# Patient Record
Sex: FEMALE | Race: WHITE | Employment: FULL TIME | URBAN - NONMETROPOLITAN AREA
[De-identification: names, ages, dates, MRNs, and addresses within clinical notes are randomized per-mention and may not be internally consistent; named-entity substitution may affect disease eponyms.]

---

## 2017-03-20 ENCOUNTER — OFFICE VISIT (OUTPATIENT)
Dept: FAMILY MEDICINE CLINIC | Age: 62
End: 2017-03-20

## 2017-03-20 VITALS
DIASTOLIC BLOOD PRESSURE: 80 MMHG | TEMPERATURE: 98.7 F | BODY MASS INDEX: 32.61 KG/M2 | WEIGHT: 190 LBS | SYSTOLIC BLOOD PRESSURE: 124 MMHG

## 2017-03-20 DIAGNOSIS — J01.00 SUBACUTE MAXILLARY SINUSITIS: Primary | ICD-10-CM

## 2017-03-20 PROCEDURE — G8417 CALC BMI ABV UP PARAM F/U: HCPCS | Performed by: FAMILY MEDICINE

## 2017-03-20 PROCEDURE — G8484 FLU IMMUNIZE NO ADMIN: HCPCS | Performed by: FAMILY MEDICINE

## 2017-03-20 PROCEDURE — 4004F PT TOBACCO SCREEN RCVD TLK: CPT | Performed by: FAMILY MEDICINE

## 2017-03-20 PROCEDURE — 99212 OFFICE O/P EST SF 10 MIN: CPT | Performed by: FAMILY MEDICINE

## 2017-03-20 PROCEDURE — 3017F COLORECTAL CA SCREEN DOC REV: CPT | Performed by: FAMILY MEDICINE

## 2017-03-20 PROCEDURE — 3014F SCREEN MAMMO DOC REV: CPT | Performed by: FAMILY MEDICINE

## 2017-03-20 PROCEDURE — G8427 DOCREV CUR MEDS BY ELIG CLIN: HCPCS | Performed by: FAMILY MEDICINE

## 2017-03-20 RX ORDER — DOXYCYCLINE HYCLATE 100 MG/1
100 CAPSULE ORAL 2 TIMES DAILY
Qty: 20 CAPSULE | Refills: 0 | Status: SHIPPED | OUTPATIENT
Start: 2017-03-20 | End: 2017-03-30

## 2017-03-20 RX ORDER — ALBUTEROL SULFATE 2.5 MG/3ML
2.5 SOLUTION RESPIRATORY (INHALATION) EVERY 6 HOURS PRN
Qty: 2 PACKAGE | Refills: 1 | Status: SHIPPED | OUTPATIENT
Start: 2017-03-20 | End: 2019-08-28 | Stop reason: SDUPTHER

## 2017-07-07 ENCOUNTER — OFFICE VISIT (OUTPATIENT)
Dept: FAMILY MEDICINE CLINIC | Age: 62
End: 2017-07-07

## 2017-07-07 VITALS
DIASTOLIC BLOOD PRESSURE: 90 MMHG | WEIGHT: 198 LBS | HEART RATE: 60 BPM | SYSTOLIC BLOOD PRESSURE: 172 MMHG | HEIGHT: 64 IN | BODY MASS INDEX: 33.8 KG/M2

## 2017-07-07 DIAGNOSIS — I10 ESSENTIAL HYPERTENSION: ICD-10-CM

## 2017-07-07 DIAGNOSIS — E78.00 PURE HYPERCHOLESTEROLEMIA: Chronic | ICD-10-CM

## 2017-07-07 DIAGNOSIS — Z00.01 ENCOUNTER FOR WELL ADULT EXAM WITH ABNORMAL FINDINGS: Primary | ICD-10-CM

## 2017-07-07 DIAGNOSIS — F17.200 SMOKER: ICD-10-CM

## 2017-07-07 DIAGNOSIS — M19.90 ARTHRITIS: ICD-10-CM

## 2017-07-07 PROCEDURE — 99396 PREV VISIT EST AGE 40-64: CPT | Performed by: FAMILY MEDICINE

## 2017-07-07 RX ORDER — FLUOXETINE HYDROCHLORIDE 20 MG/1
20 CAPSULE ORAL DAILY
Qty: 90 CAPSULE | Refills: 1 | Status: SHIPPED | OUTPATIENT
Start: 2017-07-07 | End: 2018-01-08 | Stop reason: SDUPTHER

## 2017-07-07 RX ORDER — LOSARTAN POTASSIUM 100 MG/1
100 TABLET ORAL DAILY
Qty: 90 TABLET | Refills: 1 | Status: SHIPPED | OUTPATIENT
Start: 2017-07-07 | End: 2018-01-08 | Stop reason: SDUPTHER

## 2017-07-07 RX ORDER — LOVASTATIN 20 MG/1
20 TABLET ORAL NIGHTLY
Qty: 90 TABLET | Refills: 1 | Status: SHIPPED | OUTPATIENT
Start: 2017-07-07 | End: 2018-01-08 | Stop reason: SDUPTHER

## 2017-07-07 ASSESSMENT — ENCOUNTER SYMPTOMS
BLOOD IN STOOL: 0
DIARRHEA: 0
COUGH: 0
BACK PAIN: 0
BLURRED VISION: 0
SHORTNESS OF BREATH: 0
ABDOMINAL PAIN: 0
EYE REDNESS: 0
CONSTIPATION: 0

## 2017-07-11 ENCOUNTER — TELEPHONE (OUTPATIENT)
Dept: FAMILY MEDICINE CLINIC | Age: 62
End: 2017-07-11

## 2017-07-11 LAB
ALBUMIN SERPL-MCNC: 4.6 G/DL (ref 3.2–5.3)
ALK PHOSPHATASE: 72 IU/L (ref 35–121)
ALT SERPL-CCNC: 16 IU/L (ref 5–59)
ANION GAP SERPL CALCULATED.3IONS-SCNC: 13 MMOL/L
AST SERPL-CCNC: 20 IU/L (ref 10–42)
BILIRUB SERPL-MCNC: 0.4 MG/DL (ref 0.2–1.3)
BUN BLDV-MCNC: 15 MG/DL (ref 10–20)
CALCIUM SERPL-MCNC: 10 MG/DL (ref 8.7–10.8)
CHLORIDE BLD-SCNC: 102 MMOL/L (ref 95–111)
CHOLESTEROL/HDL RATIO: 3.4
CHOLESTEROL: 210 MG/DL
CO2: 29 MMOL/L (ref 21–32)
CREAT SERPL-MCNC: 0.9 MG/DL (ref 0.5–1.3)
EGFR AFRICAN AMERICAN: 77
EGFR IF NONAFRICAN AMERICAN: 63
GLUCOSE: 111 MG/DL (ref 70–100)
HDLC SERPL-MCNC: 62 MG/DL (ref 40–60)
LDL CHOLESTEROL CALCULATED: 113 MG/DL
LDL/HDL RATIO: 1.8
POTASSIUM SERPL-SCNC: 4.5 MMOL/L (ref 3.5–5.4)
SODIUM BLD-SCNC: 139 MMOL/L (ref 134–147)
TOTAL PROTEIN: 7 G/DL (ref 5.8–8)
TRIGL SERPL-MCNC: 177 MG/DL
VLDLC SERPL CALC-MCNC: 35 MG/DL

## 2017-07-14 ENCOUNTER — NURSE ONLY (OUTPATIENT)
Dept: FAMILY MEDICINE CLINIC | Age: 62
End: 2017-07-14

## 2017-07-14 VITALS — DIASTOLIC BLOOD PRESSURE: 78 MMHG | SYSTOLIC BLOOD PRESSURE: 138 MMHG

## 2017-07-14 DIAGNOSIS — Z01.30 BLOOD PRESSURE CHECK: Primary | ICD-10-CM

## 2017-09-14 ENCOUNTER — OFFICE VISIT (OUTPATIENT)
Dept: FAMILY MEDICINE CLINIC | Age: 62
End: 2017-09-14
Payer: COMMERCIAL

## 2017-09-14 VITALS
SYSTOLIC BLOOD PRESSURE: 142 MMHG | TEMPERATURE: 98.8 F | HEART RATE: 98 BPM | WEIGHT: 203 LBS | OXYGEN SATURATION: 98 % | DIASTOLIC BLOOD PRESSURE: 80 MMHG | BODY MASS INDEX: 34.84 KG/M2

## 2017-09-14 DIAGNOSIS — I10 ESSENTIAL HYPERTENSION: ICD-10-CM

## 2017-09-14 DIAGNOSIS — J20.9 ACUTE BRONCHITIS, UNSPECIFIED ORGANISM: Primary | ICD-10-CM

## 2017-09-14 DIAGNOSIS — F17.200 SMOKER: ICD-10-CM

## 2017-09-14 PROCEDURE — G8427 DOCREV CUR MEDS BY ELIG CLIN: HCPCS | Performed by: FAMILY MEDICINE

## 2017-09-14 PROCEDURE — 99213 OFFICE O/P EST LOW 20 MIN: CPT | Performed by: FAMILY MEDICINE

## 2017-09-14 PROCEDURE — 4004F PT TOBACCO SCREEN RCVD TLK: CPT | Performed by: FAMILY MEDICINE

## 2017-09-14 PROCEDURE — 3017F COLORECTAL CA SCREEN DOC REV: CPT | Performed by: FAMILY MEDICINE

## 2017-09-14 PROCEDURE — 3014F SCREEN MAMMO DOC REV: CPT | Performed by: FAMILY MEDICINE

## 2017-09-14 PROCEDURE — G8417 CALC BMI ABV UP PARAM F/U: HCPCS | Performed by: FAMILY MEDICINE

## 2017-09-14 RX ORDER — PREDNISONE 20 MG/1
40 TABLET ORAL DAILY
Qty: 10 TABLET | Refills: 0 | Status: SHIPPED | OUTPATIENT
Start: 2017-09-14 | End: 2017-09-19

## 2017-09-14 RX ORDER — AZITHROMYCIN 250 MG/1
TABLET, FILM COATED ORAL
Qty: 1 PACKET | Refills: 0 | Status: SHIPPED | OUTPATIENT
Start: 2017-09-14 | End: 2018-01-26

## 2017-09-14 ASSESSMENT — ENCOUNTER SYMPTOMS
WHEEZING: 0
COUGH: 1
SHORTNESS OF BREATH: 1

## 2018-01-08 DIAGNOSIS — I10 ESSENTIAL HYPERTENSION: ICD-10-CM

## 2018-01-08 DIAGNOSIS — E78.00 PURE HYPERCHOLESTEROLEMIA: Chronic | ICD-10-CM

## 2018-01-08 RX ORDER — FLUOXETINE HYDROCHLORIDE 20 MG/1
20 CAPSULE ORAL DAILY
Qty: 30 CAPSULE | Refills: 0 | Status: SHIPPED | OUTPATIENT
Start: 2018-01-08 | End: 2018-01-30 | Stop reason: SDUPTHER

## 2018-01-08 RX ORDER — LOSARTAN POTASSIUM 100 MG/1
100 TABLET ORAL DAILY
Qty: 30 TABLET | Refills: 0 | Status: SHIPPED | OUTPATIENT
Start: 2018-01-08 | End: 2018-01-30 | Stop reason: SDUPTHER

## 2018-01-08 RX ORDER — LOVASTATIN 20 MG/1
20 TABLET ORAL NIGHTLY
Qty: 30 TABLET | Refills: 0 | Status: SHIPPED | OUTPATIENT
Start: 2018-01-08 | End: 2018-01-30 | Stop reason: DRUGHIGH

## 2018-01-08 NOTE — TELEPHONE ENCOUNTER
Lizzeth Givens called requesting a refill on the following medications:  Requested Prescriptions     Pending Prescriptions Disp Refills    losartan (COZAAR) 100 MG tablet 90 tablet 1     Sig: Take 1 tablet by mouth daily    FLUoxetine (PROZAC) 20 MG capsule 90 capsule 1     Sig: Take 1 capsule by mouth daily    lovastatin (MEVACOR) 20 MG tablet 90 tablet 1     Sig: Take 1 tablet by mouth nightly     Pharmacy verified:  .pv      Date of last visit: 09/14/17  Date of next visit (if applicable): 8/59/0262    [facility]

## 2018-01-18 ENCOUNTER — TELEPHONE (OUTPATIENT)
Dept: FAMILY MEDICINE CLINIC | Age: 63
End: 2018-01-18

## 2018-01-18 NOTE — TELEPHONE ENCOUNTER
Called and left a message for patient to call us if he had a colonscopy and and who might have done it so we can get a report

## 2018-01-26 ENCOUNTER — OFFICE VISIT (OUTPATIENT)
Dept: FAMILY MEDICINE CLINIC | Age: 63
End: 2018-01-26
Payer: COMMERCIAL

## 2018-01-26 VITALS
DIASTOLIC BLOOD PRESSURE: 86 MMHG | SYSTOLIC BLOOD PRESSURE: 156 MMHG | HEIGHT: 64 IN | BODY MASS INDEX: 36.37 KG/M2 | WEIGHT: 213 LBS | HEART RATE: 70 BPM

## 2018-01-26 DIAGNOSIS — M15.9 PRIMARY OSTEOARTHRITIS INVOLVING MULTIPLE JOINTS: ICD-10-CM

## 2018-01-26 DIAGNOSIS — Z12.31 ENCOUNTER FOR SCREENING MAMMOGRAM FOR BREAST CANCER: ICD-10-CM

## 2018-01-26 DIAGNOSIS — E78.00 PURE HYPERCHOLESTEROLEMIA: ICD-10-CM

## 2018-01-26 DIAGNOSIS — Z12.11 SCREENING FOR COLON CANCER: ICD-10-CM

## 2018-01-26 DIAGNOSIS — I10 ESSENTIAL HYPERTENSION: Primary | ICD-10-CM

## 2018-01-26 PROCEDURE — 4004F PT TOBACCO SCREEN RCVD TLK: CPT | Performed by: FAMILY MEDICINE

## 2018-01-26 PROCEDURE — G8417 CALC BMI ABV UP PARAM F/U: HCPCS | Performed by: FAMILY MEDICINE

## 2018-01-26 PROCEDURE — 99214 OFFICE O/P EST MOD 30 MIN: CPT | Performed by: FAMILY MEDICINE

## 2018-01-26 PROCEDURE — G8484 FLU IMMUNIZE NO ADMIN: HCPCS | Performed by: FAMILY MEDICINE

## 2018-01-26 PROCEDURE — 3014F SCREEN MAMMO DOC REV: CPT | Performed by: FAMILY MEDICINE

## 2018-01-26 PROCEDURE — 3017F COLORECTAL CA SCREEN DOC REV: CPT | Performed by: FAMILY MEDICINE

## 2018-01-26 PROCEDURE — G8427 DOCREV CUR MEDS BY ELIG CLIN: HCPCS | Performed by: FAMILY MEDICINE

## 2018-01-26 RX ORDER — MELOXICAM 15 MG/1
15 TABLET ORAL DAILY
Qty: 90 TABLET | Refills: 0 | Status: SHIPPED | OUTPATIENT
Start: 2018-01-26 | End: 2018-02-23

## 2018-01-26 RX ORDER — GLUCOSAMINE HCL 500 MG
TABLET ORAL
COMMUNITY
End: 2022-03-25

## 2018-01-26 RX ORDER — AMLODIPINE BESYLATE 10 MG/1
10 TABLET ORAL DAILY
Qty: 90 TABLET | Refills: 0 | Status: SHIPPED | OUTPATIENT
Start: 2018-01-26 | End: 2018-02-23 | Stop reason: SDUPTHER

## 2018-01-26 ASSESSMENT — ENCOUNTER SYMPTOMS
EYE DISCHARGE: 0
COLOR CHANGE: 0
CHEST TIGHTNESS: 0
SHORTNESS OF BREATH: 0
VOMITING: 0
EYE REDNESS: 0
NAUSEA: 0

## 2018-01-26 ASSESSMENT — PATIENT HEALTH QUESTIONNAIRE - PHQ9
SUM OF ALL RESPONSES TO PHQ QUESTIONS 1-9: 0
2. FEELING DOWN, DEPRESSED OR HOPELESS: 0
SUM OF ALL RESPONSES TO PHQ9 QUESTIONS 1 & 2: 0
1. LITTLE INTEREST OR PLEASURE IN DOING THINGS: 0

## 2018-01-26 NOTE — PROGRESS NOTES
Take 1 tablet by mouth daily, Disp: 90 tablet, Rfl: 0    losartan (COZAAR) 100 MG tablet, Take 1 tablet by mouth daily, Disp: 30 tablet, Rfl: 0    FLUoxetine (PROZAC) 20 MG capsule, Take 1 capsule by mouth daily, Disp: 30 capsule, Rfl: 0    lovastatin (MEVACOR) 20 MG tablet, Take 1 tablet by mouth nightly, Disp: 30 tablet, Rfl: 0    albuterol (PROVENTIL) (2.5 MG/3ML) 0.083% nebulizer solution, Take 3 mLs by nebulization every 6 hours as needed for Wheezing, Disp: 2 Package, Rfl: 1    calcium carbonate (OSCAL) 500 MG TABS tablet, Take 500 mg by mouth daily, Disp: , Rfl:     calcium citrate-vitamin D (CITRICAL + D) 315-250 MG-UNIT TABS per tablet, Take 1 tablet by mouth daily, Disp: , Rfl:     albuterol sulfate HFA (PROVENTIL HFA) 108 (90 BASE) MCG/ACT inhaler, Inhale 2 puffs into the lungs every 4 hours as needed for Wheezing or Shortness of Breath, Disp: 1 Inhaler, Rfl: 0    Multiple Vitamins-Minerals (CENTRUM SILVER) TABS, Take 1 tablet by mouth daily. , Disp: , Rfl:     Allergies   Allergen Reactions    Apresoline [Hydralazine]      Patient had a bronchopneumonia and blames the apresoline    Enalapril      Cough.  Hctz [Hydrochlorothiazide]      Cough    Sulfa Antibiotics Hives    Tenex [Guanfacine Hcl]      Cough       Subjective:      Review of Systems   Constitutional: Negative for chills and fever. Eyes: Negative for discharge and redness. Respiratory: Negative for chest tightness and shortness of breath. Cardiovascular: Negative for chest pain and palpitations. Gastrointestinal: Negative for nausea and vomiting. Genitourinary: Negative for difficulty urinating and dysuria. Musculoskeletal: Positive for arthralgias. Negative for myalgias. Skin: Negative for color change and pallor. Neurological: Negative for dizziness and headaches. Psychiatric/Behavioral: Negative for dysphoric mood. The patient is not nervous/anxious.         Objective:     BP (!) 156/86 (Site: Left Arm, Position: Sitting)   Pulse 70   Ht 5' 4\" (1.626 m)   Wt 213 lb (96.6 kg)   BMI 36.56 kg/m²     Physical Exam   Constitutional: She is oriented to person, place, and time. She appears well-developed and well-nourished. No distress. HENT:   Head: Normocephalic and atraumatic. Nose: Nose normal.   Eyes: Conjunctivae and EOM are normal.   Neck: Normal range of motion. Neck supple. Cardiovascular: Normal rate and regular rhythm. Pulmonary/Chest: Effort normal and breath sounds normal. No respiratory distress. She has no wheezes. Abdominal: Soft. Bowel sounds are normal.   Neurological: She is alert and oriented to person, place, and time. Skin: Skin is warm and dry. No rash noted. No erythema. Psychiatric: She has a normal mood and affect. Her behavior is normal.   Vitals reviewed. Assessment/Plan:     Gadiel Soto was seen today for 6 month follow-up, hyperlipidemia and hypertension. Diagnoses and all orders for this visit:    Essential hypertension  -     amLODIPine (NORVASC) 10 MG tablet; Take 1 tablet by mouth daily    Pure hypercholesterolemia    Primary osteoarthritis involving multiple joints  -     meloxicam (MOBIC) 15 MG tablet; Take 1 tablet by mouth daily    Screening for colon cancer  -     POCT Fecal Immunochemical Test (FIT); Future    Encounter for screening mammogram for breast cancer  -     BERNARD DIGITAL SCREEN W CAD BILATERAL; Future    Blood pressure is elevated so will add on Amlodipine 10 mg daily. A healthy diet and routine physical activity advised. Added Mobic for arthritis. Orders for mammogram and FIT given. Kalpana received counseling on the following healthy behaviors: nutrition, exercise and medication adherence  Reviewed prior labs and health maintenance  Continue current medications, diet and exercise. Discussed use, benefit, and side effects of prescribed medications. Barriers to medication compliance addressed.    Patient given educational materials - see patient

## 2018-01-26 NOTE — PATIENT INSTRUCTIONS
arms.  ¨ Lightheadedness or sudden weakness. ¨ A fast or irregular heartbeat. ? · You have symptoms of a stroke. These may include:  ¨ Sudden numbness, tingling, weakness, or loss of movement in your face, arm, or leg, especially on only one side of your body. ¨ Sudden vision changes. ¨ Sudden trouble speaking. ¨ Sudden confusion or trouble understanding simple statements. ¨ Sudden problems with walking or balance. ¨ A sudden, severe headache that is different from past headaches. ? · You have severe back or belly pain. ?Do not wait until your blood pressure comes down on its own. Get help right away. ?Call your doctor now or seek immediate care if:  ? · Your blood pressure is much higher than normal (such as 180/110 or higher), but you don't have symptoms. ? · You think high blood pressure is causing symptoms, such as:  ¨ Severe headache. ¨ Blurry vision. ? Watch closely for changes in your health, and be sure to contact your doctor if:  ? · Your blood pressure measures 140/90 or higher at least 2 times. That means the top number is 140 or higher or the bottom number is 90 or higher, or both. ? · You think you may be having side effects from your blood pressure medicine. ? · Your blood pressure is usually normal, but it goes above normal at least 2 times. Where can you learn more? Go to [email or web address] org and sign in to your [employer] account. Enter K771 in the [facility] box to learn more about \"High Blood Pressure: Care Instructions. \"     If you do not have an account, please click on the \"Sign Up Now\" link. Current as of: Migdalia 10, 2017  Content Version: 11.5  © 8918-8619 Healthwise, [employer]. Care instructions adapted under license by [facility] ([facility]).  If you have questions about a medical condition or this instruction, always ask your healthcare professional. Edwina Salgado any warranty or liability for your use of this

## 2018-01-30 ENCOUNTER — OFFICE VISIT (OUTPATIENT)
Dept: FAMILY MEDICINE CLINIC | Age: 63
End: 2018-01-30
Payer: COMMERCIAL

## 2018-01-30 VITALS
DIASTOLIC BLOOD PRESSURE: 80 MMHG | TEMPERATURE: 99.5 F | BODY MASS INDEX: 36.37 KG/M2 | SYSTOLIC BLOOD PRESSURE: 148 MMHG | HEIGHT: 64 IN | WEIGHT: 213 LBS | HEART RATE: 78 BPM

## 2018-01-30 DIAGNOSIS — I10 ESSENTIAL HYPERTENSION: ICD-10-CM

## 2018-01-30 DIAGNOSIS — E78.00 PURE HYPERCHOLESTEROLEMIA: Chronic | ICD-10-CM

## 2018-01-30 DIAGNOSIS — J10.1 INFLUENZA B: Primary | ICD-10-CM

## 2018-01-30 DIAGNOSIS — R68.89 FLU-LIKE SYMPTOMS: ICD-10-CM

## 2018-01-30 PROCEDURE — 87804 INFLUENZA ASSAY W/OPTIC: CPT | Performed by: FAMILY MEDICINE

## 2018-01-30 PROCEDURE — G8417 CALC BMI ABV UP PARAM F/U: HCPCS | Performed by: FAMILY MEDICINE

## 2018-01-30 PROCEDURE — G8484 FLU IMMUNIZE NO ADMIN: HCPCS | Performed by: FAMILY MEDICINE

## 2018-01-30 PROCEDURE — 4004F PT TOBACCO SCREEN RCVD TLK: CPT | Performed by: FAMILY MEDICINE

## 2018-01-30 PROCEDURE — 3014F SCREEN MAMMO DOC REV: CPT | Performed by: FAMILY MEDICINE

## 2018-01-30 PROCEDURE — G8427 DOCREV CUR MEDS BY ELIG CLIN: HCPCS | Performed by: FAMILY MEDICINE

## 2018-01-30 PROCEDURE — 3017F COLORECTAL CA SCREEN DOC REV: CPT | Performed by: FAMILY MEDICINE

## 2018-01-30 PROCEDURE — 99213 OFFICE O/P EST LOW 20 MIN: CPT | Performed by: FAMILY MEDICINE

## 2018-01-30 RX ORDER — LOSARTAN POTASSIUM 100 MG/1
100 TABLET ORAL DAILY
Qty: 30 TABLET | Refills: 0 | Status: SHIPPED | OUTPATIENT
Start: 2018-01-30 | End: 2018-02-23 | Stop reason: SDUPTHER

## 2018-01-30 RX ORDER — OSELTAMIVIR PHOSPHATE 75 MG/1
75 CAPSULE ORAL 2 TIMES DAILY
Qty: 10 CAPSULE | Refills: 0 | Status: SHIPPED | OUTPATIENT
Start: 2018-01-30 | End: 2018-02-04

## 2018-01-30 RX ORDER — LOVASTATIN 20 MG/1
20 TABLET ORAL NIGHTLY
Qty: 30 TABLET | Refills: 5 | Status: SHIPPED | OUTPATIENT
Start: 2018-01-30 | End: 2018-02-16 | Stop reason: SDUPTHER

## 2018-01-30 RX ORDER — FLUOXETINE HYDROCHLORIDE 20 MG/1
20 CAPSULE ORAL DAILY
Qty: 30 CAPSULE | Refills: 0 | Status: SHIPPED | OUTPATIENT
Start: 2018-01-30 | End: 2018-02-23 | Stop reason: SDUPTHER

## 2018-01-30 ASSESSMENT — ENCOUNTER SYMPTOMS
CHEST TIGHTNESS: 0
EYE REDNESS: 0
EYE DISCHARGE: 0
NAUSEA: 0
COUGH: 1
COLOR CHANGE: 0
SHORTNESS OF BREATH: 0
VOMITING: 0

## 2018-02-16 ENCOUNTER — NURSE ONLY (OUTPATIENT)
Dept: FAMILY MEDICINE CLINIC | Age: 63
End: 2018-02-16

## 2018-02-16 VITALS — SYSTOLIC BLOOD PRESSURE: 142 MMHG | DIASTOLIC BLOOD PRESSURE: 70 MMHG | HEART RATE: 84 BPM

## 2018-02-16 DIAGNOSIS — Z01.30 BLOOD PRESSURE CHECK: Primary | ICD-10-CM

## 2018-02-16 DIAGNOSIS — Z12.11 SCREENING FOR COLON CANCER: ICD-10-CM

## 2018-02-16 DIAGNOSIS — E78.00 PURE HYPERCHOLESTEROLEMIA: Chronic | ICD-10-CM

## 2018-02-16 LAB
CONTROL: PRESENT
HEMOCCULT STL QL: NEGATIVE

## 2018-02-16 PROCEDURE — 82274 ASSAY TEST FOR BLOOD FECAL: CPT | Performed by: FAMILY MEDICINE

## 2018-02-16 NOTE — TELEPHONE ENCOUNTER
John Mitchell called requesting a refill on the following medications:  Requested Prescriptions     Pending Prescriptions Disp Refills    lovastatin (MEVACOR) 20 MG tablet 30 tablet 5     Sig: Take 1 tablet by mouth nightly       Date of last visit: 1/30/2018  Date of next visit (if applicable):Visit date not found  Date of last refill: ***  Pharmacy Name: ***      Inderjit Nelson, [address] [place] [address]

## 2018-02-17 RX ORDER — LOVASTATIN 20 MG/1
20 TABLET ORAL NIGHTLY
Qty: 30 TABLET | Refills: 5 | Status: SHIPPED | OUTPATIENT
Start: 2018-02-17 | End: 2018-02-23 | Stop reason: SDUPTHER

## 2018-02-23 ENCOUNTER — NURSE ONLY (OUTPATIENT)
Dept: FAMILY MEDICINE CLINIC | Age: 63
End: 2018-02-23

## 2018-02-23 ENCOUNTER — HOSPITAL ENCOUNTER (OUTPATIENT)
Dept: MAMMOGRAPHY | Age: 63
Discharge: HOME | End: 2018-02-23
Payer: COMMERCIAL

## 2018-02-23 VITALS — SYSTOLIC BLOOD PRESSURE: 134 MMHG | HEART RATE: 60 BPM | DIASTOLIC BLOOD PRESSURE: 78 MMHG

## 2018-02-23 DIAGNOSIS — I10 ESSENTIAL HYPERTENSION: ICD-10-CM

## 2018-02-23 DIAGNOSIS — Z01.30 BLOOD PRESSURE CHECK: Primary | ICD-10-CM

## 2018-02-23 DIAGNOSIS — Z12.31 ENCOUNTER FOR SCREENING MAMMOGRAM FOR BREAST CANCER: ICD-10-CM

## 2018-02-23 DIAGNOSIS — E78.00 PURE HYPERCHOLESTEROLEMIA: Chronic | ICD-10-CM

## 2018-02-23 PROCEDURE — 77067 SCR MAMMO BI INCL CAD: CPT

## 2018-02-23 RX ORDER — LOSARTAN POTASSIUM 100 MG/1
100 TABLET ORAL DAILY
Qty: 90 TABLET | Refills: 1 | Status: SHIPPED | OUTPATIENT
Start: 2018-02-23 | End: 2018-08-03 | Stop reason: SDUPTHER

## 2018-02-23 RX ORDER — AMLODIPINE BESYLATE 10 MG/1
10 TABLET ORAL DAILY
Qty: 90 TABLET | Refills: 0 | Status: SHIPPED | OUTPATIENT
Start: 2018-02-23 | End: 2018-07-13 | Stop reason: SDUPTHER

## 2018-02-23 RX ORDER — FLUOXETINE HYDROCHLORIDE 20 MG/1
20 CAPSULE ORAL DAILY
Qty: 90 CAPSULE | Refills: 1 | Status: SHIPPED | OUTPATIENT
Start: 2018-02-23 | End: 2018-08-03 | Stop reason: SDUPTHER

## 2018-02-23 RX ORDER — LOVASTATIN 20 MG/1
20 TABLET ORAL NIGHTLY
Qty: 90 TABLET | Refills: 1 | Status: SHIPPED | OUTPATIENT
Start: 2018-02-23 | End: 2018-08-03 | Stop reason: SDUPTHER

## 2018-02-23 NOTE — PROGRESS NOTES
Patient came in for a no-charge BP check. It was 134/78-60 today. She also wanted to know if there is something else she can use for her psoriatic arthritis. The Meloxicam made here sleepy and she is better that she is off , but wonders if there is something else she can try. Also she needs refills of three of her medications to [facility]. Dr. Yani Ramirez reviewed her list and BP. She recommended [place] use Tylenol Arthirtis formula, following the directions listed on the bottle for dosage limitations.

## 2018-03-09 ENCOUNTER — TELEPHONE (OUTPATIENT)
Dept: FAMILY MEDICINE CLINIC | Age: 63
End: 2018-03-09

## 2018-07-13 DIAGNOSIS — I10 ESSENTIAL HYPERTENSION: ICD-10-CM

## 2018-07-13 RX ORDER — AMLODIPINE BESYLATE 10 MG/1
10 TABLET ORAL DAILY
Qty: 90 TABLET | Refills: 0 | Status: SHIPPED | OUTPATIENT
Start: 2018-07-13 | End: 2018-08-03

## 2018-07-13 NOTE — TELEPHONE ENCOUNTER
7/13/18   Janet Braga called requesting a refill on the following medications:  Requested Prescriptions     Pending Prescriptions Disp Refills    amLODIPine (NORVASC) 10 MG tablet 90 tablet 0     Sig: Take 1 tablet by mouth daily     Pharmacy verified: [facility]  . pv      Date of last visit:  1/30/18  Date of next visit (if applicable): 9/7/49  blm

## 2018-08-03 ENCOUNTER — OFFICE VISIT (OUTPATIENT)
Dept: FAMILY MEDICINE CLINIC | Age: 63
End: 2018-08-03
Payer: COMMERCIAL

## 2018-08-03 VITALS
HEIGHT: 64 IN | HEART RATE: 84 BPM | DIASTOLIC BLOOD PRESSURE: 82 MMHG | WEIGHT: 228.2 LBS | SYSTOLIC BLOOD PRESSURE: 160 MMHG | BODY MASS INDEX: 38.96 KG/M2

## 2018-08-03 DIAGNOSIS — I10 ESSENTIAL HYPERTENSION: ICD-10-CM

## 2018-08-03 DIAGNOSIS — Z00.00 WELL ADULT EXAM: Primary | ICD-10-CM

## 2018-08-03 DIAGNOSIS — E78.00 PURE HYPERCHOLESTEROLEMIA: Chronic | ICD-10-CM

## 2018-08-03 PROCEDURE — 99396 PREV VISIT EST AGE 40-64: CPT | Performed by: FAMILY MEDICINE

## 2018-08-03 RX ORDER — LOSARTAN POTASSIUM 100 MG/1
100 TABLET ORAL DAILY
Qty: 90 TABLET | Refills: 1 | Status: SHIPPED | OUTPATIENT
Start: 2018-08-03 | End: 2019-03-08 | Stop reason: SDUPTHER

## 2018-08-03 RX ORDER — LOVASTATIN 20 MG/1
20 TABLET ORAL NIGHTLY
Qty: 90 TABLET | Refills: 1 | Status: SHIPPED | OUTPATIENT
Start: 2018-08-03 | End: 2019-03-08 | Stop reason: SDUPTHER

## 2018-08-03 RX ORDER — FLUOXETINE HYDROCHLORIDE 20 MG/1
20 CAPSULE ORAL DAILY
Qty: 90 CAPSULE | Refills: 1 | Status: SHIPPED | OUTPATIENT
Start: 2018-08-03 | End: 2019-03-08 | Stop reason: SDUPTHER

## 2018-08-03 ASSESSMENT — ENCOUNTER SYMPTOMS
DIARRHEA: 0
CONSTIPATION: 0
EYE DISCHARGE: 0
COUGH: 0
BLOOD IN STOOL: 0
SHORTNESS OF BREATH: 0
EYE REDNESS: 0
VOMITING: 0
NAUSEA: 0

## 2018-08-03 NOTE — PROGRESS NOTES
[address] [place] [address] Drive, [facility], [place]  Phone:  [phone]  Fax:  [phone]      [facility] [address]       Name: Orlean Lefort  : 1955          Chief Complaint:     Chief Complaint   Patient presents with   [facility] Program       History of Present Illness:      Orlean Lefort is a 60 y.o.  female who presents today for a health maintenance examination. Health Maintenance  Smoker?:  Yes; not ready to quit and doesn't want low-dose lung CT because she doesn't want to know if she has lung cancer  Healthy diet?:  No  Routine physical activity?:  No  Routine eye exams?:  Yes  Routine dental exams?:  No, has dentures  Last Mammogram?:  2018  Last colon CA screening?:  2012  Last PAP?:  2 years ago--was WNL; h/o MILES exposure  Living will?:  Yes      Past Medical History:     Past Medical History:   Diagnosis Date    COPD (chronic obstructive pulmonary disease) ([facility] [address]     Hyperlipidemia     Hypertension     Osteoarthritis     Smoker     Urticaria         Past Surgical History:     Past Surgical History:   Procedure Laterality Date    DILATION AND CURETTAGE OF UTERUS      KNEE ARTHROSCOPY          Medications:       Outpatient Medications Prior to Visit   Medication Sig Dispense Refill    Cholecalciferol (VITAMIN D3) 3000 units TABS Take by mouth      albuterol (PROVENTIL) (2.5 MG/3ML) 0.083% nebulizer solution Take 3 mLs by nebulization every 6 hours as needed for Wheezing 2 Package 1    calcium carbonate (OSCAL) 500 MG TABS tablet Take 500 mg by mouth daily      calcium citrate-vitamin D (CITRICAL + D) 315-250 MG-UNIT TABS per tablet Take 1 tablet by mouth daily      albuterol sulfate HFA (PROVENTIL HFA) 108 (90 BASE) MCG/ACT inhaler Inhale 2 puffs into the lungs every 4 hours as needed for Wheezing or Shortness of Breath 1 Inhaler 0    Multiple Vitamins-Minerals (CENTRUM SILVER) TABS Take 1 tablet by mouth daily.       amLODIPine (NORVASC) 10 MG tablet (!) 160/80, pulse 84, height 5' 4\" (1.626 m), weight 228 lb 3.2 oz (103.5 kg). General appearance:  Alert, well appearing, and in no acute distress. Mental status:  Oriented to person, place, and time with normal affect. Head:  Normocephalic and atraumatic. Eyes:   Extraocular eye movements intact, conjunctiva clear. Ears:  Hearing appears to be intact. Nose:  No drainage noted. Mouth:  Mucous membranes moist.  Neck:  Supple, no carotid bruits, thyroid not palpable. Back:  Symmetric. No tenderness. Chest Wall:  No tenderness. Heart:  Normal rate, regular rhythm, no murmurs. Lungs:  Clear to auscultation bilaterally. Respirations unlabored. Abdomen:  Soft, nondistended, bowel sounds present all four quadrants. No masses, hepatomegaly, or splenomegaly. Neurological:  Normal speech. Extremities:  Peripheral pulses palpable. 1+ pitting edema bilateral lower extremities. Skin:  No gross lesions, rashes, or induration noted. Assesment:      Diagnosis Orders   1. Well adult exam  Lipid Panel    Comprehensive Metabolic Panel   2. Pure hypercholesterolemia  lovastatin (MEVACOR) 20 MG tablet   3. Essential hypertension  losartan (COZAAR) 100 MG tablet       Plan:     Return in about 1 week (around 8/10/2018) for nurse BP check.   Orders Placed This Encounter   Medications    lovastatin (MEVACOR) 20 MG tablet     Sig: Take 1 tablet by mouth nightly     Dispense:  90 tablet     Refill:  1    losartan (COZAAR) 100 MG tablet     Sig: Take 1 tablet by mouth daily     Dispense:  90 tablet     Refill:  1    FLUoxetine (PROZAC) 20 MG capsule     Sig: Take 1 capsule by mouth daily     Dispense:  90 capsule     Refill:  1     Orders Placed This Encounter   Procedures    Lipid Panel     Standing Status:   Future     Standing Expiration Date:   8/3/2019     Order Specific Question:   Is Patient Fasting?/# of Hours     Answer:   12 hours    Comprehensive Metabolic Panel     Standing Status:   Future Standing Expiration Date:   8/3/2019         Counseling Completed:  Tobacco and secondary smoke risks reviewed; instructed on cessation and avoidance. Repeat pap every 3 - 5 years if negative for women over the age of 27. Every 3 years under 27years old. Mammograms every 1 year if age > 35 yo and last mammogram was negative. Colonoscopy screening reviewed age > 48, or < if indicated. Labs ordered, if indicated. Influenza vaccine recommended. Pneumonia vaccination if > 65 or indicated. Routine eye and dental exams advised. Exercise and healthy diet discussed. Patient is experiencing swelling in bilateral LEs so will discontinue Amlodipine and start on Metoprolol Tartrate. Return in about 1 week (around 8/10/2018) for nurse BP check.     Electronically signed by Randi Cheema MD on 8/3/2018 at 10:42 AM

## 2018-08-13 ENCOUNTER — TELEPHONE (OUTPATIENT)
Dept: FAMILY MEDICINE CLINIC | Age: 63
End: 2018-08-13

## 2018-08-13 ENCOUNTER — NURSE ONLY (OUTPATIENT)
Dept: FAMILY MEDICINE CLINIC | Age: 63
End: 2018-08-13
Payer: COMMERCIAL

## 2018-08-13 VITALS — DIASTOLIC BLOOD PRESSURE: 72 MMHG | HEART RATE: 64 BPM | SYSTOLIC BLOOD PRESSURE: 122 MMHG

## 2018-08-13 DIAGNOSIS — Z01.30 BLOOD PRESSURE CHECK: Primary | ICD-10-CM

## 2018-08-13 DIAGNOSIS — Z00.00 WELL ADULT EXAM: ICD-10-CM

## 2018-08-13 LAB
ALBUMIN SERPL-MCNC: 4.5 G/DL (ref 3.5–5.1)
ALP BLD-CCNC: 73 U/L (ref 38–126)
ALT SERPL-CCNC: 18 U/L (ref 11–66)
ANION GAP SERPL CALCULATED.3IONS-SCNC: 14 MEQ/L (ref 8–16)
AST SERPL-CCNC: 20 U/L (ref 5–40)
BILIRUB SERPL-MCNC: 0.2 MG/DL (ref 0.3–1.2)
BUN BLDV-MCNC: 19 MG/DL (ref 7–22)
CALCIUM SERPL-MCNC: 9.3 MG/DL (ref 8.5–10.5)
CHLORIDE BLD-SCNC: 102 MEQ/L (ref 98–111)
CHOLESTEROL, TOTAL: 186 MG/DL (ref 100–199)
CO2: 24 MEQ/L (ref 23–33)
CREAT SERPL-MCNC: 0.8 MG/DL (ref 0.4–1.2)
GFR SERPL CREATININE-BSD FRML MDRD: 72 ML/MIN/1.73M2
GLUCOSE BLD-MCNC: 122 MG/DL (ref 70–108)
HDLC SERPL-MCNC: 50 MG/DL
LDL CHOLESTEROL CALCULATED: 104 MG/DL
POTASSIUM SERPL-SCNC: 4.6 MEQ/L (ref 3.5–5.2)
SODIUM BLD-SCNC: 140 MEQ/L (ref 135–145)
TOTAL PROTEIN: 7.5 G/DL (ref 6.1–8)
TRIGL SERPL-MCNC: 160 MG/DL (ref 0–199)

## 2018-08-13 PROCEDURE — 36415 COLL VENOUS BLD VENIPUNCTURE: CPT | Performed by: FAMILY MEDICINE

## 2018-11-26 DIAGNOSIS — I10 ESSENTIAL HYPERTENSION: ICD-10-CM

## 2019-03-08 ENCOUNTER — HOSPITAL ENCOUNTER (OUTPATIENT)
Dept: MAMMOGRAPHY | Age: 64
Discharge: HOME | End: 2019-03-08
Payer: COMMERCIAL

## 2019-03-08 ENCOUNTER — OFFICE VISIT (OUTPATIENT)
Dept: FAMILY MEDICINE CLINIC | Age: 64
End: 2019-03-08
Payer: COMMERCIAL

## 2019-03-08 VITALS
BODY MASS INDEX: 38.07 KG/M2 | HEART RATE: 66 BPM | HEIGHT: 64 IN | SYSTOLIC BLOOD PRESSURE: 132 MMHG | DIASTOLIC BLOOD PRESSURE: 84 MMHG | WEIGHT: 223 LBS

## 2019-03-08 DIAGNOSIS — E78.00 PURE HYPERCHOLESTEROLEMIA: ICD-10-CM

## 2019-03-08 DIAGNOSIS — Z12.39 BREAST CANCER SCREENING: ICD-10-CM

## 2019-03-08 DIAGNOSIS — I10 ESSENTIAL HYPERTENSION: Primary | ICD-10-CM

## 2019-03-08 DIAGNOSIS — R73.01 ELEVATED FASTING GLUCOSE: ICD-10-CM

## 2019-03-08 LAB — HBA1C MFR BLD: 5.7 %

## 2019-03-08 PROCEDURE — 83036 HEMOGLOBIN GLYCOSYLATED A1C: CPT | Performed by: FAMILY MEDICINE

## 2019-03-08 PROCEDURE — 99214 OFFICE O/P EST MOD 30 MIN: CPT | Performed by: FAMILY MEDICINE

## 2019-03-08 PROCEDURE — G8417 CALC BMI ABV UP PARAM F/U: HCPCS | Performed by: FAMILY MEDICINE

## 2019-03-08 PROCEDURE — 3017F COLORECTAL CA SCREEN DOC REV: CPT | Performed by: FAMILY MEDICINE

## 2019-03-08 PROCEDURE — G8427 DOCREV CUR MEDS BY ELIG CLIN: HCPCS | Performed by: FAMILY MEDICINE

## 2019-03-08 PROCEDURE — 77063 BREAST TOMOSYNTHESIS BI: CPT

## 2019-03-08 PROCEDURE — 4004F PT TOBACCO SCREEN RCVD TLK: CPT | Performed by: FAMILY MEDICINE

## 2019-03-08 PROCEDURE — G8484 FLU IMMUNIZE NO ADMIN: HCPCS | Performed by: FAMILY MEDICINE

## 2019-03-08 RX ORDER — LOSARTAN POTASSIUM 100 MG/1
100 TABLET ORAL DAILY
Qty: 90 TABLET | Refills: 1 | Status: SHIPPED | OUTPATIENT
Start: 2019-03-08 | End: 2019-08-29 | Stop reason: SDUPTHER

## 2019-03-08 RX ORDER — LOVASTATIN 20 MG/1
20 TABLET ORAL NIGHTLY
Qty: 90 TABLET | Refills: 1 | Status: SHIPPED | OUTPATIENT
Start: 2019-03-08 | End: 2019-08-29 | Stop reason: SDUPTHER

## 2019-03-08 RX ORDER — FLUOXETINE HYDROCHLORIDE 20 MG/1
20 CAPSULE ORAL DAILY
Qty: 90 CAPSULE | Refills: 1 | Status: SHIPPED | OUTPATIENT
Start: 2019-03-08 | End: 2019-08-29 | Stop reason: SDUPTHER

## 2019-03-08 ASSESSMENT — PATIENT HEALTH QUESTIONNAIRE - PHQ9
1. LITTLE INTEREST OR PLEASURE IN DOING THINGS: 0
SUM OF ALL RESPONSES TO PHQ QUESTIONS 1-9: 0
SUM OF ALL RESPONSES TO PHQ QUESTIONS 1-9: 0
SUM OF ALL RESPONSES TO PHQ9 QUESTIONS 1 & 2: 0
2. FEELING DOWN, DEPRESSED OR HOPELESS: 0

## 2019-03-08 ASSESSMENT — ENCOUNTER SYMPTOMS
CHEST TIGHTNESS: 0
VOMITING: 0
EYE DISCHARGE: 0
COLOR CHANGE: 0
NAUSEA: 0
SHORTNESS OF BREATH: 0
EYE REDNESS: 0

## 2019-06-03 ENCOUNTER — TELEPHONE (OUTPATIENT)
Dept: FAMILY MEDICINE CLINIC | Age: 64
End: 2019-06-03

## 2019-06-03 RX ORDER — METOPROLOL TARTRATE 50 MG/1
25 TABLET, FILM COATED ORAL 2 TIMES DAILY
COMMUNITY
End: 2019-08-28 | Stop reason: SDUPTHER

## 2019-06-03 NOTE — TELEPHONE ENCOUNTER
Carlyle Stubbs called in stating that Adair Jaz states that when she took the 25 mg Metoprolol tablets, she stated they made her feel bad. Janelle Morris states that she was getting this from one  and they tried her on 1/2 of a 50 mg tab from another manufacture and she tolerated it well. They will use this plan.  I have changed  Her medication list.

## 2019-08-29 ENCOUNTER — OFFICE VISIT (OUTPATIENT)
Dept: FAMILY MEDICINE CLINIC | Age: 64
End: 2019-08-29
Payer: COMMERCIAL

## 2019-08-29 VITALS
BODY MASS INDEX: 38.41 KG/M2 | SYSTOLIC BLOOD PRESSURE: 116 MMHG | HEART RATE: 70 BPM | WEIGHT: 225 LBS | DIASTOLIC BLOOD PRESSURE: 74 MMHG | HEIGHT: 64 IN

## 2019-08-29 DIAGNOSIS — J43.1 PANLOBULAR EMPHYSEMA (HCC): Chronic | ICD-10-CM

## 2019-08-29 DIAGNOSIS — F32.1 CURRENT MODERATE EPISODE OF MAJOR DEPRESSIVE DISORDER WITHOUT PRIOR EPISODE (HCC): ICD-10-CM

## 2019-08-29 DIAGNOSIS — I10 ESSENTIAL HYPERTENSION: ICD-10-CM

## 2019-08-29 DIAGNOSIS — Z00.00 WELL ADULT EXAM: Primary | ICD-10-CM

## 2019-08-29 DIAGNOSIS — E78.00 PURE HYPERCHOLESTEROLEMIA: ICD-10-CM

## 2019-08-29 PROCEDURE — 99387 INIT PM E/M NEW PAT 65+ YRS: CPT | Performed by: FAMILY MEDICINE

## 2019-08-29 RX ORDER — FLUOXETINE HYDROCHLORIDE 40 MG/1
40 CAPSULE ORAL DAILY
Qty: 90 CAPSULE | Refills: 1 | Status: SHIPPED | OUTPATIENT
Start: 2019-08-29 | End: 2020-03-13 | Stop reason: SDUPTHER

## 2019-08-29 RX ORDER — METOPROLOL TARTRATE 50 MG/1
25 TABLET, FILM COATED ORAL 2 TIMES DAILY
Qty: 180 TABLET | Refills: 1 | Status: SHIPPED | OUTPATIENT
Start: 2019-08-29 | End: 2020-08-27 | Stop reason: SDUPTHER

## 2019-08-29 RX ORDER — ALBUTEROL SULFATE 90 UG/1
2 AEROSOL, METERED RESPIRATORY (INHALATION) EVERY 4 HOURS PRN
Qty: 1 INHALER | Refills: 3 | Status: SHIPPED | OUTPATIENT
Start: 2019-08-29 | End: 2020-08-27 | Stop reason: SDUPTHER

## 2019-08-29 RX ORDER — LOVASTATIN 20 MG/1
20 TABLET ORAL NIGHTLY
Qty: 90 TABLET | Refills: 1 | Status: SHIPPED | OUTPATIENT
Start: 2019-08-29 | End: 2020-03-13 | Stop reason: SDUPTHER

## 2019-08-29 RX ORDER — ALBUTEROL SULFATE 2.5 MG/3ML
2.5 SOLUTION RESPIRATORY (INHALATION) EVERY 6 HOURS PRN
Qty: 2 PACKAGE | Refills: 2 | Status: SHIPPED | OUTPATIENT
Start: 2019-08-29 | End: 2020-08-27 | Stop reason: SDUPTHER

## 2019-08-29 RX ORDER — LOSARTAN POTASSIUM 100 MG/1
100 TABLET ORAL DAILY
Qty: 90 TABLET | Refills: 1 | Status: SHIPPED | OUTPATIENT
Start: 2019-08-29 | End: 2020-03-13 | Stop reason: SDUPTHER

## 2019-08-29 ASSESSMENT — ENCOUNTER SYMPTOMS
WHEEZING: 0
COLOR CHANGE: 0
COUGH: 1
CONSTIPATION: 0
RHINORRHEA: 0
VOMITING: 0
SHORTNESS OF BREATH: 0
DIARRHEA: 0
EYE REDNESS: 0
BLOOD IN STOOL: 0
EYE DISCHARGE: 0

## 2019-08-30 ENCOUNTER — TELEPHONE (OUTPATIENT)
Dept: FAMILY MEDICINE CLINIC | Age: 64
End: 2019-08-30

## 2019-08-30 ENCOUNTER — NURSE ONLY (OUTPATIENT)
Dept: FAMILY MEDICINE CLINIC | Age: 64
End: 2019-08-30

## 2019-08-30 DIAGNOSIS — Z00.00 WELL ADULT EXAM: Primary | ICD-10-CM

## 2019-08-30 DIAGNOSIS — Z00.00 WELL ADULT EXAM: ICD-10-CM

## 2020-03-13 RX ORDER — LOSARTAN POTASSIUM 100 MG/1
100 TABLET ORAL DAILY
Qty: 90 TABLET | Refills: 1 | Status: SHIPPED | OUTPATIENT
Start: 2020-03-13 | End: 2020-08-27 | Stop reason: SDUPTHER

## 2020-03-13 RX ORDER — FLUOXETINE HYDROCHLORIDE 40 MG/1
40 CAPSULE ORAL DAILY
Qty: 90 CAPSULE | Refills: 1 | Status: SHIPPED | OUTPATIENT
Start: 2020-03-13 | End: 2020-08-27 | Stop reason: SDUPTHER

## 2020-03-13 RX ORDER — LOVASTATIN 20 MG/1
20 TABLET ORAL NIGHTLY
Qty: 90 TABLET | Refills: 1 | Status: SHIPPED | OUTPATIENT
Start: 2020-03-13 | End: 2020-08-27 | Stop reason: SDUPTHER

## 2020-08-25 ASSESSMENT — ENCOUNTER SYMPTOMS
DIARRHEA: 0
CONSTIPATION: 0
EYE DISCHARGE: 0
COUGH: 0
EYE REDNESS: 0
VOMITING: 0
COLOR CHANGE: 0
BLOOD IN STOOL: 0
WHEEZING: 0
RHINORRHEA: 0
SHORTNESS OF BREATH: 0

## 2020-08-25 NOTE — PATIENT INSTRUCTIONS
Patient Education        Well Visit, Over 72: Care Instructions  Your Care Instructions     Physical exams can help you stay healthy. Your doctor has checked your overall health and may have suggested ways to take good care of yourself. He or she also may have recommended tests. At home, you can help prevent illness with healthy eating, regular exercise, and other steps. Follow-up care is a key part of your treatment and safety. Be sure to make and go to all appointments, and call your doctor if you are having problems. It's also a good idea to know your test results and keep a list of the medicines you take. How can you care for yourself at home? · Reach and stay at a healthy weight. This will lower your risk for many problems, such as obesity, diabetes, heart disease, and high blood pressure. · Get at least 30 minutes of exercise on most days of the week. Walking is a good choice. You also may want to do other activities, such as running, swimming, cycling, or playing tennis or team sports. · Do not smoke. Smoking can make health problems worse. If you need help quitting, talk to your doctor about stop-smoking programs and medicines. These can increase your chances of quitting for good. · Protect your skin from too much sun. When you're outdoors from 10 a.m. to 4 p.m., stay in the shade or cover up with clothing and a hat with a wide brim. Wear sunglasses that block UV rays. Even when it's cloudy, put broad-spectrum sunscreen (SPF 30 or higher) on any exposed skin. · See a dentist one or two times a year for checkups and to have your teeth cleaned. · Wear a seat belt in the car. Follow your doctor's advice about when to have certain tests. These tests can spot problems early. For men and women  · Cholesterol. Your doctor will tell you how often to have this done based on your overall health and other things that can increase your risk for heart attack and stroke. · Blood pressure.  Have your blood

## 2020-08-25 NOTE — PROGRESS NOTES
Take 0.5 tablets by mouth 2 times daily 180 tablet 1    albuterol sulfate HFA (PROVENTIL HFA) 108 (90 Base) MCG/ACT inhaler Inhale 2 puffs into the lungs every 4 hours as needed for Wheezing or Shortness of Breath 1 Inhaler 3    albuterol (PROVENTIL) (2.5 MG/3ML) 0.083% nebulizer solution Take 3 mLs by nebulization every 6 hours as needed for Wheezing 2 Package 2     No facility-administered medications prior to visit. Allergies       Apresoline [hydralazine]; Enalapril; Hctz [hydrochlorothiazide]; Sulfa antibiotics; and Tenex [guanfacine hcl]      Social History:     Social:          Social History     Socioeconomic History    Marital status:      Spouse name: Not on file    Number of children: Not on file    Years of education: Not on file    Highest education level: Not on file   Occupational History    Not on file   Social Needs    Financial resource strain: Not on file    Food insecurity     Worry: Not on file     Inability: Not on file   [employer] needs     Medical: Not on file     Non-medical: Not on file   Tobacco Use    Smoking status: Current Every Day Smoker     Packs/day: 1.00     Types: Cigarettes    Smokeless tobacco: Never Used   Substance and Sexual Activity    Alcohol use:  Yes     Alcohol/week: 2.0 - 3.0 standard drinks     Types: 2 - 3 Shots of liquor per week     Comment: 2-3  16 oz glasses balck velvet daily    Drug use: No    Sexual activity: Not Currently   Lifestyle    Physical activity     Days per week: Not on file     Minutes per session: Not on file    Stress: Not on file   Relationships    Social connections     Talks on phone: Not on file     Gets together: Not on file     Attends Druze service: Not on file     Active member of club or organization: Not on file     Attends meetings of clubs or organizations: Not on file     Relationship status: Not on file    Intimate partner violence     Fear of current or ex partner: Not on file Emotionally abused: Not on file     Physically abused: Not on file     Forced sexual activity: Not on file   Other Topics Concern    Not on file   Social History Narrative    Not on file       Family History:     Family History   Problem Relation Age of Onset    Heart Disease Father        Review of Systems:     Review of Systems   Constitutional: Negative for chills and fever. HENT: Negative for congestion and rhinorrhea. Eyes: Negative for discharge and redness. Respiratory: Negative for cough, shortness of breath and wheezing. Cardiovascular: Negative for chest pain and palpitations. Gastrointestinal: Negative for blood in stool, constipation, diarrhea and vomiting. Genitourinary: Negative for dysuria and frequency. Musculoskeletal: Negative for arthralgias and myalgias. Skin: Negative for color change and rash. Allergic/Immunologic: Negative for environmental allergies and food allergies. Neurological: Negative for weakness and headaches. Psychiatric/Behavioral: Negative for decreased concentration and dysphoric mood. Physical Exam:     Vitals:  Blood pressure 130/68, pulse 66, temperature 97.5 °F (36.4 °C), height 5' 4\" (1.626 m), weight 224 lb (101.6 kg), SpO2 99 %. General appearance:  Alert, well appearing, and in no acute distress. Mental status:  Oriented to person, place, and time with normal affect. Head:  Normocephalic and atraumatic. Eyes:   Extraocular eye movements intact, conjunctiva clear. Ears:  Hearing appears to be intact. Nose:  No drainage noted. Mouth:  Mucous membranes moist.  Neck:  Supple, no carotid bruits, thyroid not palpable. Heart:  Normal rate, regular rhythm, no murmurs. Lungs:  Clear to auscultation bilaterally. Respirations unlabored. Abdomen:  Soft, nondistended, bowel sounds present all four quadrants. No masses, hepatomegaly, or splenomegaly. Neurological:  Normal speech.   Extremities:  Peripheral pulses palpable, no pedal edema.  Skin:  No gross lesions, rashes, or induration noted. Assesment:      Diagnosis Orders   1. Well adult exam     2. Essential hypertension  losartan (COZAAR) 100 MG tablet    metoprolol tartrate (LOPRESSOR) 50 MG tablet    Comprehensive Metabolic Panel   3. Current moderate episode of major depressive disorder without prior episode (HCC)  FLUoxetine (PROZAC) 40 MG capsule   4. Pure hypercholesterolemia  lovastatin (MEVACOR) 20 MG tablet    Lipid Panel   5.  Panlobular emphysema (HCC)  albuterol sulfate HFA (PROVENTIL HFA) 108 (90 Base) MCG/ACT inhaler    albuterol (PROVENTIL) (2.5 MG/3ML) 0.083% nebulizer solution       Plan:     Orders Placed This Encounter   Medications    losartan (COZAAR) 100 MG tablet     Sig: Take 1 tablet by mouth daily     Dispense:  90 tablet     Refill:  1    FLUoxetine (PROZAC) 40 MG capsule     Sig: Take 1 capsule by mouth daily     Dispense:  90 capsule     Refill:  1    lovastatin (MEVACOR) 20 MG tablet     Sig: Take 1 tablet by mouth nightly     Dispense:  90 tablet     Refill:  1    metoprolol tartrate (LOPRESSOR) 50 MG tablet     Sig: Take 0.5 tablets by mouth 2 times daily     Dispense:  180 tablet     Refill:  1    albuterol sulfate HFA (PROVENTIL HFA) 108 (90 Base) MCG/ACT inhaler     Sig: Inhale 2 puffs into the lungs every 4 hours as needed for Wheezing or Shortness of Breath     Dispense:  1 Inhaler     Refill:  3    albuterol (PROVENTIL) (2.5 MG/3ML) 0.083% nebulizer solution     Sig: Take 3 mLs by nebulization every 6 hours as needed for Wheezing     Dispense:  2 Package     Refill:  2     Orders Placed This Encounter   Procedures    Lipid Panel     Standing Status:   Future     Standing Expiration Date:   8/27/2021     Order Specific Question:   Is Patient Fasting?/# of Hours     Answer:   12 hours    Comprehensive Metabolic Panel     Standing Status:   Future     Standing Expiration Date:   8/27/2021       Counseling Completed:  Tobacco and secondary

## 2020-08-26 ENCOUNTER — HOSPITAL ENCOUNTER (OUTPATIENT)
Dept: MAMMOGRAPHY | Age: 65
Discharge: HOME | End: 2020-08-26
Payer: COMMERCIAL

## 2020-08-26 PROCEDURE — 77063 BREAST TOMOSYNTHESIS BI: CPT

## 2020-08-27 ENCOUNTER — OFFICE VISIT (OUTPATIENT)
Dept: FAMILY MEDICINE CLINIC | Age: 65
End: 2020-08-27
Payer: COMMERCIAL

## 2020-08-27 VITALS
DIASTOLIC BLOOD PRESSURE: 68 MMHG | HEIGHT: 64 IN | WEIGHT: 224 LBS | HEART RATE: 66 BPM | SYSTOLIC BLOOD PRESSURE: 130 MMHG | OXYGEN SATURATION: 99 % | BODY MASS INDEX: 38.24 KG/M2 | TEMPERATURE: 97.5 F

## 2020-08-27 PROCEDURE — 99397 PER PM REEVAL EST PAT 65+ YR: CPT | Performed by: FAMILY MEDICINE

## 2020-08-27 RX ORDER — FLUOXETINE HYDROCHLORIDE 40 MG/1
40 CAPSULE ORAL DAILY
Qty: 90 CAPSULE | Refills: 1 | Status: SHIPPED | OUTPATIENT
Start: 2020-08-27 | End: 2021-02-04 | Stop reason: SDUPTHER

## 2020-08-27 RX ORDER — ALBUTEROL SULFATE 2.5 MG/3ML
2.5 SOLUTION RESPIRATORY (INHALATION) EVERY 6 HOURS PRN
Qty: 2 PACKAGE | Refills: 2 | Status: SHIPPED | OUTPATIENT
Start: 2020-08-27

## 2020-08-27 RX ORDER — LOSARTAN POTASSIUM 100 MG/1
100 TABLET ORAL DAILY
Qty: 90 TABLET | Refills: 1 | Status: SHIPPED | OUTPATIENT
Start: 2020-08-27 | End: 2021-02-04 | Stop reason: SDUPTHER

## 2020-08-27 RX ORDER — ALBUTEROL SULFATE 90 UG/1
2 AEROSOL, METERED RESPIRATORY (INHALATION) EVERY 4 HOURS PRN
Qty: 1 INHALER | Refills: 3 | Status: SHIPPED | OUTPATIENT
Start: 2020-08-27

## 2020-08-27 RX ORDER — METOPROLOL TARTRATE 50 MG/1
25 TABLET, FILM COATED ORAL 2 TIMES DAILY
Qty: 180 TABLET | Refills: 1 | Status: SHIPPED | OUTPATIENT
Start: 2020-08-27 | End: 2021-02-04 | Stop reason: SDUPTHER

## 2020-08-27 RX ORDER — LOVASTATIN 20 MG/1
20 TABLET ORAL NIGHTLY
Qty: 90 TABLET | Refills: 1 | Status: SHIPPED | OUTPATIENT
Start: 2020-08-27 | End: 2021-02-04 | Stop reason: SDUPTHER

## 2020-08-27 ASSESSMENT — PATIENT HEALTH QUESTIONNAIRE - PHQ9
SUM OF ALL RESPONSES TO PHQ9 QUESTIONS 1 & 2: 0
1. LITTLE INTEREST OR PLEASURE IN DOING THINGS: 0
2. FEELING DOWN, DEPRESSED OR HOPELESS: 0
SUM OF ALL RESPONSES TO PHQ QUESTIONS 1-9: 0
SUM OF ALL RESPONSES TO PHQ QUESTIONS 1-9: 0

## 2020-09-24 ENCOUNTER — HOSPITAL ENCOUNTER (OUTPATIENT)
Age: 65
Discharge: HOME | End: 2020-09-24
Payer: COMMERCIAL

## 2020-09-24 ENCOUNTER — TELEPHONE (OUTPATIENT)
Dept: FAMILY MEDICINE CLINIC | Age: 65
End: 2020-09-24

## 2020-09-24 DIAGNOSIS — E78.00 PURE HYPERCHOLESTEROLEMIA: ICD-10-CM

## 2020-09-24 DIAGNOSIS — I10 ESSENTIAL HYPERTENSION: ICD-10-CM

## 2020-09-24 LAB
ALBUMIN SERPL-MCNC: 4.3 G/DL (ref 3.5–5.1)
ALP BLD-CCNC: 71 U/L (ref 38–126)
ALT SERPL-CCNC: 17 U/L (ref 11–66)
ANION GAP SERPL CALCULATED.3IONS-SCNC: 11 MEQ/L (ref 8–16)
AST SERPL-CCNC: 22 U/L (ref 5–40)
BILIRUB SERPL-MCNC: 0.3 MG/DL (ref 0.3–1.2)
BUN BLDV-MCNC: 20 MG/DL (ref 7–22)
CALCIUM SERPL-MCNC: 9.6 MG/DL (ref 8.5–10.5)
CHLORIDE BLD-SCNC: 102 MEQ/L (ref 98–111)
CHOLESTEROL, TOTAL: 193 MG/DL (ref 100–199)
CO2: 29 MEQ/L (ref 23–33)
CREAT SERPL-MCNC: 0.8 MG/DL (ref 0.4–1.2)
GFR SERPL CREATININE-BSD FRML MDRD: 72 ML/MIN/1.73M2
GLUCOSE BLD-MCNC: 110 MG/DL (ref 70–108)
HDLC SERPL-MCNC: 52 MG/DL
LDL CHOLESTEROL CALCULATED: 101 MG/DL
POTASSIUM SERPL-SCNC: 4.6 MEQ/L (ref 3.5–5.2)
SODIUM BLD-SCNC: 142 MEQ/L (ref 135–145)
TOTAL PROTEIN: 7.3 G/DL (ref 6.1–8)
TRIGL SERPL-MCNC: 201 MG/DL (ref 0–199)

## 2020-09-24 PROCEDURE — 80053 COMPREHEN METABOLIC PANEL: CPT

## 2020-09-24 PROCEDURE — 36415 COLL VENOUS BLD VENIPUNCTURE: CPT

## 2020-09-24 PROCEDURE — 80061 LIPID PANEL: CPT

## 2021-02-03 ASSESSMENT — ENCOUNTER SYMPTOMS
EYE REDNESS: 0
SHORTNESS OF BREATH: 0
CHEST TIGHTNESS: 0
VOMITING: 0
COLOR CHANGE: 0
EYE DISCHARGE: 0
NAUSEA: 0

## 2021-02-03 NOTE — PROGRESS NOTES
[address] [place] [address], [address]  Phone:  [phone]  Fax:  [phone]       Name: Erin Barrow  : 1955    Chief Complaint   Patient presents with    6 Month Follow-Up    Hypertension    Hyperlipidemia       HPI:     Erin Barrow is a 76 y.o. female who presents today for 6 month follow-up of hypertension, hyperlipidemia, COPD, and depression. She states that she's been doing well overall and denies any acute issues. Hypertension  This is a chronic problem. The current episode started more than 1 year ago. The problem is uncontrolled. Pertinent negatives include no chest pain, headaches, palpitations, peripheral edema or shortness of breath. There are no associated agents to hypertension. Risk factors for coronary artery disease include dyslipidemia, obesity and post-menopausal state. Past treatments include angiotensin blockers. The current treatment provides moderate improvement. There are no compliance problems. Hyperlipidemia  This is a chronic problem. The current episode started more than 1 year ago. The problem is uncontrolled. Recent lipid tests were reviewed and are high. Pertinent negatives include no chest pain, myalgias or shortness of breath. Current antihyperlipidemic treatment includes statins. The current treatment provides moderate improvement of lipids. There are no compliance problems. Risk factors for coronary artery disease include hypertension and dyslipidemia.       Lab Results   Component Value Date    CHOL 193 2020    TRIG 201 (H) 2020    HDL 52 2020    LDLCALC 101 2020     Lab Results   Component Value Date    ALT 17 2020    AST 22 2020        COPD This is a chronic problem. Current treatment includes short-acting beta agonist inhaler which she only uses with a bronchitis flare. Residual symptoms: non-productive cough. She is still smoking 1 PPD and is not ready to quit at this time. Depression  This is a chronic problem. She has been on Prozac 40 mg daily which is keeping her mood stable. She had a dark period a few months ago where a coworker was bullying her, but it's been better recently. Current Outpatient Medications:     losartan (COZAAR) 100 MG tablet, Take 1 tablet by mouth daily, Disp: 90 tablet, Rfl: 1    FLUoxetine (PROZAC) 40 MG capsule, Take 1 capsule by mouth daily, Disp: 90 capsule, Rfl: 1    lovastatin (MEVACOR) 20 MG tablet, Take 1 tablet by mouth nightly, Disp: 90 tablet, Rfl: 1    metoprolol tartrate (LOPRESSOR) 50 MG tablet, Take 0.5 tablets by mouth 2 times daily, Disp: 180 tablet, Rfl: 1    albuterol sulfate HFA (PROVENTIL HFA) 108 (90 Base) MCG/ACT inhaler, Inhale 2 puffs into the lungs every 4 hours as needed for Wheezing or Shortness of Breath, Disp: 1 Inhaler, Rfl: 3    albuterol (PROVENTIL) (2.5 MG/3ML) 0.083% nebulizer solution, Take 3 mLs by nebulization every 6 hours as needed for Wheezing, Disp: 2 Package, Rfl: 2    Glucosamine-Chondroit-Vit C-Mn (GLUCOSAMINE CHONDR 1500 COMPLX PO), Take by mouth, Disp: , Rfl:     Cholecalciferol (VITAMIN D3) 3000 units TABS, Take by mouth, Disp: , Rfl:     calcium carbonate (OSCAL) 500 MG TABS tablet, Take 500 mg by mouth daily, Disp: , Rfl:     Multiple Vitamins-Minerals (CENTRUM SILVER) TABS, Take 1 tablet by mouth daily. , Disp: , Rfl:     Allergies   Allergen Reactions    Apresoline [Hydralazine]      Patient had a bronchopneumonia and blames the apresoline    Enalapril      Cough.     Hctz [Hydrochlorothiazide]      Cough    Sulfa Antibiotics Hives    Tenex [Guanfacine Hcl]      Cough       Subjective:      Review of Systems Constitutional: Negative for chills and fever. Eyes: Negative for discharge and redness. Respiratory: Negative for chest tightness and shortness of breath. Cardiovascular: Negative for chest pain and palpitations. Gastrointestinal: Negative for nausea and vomiting. Genitourinary: Negative for difficulty urinating and dysuria. Musculoskeletal: Positive for arthralgias. Negative for myalgias. Skin: Negative for color change and pallor. Neurological: Negative for dizziness and headaches. Psychiatric/Behavioral: Negative for dysphoric mood. The patient is not nervous/anxious. Objective:     /74 (Site: Left Upper Arm, Position: Sitting, Cuff Size: Large Adult)   Pulse 72   Ht 5' 4\" (1.626 m)   Wt 226 lb (102.5 kg)   SpO2 94%   BMI 38.79 kg/m²     Physical Exam  Vitals signs reviewed. Constitutional:       General: She is not in acute distress. Appearance: She is well-developed. HENT:      Head: Normocephalic and atraumatic. Nose: Nose normal.   Eyes:      Conjunctiva/sclera: Conjunctivae normal.   Neck:      Musculoskeletal: Normal range of motion and neck supple. Cardiovascular:      Rate and Rhythm: Normal rate and regular rhythm. Pulmonary:      Effort: Pulmonary effort is normal. No respiratory distress. Breath sounds: Normal breath sounds. No wheezing. Abdominal:      General: Bowel sounds are normal.      Palpations: Abdomen is soft. Skin:     General: Skin is warm and dry. Findings: No erythema or rash. Neurological:      Mental Status: She is alert and oriented to person, place, and time. Psychiatric:         Behavior: Behavior normal.       Assessment/Plan:     Sg was seen today for 6 month follow-up, hypertension and hyperlipidemia. Diagnoses and all orders for this visit:    Essential hypertension  -     Blood pressure is controlled so will continue on current medications. She will be due for labs at her next check-up. -     losartan (COZAAR) 100 MG tablet; Take 1 tablet by mouth daily  -     metoprolol tartrate (LOPRESSOR) 50 MG tablet; Take 0.5 tablets by mouth 2 times daily    Pure hypercholesterolemia  -     A healthy diet and routine physical activity encouraged. -     lovastatin (MEVACOR) 20 MG tablet; Take 1 tablet by mouth nightly    Current moderate episode of major depressive disorder without prior episode (HCC)  -     Mood has been good recently so will continue on Prozac 40 mg daily. She was advised to contact our office with worsening mood or medication side effects.  -     FLUoxetine (PROZAC) 40 MG capsule; Take 1 capsule by mouth daily    Panlobular emphysema ([address]        -     She continues to smoke 1 PPD and does not routinely use her Albuterol inhaler as symptoms are stable. Return in about 6 months (around 8/4/2021) for hypertension, hyperlipidemia.     Electronically signed by Ale Carrillo MD on 2/4/2021 at 3:36 PM

## 2021-02-04 ENCOUNTER — OFFICE VISIT (OUTPATIENT)
Dept: FAMILY MEDICINE CLINIC | Age: 66
End: 2021-02-04
Payer: COMMERCIAL

## 2021-02-04 VITALS
BODY MASS INDEX: 38.58 KG/M2 | SYSTOLIC BLOOD PRESSURE: 138 MMHG | WEIGHT: 226 LBS | OXYGEN SATURATION: 94 % | HEIGHT: 64 IN | HEART RATE: 72 BPM | DIASTOLIC BLOOD PRESSURE: 74 MMHG

## 2021-02-04 DIAGNOSIS — J43.1 PANLOBULAR EMPHYSEMA (HCC): ICD-10-CM

## 2021-02-04 DIAGNOSIS — E78.00 PURE HYPERCHOLESTEROLEMIA: ICD-10-CM

## 2021-02-04 DIAGNOSIS — I10 ESSENTIAL HYPERTENSION: Primary | ICD-10-CM

## 2021-02-04 DIAGNOSIS — F32.1 CURRENT MODERATE EPISODE OF MAJOR DEPRESSIVE DISORDER WITHOUT PRIOR EPISODE (HCC): ICD-10-CM

## 2021-02-04 PROCEDURE — 99214 OFFICE O/P EST MOD 30 MIN: CPT | Performed by: FAMILY MEDICINE

## 2021-02-04 RX ORDER — ALBUTEROL SULFATE 90 UG/1
2 AEROSOL, METERED RESPIRATORY (INHALATION) EVERY 4 HOURS PRN
Qty: 1 INHALER | Refills: 3 | Status: CANCELLED | OUTPATIENT
Start: 2021-02-04

## 2021-02-04 RX ORDER — LOSARTAN POTASSIUM 100 MG/1
100 TABLET ORAL DAILY
Qty: 90 TABLET | Refills: 1 | Status: SHIPPED | OUTPATIENT
Start: 2021-02-04 | Stop reason: SDUPTHER

## 2021-02-04 RX ORDER — METOPROLOL TARTRATE 50 MG/1
25 TABLET, FILM COATED ORAL 2 TIMES DAILY
Qty: 180 TABLET | Refills: 1 | Status: SHIPPED | OUTPATIENT
Start: 2021-02-04 | Stop reason: SDUPTHER

## 2021-02-04 RX ORDER — FLUOXETINE HYDROCHLORIDE 40 MG/1
40 CAPSULE ORAL DAILY
Qty: 90 CAPSULE | Refills: 1 | Status: SHIPPED | OUTPATIENT
Start: 2021-02-04 | Stop reason: SDUPTHER

## 2021-02-04 RX ORDER — ALBUTEROL SULFATE 2.5 MG/3ML
2.5 SOLUTION RESPIRATORY (INHALATION) EVERY 6 HOURS PRN
Qty: 2 PACKAGE | Refills: 2 | Status: CANCELLED | OUTPATIENT
Start: 2021-02-04

## 2021-02-04 RX ORDER — LOVASTATIN 20 MG/1
20 TABLET ORAL NIGHTLY
Qty: 90 TABLET | Refills: 1 | Status: SHIPPED | OUTPATIENT
Start: 2021-02-04 | Stop reason: SDUPTHER

## 2021-02-15 ENCOUNTER — TELEPHONE (OUTPATIENT)
Dept: FAMILY MEDICINE CLINIC | Age: 66
End: 2021-02-15

## 2021-02-23 ENCOUNTER — IMMUNIZATION (OUTPATIENT)
Dept: PRIMARY CARE CLINIC | Age: 66
End: 2021-02-23
Payer: COMMERCIAL

## 2021-02-23 PROCEDURE — 0011A COVID-19, MODERNA VACCINE 100MCG/0.5ML DOSE: CPT | Performed by: FAMILY MEDICINE

## 2021-02-23 PROCEDURE — 91301 COVID-19, MODERNA VACCINE 100MCG/0.5ML DOSE: CPT | Performed by: FAMILY MEDICINE

## 2021-03-23 ENCOUNTER — IMMUNIZATION (OUTPATIENT)
Dept: PRIMARY CARE CLINIC | Age: 66
End: 2021-03-23
Payer: COMMERCIAL

## 2021-03-23 PROCEDURE — 0012A COVID-19, MODERNA VACCINE 100MCG/0.5ML DOSE: CPT

## 2021-03-23 PROCEDURE — 91301 COVID-19, MODERNA VACCINE 100MCG/0.5ML DOSE: CPT

## 2021-09-03 DIAGNOSIS — F32.1 CURRENT MODERATE EPISODE OF MAJOR DEPRESSIVE DISORDER WITHOUT PRIOR EPISODE (HCC): ICD-10-CM

## 2021-09-03 DIAGNOSIS — E78.00 PURE HYPERCHOLESTEROLEMIA: ICD-10-CM

## 2021-09-03 DIAGNOSIS — I10 ESSENTIAL HYPERTENSION: ICD-10-CM

## 2021-09-03 NOTE — TELEPHONE ENCOUNTER
Saira Broderick called requesting a refill on the following medications:  Requested Prescriptions     Pending Prescriptions Disp Refills    losartan (COZAAR) 100 MG tablet 90 tablet 1     Sig: Take 1 tablet by mouth daily    FLUoxetine (PROZAC) 40 MG capsule 90 capsule 1     Sig: Take 1 capsule by mouth daily    lovastatin (MEVACOR) 20 MG tablet 90 tablet 1     Sig: Take 1 tablet by mouth nightly    metoprolol tartrate (LOPRESSOR) 50 MG tablet 180 tablet 1     Sig: Take 0.5 tablets by mouth 2 times daily       Date of last visit: 2/4/2021  Date of next visit (if applicable):Visit date not found  Date of last refill: 02/04/21  Pharmacy Name: Raul Gutierrez LPN

## 2021-09-03 NOTE — TELEPHONE ENCOUNTER
----- Message from Luis Turner sent at 9/3/2021  2:39 PM EDT -----  Subject: Message to Provider    QUESTIONS  Information for Provider? For  [facility] patient has appt scheduled for   wed sept 9th but wants to know if she can get her prescriptions called in   prior to her appt she said they all needs refills when i asked her which   did she need refilled   ---------------------------------------------------------------------------  --------------  CALL BACK INFO  What is the best way for the office to contact you? OK to leave message on   voicemail  Preferred Call Back Phone Number? [phone]  ---------------------------------------------------------------------------  --------------  SCRIPT ANSWERS  Relationship to Patient?  Self

## 2021-09-06 RX ORDER — LOVASTATIN 20 MG/1
20 TABLET ORAL NIGHTLY
Qty: 90 TABLET | Refills: 1 | Status: SHIPPED
Start: 2021-09-06 | End: 2021-09-09 | Stop reason: ALTCHOICE

## 2021-09-06 RX ORDER — METOPROLOL TARTRATE 50 MG/1
25 TABLET, FILM COATED ORAL 2 TIMES DAILY
Qty: 180 TABLET | Refills: 1 | Status: SHIPPED
Start: 2021-09-06 | End: 2021-09-09 | Stop reason: SINTOL

## 2021-09-06 RX ORDER — FLUOXETINE HYDROCHLORIDE 40 MG/1
40 CAPSULE ORAL DAILY
Qty: 90 CAPSULE | Refills: 1 | Status: SHIPPED
Start: 2021-09-06 | End: 2021-09-09 | Stop reason: DRUGHIGH

## 2021-09-06 RX ORDER — LOSARTAN POTASSIUM 100 MG/1
100 TABLET ORAL DAILY
Qty: 90 TABLET | Refills: 1 | Status: SHIPPED | OUTPATIENT
Start: 2021-09-06 | End: 2021-09-09 | Stop reason: SDUPTHER

## 2021-09-08 ENCOUNTER — TELEPHONE (OUTPATIENT)
Dept: FAMILY MEDICINE CLINIC | Age: 66
End: 2021-09-08

## 2021-09-08 ENCOUNTER — HOSPITAL ENCOUNTER (OUTPATIENT)
Age: 66
Discharge: HOME | End: 2021-09-08
Payer: COMMERCIAL

## 2021-09-08 DIAGNOSIS — I10 ESSENTIAL HYPERTENSION: Primary | ICD-10-CM

## 2021-09-08 DIAGNOSIS — I10 ESSENTIAL HYPERTENSION: ICD-10-CM

## 2021-09-08 LAB
ALBUMIN SERPL-MCNC: 4.6 G/DL (ref 3.5–5.1)
ALP BLD-CCNC: 79 U/L (ref 38–126)
ALT SERPL-CCNC: 20 U/L (ref 11–66)
ANION GAP SERPL CALCULATED.3IONS-SCNC: 13 MEQ/L (ref 8–16)
AST SERPL-CCNC: 29 U/L (ref 5–40)
BILIRUB SERPL-MCNC: 0.2 MG/DL (ref 0.3–1.2)
BUN BLDV-MCNC: 15 MG/DL (ref 7–22)
CALCIUM SERPL-MCNC: 9.6 MG/DL (ref 8.5–10.5)
CHLORIDE BLD-SCNC: 99 MEQ/L (ref 98–111)
CHOLESTEROL, TOTAL: 207 MG/DL (ref 100–199)
CO2: 26 MEQ/L (ref 23–33)
CREAT SERPL-MCNC: 0.8 MG/DL (ref 0.4–1.2)
GFR SERPL CREATININE-BSD FRML MDRD: 72 ML/MIN/1.73M2
GLUCOSE BLD-MCNC: 99 MG/DL (ref 70–108)
HDLC SERPL-MCNC: 51 MG/DL
LDL CHOLESTEROL CALCULATED: 97 MG/DL
POTASSIUM SERPL-SCNC: 4.1 MEQ/L (ref 3.5–5.2)
SODIUM BLD-SCNC: 138 MEQ/L (ref 135–145)
TOTAL PROTEIN: 7.6 G/DL (ref 6.1–8)
TRIGL SERPL-MCNC: 296 MG/DL (ref 0–199)

## 2021-09-08 PROCEDURE — 80061 LIPID PANEL: CPT

## 2021-09-08 PROCEDURE — 80053 COMPREHEN METABOLIC PANEL: CPT

## 2021-09-08 PROCEDURE — 36415 COLL VENOUS BLD VENIPUNCTURE: CPT

## 2021-09-08 NOTE — TELEPHONE ENCOUNTER
Martha Barrera comes to office for Wellness with Dr. Naomi Liu her appointment was with Dr. Naomi Gregory but she was not informed it was in [facility].  Martha Barrera is very upset did order labs since she was fasting

## 2021-09-09 ENCOUNTER — OFFICE VISIT (OUTPATIENT)
Dept: FAMILY MEDICINE CLINIC | Age: 66
End: 2021-09-09
Payer: COMMERCIAL

## 2021-09-09 VITALS
RESPIRATION RATE: 16 BRPM | DIASTOLIC BLOOD PRESSURE: 72 MMHG | WEIGHT: 228.6 LBS | HEIGHT: 64 IN | BODY MASS INDEX: 39.03 KG/M2 | SYSTOLIC BLOOD PRESSURE: 140 MMHG | HEART RATE: 72 BPM

## 2021-09-09 DIAGNOSIS — F32.1 CURRENT MODERATE EPISODE OF MAJOR DEPRESSIVE DISORDER WITHOUT PRIOR EPISODE (HCC): ICD-10-CM

## 2021-09-09 DIAGNOSIS — E78.00 PURE HYPERCHOLESTEROLEMIA: ICD-10-CM

## 2021-09-09 DIAGNOSIS — I10 ESSENTIAL HYPERTENSION: ICD-10-CM

## 2021-09-09 DIAGNOSIS — Z00.00 WELL ADULT EXAM: Primary | ICD-10-CM

## 2021-09-09 PROCEDURE — 99397 PER PM REEVAL EST PAT 65+ YR: CPT | Performed by: NURSE PRACTITIONER

## 2021-09-09 RX ORDER — FLUOXETINE HYDROCHLORIDE 60 MG/1
60 TABLET, FILM COATED ORAL; ORAL DAILY
Qty: 90 TABLET | Refills: 3 | Status: SHIPPED | OUTPATIENT
Start: 2021-09-09 | End: 2022-07-08 | Stop reason: SDUPTHER

## 2021-09-09 RX ORDER — IBUPROFEN 200 MG
400 TABLET ORAL NIGHTLY
COMMUNITY
End: 2022-03-25

## 2021-09-09 RX ORDER — ATORVASTATIN CALCIUM 40 MG/1
40 TABLET, FILM COATED ORAL DAILY
Qty: 60 TABLET | Refills: 0 | Status: SHIPPED | OUTPATIENT
Start: 2021-09-09 | End: 2021-12-06 | Stop reason: SDUPTHER

## 2021-09-09 RX ORDER — LOSARTAN POTASSIUM 100 MG/1
100 TABLET ORAL DAILY
Qty: 90 TABLET | Refills: 1 | Status: SHIPPED | OUTPATIENT
Start: 2021-09-09 | End: 2022-03-14 | Stop reason: SDUPTHER

## 2021-09-09 RX ORDER — AMLODIPINE BESYLATE 2.5 MG/1
2.5 TABLET ORAL DAILY
Qty: 30 TABLET | Refills: 0 | Status: SHIPPED | OUTPATIENT
Start: 2021-09-09 | End: 2021-10-05 | Stop reason: SDUPTHER

## 2021-09-09 RX ORDER — SENNOSIDES 8.6 MG
1300 CAPSULE ORAL DAILY
COMMUNITY

## 2021-09-09 SDOH — ECONOMIC STABILITY: FOOD INSECURITY: WITHIN THE PAST 12 MONTHS, YOU WORRIED THAT YOUR FOOD WOULD RUN OUT BEFORE YOU GOT MONEY TO BUY MORE.: NEVER TRUE

## 2021-09-09 SDOH — ECONOMIC STABILITY: FOOD INSECURITY: WITHIN THE PAST 12 MONTHS, THE FOOD YOU BOUGHT JUST DIDN'T LAST AND YOU DIDN'T HAVE MONEY TO GET MORE.: NEVER TRUE

## 2021-09-09 ASSESSMENT — ENCOUNTER SYMPTOMS
COLOR CHANGE: 0
CONSTIPATION: 0
ABDOMINAL PAIN: 0
COUGH: 0
SHORTNESS OF BREATH: 0
EYE PAIN: 0
BACK PAIN: 0
EYE REDNESS: 0
BLOOD IN STOOL: 0
DIARRHEA: 0
EYE DISCHARGE: 0
EYE ITCHING: 0
SINUS PRESSURE: 0
WHEEZING: 0

## 2021-09-09 ASSESSMENT — PATIENT HEALTH QUESTIONNAIRE - PHQ9
SUM OF ALL RESPONSES TO PHQ9 QUESTIONS 1 & 2: 4
10. IF YOU CHECKED OFF ANY PROBLEMS, HOW DIFFICULT HAVE THESE PROBLEMS MADE IT FOR YOU TO DO YOUR WORK, TAKE CARE OF THINGS AT HOME, OR GET ALONG WITH OTHER PEOPLE: 1
SUM OF ALL RESPONSES TO PHQ QUESTIONS 1-9: 9
5. POOR APPETITE OR OVEREATING: 0
7. TROUBLE CONCENTRATING ON THINGS, SUCH AS READING THE NEWSPAPER OR WATCHING TELEVISION: 1
9. THOUGHTS THAT YOU WOULD BE BETTER OFF DEAD, OR OF HURTING YOURSELF: 0
SUM OF ALL RESPONSES TO PHQ QUESTIONS 1-9: 9
6. FEELING BAD ABOUT YOURSELF - OR THAT YOU ARE A FAILURE OR HAVE LET YOURSELF OR YOUR FAMILY DOWN: 0
2. FEELING DOWN, DEPRESSED OR HOPELESS: 2
4. FEELING TIRED OR HAVING LITTLE ENERGY: 2
1. LITTLE INTEREST OR PLEASURE IN DOING THINGS: 2
SUM OF ALL RESPONSES TO PHQ QUESTIONS 1-9: 9
3. TROUBLE FALLING OR STAYING ASLEEP: 2
8. MOVING OR SPEAKING SO SLOWLY THAT OTHER PEOPLE COULD HAVE NOTICED. OR THE OPPOSITE, BEING SO FIGETY OR RESTLESS THAT YOU HAVE BEEN MOVING AROUND A LOT MORE THAN USUAL: 0

## 2021-09-09 ASSESSMENT — SOCIAL DETERMINANTS OF HEALTH (SDOH): HOW HARD IS IT FOR YOU TO PAY FOR THE VERY BASICS LIKE FOOD, HOUSING, MEDICAL CARE, AND HEATING?: NOT HARD AT ALL

## 2021-09-09 NOTE — PATIENT INSTRUCTIONS
Patient Education        Well Visit, Over 72: Care Instructions  Overview     Well visits can help you stay healthy. Your doctor has checked your overall health and may have suggested ways to take good care of yourself. Your doctor also may have recommended tests. At home, you can help prevent illness with healthy eating, regular exercise, and other steps. Follow-up care is a key part of your treatment and safety. Be sure to make and go to all appointments, and call your doctor if you are having problems. It's also a good idea to know your test results and keep a list of the medicines you take. How can you care for yourself at home? · Get screening tests that you and your doctor decide on. Screening helps find diseases before any symptoms appear. · Eat healthy foods. Choose fruits, vegetables, whole grains, protein, and low-fat dairy foods. Limit fat, especially saturated fat. Reduce salt in your diet. · Limit alcohol. If you are a man, have no more than 2 drinks a day or 14 drinks a week. If you are a woman, have no more than 1 drink a day or 7 drinks a week. Since alcohol affects older adults differently, you may want to limit alcohol even more. Or you may not want to drink at all. · Get at least 30 minutes of exercise on most days of the week. Walking is a good choice. You also may want to do other activities, such as running, swimming, cycling, or playing tennis or team sports. · Reach and stay at a healthy weight. This will lower your risk for many problems, such as obesity, diabetes, heart disease, and high blood pressure. · Do not smoke. Smoking can make health problems worse. If you need help quitting, talk to your doctor about stop-smoking programs and medicines. These can increase your chances of quitting for good. · Care for your mental health. It is easy to get weighed down by worry and stress.  Learn strategies to manage stress, like deep breathing and mindfulness, and stay connected with your Healthwise, [facility] disclaims any warranty or liability for your use of this information. Patient Education        High Blood Pressure: Care Instructions  Overview     It's normal for blood pressure to go up and down throughout the day. But if it stays up, you have high blood pressure. Another name for high blood pressure is hypertension. Despite what a lot of people think, high blood pressure usually doesn't cause headaches or make you feel dizzy or lightheaded. It usually has no symptoms. But it does increase your risk of stroke, heart attack, and other problems. You and your doctor will talk about your risks of these problems based on your blood pressure. Your doctor will give you a goal for your blood pressure. Your goal will be based on your health and your age. Lifestyle changes, such as eating healthy and being active, are always important to help lower blood pressure. You might also take medicine to reach your blood pressure goal.  Follow-up care is a key part of your treatment and safety. Be sure to make and go to all appointments, and call your doctor if you are having problems. It's also a good idea to know your test results and keep a list of the medicines you take. How can you care for yourself at home? Medical treatment  · If you stop taking your medicine, your blood pressure will go back up. You may take one or more types of medicine to lower your blood pressure. Be safe with medicines. Take your medicine exactly as prescribed. Call your doctor if you think you are having a problem with your medicine. · Talk to your doctor before you start taking aspirin every day. Aspirin can help certain people lower their risk of a heart attack or stroke. But taking aspirin isn't right for everyone, because it can cause serious bleeding. · See your doctor regularly. You may need to see the doctor more often at first or until your blood pressure comes down.   · If you are taking blood pressure medicine, talk to your doctor before you take decongestants or anti-inflammatory medicine, such as ibuprofen. Some of these medicines can raise blood pressure. · Learn how to check your blood pressure at home. Lifestyle changes  · Stay at a healthy weight. This is especially important if you put on weight around the waist. Losing even 10 pounds can help you lower your blood pressure. · If your doctor recommends it, get more exercise. Walking is a good choice. Bit by bit, increase the amount you walk every day. Try for at least 30 minutes on most days of the week. You also may want to swim, bike, or do other activities. · Avoid or limit alcohol. Talk to your doctor about whether you can drink any alcohol. · Try to limit how much sodium you eat to less than 2,300 milligrams (mg) a day. Your doctor may ask you to try to eat less than 1,500 mg a day. · Eat plenty of fruits (such as bananas and oranges), vegetables, legumes, whole grains, and low-fat dairy products. · Lower the amount of saturated fat in your diet. Saturated fat is found in animal products such as milk, cheese, and meat. Limiting these foods may help you lose weight and also lower your risk for heart disease. · Do not smoke. Smoking increases your risk for heart attack and stroke. If you need help quitting, talk to your doctor about stop-smoking programs and medicines. These can increase your chances of quitting for good. When should you call for help? Call 911  anytime you think you may need emergency care. This may mean having symptoms that suggest that your blood pressure is causing a serious heart or blood vessel problem. Your blood pressure may be over 180/120. For example, call 911 if:    · You have symptoms of a heart attack. These may include:  ? Chest pain or pressure, or a strange feeling in the chest.  ? Sweating. ? Shortness of breath. ? Nausea or vomiting.   ? Pain, pressure, or a strange feeling in the back, neck, jaw, or upper belly or in one or both shoulders or arms. ? Lightheadedness or sudden weakness. ? A fast or irregular heartbeat.     · You have symptoms of a stroke. These may include:  ? Sudden numbness, tingling, weakness, or loss of movement in your face, arm, or leg, especially on only one side of your body. ? Sudden vision changes. ? Sudden trouble speaking. ? Sudden confusion or trouble understanding simple statements. ? Sudden problems with walking or balance. ? A sudden, severe headache that is different from past headaches.     · You have severe back or belly pain. Do not wait until your blood pressure comes down on its own. Get help right away. Call your doctor now or seek immediate care if:    · Your blood pressure is much higher than normal (such as 180/120 or higher), but you don't have symptoms.     · You think high blood pressure is causing symptoms, such as:  ? Severe headache.  ? Blurry vision. Watch closely for changes in your health, and be sure to contact your doctor if:    · Your blood pressure measures higher than your doctor recommends at least 2 times. That means the top number is higher or the bottom number is higher, or both.     · You think you may be having side effects from your blood pressure medicine. Where can you learn more? Go to https://Invengo Information Technology.Ambature. org and sign in to your Graceful Tables account. Enter O338 in the Rosterbot box to learn more about \"High Blood Pressure: Care Instructions. \"     If you do not have an account, please click on the \"Sign Up Now\" link. Current as of: August 31, 2020               Content Version: 12.9  © 8553-4584 Healthwise, Incorporated. Care instructions adapted under license by [place] [facility] ([facility]). If you have questions about a medical condition or this instruction, always ask your healthcare professional. [place] [address] any warranty or liability for your use of this information.          Patient Education High Cholesterol: Care Instructions  Overview     Cholesterol is a type of fat in your blood. It is needed for many body functions, such as making new cells. Cholesterol is made by your body. It also comes from food you eat. High cholesterol means that you have too much of the fat in your blood. This raises your risk of a heart attack and stroke. LDL and HDL are part of your total cholesterol. LDL is the \"bad\" cholesterol. High LDL can raise your risk for coronary artery disease, heart attack, and stroke. HDL is the \"good\" cholesterol. It helps clear bad cholesterol from the body. High HDL is linked with a lower risk of coronary artery disease, heart attack, and stroke. Your cholesterol levels help your doctor find out your risk for having a heart attack or stroke. You and your doctor can talk about whether you need to lower your risk and what treatment is best for you. Treatment options include a heart-healthy lifestyle and medicine. Both options can help lower your cholesterol and your risk. The way you choose to lower your risk will depend on how high your risk is for heart attack and stroke. It will also depend on how you feel about taking medicines. Follow-up care is a key part of your treatment and safety. Be sure to make and go to all appointments, and call your doctor if you are having problems. It's also a good idea to know your test results and keep a list of the medicines you take. How can you care for yourself at home? · Eat heart-healthy foods. ? Eat fruits, vegetables, whole grains, beans, and other high-fiber foods. ? Eat lean proteins, such as seafood, lean meats, beans, nuts, and soy products. ? Eat healthy fats, such as canola and olive oil. ? Choose foods that are low in saturated fat. ? Limit sodium and alcohol. ? Limit drinks and foods with added sugar. · Be physically active. Try to do moderate activity at least 2½ hours a week.  Or try to do vigorous activity at least 1¼ hours a week. You may want to walk or try other activities, such as running, swimming, cycling, or playing tennis or team sports. · Stay at a healthy weight or lose weight by making the changes in eating and physical activity listed above. Losing just a small amount of weight, even 5 to 10 pounds, can help reduce your risk for having a heart attack or stroke. · Do not smoke. · Manage other health problems. These include diabetes and high blood pressure. If you think you may have a problem with alcohol or drug use, talk to your doctor. · If you take medicine, take it exactly as prescribed. Call your doctor if you think you are having a problem with your medicine. · Check with your doctor or pharmacist before you use any other medicines, including over-the-counter medicines. Make sure your doctor knows all of the medicines, vitamins, herbal products, and supplements you take. Taking some medicines together can cause problems. When should you call for help? Watch closely for changes in your health, and be sure to contact your doctor if:    · You need help making lifestyle changes.     · You have questions about your medicine. Where can you learn more? Go to https://Sundrop Mobile.Exhbit. org and sign in to your TRANSCORP account. Enter M695 in the Night Zookeeper box to learn more about \"High Cholesterol: Care Instructions. \"     If you do not have an account, please click on the \"Sign Up Now\" link. Current as of: August 31, 2020               Content Version: 12.9  © 2396-5057 [employer]. Care instructions adapted under license by [facility] ([facility]). If you have questions about a medical condition or this instruction, always ask your healthcare professional. [place] [address] any warranty or liability for your use of this information.          Patient Education        Depression and Chronic Disease: Care Instructions  Your Care Instructions     A chronic disease is one that you chronic disease. When should you call for help? Call 911 anytime you think you may need emergency care. For example, call if:    · You feel like hurting yourself or someone else.     · Someone you know has depression and is about to attempt or is attempting suicide. Call your doctor now or seek immediate medical care if:    · You hear voices.     · Someone you know has depression and:  ? Starts to give away his or her possessions. ? Uses illegal drugs or drinks alcohol heavily. ? Talks or writes about death, including writing suicide notes or talking about guns, knives, or pills. ? Starts to spend a lot of time alone. ? Acts very aggressively or suddenly appears calm. Watch closely for changes in your health, and be sure to contact your doctor if:    · You do not get better as expected. Where can you learn more? Go to https://PATHEOSpepicTeamBuy.Spavista. org and sign in to your Social Trends Media account. Enter V782 in the Swrve box to learn more about \"Depression and Chronic Disease: Care Instructions. \"     If you do not have an account, please click on the \"Sign Up Now\" link. Current as of: September 23, 2020               Content Version: 12.9  © 2006-2021 [employer]. Care instructions adapted under license by [facility] ([facility]). If you have questions about a medical condition or this instruction, always ask your healthcare professional. [place] [address] any warranty or liability for your use of this information. Patient Education        DASH Diet: Care Instructions  Your Care Instructions     The DASH diet is an eating plan that can help lower your blood pressure. DASH stands for Dietary Approaches to Stop Hypertension. Hypertension is high blood pressure. The DASH diet focuses on eating foods that are high in calcium, potassium, and magnesium. These nutrients can lower blood pressure.  The foods that are highest in these nutrients are fruits, vegetables, low-fat dairy products, nuts, seeds, and legumes. But taking calcium, potassium, and magnesium supplements instead of eating foods that are high in those nutrients does not have the same effect. The DASH diet also includes whole grains, fish, and poultry. The DASH diet is one of several lifestyle changes your doctor may recommend to lower your high blood pressure. Your doctor may also want you to decrease the amount of sodium in your diet. Lowering sodium while following the DASH diet can lower blood pressure even further than just the DASH diet alone. Follow-up care is a key part of your treatment and safety. Be sure to make and go to all appointments, and call your doctor if you are having problems. It's also a good idea to know your test results and keep a list of the medicines you take. How can you care for yourself at home? Following the DASH diet  · Eat 4 to 5 servings of fruit each day. A serving is 1 medium-sized piece of fruit, ½ cup chopped or canned fruit, 1/4 cup dried fruit, or 4 ounces (½ cup) of fruit juice. Choose fruit more often than fruit juice. · Eat 4 to 5 servings of vegetables each day. A serving is 1 cup of lettuce or raw leafy vegetables, ½ cup of chopped or cooked vegetables, or 4 ounces (½ cup) of vegetable juice. Choose vegetables more often than vegetable juice. · Get 2 to 3 servings of low-fat and fat-free dairy each day. A serving is 8 ounces of milk, 1 cup of yogurt, or 1 ½ ounces of cheese. · Eat 6 to 8 servings of grains each day. A serving is 1 slice of bread, 1 ounce of dry cereal, or ½ cup of cooked rice, pasta, or cooked cereal. Try to choose whole-grain products as much as possible. · Limit lean meat, poultry, and fish to 2 servings each day. A serving is 3 ounces, about the size of a deck of cards. · Eat 4 to 5 servings of nuts, seeds, and legumes (cooked dried beans, lentils, and split peas) each week.  A serving is 1/3 cup of nuts, 2 tablespoons of seeds, account. Enter K736 in the [facility] box to learn more about \"DASH Diet: Care Instructions. \"     If you do not have an account, please click on the \"Sign Up Now\" link. Current as of: August 31, 2020               Content Version: 12.9  © 5148-6023 Healthwise, Incorporated. Care instructions adapted under license by [facility] ([facility]). If you have questions about a medical condition or this instruction, always ask your healthcare professional. [address] any warranty or liability for your use of this information.

## 2021-09-09 NOTE — PROGRESS NOTES
[facility]  [address] [place] [address]  Dept: [phone]  Dept Fax: [phone]: [phone]     Visit Date:  9/9/2021    Patient:  Denis Hong  YOB: 1955  Age: 78 y.o. Gender: female  BMI: Body mass index is 39.24 kg/m². Denis Hong, New patient, is being seen today for   Chief Complaint   Patient presents with    Annual Exam     wants to discuss metoprolol, and prozac   . Assessment/Plan      1. Well adult exam  - Age-appropriate education provided. - Health maintenance reviewed- declines vaccinations and screenings  - Encouraged healthy diet and regular exercise. - Reviewed completed lab work with patient  - Work physical form completed    2. Essential hypertension  - Chronic, unstable  - D/c metoprolol, start amlodipine  - DASH diet, regular exercise encouraged  - amLODIPine (NORVASC) 2.5 MG tablet; Take 1 tablet by mouth daily  Dispense: 30 tablet; Refill: 0  - losartan (COZAAR) 100 MG tablet; Take 1 tablet by mouth daily  Dispense: 90 tablet; Refill: 1    3. Current moderate episode of major depressive disorder without prior episode (HCC)  - Chronic, unstable  - Increase fluoxetine dose from 40 mg to 60 mg  - FLUoxetine HCl 60 MG TABS; Take 60 mg by mouth daily  Dispense: 90 tablet; Refill: 3    4. Pure hypercholesterolemia  - Chronic, unstable  - Increased ASCVD risk score  - Start high intensity statin  - Recheck blood work in 2 months  - atorvastatin (LIPITOR) 40 MG tablet; Take 1 tablet by mouth daily  Dispense: 60 tablet; Refill: 0  - ALT; Future  - Lipid Panel; Future      Return in about 2 weeks (around 9/23/2021) for Hypertension. HPI:     Annual visit. Health maintenance reviewed. 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 Current concerns include metoprolol SE, increased depressive feelings/ anger. States she has experienced diarrhea since starting metoprolol two years ago.  Recently Thank you for letting us take care of you today. We hope all your questions were addressed. If a question was overlooked or something else comes to mind after you return home, please contact a member of your Care Team listed below. Please make sure you have a routine office visit set up to follow-up on [address]. Your Care Team at [place] [address] is Team #4  Lena Ritter MD (Faculty)  MD Tae [facility] MD Jonna (Resident)  Mela Hook MD (Resident)  Mata Hoyt MD (Resident)  Yocasta Gallegos MD (Resident)  Sanjuanita Silva, KERI Bell, RMCHRIS Monzon, RMCHRIS Parrish ([facility])  Gia Dinh RN, ([address] [facility])  Miguel A Patel, Ph.D., (Behavioral Services)  Griselda Nanas, [facility] (Clinical Pharmacist)       Office phone number: [phone]    If you need to get in right away due to illness, please be advised we have \"Same Day\" appointments available Monday-Friday. Please call us at [phone] option #1 to schedule your \"Same Day\" appointment. stopped taking the medication and diarrhea has resolved. BP is slightly elevated today which is attributed to this medication change. Reports worsening depression and anger related to work. Was placed in a position at work that she does not enjoy. Feels she is more irritable. Currently taking 40 mg fluoxetine.        PHQ-9 Total Score: 9 (9/9/2021  1:55 PM)  Thoughts that you would be better off dead, or of hurting yourself in some way: 0 (9/9/2021  1:55 PM)      [facility] Scores 9/9/2021 8/27/2020 3/8/2019 1/26/2018 7/8/2016   PHQ2 Score 4 0 0 0 0   PHQ9 Score 9 0 0 0 0     Interpretation of Total Score Depression Severity: 1-4 = Minimal depression, 5-9 = Mild depression, 10-14 = Moderate depression, 15-19 = Moderately severe depression, 20-27 = Severe depression    The 10-year ASCVD risk score (Margarita Luong, et al., 2013) is: 19.2%    Values used to calculate the score:      Age: 78 years      Sex: Female      Is Non- : No      Diabetic: No      Tobacco smoker: Yes      Systolic Blood Pressure: 114 mmHg      Is BP treated: Yes      HDL Cholesterol: 51 mg/dL      Total Cholesterol: 207 mg/dL      Medications    Current Outpatient Medications:     acetaminophen (TYLENOL 8 HOUR ARTHRITIS PAIN) 650 MG extended release tablet, Take 1,300 mg by mouth daily, Disp: , Rfl:     ibuprofen (ADVIL;MOTRIN) 200 MG tablet, Take 400 mg by mouth nightly, Disp: , Rfl:     amLODIPine (NORVASC) 2.5 MG tablet, Take 1 tablet by mouth daily, Disp: 30 tablet, Rfl: 0    FLUoxetine HCl 60 MG TABS, Take 60 mg by mouth daily, Disp: 90 tablet, Rfl: 3    atorvastatin (LIPITOR) 40 MG tablet, Take 1 tablet by mouth daily, Disp: 60 tablet, Rfl: 0    losartan (COZAAR) 100 MG tablet, Take 1 tablet by mouth daily, Disp: 90 tablet, Rfl: 1    albuterol sulfate HFA (PROVENTIL HFA) 108 (90 Base) MCG/ACT inhaler, Inhale 2 puffs into the lungs every 4 hours as needed for Wheezing or Shortness of Breath, Disp: 1 Inhaler, Rfl: 3   albuterol (PROVENTIL) (2.5 MG/3ML) 0.083% nebulizer solution, Take 3 mLs by nebulization every 6 hours as needed for Wheezing, Disp: 2 Package, Rfl: 2    Glucosamine-Chondroit-Vit C-Mn (GLUCOSAMINE CHONDR 1500 COMPLX PO), Take by mouth, Disp: , Rfl:     Cholecalciferol (VITAMIN D3) 3000 units TABS, Take by mouth, Disp: , Rfl:     calcium carbonate (OSCAL) 500 MG TABS tablet, Take 500 mg by mouth daily, Disp: , Rfl:     Multiple Vitamins-Minerals (CENTRUM SILVER) TABS, Take 1 tablet by mouth daily. , Disp: , Rfl:     The patient is allergic to apresoline [hydralazine], enalapril, hctz [hydrochlorothiazide], sulfa antibiotics, and tenex [guanfacine hcl]. Past Medical History  Tam Weinberg  has a past medical history of COPD (chronic obstructive pulmonary disease) ([address] Hyperlipidemia, Hypertension, Osteoarthritis, Smoker, and Urticaria. Past Surgical History  The patient  has a past surgical history that includes Knee arthroscopy and Dilation and curettage of uterus. Family History  This patient's family history includes Heart Disease in her father. Social History  Tam Weinberg  reports that she has been smoking cigarettes. She has a 50.00 pack-year smoking history. She has never used smokeless tobacco. She reports current alcohol use of about 2.0 - 3.0 standard drinks of alcohol per week. She reports that she does not use drugs. Health Maintenance:    Health maintenance reviewed.    Health Maintenance   Topic Date Due    Hepatitis C screen  Never done    DTaP/Tdap/Td vaccine (1 - Tdap) Never done    Shingles Vaccine (1 of 2) Never done    Low dose CT lung screening  Never done    DEXA (modify frequency per FRAX score)  Never done    Pneumococcal 65+ years Vaccine (1 of 1 - PPSV23) Never done    A1C test (Diabetic or Prediabetic)  03/08/2020    Flu vaccine (1) Never done    Colon cancer screen colonoscopy  07/27/2022    Breast cancer screen  08/26/2022    Lipid screen  09/08/2022    Potassium monitoring  09/08/2022    Creatinine monitoring  09/08/2022    COVID-19 Vaccine  Completed    Hepatitis A vaccine  Aged Out    Hepatitis B vaccine  Aged Out    Hib vaccine  Aged Out    Meningococcal (ACWY) vaccine  Aged Out       Subjective/Objective:      Review of Systems   Constitutional: Negative for chills, fatigue and fever. HENT: Negative for congestion, ear pain, sinus pressure and sneezing. Eyes: Negative for pain, discharge, redness and itching. Respiratory: Negative for cough, shortness of breath and wheezing. Cardiovascular: Negative for chest pain, palpitations and leg swelling. Gastrointestinal: Negative for abdominal pain, blood in stool, constipation and diarrhea. Endocrine: Negative for polydipsia, polyphagia and polyuria. Genitourinary: Negative for difficulty urinating and hematuria. Musculoskeletal: Negative for arthralgias, back pain and neck pain. Skin: Negative for color change, pallor and rash. Allergic/Immunologic: Negative for environmental allergies and food allergies. Neurological: Negative for dizziness, light-headedness, numbness and headaches. Psychiatric/Behavioral: Positive for agitation and dysphoric mood. Negative for confusion. The patient is not nervous/anxious. BP (!) 140/72 (Site: Right Upper Arm, Position: Sitting, Cuff Size: Large Adult)   Pulse 72   Resp 16   Ht 5' 4\" (1.626 m)   Wt 228 lb 9.6 oz (103.7 kg)   BMI 39.24 kg/m²     Physical Exam  Vitals and nursing note reviewed. Constitutional:       Appearance: She is well-developed. HENT:      Head: Normocephalic and atraumatic. Right Ear: Hearing, tympanic membrane, ear canal and external ear normal.      Left Ear: Hearing, tympanic membrane, ear canal and external ear normal.      Nose:      Right Sinus: No maxillary sinus tenderness or frontal sinus tenderness. Left Sinus: No maxillary sinus tenderness or frontal sinus tenderness.    Eyes:      General: Right eye: No discharge. Left eye: No discharge. Conjunctiva/sclera: Conjunctivae normal.      Pupils: Pupils are equal, round, and reactive to light. Neck:      Vascular: No JVD. Cardiovascular:      Rate and Rhythm: Normal rate and regular rhythm. Heart sounds: Normal heart sounds. No murmur heard. Pulmonary:      Effort: Pulmonary effort is normal. No tachypnea. Breath sounds: Normal breath sounds. No stridor. No wheezing. Abdominal:      General: There is no distension. Tenderness: There is no abdominal tenderness. Musculoskeletal:         General: No deformity. Cervical back: Normal range of motion. Comments: ROM in all extremities WNL. No deformities. Lymphadenopathy:      Head:      Right side of head: No submental or submandibular adenopathy. Left side of head: No submental or submandibular adenopathy. Skin:     General: Skin is warm and dry. Capillary Refill: Capillary refill takes less than 2 seconds. Findings: No rash. Neurological:      Mental Status: She is alert and oriented to person, place, and time. Coordination: Coordination normal.   Psychiatric:         Mood and Affect: Mood normal.         Behavior: Behavior normal.         Thought Content: Thought content normal.         Judgment: Judgment normal.           Labs Reviewed 9/9/2021:    Lab Results   Component Value Date    WBC 6.4 07/31/2016    HGB 12.6 07/31/2016    HCT 39.1 07/31/2016     07/31/2016    CHOL 207 (H) 09/08/2021    TRIG 296 (H) 09/08/2021    HDL 51 09/08/2021    ALT 20 09/08/2021    AST 29 09/08/2021     09/08/2021    K 4.1 09/08/2021    CL 99 09/08/2021    CREATININE 0.8 09/08/2021    BUN 15 09/08/2021    CO2 26 09/08/2021    LABA1C 5.7 03/08/2019           Patient given educational materials - see patient instructions. Discussed use, benefit, and side effects of prescribed medications. All patient questions answered.   Pt voiced understanding. Reviewed health maintenance.        Electronically signed by PAO Eisenberg CNP on 9/9/2021 at 2:05 PM EDT

## 2021-10-01 ENCOUNTER — NURSE ONLY (OUTPATIENT)
Dept: FAMILY MEDICINE CLINIC | Age: 66
End: 2021-10-01

## 2021-10-01 VITALS — SYSTOLIC BLOOD PRESSURE: 140 MMHG | DIASTOLIC BLOOD PRESSURE: 80 MMHG

## 2021-10-01 DIAGNOSIS — I10 ESSENTIAL HYPERTENSION: Primary | ICD-10-CM

## 2021-10-01 DIAGNOSIS — I10 ESSENTIAL HYPERTENSION: ICD-10-CM

## 2021-10-01 PROCEDURE — 99999 PR OFFICE/OUTPT VISIT,PROCEDURE ONLY: CPT | Performed by: NURSE PRACTITIONER

## 2021-10-01 NOTE — TELEPHONE ENCOUNTER
Nikita Fan called requesting a refill on the following medications:  Requested Prescriptions     Pending Prescriptions Disp Refills    amLODIPine (NORVASC) 2.5 MG tablet 30 tablet 0     Sig: Take 1 tablet by mouth daily       Date of last visit: 9/9/2021  Date of next visit (if applicable):Visit date not found  Date of last refill: 09/09/21  Pharmacy Name: Dawn [facility]ierrez LPN

## 2021-10-05 RX ORDER — AMLODIPINE BESYLATE 2.5 MG/1
2.5 TABLET ORAL DAILY
Qty: 30 TABLET | Refills: 0 | Status: SHIPPED | OUTPATIENT
Start: 2021-10-05 | End: 2021-10-05 | Stop reason: SDUPTHER

## 2021-10-05 RX ORDER — AMLODIPINE BESYLATE 2.5 MG/1
2.5 TABLET ORAL DAILY
Qty: 90 TABLET | Refills: 0 | Status: SHIPPED | OUTPATIENT
Start: 2021-10-05 | End: 2021-12-06 | Stop reason: SDUPTHER

## 2021-11-12 DIAGNOSIS — E78.00 PURE HYPERCHOLESTEROLEMIA: ICD-10-CM

## 2021-11-12 NOTE — TELEPHONE ENCOUNTER
Graham Obnado is requesting a refill on the following medications:  Requested Prescriptions     Pending Prescriptions Disp Refills    atorvastatin (LIPITOR) 40 MG tablet 60 tablet 0     Sig: Take 1 tablet by mouth daily       Date of last visit: 9/9/2021  Date of next visit (if applicable):Visit date not found  Date of last refill: 09/09/2021  [facility] Name: [facility]      Thanks,  Kathy Damian MA

## 2021-11-15 RX ORDER — ATORVASTATIN CALCIUM 40 MG/1
40 TABLET, FILM COATED ORAL DAILY
Qty: 60 TABLET | Refills: 0 | OUTPATIENT
Start: 2021-11-15

## 2021-11-26 ENCOUNTER — HOSPITAL ENCOUNTER (OUTPATIENT)
Age: 66
Discharge: HOME | End: 2021-11-26
Payer: COMMERCIAL

## 2021-11-26 DIAGNOSIS — E78.00 PURE HYPERCHOLESTEROLEMIA: ICD-10-CM

## 2021-11-26 LAB
ALT SERPL-CCNC: 19 U/L (ref 11–66)
CHOLESTEROL, TOTAL: 203 MG/DL (ref 100–199)
HDLC SERPL-MCNC: 47 MG/DL
LDL CHOLESTEROL CALCULATED: 104 MG/DL
TRIGL SERPL-MCNC: 262 MG/DL (ref 0–199)

## 2021-11-26 PROCEDURE — 36415 COLL VENOUS BLD VENIPUNCTURE: CPT

## 2021-11-26 PROCEDURE — 80061 LIPID PANEL: CPT

## 2021-11-26 PROCEDURE — 84460 ALANINE AMINO (ALT) (SGPT): CPT

## 2021-11-29 ENCOUNTER — TELEPHONE (OUTPATIENT)
Dept: FAMILY MEDICINE CLINIC | Age: 66
End: 2021-11-29

## 2021-11-29 NOTE — TELEPHONE ENCOUNTER
----- Message from PAO Vance CNP sent at 11/29/2021  7:57 AM EST -----  Lipid levels have not improved. Taking the medication daily?  Any SE?

## 2021-11-29 NOTE — LETTER
[address] [place] [address]  Phone: [phone]  Fax: [phone]    PAO Bryant CNP        November 30, 2021    [address]  [facility] [address]      Dear Karly Gunter:    We have been trying to reach you regarding your recent lab work. Please give the office a call when you can.       Sincerely,        PAO Bryant CNP

## 2021-12-03 ENCOUNTER — TELEPHONE (OUTPATIENT)
Dept: FAMILY MEDICINE CLINIC | Age: 66
End: 2021-12-03

## 2021-12-03 DIAGNOSIS — I10 ESSENTIAL HYPERTENSION: ICD-10-CM

## 2021-12-03 DIAGNOSIS — E78.00 PURE HYPERCHOLESTEROLEMIA: ICD-10-CM

## 2021-12-03 NOTE — TELEPHONE ENCOUNTER
Maranda Avilez has not taken her Atorvastatin because she was out of it since 11/09/21. She was given a 60 day supply in October. She had no side effects. She uses [facility]. Her blood work was completed on 11/26/21.

## 2021-12-06 RX ORDER — ATORVASTATIN CALCIUM 40 MG/1
40 TABLET, FILM COATED ORAL DAILY
Qty: 90 TABLET | Refills: 0 | Status: SHIPPED | OUTPATIENT
Start: 2021-12-06 | End: 2022-03-04 | Stop reason: SDUPTHER

## 2021-12-06 RX ORDER — AMLODIPINE BESYLATE 2.5 MG/1
2.5 TABLET ORAL DAILY
Qty: 90 TABLET | Refills: 0 | Status: SHIPPED | OUTPATIENT
Start: 2021-12-06 | End: 2022-02-14 | Stop reason: SDUPTHER

## 2022-02-14 DIAGNOSIS — I10 ESSENTIAL HYPERTENSION: ICD-10-CM

## 2022-02-14 NOTE — TELEPHONE ENCOUNTER
----- Message from Oniel Quigley sent at 2/14/2022  9:05 AM EST -----  Subject: Refill Request    QUESTIONS  Name of Medication? amLODIPine (NORVASC) 2.5 MG tablet  Patient-reported dosage and instructions? 1 tablet daily  How many days do you have left? 2  Preferred Pharmacy? [facility] phone number (if available)? [phone]  ---------------------------------------------------------------------------  --------------  CALL BACK INFO  What is the best way for the office to contact you? Do not leave any   message, patient will call back for answer  Preferred Call Back Phone Number?  [phone]

## 2022-02-15 RX ORDER — AMLODIPINE BESYLATE 2.5 MG/1
2.5 TABLET ORAL DAILY
Qty: 30 TABLET | Refills: 1 | Status: SHIPPED | OUTPATIENT
Start: 2022-02-15 | End: 2022-03-25 | Stop reason: SDUPTHER

## 2022-02-17 ENCOUNTER — TELEPHONE (OUTPATIENT)
Dept: FAMILY MEDICINE CLINIC | Age: 67
End: 2022-02-17

## 2022-02-17 NOTE — TELEPHONE ENCOUNTER
----- Message from [facility] sent at 2/17/2022 10:06 AM EST -----  Subject: Appointment Request    Reason for Call: Semi-Routine Cough, Cold Symptoms    QUESTIONS  Type of Appointment? Established Patient  Reason for appointment request? No appointments available during search  Additional Information for Provider? Patient would like for staff/nurse to   contact she stated she is having flu like symptoms/ diarrhea for 3 days,   she also needs paperwork for work for being off. Please contact patient.   ---------------------------------------------------------------------------  --------------  CALL BACK INFO  What is the best way for the office to contact you? Do not leave any   message, patient will call back for answer  Preferred Call Back Phone Number? [phone]  ---------------------------------------------------------------------------  --------------  SCRIPT ANSWERS  Relationship to Patient? Self  Are you currently unable to finish sentences due to any difficulty   breathing? No  Are you unable to swallow liquids? No  Are you having fevers (100.4 or greater), chills, or sweats? No  Do you have COPD, asthma or a chronic lung condition? No  Have your symptoms been present for more than 5 days? No  Have you recently (14 days) been seen by a provider for this issue? No  Have you been diagnosed with, awaiting test results for, or told that you   are suspected of having COVID-19 (Coronavirus)? (If patient has tested   negative or was tested as a requirement for work, school, or travel and   not based on symptoms, answer no)? No  Within the past 10 days have you developed any of the following symptoms   (answer no if symptoms have been present longer than 10 days or began   more than 10 days ago)?  Fever or Chills, Cough, Shortness of breath or   difficulty breathing, Loss of taste or smell, Sore throat, Nasal   congestion, Sneezing or runny nose, Fatigue or generalized body aches   (answer no if pain is specific

## 2022-02-18 ENCOUNTER — SCHEDULED TELEPHONE ENCOUNTER (OUTPATIENT)
Dept: FAMILY MEDICINE CLINIC | Age: 67
End: 2022-02-18
Payer: COMMERCIAL

## 2022-02-18 DIAGNOSIS — K52.9 ACUTE GASTROENTERITIS: ICD-10-CM

## 2022-02-18 PROCEDURE — 99421 OL DIG E/M SVC 5-10 MIN: CPT | Performed by: NURSE PRACTITIONER

## 2022-02-18 NOTE — LETTER
[address] [address]  Phone: [phone]  Fax: [phone]    PAO Neumann CNP        February 18, 2022     Patient: Timmy Lee   YOB: 1955   Date of Visit: 2/18/2022       To Whom It May Concern: It is my medical opinion piyush Dao may return to work on 2/21/2022. Please excuse recent absences (2/15/2022, 2/16/2022 and 2/17/2022) due to acute illness. If you have any questions or concerns, please don't hesitate to call.     Sincerely,        PAO Neumann CNP

## 2022-02-18 NOTE — TELEPHONE ENCOUNTER
Timmy Lee is a 78 y.o. female evaluated via telephone on 2/18/2022. Consent:  She and/or health care decision maker is aware that that she may receive a bill for this telephone service, which includes applicable co-pays, depending on her insurance coverage, and has provided verbal consent to proceed. Documentation:  I communicated with the patient and/or health care decision maker about n/v/d. Symptoms started 3 days ago. Started with n/v. Diarrhea started the following day. Denies presence of blood. Denies suspect food intake. Symptoms have since resolved. Denies current n/v/d, abdominal pain and fever. Details of this discussion including any medical advice provided: Symptoms consistent with gastroenteritis. Symptoms have resolved. Encouraged additional fluid intake and rest. RTW letter created. I affirm this is a Patient Initiated Episode with a Patient who has not had a related appointment within my department in the past 7 days or scheduled within the next 24 hours. Patient identification was verified at the start of the visit: Yes    Total Time: minutes: 5-10 minutes    Timmy Lee was evaluated through a synchronous (real-time) audio encounter. The patient was located at home in a state where the provider was licensed to provide care.     Note: not billable if this call serves to triage the patient into an appointment for the relevant concern      PAO Neumann - CNP

## 2022-03-04 DIAGNOSIS — E78.00 PURE HYPERCHOLESTEROLEMIA: ICD-10-CM

## 2022-03-04 RX ORDER — ATORVASTATIN CALCIUM 40 MG/1
40 TABLET, FILM COATED ORAL DAILY
Qty: 90 TABLET | Refills: 0 | Status: SHIPPED | OUTPATIENT
Start: 2022-03-04 | End: 2022-03-25 | Stop reason: SDUPTHER

## 2022-03-04 NOTE — TELEPHONE ENCOUNTER
Ainsley Singh is requesting a refill on the following medications:  Requested Prescriptions     Pending Prescriptions Disp Refills    atorvastatin (LIPITOR) 40 MG tablet 90 tablet 0     Sig: Take 1 tablet by mouth daily       Date of last visit: 9/9/2021  Date of next visit (if applicable): 6/11/8792  Date of last refill: 12/6/2021  Pharmacy Name: [facility] Thanks, Claudeen Lunch, LPN

## 2022-03-04 NOTE — TELEPHONE ENCOUNTER
----- Message from Jade Meyers sent at 3/4/2022  3:57 PM EST -----  Subject: Refill Request    QUESTIONS  Name of Medication? atorvastatin (LIPITOR) 40 MG tablet  Patient-reported dosage and instructions? 40 mg once a day  How many days do you have left? 5  Preferred Pharmacy? [facility] phone number (if available)? [phone]  ---------------------------------------------------------------------------  --------------  CALL BACK INFO  What is the best way for the office to contact you? OK to leave message on   voicemail,Do not leave any message, patient will call back for answer  Preferred Call Back Phone Number?  [phone]

## 2022-03-14 ENCOUNTER — TELEPHONE (OUTPATIENT)
Dept: FAMILY MEDICINE CLINIC | Age: 67
End: 2022-03-14

## 2022-03-14 DIAGNOSIS — I10 ESSENTIAL HYPERTENSION: ICD-10-CM

## 2022-03-14 RX ORDER — LOSARTAN POTASSIUM 100 MG/1
100 TABLET ORAL DAILY
Qty: 90 TABLET | Refills: 1 | Status: SHIPPED | OUTPATIENT
Start: 2022-03-14 | End: 2022-07-08 | Stop reason: SDUPTHER

## 2022-03-25 ENCOUNTER — OFFICE VISIT (OUTPATIENT)
Dept: FAMILY MEDICINE CLINIC | Age: 67
End: 2022-03-25
Payer: COMMERCIAL

## 2022-03-25 VITALS
WEIGHT: 212 LBS | SYSTOLIC BLOOD PRESSURE: 154 MMHG | BODY MASS INDEX: 36.19 KG/M2 | TEMPERATURE: 98.4 F | DIASTOLIC BLOOD PRESSURE: 82 MMHG | HEART RATE: 64 BPM | HEIGHT: 64 IN | OXYGEN SATURATION: 96 %

## 2022-03-25 DIAGNOSIS — R73.09 ELEVATED GLUCOSE: ICD-10-CM

## 2022-03-25 DIAGNOSIS — F17.200 SMOKER: ICD-10-CM

## 2022-03-25 DIAGNOSIS — E78.00 PURE HYPERCHOLESTEROLEMIA: ICD-10-CM

## 2022-03-25 DIAGNOSIS — J43.1 PANLOBULAR EMPHYSEMA (HCC): Chronic | ICD-10-CM

## 2022-03-25 DIAGNOSIS — I10 ESSENTIAL HYPERTENSION: Primary | ICD-10-CM

## 2022-03-25 PROCEDURE — 99214 OFFICE O/P EST MOD 30 MIN: CPT | Performed by: FAMILY MEDICINE

## 2022-03-25 RX ORDER — AMLODIPINE BESYLATE 5 MG/1
5 TABLET ORAL DAILY
Qty: 90 TABLET | Refills: 3 | Status: SHIPPED | OUTPATIENT
Start: 2022-03-25 | End: 2022-07-08 | Stop reason: SDUPTHER

## 2022-03-25 RX ORDER — ATORVASTATIN CALCIUM 40 MG/1
40 TABLET, FILM COATED ORAL DAILY
Qty: 90 TABLET | Refills: 1 | Status: SHIPPED | OUTPATIENT
Start: 2022-03-25 | End: 2022-07-08 | Stop reason: SDUPTHER

## 2022-03-25 RX ORDER — ACETAMINOPHEN 160 MG
TABLET,DISINTEGRATING ORAL
COMMUNITY

## 2022-03-25 ASSESSMENT — PATIENT HEALTH QUESTIONNAIRE - PHQ9
6. FEELING BAD ABOUT YOURSELF - OR THAT YOU ARE A FAILURE OR HAVE LET YOURSELF OR YOUR FAMILY DOWN: 0
4. FEELING TIRED OR HAVING LITTLE ENERGY: 0
SUM OF ALL RESPONSES TO PHQ QUESTIONS 1-9: 0
SUM OF ALL RESPONSES TO PHQ QUESTIONS 1-9: 0
8. MOVING OR SPEAKING SO SLOWLY THAT OTHER PEOPLE COULD HAVE NOTICED. OR THE OPPOSITE, BEING SO FIGETY OR RESTLESS THAT YOU HAVE BEEN MOVING AROUND A LOT MORE THAN USUAL: 0
2. FEELING DOWN, DEPRESSED OR HOPELESS: 0
1. LITTLE INTEREST OR PLEASURE IN DOING THINGS: 0
5. POOR APPETITE OR OVEREATING: 0
SUM OF ALL RESPONSES TO PHQ9 QUESTIONS 1 & 2: 0
SUM OF ALL RESPONSES TO PHQ QUESTIONS 1-9: 0
10. IF YOU CHECKED OFF ANY PROBLEMS, HOW DIFFICULT HAVE THESE PROBLEMS MADE IT FOR YOU TO DO YOUR WORK, TAKE CARE OF THINGS AT HOME, OR GET ALONG WITH OTHER PEOPLE: 0
9. THOUGHTS THAT YOU WOULD BE BETTER OFF DEAD, OR OF HURTING YOURSELF: 0
SUM OF ALL RESPONSES TO PHQ QUESTIONS 1-9: 0
3. TROUBLE FALLING OR STAYING ASLEEP: 0
7. TROUBLE CONCENTRATING ON THINGS, SUCH AS READING THE NEWSPAPER OR WATCHING TELEVISION: 0

## 2022-03-25 ASSESSMENT — ENCOUNTER SYMPTOMS: SHORTNESS OF BREATH: 0

## 2022-03-25 NOTE — PATIENT INSTRUCTIONS
Decrease sugar intake in drinks and foods as possible  Substitute with healthy foods.     Convenience:  -- tuna packets  -- baby carrots  -- nuts and seeds (maybe add lower dark chocolates chips from [employer])  -- peanut butter  -- celery cut up  -- check out salad kits and add chicken breast  -- check out frozen mini bags of vegetables (5 minutes in microwave)  -- cheese  --- fruit instead of candy

## 2022-03-25 NOTE — PROGRESS NOTES
Remigio Levine (:  1955) is a 78 y.o. female,Established patient, here for evaluation of the following chief complaint(s):  Establish Care and Hypertension         ASSESSMENT/PLAN:  1. Essential hypertension  -     amLODIPine (NORVASC) 5 MG tablet; Take 1 tablet by mouth daily, Disp-90 tablet, R-3Normal  -     CBC with Auto Differential; Future  -     Comprehensive Metabolic Panel; Future  -     Lipid Panel; Future  -     Hemoglobin A1C; Future  2. Pure hypercholesterolemia  -     atorvastatin (LIPITOR) 40 MG tablet; Take 1 tablet by mouth daily, Disp-90 tablet, R-1Normal  -     CBC with Auto Differential; Future  -     Comprehensive Metabolic Panel; Future  -     Lipid Panel; Future  -     Hemoglobin A1C; Future  3. Elevated glucose  -     CBC with Auto Differential; Future  -     Comprehensive Metabolic Panel; Future  -     Lipid Panel; Future  -     Hemoglobin A1C; Future    Blood pressure is uncontrolled. Patient has many lifestyle modifications that she could work on. She agrees to decrease her sugar intake and to replace with healthier habits. This will occur slowly. Also she will pay attention to sodium. Encouraged reduction of smoking and also alcohol but patient is only precontemplative for this. Dietary counseling given -- reviewed patient's current eating patterns. Discussed benefits (both short-term and long-term) of a healthy eating pattern for patient conditions and prevention of others. Specific recommendations given to patient based on agreed goals for diet change. Return in about 3 months (around 2022). Subjective   SUBJECTIVE/OBJECTIVE:  HPI     Patient with confusion of medication. She says that she Stopped 1.5 months due to Metoprolol gave her diarrhea. In topped then but there was confusion. She didn't understand the switch. Patient is taking Norvasc 2.5 mg daily plus Cozaar 100 mg daily. She is tolerating this.  is not great.   Likes daily Candy - m&m, tootsie rolls. Eats out and then brings extra for home. Single, doesn't like to cook. Smoker as well. She states that she likes it. Alcohol -- most days 1.5 drinks of velvet with 7 up. Copd -- reports no flare ups. Review of Systems   Constitutional: Negative for fatigue and fever. Respiratory: Negative for shortness of breath. Cardiovascular: Negative for chest pain and leg swelling. Objective   Physical Exam  Constitutional:       General: She is not in acute distress. Appearance: Normal appearance. She is not ill-appearing. Cardiovascular:      Rate and Rhythm: Normal rate and regular rhythm. Heart sounds: No murmur heard. Pulmonary:      Effort: Pulmonary effort is normal. No respiratory distress. Breath sounds: Normal breath sounds. No wheezing. Musculoskeletal:         General: No swelling. Neurological:      Mental Status: She is alert and oriented to person, place, and time. Psychiatric:         Mood and Affect: Mood normal.         Behavior: Behavior normal.          An electronic signature was used to authenticate this note.     --Ladonna Orellana MD

## 2022-07-08 ENCOUNTER — OFFICE VISIT (OUTPATIENT)
Dept: FAMILY MEDICINE CLINIC | Age: 67
End: 2022-07-08
Payer: COMMERCIAL

## 2022-07-08 VITALS
HEART RATE: 67 BPM | OXYGEN SATURATION: 95 % | BODY MASS INDEX: 37.9 KG/M2 | DIASTOLIC BLOOD PRESSURE: 80 MMHG | HEIGHT: 64 IN | TEMPERATURE: 97.2 F | WEIGHT: 222 LBS | SYSTOLIC BLOOD PRESSURE: 132 MMHG

## 2022-07-08 DIAGNOSIS — Z00.00 ANNUAL PHYSICAL EXAM: Primary | ICD-10-CM

## 2022-07-08 DIAGNOSIS — I10 ESSENTIAL HYPERTENSION: ICD-10-CM

## 2022-07-08 DIAGNOSIS — E78.00 PURE HYPERCHOLESTEROLEMIA: ICD-10-CM

## 2022-07-08 DIAGNOSIS — F17.200 SMOKER: ICD-10-CM

## 2022-07-08 DIAGNOSIS — F32.1 CURRENT MODERATE EPISODE OF MAJOR DEPRESSIVE DISORDER WITHOUT PRIOR EPISODE (HCC): ICD-10-CM

## 2022-07-08 PROCEDURE — 99397 PER PM REEVAL EST PAT 65+ YR: CPT | Performed by: FAMILY MEDICINE

## 2022-07-08 RX ORDER — LOSARTAN POTASSIUM 100 MG/1
100 TABLET ORAL DAILY
Qty: 90 TABLET | Refills: 3 | Status: SHIPPED | OUTPATIENT
Start: 2022-07-08 | End: 2023-01-04

## 2022-07-08 RX ORDER — AMLODIPINE BESYLATE 5 MG/1
5 TABLET ORAL DAILY
Qty: 90 TABLET | Refills: 3 | Status: SHIPPED | OUTPATIENT
Start: 2022-07-08 | End: 2023-07-08

## 2022-07-08 RX ORDER — FLUOXETINE HYDROCHLORIDE 60 MG/1
60 TABLET, FILM COATED ORAL; ORAL DAILY
Qty: 90 TABLET | Refills: 3 | Status: SHIPPED | OUTPATIENT
Start: 2022-07-08 | End: 2023-07-03

## 2022-07-08 RX ORDER — ATORVASTATIN CALCIUM 40 MG/1
40 TABLET, FILM COATED ORAL DAILY
Qty: 90 TABLET | Refills: 3 | Status: SHIPPED | OUTPATIENT
Start: 2022-07-08 | End: 2022-10-06

## 2022-07-08 NOTE — PROGRESS NOTES
Nathan Cummings (:  1955) is a 78 y.o. female,Established patient, here for evaluation of the following chief complaint(s):  Wellness Program       ASSESSMENT/PLAN:  1. Annual physical exam  -     Comprehensive Metabolic Panel; Future  -     CBC with Auto Differential; Future  -     Hemoglobin A1C; Future  -     Lipid Panel; Future  2. Smoker  3. Essential hypertension  -     amLODIPine (NORVASC) 5 MG tablet; Take 1 tablet by mouth daily, Disp-90 tablet, R-3Normal  -     losartan (COZAAR) 100 MG tablet; Take 1 tablet by mouth daily, Disp-90 tablet, R-3Normal  -     Comprehensive Metabolic Panel; Future  -     CBC with Auto Differential; Future  -     Hemoglobin A1C; Future  -     Lipid Panel; Future  4. Pure hypercholesterolemia  -     atorvastatin (LIPITOR) 40 MG tablet; Take 1 tablet by mouth daily, Disp-90 tablet, R-3Normal  -     Comprehensive Metabolic Panel; Future  -     CBC with Auto Differential; Future  -     Hemoglobin A1C; Future  -     Lipid Panel; Future  5. Current moderate episode of major depressive disorder without prior episode (HCC)  -     FLUoxetine HCl 60 MG TABS; Take 60 mg by mouth daily, Disp-90 tablet, R-3Normal    Renewed blood work. Recommended blood work to be done. Continue medications  HTN now well controlled  Alcohol and smoking ongoing -- pre-contemplative for change  May benefit from talking to care coordinator or someone who can lead to financial planning and available resources, but patient not open to such currently. Return in about 6 months (around 2023). Subjective   SUBJECTIVE/OBJECTIVE:  HPI   Patient here for wellness program visit and follow up on chronic conditions. She needs this for work. She denies new concerns. HTN was uncontrolled last visit in March. -- losartan 100 mg + norvasc 5 mg on board. Elevated glucose noted as well mildly -- no labs done. Alcohol intake was at 1.5 drinks of veltet with 7UP during the week.  No number of drinks admitted. Smoker. Likes to do it. No desire to stop currently. 7 cats. Can' retire as she has no one else to support her. Insufficient benefits. Not enough to retire and feed her cats. Has not talked to . She did not get labs done  Not much into dental care. Active on the job. Sleep is okay. Review of Systems   Constitutional:  Negative for fatigue and fever. Respiratory:  Negative for shortness of breath. Cardiovascular:  Negative for chest pain and leg swelling. Wt Readings from Last 3 Encounters:   07/08/22 222 lb (100.7 kg)   03/25/22 212 lb (96.2 kg)   09/09/21 228 lb 9.6 oz (103.7 kg)       Objective   Physical Exam   Gen: NAD, AAO x 3, coherent, pleasant     Psych: normal mood and affect, moods lights up when talking about her cats. Insight and judgement fair    Conjunctiva clear bilaterally. CTAB. RRR. No leg edema     Abdomen obese, soft    Lab Results   Component Value Date     09/08/2021    K 4.1 09/08/2021    CL 99 09/08/2021    CO2 26 09/08/2021    BUN 15 09/08/2021    CREATININE 0.8 09/08/2021    GLUCOSE 99 09/08/2021    CALCIUM 9.6 09/08/2021    PROT 7.6 09/08/2021    LABALBU 4.6 09/08/2021    BILITOT 0.2 (L) 09/08/2021    ALKPHOS 79 09/08/2021    AST 29 09/08/2021    ALT 19 11/26/2021    LABGLOM 72 (A) 09/08/2021           An electronic signature was used to authenticate this note.     --Marylin Dos Santos MD

## 2022-07-13 NOTE — TELEPHONE ENCOUNTER
[facility] called requesting a refill on the following medications:  Requested Prescriptions     Pending Prescriptions Disp Refills    FLUoxetine (PROZAC) 20 MG capsule 270 capsule 3     Sig: Take 3 capsules by mouth daily       Date of last visit: 7/8/2022  Date of next visit (if applicable):1/13/2023  Date of last refill:   Pharmacy Name: [facility] [address] [facility]  [address]    >Patient insurance is requesting capsules.       Thanks,  Dennise Guidry, CMA

## 2022-07-14 RX ORDER — FLUOXETINE HYDROCHLORIDE 20 MG/1
60 CAPSULE ORAL DAILY
Qty: 270 CAPSULE | Refills: 3 | Status: SHIPPED | OUTPATIENT
Start: 2022-07-14

## 2022-07-16 ASSESSMENT — ENCOUNTER SYMPTOMS: SHORTNESS OF BREATH: 0

## 2022-08-05 ENCOUNTER — HOSPITAL ENCOUNTER (OUTPATIENT)
Age: 67
Discharge: HOME | End: 2022-08-05
Payer: COMMERCIAL

## 2022-08-05 DIAGNOSIS — Z00.00 ANNUAL PHYSICAL EXAM: ICD-10-CM

## 2022-08-05 DIAGNOSIS — I10 ESSENTIAL HYPERTENSION: ICD-10-CM

## 2022-08-05 DIAGNOSIS — E78.00 PURE HYPERCHOLESTEROLEMIA: ICD-10-CM

## 2022-08-05 LAB
ALBUMIN SERPL-MCNC: 4.4 G/DL (ref 3.5–5.1)
ALP BLD-CCNC: 83 U/L (ref 38–126)
ALT SERPL-CCNC: 31 U/L (ref 11–66)
ANION GAP SERPL CALCULATED.3IONS-SCNC: 12 MEQ/L (ref 8–16)
AST SERPL-CCNC: 30 U/L (ref 5–40)
BASOPHILS # BLD: 0.5 %
BASOPHILS ABSOLUTE: 0 THOU/MM3 (ref 0–0.1)
BILIRUB SERPL-MCNC: 0.6 MG/DL (ref 0.3–1.2)
BUN BLDV-MCNC: 15 MG/DL (ref 7–22)
CALCIUM SERPL-MCNC: 9.2 MG/DL (ref 8.5–10.5)
CHLORIDE BLD-SCNC: 99 MEQ/L (ref 98–111)
CHOLESTEROL, TOTAL: 159 MG/DL (ref 100–199)
CO2: 29 MEQ/L (ref 23–33)
CREAT SERPL-MCNC: 0.7 MG/DL (ref 0.4–1.2)
EOSINOPHIL # BLD: 1.7 %
EOSINOPHILS ABSOLUTE: 0.1 THOU/MM3 (ref 0–0.4)
ERYTHROCYTE [DISTWIDTH] IN BLOOD BY AUTOMATED COUNT: 13.3 % (ref 11.5–14.5)
ERYTHROCYTE [DISTWIDTH] IN BLOOD BY AUTOMATED COUNT: 45.8 FL (ref 35–45)
GFR SERPL CREATININE-BSD FRML MDRD: 83 ML/MIN/1.73M2
GLUCOSE BLD-MCNC: 94 MG/DL (ref 70–108)
HCT VFR BLD CALC: 42.5 % (ref 37–47)
HDLC SERPL-MCNC: 67 MG/DL
HEMOGLOBIN: 13.4 GM/DL (ref 12–16)
IMMATURE GRANS (ABS): 0.05 THOU/MM3 (ref 0–0.07)
IMMATURE GRANULOCYTES: 0.8 %
LDL CHOLESTEROL CALCULATED: 67 MG/DL
LYMPHOCYTES # BLD: 26.8 %
LYMPHOCYTES ABSOLUTE: 1.6 THOU/MM3 (ref 1–4.8)
MCH RBC QN AUTO: 29.7 PG (ref 26–33)
MCHC RBC AUTO-ENTMCNC: 31.5 GM/DL (ref 32.2–35.5)
MCV RBC AUTO: 94.2 FL (ref 81–99)
MONOCYTES # BLD: 15.6 %
MONOCYTES ABSOLUTE: 0.9 THOU/MM3 (ref 0.4–1.3)
NUCLEATED RED BLOOD CELLS: 0 /100 WBC
PLATELET # BLD: 233 THOU/MM3 (ref 130–400)
PMV BLD AUTO: 10.8 FL (ref 9.4–12.4)
POTASSIUM SERPL-SCNC: 5 MEQ/L (ref 3.5–5.2)
RBC # BLD: 4.51 MILL/MM3 (ref 4.2–5.4)
SEG NEUTROPHILS: 54.6 %
SEGMENTED NEUTROPHILS ABSOLUTE COUNT: 3.2 THOU/MM3 (ref 1.8–7.7)
SODIUM BLD-SCNC: 140 MEQ/L (ref 135–145)
TOTAL PROTEIN: 7.3 G/DL (ref 6.1–8)
TRIGL SERPL-MCNC: 125 MG/DL (ref 0–199)
WBC # BLD: 5.9 THOU/MM3 (ref 4.8–10.8)

## 2022-08-05 PROCEDURE — 83036 HEMOGLOBIN GLYCOSYLATED A1C: CPT

## 2022-08-05 PROCEDURE — 80053 COMPREHEN METABOLIC PANEL: CPT

## 2022-08-05 PROCEDURE — 85025 COMPLETE CBC W/AUTO DIFF WBC: CPT

## 2022-08-05 PROCEDURE — 80061 LIPID PANEL: CPT

## 2022-08-05 PROCEDURE — 36415 COLL VENOUS BLD VENIPUNCTURE: CPT

## 2022-08-06 LAB
AVERAGE GLUCOSE: 114 MG/DL (ref 70–126)
HBA1C MFR BLD: 5.8 % (ref 4.4–6.4)

## 2022-08-08 ENCOUNTER — TELEPHONE (OUTPATIENT)
Dept: FAMILY MEDICINE CLINIC | Age: 67
End: 2022-08-08

## 2022-08-08 NOTE — TELEPHONE ENCOUNTER
Pt states that she has a form that was to be completed at her physical on 7/8/22. Document is ready- needs to be signed by Fanny Brown- will be signed this afternoon. Pt states that she will pick it up on Friday.

## 2022-08-08 NOTE — RESULT ENCOUNTER NOTE
Please let patient know that her cholesterol shows much improvement although blood sugar levels bumped up slightly. Kidney and liver and blood cells are within acceptable limits. I encourage a fiber filled diet and reducing added sugars in drinks/foods. Smoking cessation also helps, along with good activity levels  Will follow A1c next OV in Jan.         Thank you.

## 2022-08-08 NOTE — TELEPHONE ENCOUNTER
----- Message from Christine Gusman MD sent at 8/8/2022  8:33 AM EDT -----  Please let patient know that her cholesterol shows much improvement although blood sugar levels bumped up slightly. Kidney and liver and blood cells are within acceptable limits. I encourage a fiber filled diet and reducing added sugars in drinks/foods. Smoking cessation also helps, along with good activity levels  Will follow A1c next OV in Jan.         Thank you.

## 2022-08-08 NOTE — TELEPHONE ENCOUNTER
----- Message from Anuradha Gomez MD sent at 8/8/2022  8:33 AM EDT -----  Please let patient know that her cholesterol shows much improvement although blood sugar levels bumped up slightly. Kidney and liver and blood cells are within acceptable limits. I encourage a fiber filled diet and reducing added sugars in drinks/foods. Smoking cessation also helps, along with good activity levels  Will follow A1c next OV in Jan.         Thank you.

## 2023-07-14 DIAGNOSIS — I10 ESSENTIAL HYPERTENSION: ICD-10-CM

## 2023-07-14 RX ORDER — AMLODIPINE BESYLATE 5 MG/1
5 TABLET ORAL DAILY
Qty: 90 TABLET | Refills: 0 | Status: SHIPPED | OUTPATIENT
Start: 2023-07-14 | End: 2023-10-12

## 2023-07-14 NOTE — TELEPHONE ENCOUNTER
Arlen Spoon called requesting a refill on the following medications:  Requested Prescriptions     Pending Prescriptions Disp Refills    amLODIPine (NORVASC) 5 MG tablet 90 tablet 0     Sig: Take 1 tablet by mouth daily       Date of last visit: 7/8/2022  Date of next visit (if applicable):Visit date not found  Date of last refill: 4/10/2023  Pharm[facility] Name: [facility] [place]

## 2023-07-14 NOTE — TELEPHONE ENCOUNTER
----- Message from [facility] sent at 7/14/2023  1:47 PM EDT -----  Subject: Refill Request    QUESTIONS  Name of Medication? amLODIPine (NORVASC) 5 MG tablet  Patient-reported dosage and instructions? 1 tab by mouth daily  How many days do you have left? 5  Preferred Pharmacy? [id number]  Pharmacy phone number (if available)? [phone]  ---------------------------------------------------------------------------  --------------  CALL BACK INFO  What is the best way for the office to contact you? OK to leave message on   voicemail  Preferred Call Back Phone Number? [phone]  ---------------------------------------------------------------------------  --------------  SCRIPT ANSWERS  Relationship to Patient?  Self

## 2023-07-21 ENCOUNTER — OFFICE VISIT (OUTPATIENT)
Dept: FAMILY MEDICINE CLINIC | Age: 68
End: 2023-07-21

## 2023-07-21 ENCOUNTER — HOSPITAL ENCOUNTER (OUTPATIENT)
Age: 68
Discharge: HOME | End: 2023-07-21
Payer: COMMERCIAL

## 2023-07-21 VITALS
HEIGHT: 64 IN | SYSTOLIC BLOOD PRESSURE: 150 MMHG | WEIGHT: 232.2 LBS | HEART RATE: 92 BPM | RESPIRATION RATE: 20 BRPM | TEMPERATURE: 97.7 F | DIASTOLIC BLOOD PRESSURE: 62 MMHG | OXYGEN SATURATION: 95 % | BODY MASS INDEX: 39.64 KG/M2

## 2023-07-21 DIAGNOSIS — R73.03 PREDIABETES: ICD-10-CM

## 2023-07-21 DIAGNOSIS — Z12.31 ENCOUNTER FOR SCREENING MAMMOGRAM FOR BREAST CANCER: ICD-10-CM

## 2023-07-21 DIAGNOSIS — Z00.00 WELL ADULT EXAM: ICD-10-CM

## 2023-07-21 DIAGNOSIS — E78.00 PURE HYPERCHOLESTEROLEMIA: ICD-10-CM

## 2023-07-21 DIAGNOSIS — I10 ESSENTIAL HYPERTENSION: ICD-10-CM

## 2023-07-21 DIAGNOSIS — Z12.11 SCREENING FOR COLORECTAL CANCER: ICD-10-CM

## 2023-07-21 DIAGNOSIS — Z87.891 PERSONAL HISTORY OF TOBACCO USE, PRESENTING HAZARDS TO HEALTH: ICD-10-CM

## 2023-07-21 DIAGNOSIS — Z12.12 SCREENING FOR COLORECTAL CANCER: ICD-10-CM

## 2023-07-21 DIAGNOSIS — F32.1 CURRENT MODERATE EPISODE OF MAJOR DEPRESSIVE DISORDER WITHOUT PRIOR EPISODE (HCC): ICD-10-CM

## 2023-07-21 DIAGNOSIS — Z00.00 WELL ADULT EXAM: Primary | ICD-10-CM

## 2023-07-21 DIAGNOSIS — E66.01 CLASS 2 SEVERE OBESITY DUE TO EXCESS CALORIES WITH SERIOUS COMORBIDITY AND BODY MASS INDEX (BMI) OF 39.0 TO 39.9 IN ADULT (HCC): ICD-10-CM

## 2023-07-21 LAB
ALBUMIN SERPL BCG-MCNC: 4.3 G/DL (ref 3.5–5.1)
ALP SERPL-CCNC: 88 U/L (ref 38–126)
ALT SERPL W/O P-5'-P-CCNC: 23 U/L (ref 11–66)
ANION GAP SERPL CALC-SCNC: 10 MEQ/L (ref 8–16)
AST SERPL-CCNC: 27 U/L (ref 5–40)
BASOPHILS ABSOLUTE: 0 THOU/MM3 (ref 0–0.1)
BASOPHILS NFR BLD AUTO: 0.4 %
BILIRUB SERPL-MCNC: 0.5 MG/DL (ref 0.3–1.2)
BUN SERPL-MCNC: 14 MG/DL (ref 7–22)
CALCIUM SERPL-MCNC: 9.3 MG/DL (ref 8.5–10.5)
CHLORIDE SERPL-SCNC: 99 MEQ/L (ref 98–111)
CHOLEST SERPL-MCNC: 163 MG/DL (ref 100–199)
CO2 SERPL-SCNC: 27 MEQ/L (ref 23–33)
CREAT SERPL-MCNC: 0.7 MG/DL (ref 0.4–1.2)
DEPRECATED RDW RBC AUTO: 46.3 FL (ref 35–45)
EOSINOPHIL NFR BLD AUTO: 1.5 %
EOSINOPHILS ABSOLUTE: 0.1 THOU/MM3 (ref 0–0.4)
ERYTHROCYTE [DISTWIDTH] IN BLOOD BY AUTOMATED COUNT: 13.2 % (ref 11.5–14.5)
GFR SERPL CREATININE-BSD FRML MDRD: > 60 ML/MIN/1.73M2
GLUCOSE SERPL-MCNC: 105 MG/DL (ref 70–108)
HCT VFR BLD AUTO: 43.1 % (ref 37–47)
HDLC SERPL-MCNC: 64 MG/DL
HGB BLD-MCNC: 13.4 GM/DL (ref 12–16)
IMM GRANULOCYTES # BLD AUTO: 0.07 THOU/MM3 (ref 0–0.07)
IMM GRANULOCYTES NFR BLD AUTO: 1 %
LDLC SERPL CALC-MCNC: 80 MG/DL
LYMPHOCYTES ABSOLUTE: 1.8 THOU/MM3 (ref 1–4.8)
LYMPHOCYTES NFR BLD AUTO: 26.3 %
MCH RBC QN AUTO: 29.8 PG (ref 26–33)
MCHC RBC AUTO-ENTMCNC: 31.1 GM/DL (ref 32.2–35.5)
MCV RBC AUTO: 95.8 FL (ref 81–99)
MONOCYTES ABSOLUTE: 0.8 THOU/MM3 (ref 0.4–1.3)
MONOCYTES NFR BLD AUTO: 12.4 %
NEUTROPHILS NFR BLD AUTO: 58.4 %
NRBC BLD AUTO-RTO: 0 /100 WBC
PLATELET # BLD AUTO: 243 THOU/MM3 (ref 130–400)
PMV BLD AUTO: 11.2 FL (ref 9.4–12.4)
POTASSIUM SERPL-SCNC: 4.3 MEQ/L (ref 3.5–5.2)
PROT SERPL-MCNC: 7.4 G/DL (ref 6.1–8)
RBC # BLD AUTO: 4.5 MILL/MM3 (ref 4.2–5.4)
SEGMENTED NEUTROPHILS ABSOLUTE COUNT: 3.9 THOU/MM3 (ref 1.8–7.7)
SODIUM SERPL-SCNC: 136 MEQ/L (ref 135–145)
TRIGL SERPL-MCNC: 97 MG/DL (ref 0–199)
WBC # BLD AUTO: 6.7 THOU/MM3 (ref 4.8–10.8)

## 2023-07-21 PROCEDURE — 36415 COLL VENOUS BLD VENIPUNCTURE: CPT

## 2023-07-21 PROCEDURE — 80061 LIPID PANEL: CPT

## 2023-07-21 PROCEDURE — 80053 COMPREHEN METABOLIC PANEL: CPT

## 2023-07-21 PROCEDURE — 85025 COMPLETE CBC W/AUTO DIFF WBC: CPT

## 2023-07-21 PROCEDURE — 83036 HEMOGLOBIN GLYCOSYLATED A1C: CPT

## 2023-07-21 RX ORDER — FLUOXETINE HYDROCHLORIDE 60 MG/1
60 TABLET, FILM COATED ORAL; ORAL DAILY
Qty: 90 TABLET | Refills: 1 | Status: SHIPPED | OUTPATIENT
Start: 2023-07-21 | End: 2024-01-17

## 2023-07-21 RX ORDER — ATORVASTATIN CALCIUM 40 MG/1
40 TABLET, FILM COATED ORAL DAILY
Qty: 30 TABLET | Refills: 5 | Status: SHIPPED | OUTPATIENT
Start: 2023-07-21 | End: 2024-01-17

## 2023-07-21 RX ORDER — LOSARTAN POTASSIUM 100 MG/1
100 TABLET ORAL DAILY
Qty: 30 TABLET | Refills: 5 | Status: SHIPPED | OUTPATIENT
Start: 2023-07-21 | End: 2024-01-17

## 2023-07-21 RX ORDER — AMLODIPINE BESYLATE 5 MG/1
5 TABLET ORAL DAILY
Qty: 90 TABLET | Refills: 1 | Status: SHIPPED | OUTPATIENT
Start: 2023-07-21 | End: 2024-01-17

## 2023-07-21 RX ORDER — FLUOXETINE HYDROCHLORIDE 20 MG/1
60 CAPSULE ORAL DAILY
Qty: 270 CAPSULE | Refills: 3 | Status: CANCELLED | OUTPATIENT
Start: 2023-07-21

## 2023-07-21 SDOH — ECONOMIC STABILITY: FOOD INSECURITY: WITHIN THE PAST 12 MONTHS, THE FOOD YOU BOUGHT JUST DIDN'T LAST AND YOU DIDN'T HAVE MONEY TO GET MORE.: NEVER TRUE

## 2023-07-21 SDOH — ECONOMIC STABILITY: INCOME INSECURITY: HOW HARD IS IT FOR YOU TO PAY FOR THE VERY BASICS LIKE FOOD, HOUSING, MEDICAL CARE, AND HEATING?: NOT HARD AT ALL

## 2023-07-21 SDOH — ECONOMIC STABILITY: HOUSING INSECURITY
IN THE LAST 12 MONTHS, WAS THERE A TIME WHEN YOU DID NOT HAVE A STEADY PLACE TO SLEEP OR SLEPT IN A SHELTER (INCLUDING NOW)?: NO

## 2023-07-21 SDOH — ECONOMIC STABILITY: FOOD INSECURITY: WITHIN THE PAST 12 MONTHS, YOU WORRIED THAT YOUR FOOD WOULD RUN OUT BEFORE YOU GOT MONEY TO BUY MORE.: NEVER TRUE

## 2023-07-21 ASSESSMENT — PATIENT HEALTH QUESTIONNAIRE - PHQ9
1. LITTLE INTEREST OR PLEASURE IN DOING THINGS: 0
5. POOR APPETITE OR OVEREATING: 2
8. MOVING OR SPEAKING SO SLOWLY THAT OTHER PEOPLE COULD HAVE NOTICED. OR THE OPPOSITE, BEING SO FIGETY OR RESTLESS THAT YOU HAVE BEEN MOVING AROUND A LOT MORE THAN USUAL: 0
3. TROUBLE FALLING OR STAYING ASLEEP: 3
SUM OF ALL RESPONSES TO PHQ QUESTIONS 1-9: 5
4. FEELING TIRED OR HAVING LITTLE ENERGY: 0
6. FEELING BAD ABOUT YOURSELF - OR THAT YOU ARE A FAILURE OR HAVE LET YOURSELF OR YOUR FAMILY DOWN: 0
SUM OF ALL RESPONSES TO PHQ QUESTIONS 1-9: 5
2. FEELING DOWN, DEPRESSED OR HOPELESS: 0
10. IF YOU CHECKED OFF ANY PROBLEMS, HOW DIFFICULT HAVE THESE PROBLEMS MADE IT FOR YOU TO DO YOUR WORK, TAKE CARE OF THINGS AT HOME, OR GET ALONG WITH OTHER PEOPLE: 0
7. TROUBLE CONCENTRATING ON THINGS, SUCH AS READING THE NEWSPAPER OR WATCHING TELEVISION: 0
SUM OF ALL RESPONSES TO PHQ QUESTIONS 1-9: 5
SUM OF ALL RESPONSES TO PHQ QUESTIONS 1-9: 5
SUM OF ALL RESPONSES TO PHQ9 QUESTIONS 1 & 2: 0
9. THOUGHTS THAT YOU WOULD BE BETTER OFF DEAD, OR OF HURTING YOURSELF: 0

## 2023-07-21 ASSESSMENT — ENCOUNTER SYMPTOMS
ABDOMINAL PAIN: 0
NAUSEA: 0
VOMITING: 0
SHORTNESS OF BREATH: 0
EYE PAIN: 0
COUGH: 0
SORE THROAT: 0
CHEST TIGHTNESS: 0

## 2023-07-21 NOTE — ASSESSMENT & PLAN NOTE
Uncontrolled, lifestyle modifications recommended. Patient will work on adding exercise daily and creating better diet habits.

## 2023-07-21 NOTE — ASSESSMENT & PLAN NOTE
Will update labs to determine control. Patient is going to work on exercise and diet improvements. Can consider medication adjustment based on results.

## 2023-07-21 NOTE — ASSESSMENT & PLAN NOTE
Elevated today. Home readings at goal. Recheck at next appointment and if elevated again consider medication adjustment. Patient is also going to work more on diet and exercise.

## 2023-07-21 NOTE — ASSESSMENT & PLAN NOTE
Will check A1C. Patient is going to work on lifestyle improvements. May consider medication based on results.

## 2023-07-22 LAB
DEPRECATED MEAN GLUCOSE BLD GHB EST-ACNC: 111 MG/DL (ref 70–126)
HBA1C MFR BLD HPLC: 5.7 % (ref 4.4–6.4)

## 2023-07-24 ENCOUNTER — TELEPHONE (OUTPATIENT)
Dept: FAMILY MEDICINE CLINIC | Age: 68
End: 2023-07-24

## 2023-07-24 NOTE — TELEPHONE ENCOUNTER
----- Message from PAO Morales CNP sent at 7/24/2023 12:40 PM EDT -----  Labs are stable. Continue current medication and keep schedule follow up in 6 months.

## 2023-08-22 ENCOUNTER — COMMUNITY OUTREACH (OUTPATIENT)
Dept: FAMILY MEDICINE CLINIC | Age: 68
End: 2023-08-22

## 2023-09-07 RX ORDER — FLUOXETINE HYDROCHLORIDE 20 MG/1
CAPSULE ORAL
Qty: 270 CAPSULE | Refills: 3 | Status: SHIPPED | OUTPATIENT
Start: 2023-09-07

## 2023-09-07 NOTE — TELEPHONE ENCOUNTER
[facility] Donis called requesting a refill on the following medications:  Requested Prescriptions     Pending Prescriptions Disp Refills    FLUoxetine (PROZAC) 20 MG capsule [Pharmacy Med Name: FLUOXETINE HCL 20 MG CAPSULE] 270 capsule 3     Sig: TAKE 3 CAPSULES BY MOUTH ONCE DAILY.        Date of last visit: 7/21/2023  Date of next visit (if applicable):Visit date not found  Date of last refill: 7/14/2022  Pharmacy Name: [address] [place] [address]     Rx pending 270/3    Thanks,  Ti Pack LPN

## 2023-10-05 ENCOUNTER — APPOINTMENT (OUTPATIENT)
Dept: GENERAL RADIOLOGY | Age: 68
End: 2023-10-05
Payer: COMMERCIAL

## 2023-10-05 ENCOUNTER — HOSPITAL ENCOUNTER (EMERGENCY)
Age: 68
Discharge: HOME | End: 2023-10-05
Payer: COMMERCIAL

## 2023-10-05 VITALS
RESPIRATION RATE: 20 BRPM | SYSTOLIC BLOOD PRESSURE: 137 MMHG | TEMPERATURE: 98 F | BODY MASS INDEX: 40.34 KG/M2 | HEART RATE: 70 BPM | WEIGHT: 235 LBS | OXYGEN SATURATION: 95 % | DIASTOLIC BLOOD PRESSURE: 65 MMHG

## 2023-10-05 DIAGNOSIS — S42.291A CLOSED FRACTURE OF HEAD OF RIGHT HUMERUS, INITIAL ENCOUNTER: Primary | ICD-10-CM

## 2023-10-05 PROCEDURE — 73030 X-RAY EXAM OF SHOULDER: CPT

## 2023-10-05 PROCEDURE — 99283 EMERGENCY DEPT VISIT LOW MDM: CPT

## 2023-10-05 PROCEDURE — 6370000000 HC RX 637 (ALT 250 FOR IP): Performed by: NURSE PRACTITIONER

## 2023-10-05 RX ORDER — HYDROCODONE BITARTRATE AND ACETAMINOPHEN 5; 325 MG/1; MG/1
1 TABLET ORAL EVERY 6 HOURS PRN
Qty: 12 TABLET | Refills: 0 | Status: SHIPPED | OUTPATIENT
Start: 2023-10-05 | End: 2023-10-08

## 2023-10-05 RX ORDER — OXYCODONE HYDROCHLORIDE AND ACETAMINOPHEN 5; 325 MG/1; MG/1
1 TABLET ORAL ONCE
Status: COMPLETED | OUTPATIENT
Start: 2023-10-05 | End: 2023-10-05

## 2023-10-05 RX ADMIN — OXYCODONE AND ACETAMINOPHEN 1 TABLET: 5; 325 TABLET ORAL at 22:28

## 2023-10-05 ASSESSMENT — PAIN SCALES - GENERAL
PAINLEVEL_OUTOF10: 10
PAINLEVEL_OUTOF10: 10

## 2023-10-05 ASSESSMENT — PAIN DESCRIPTION - ORIENTATION: ORIENTATION: RIGHT

## 2023-10-05 ASSESSMENT — PAIN DESCRIPTION - LOCATION: LOCATION: SHOULDER

## 2023-10-05 ASSESSMENT — PAIN - FUNCTIONAL ASSESSMENT: PAIN_FUNCTIONAL_ASSESSMENT: 0-10

## 2023-10-06 NOTE — ED PROVIDER NOTES
[facility] EMERGENCY DEPT      EMERGENCY MEDICINE     Pt Name: Karissa Hill  MRN: [id number]  [facility] 1955  Date of evaluation: 10/5/2023  Provider: PAO Contreras CNP    CHIEF COMPLAINT       Chief Complaint   Patient presents with    Fall    Shoulder Injury     right     HISTORY OF PRESENT ILLNESS   Karissa Hill is a pleasant 70 y.o. female who presents to the emergency department from home with c/o mechanical fall where she landed on her right shoulder. LROM and pain in the shoulder. Denies hitting head. No blood thinners. History is obtained from:  patient  PASTMEDICAL HISTORY     Past Medical History:   Diagnosis Date    COPD (chronic obstructive pulmonary disease) ([address])     Hyperlipidemia     Hypertension     Osteoarthritis     Smoker     Urticaria        Patient Active Problem List   Diagnosis Code    COPD (chronic obstructive pulmonary disease) ([address] [facility]) J44.9    Hyperlipidemia E78.5    Smoker F17.200    Osteoarthritis M19.90    Essential hypertension I10    Current moderate episode of major depressive disorder without prior episode ([address] [facility]) F32.1    Prediabetes R73.03    Class 2 severe obesity due to excess calories with serious comorbidity and body mass index (BMI) of 39.0 to 39.9 in adult ([address] E66.01, Z68.39     SURGICAL HISTORY       Past Surgical History:   Procedure Laterality Date    DILATION AND CURETTAGE OF UTERUS      KNEE ARTHROSCOPY         CURRENT MEDICATIONS       Discharge Medication List as of 10/5/2023 11:06 PM        CONTINUE these medications which have NOT CHANGED    Details   FLUoxetine (PROZAC) 20 MG capsule TAKE 3 CAPSULES BY MOUTH ONCE DAILY. , Disp-270 capsule, R-3Normal      amLODIPine (NORVASC) 5 MG tablet Take 1 tablet by mouth daily, Disp-90 tablet, R-1Normal      atorvastatin (LIPITOR) 40 MG tablet Take 1 tablet by mouth daily, Disp-30 tablet, R-5Normal      losartan (COZAAR) 100 MG tablet Take 1 tablet by mouth daily, Disp-30 tablet, R-5Normal

## 2023-10-06 NOTE — ED TRIAGE NOTES
Pt comes to ED with c/o right shoulder pain after falling onto it around 8. Pt states she was walking and tripped over her feet. Pt denies hitting her head. Pt denies hearing a pop snack or crack. Pt states when she does not move it is fine. PT states when she tries to move it is a 10 out of 10.

## 2023-12-12 ENCOUNTER — HOSPITAL ENCOUNTER (OUTPATIENT)
Dept: OCCUPATIONAL THERAPY | Age: 68
Setting detail: THERAPIES SERIES
Discharge: HOME | End: 2023-12-12

## 2023-12-12 NOTE — PROGRESS NOTES
limited by pain   []  Patient limited by other medical complications  []  Other:     Assessment: ***  Areas for Improvement: {Problem List:4696010384}  Prognosis: {GOOD/FAIR/POOR:817344896}    GOALS:  Patient Goal: ***    Short Term Goals:  Time Frame: 4weeks   Patient will increase right shoulder to   Patient will be independent with HEP to increase ease and ability to   Patient will report pain with activity no greater than       Long Term Goals:  Time Frame: 12 weeks         Patient Education:   [x]  HEP/Education Completed: Plan of Care, Goals  HEP: table slides, scapular pinches, pendulums  []  No new Education completed  []  Reviewed Prior HEP      [x]  Patient verbalized and/or demonstrated understanding of education provided. []  Patient unable to verbalize and/or demonstrate understanding of education provided. Will continue education. [x]  Barriers to learning: none    PLAN:  Treatment Recommendations: Strengthening, Range of Motion, Manual Therapy - Soft Tissue Mobilization, Manual Therapy - Joint Manipulation, Home Exercise Program, Patient Education, Aquatics, and Modalities    [x]  Plan of care initiated. Plan to see patient 2 times per week for 12 weeks to address the treatment planned outlined above.   []  Continue with current plan of care  []  Modify plan of care as follows:    []  Hold pending physician visit  []  Discharge    Time In ***   Time Out ***   Timed Code Minutes: *** min   Total Treatment Time: *** min       Electronically Signed by: Camilo Samson OT

## 2023-12-14 ENCOUNTER — HOSPITAL ENCOUNTER (OUTPATIENT)
Dept: OCCUPATIONAL THERAPY | Age: 68
Setting detail: THERAPIES SERIES
End: 2023-12-14
Payer: COMMERCIAL

## 2023-12-26 ENCOUNTER — HOSPITAL ENCOUNTER (OUTPATIENT)
Dept: OCCUPATIONAL THERAPY | Age: 68
Setting detail: THERAPIES SERIES
Discharge: HOME | End: 2023-12-26
Payer: COMMERCIAL

## 2023-12-26 PROCEDURE — 97110 THERAPEUTIC EXERCISES: CPT

## 2023-12-26 NOTE — PROGRESS NOTES
Noted   Education:   [x]  HEP/Education Completed: Plan of Care, Goals  HEP: table slides, scapular pinches, pendulums  []  No new Education completed  []  Reviewed Prior HEP      [x]  Patient verbalized and/or demonstrated understanding of education provided. []  Patient unable to verbalize and/or demonstrate understanding of education provided. Will continue education. [x]  Barriers to learning: none    PLAN:  Treatment Recommendations: Strengthening, Range of Motion, Manual Therapy - Soft Tissue Mobilization, Manual Therapy - Joint Manipulation, Home Exercise Program, Patient Education, Aquatics, and Modalities    [x]  Plan of care initiated. Plan to see patient 2 times per week for 12 weeks to address the treatment planned outlined above.   []  Continue with current plan of care  []  Modify plan of care as follows:    []  Hold pending physician visit  []  Discharge    Time In 1425   Time Out 1505   Timed Code Minutes: 30 min   Total Treatment Time: 30 min       Electronically Signed by: ANTON Murphy, OTR/L [id number]

## 2023-12-28 ENCOUNTER — HOSPITAL ENCOUNTER (OUTPATIENT)
Dept: OCCUPATIONAL THERAPY | Age: 68
Setting detail: THERAPIES SERIES
Discharge: HOME | End: 2023-12-28
Payer: COMMERCIAL

## 2023-12-28 PROCEDURE — 97110 THERAPEUTIC EXERCISES: CPT

## 2023-12-28 NOTE — PROGRESS NOTES
[address] [facility] THERAPY  [] EVALUATION  [x] DAILY NOTE (LAND) [] DAILY NOTE (AQUATIC ) [] PROGRESS NOTE [] DISCHARGE NOTE    [] [facility] [place]   [x] [address] [place] [address]    [] [facility]   [] Rolando Nye    Date: 2023  Patient Name:  Tyron Barragan  : [birth year]  MRN: [id number]  CSN: [id number]    Referring Practitioner Wilton Hu DO    Diagnosis Other displaced fracture of upper end of right humerus, sequela    Treatment Diagnosis M25.611 Stiffness of Right Shoulder   Date of Evaluation 23    Additional Pertinent History  has a past medical history of COPD (chronic obstructive pulmonary disease) ([address] [facility]), Hyperlipidemia, Hypertension, Osteoarthritis, Smoker, and Urticaria. Functional Outcome Measure Used UEFS   Functional Outcome Score 33/80 (23)       Insurance: Primary: Payor: ALLIED BENEFIT SYSTEM /  /  / ,   Secondary:    Authorization Information: PRE CERTIFICATION REQUIRED: no  INSURANCE THERAPY BENEFIT:  25 visits combined PT/OT none used  AQUATIC THERAPY COVERED: yes  MODALITIES COVERED:  yes  TELEHEALTH COVERED: na   Approved Procedure Codes: 53061 - Therapeutic Exercise, 99538 - Manual Therapy, 34388 - Therapeutic Activities, 06775 - OT ADL Training, and [id number] - Ultrasound  (Codes requested indicated by red font, codes approved indicated by black font)   Visit # 4, 4/10 for progress note   Visits Allowed: 25   Recertification Date: 0/3/93   Physician Follow-Up: 24   Physician Orders: Gentle PROM only   History of Present Illness: Patient reports that she caught her shoe and fell on . Went to ED for treatment. X-rays revealed: Displaced, angulated right humeral head/neck fracture. No dislocation. Unremarkable soft tissues.      SUBJECTIVE: Patient reports that she typically does not have pain at rest.            OBJECTIVE:  TREATMENT   Precautions: Gentle PROM only   Pain:

## 2024-01-02 ENCOUNTER — HOSPITAL ENCOUNTER (OUTPATIENT)
Dept: OCCUPATIONAL THERAPY | Age: 69
Setting detail: THERAPIES SERIES
Discharge: HOME | End: 2024-01-02
Payer: COMMERCIAL

## 2024-01-02 PROCEDURE — 97110 THERAPEUTIC EXERCISES: CPT

## 2024-01-02 NOTE — PROGRESS NOTES
OBJECTIVE:  TREATMENT   Precautions: Gentle PROM only   Pain:  3/10 right shoulder    “X” in shaded column indicates Activity Completed Today   Modalities Parameters/  Location  Notes/Comments                     Manual Therapy Time/  Technique  Notes/Comments   STM to axillary muscles  x    Anterior chest stretching  x          Exercises   Sets/  Sec Reps  Notes/Comments   Backward shoulder circles   x    Table slides        Supine PROM right shoulder all planes    x Decreased  cues to relax required   Passive pulleys   x    Cross body stretch   x gentle   Anterior shoulder stretch in sidelying   x Very gentle          Activities Time    Notes/Comments                       Specific Interventions Next Treatment: gentle PROM as per script, modalities as needed    Activity/Treatment Tolerance:  [x]  Patient tolerated treatment well  []  Patient limited by fatigue  []  Patient limited by pain   []  Patient limited by other medical complications  []  Other:     Assessment: Patient is progressing toward goals.   Gradual increased tolerance for PROM.    Areas for Improvement: impaired ROM, impaired strength, and pain  Prognosis: fair    GOALS:  Patient Goal: to be able to move my arm like before    Short Term Goals:  Time Frame: 4weeks   Patient will increase right shoulder AROM to 95 degrees flexion, 75 degrees abduction, 50 degrees ER with the arm at the side and thumb tip to the lateral buttock to increase ease and ability for toileting.  Patient will increase right shoulder PROM to 130 degrees flexion 90 degrees abduction, 50 degrees ER with the arm in scaption and 60 degrees IR to increase ease and ability with house chores.  Patient will be independent with HEP to increase ease and ability to perform grooming and bathing routine  Patient will report pain with activity no greater than 4/10 to increase ease and ability with HEP      Long Term Goals:  Time Frame: 12 weeks  Patient will report grooming with R

## 2024-01-04 ENCOUNTER — HOSPITAL ENCOUNTER (OUTPATIENT)
Dept: OCCUPATIONAL THERAPY | Age: 69
Setting detail: THERAPIES SERIES
Discharge: HOME | End: 2024-01-04
Payer: COMMERCIAL

## 2024-01-04 PROCEDURE — 97110 THERAPEUTIC EXERCISES: CPT

## 2024-01-04 NOTE — PROGRESS NOTES
care initiated.  Plan to see patient 2 times per week for 12 weeks to address the treatment planned outlined above.  [x]  Continue with current plan of care  []  Modify plan of care as follows:    []  Hold pending physician visit  []  Discharge    Time In 1430   Time Out 1510   Timed Code Minutes: 40 min   Total Treatment Time: 40 min       Electronically Signed by: ANTON Neumann, OTR/L [id number]

## 2024-01-09 ENCOUNTER — APPOINTMENT (OUTPATIENT)
Dept: OCCUPATIONAL THERAPY | Age: 69
End: 2024-01-09
Payer: COMMERCIAL

## 2024-01-12 ENCOUNTER — HOSPITAL ENCOUNTER (OUTPATIENT)
Dept: OCCUPATIONAL THERAPY | Age: 69
Setting detail: THERAPIES SERIES
Discharge: HOME | End: 2024-01-12
Payer: COMMERCIAL

## 2024-01-12 PROCEDURE — 97110 THERAPEUTIC EXERCISES: CPT

## 2024-01-12 NOTE — PROGRESS NOTES
[]  Hold pending physician visit  []  Discharge    Time In 1255   Time Out 1333   Timed Code Minutes: 38 min   Total Treatment Time: 38 min       Electronically Signed by: ANTON Neumann, OTR/L 6209

## 2024-01-15 ENCOUNTER — HOSPITAL ENCOUNTER (OUTPATIENT)
Dept: OCCUPATIONAL THERAPY | Age: 69
Setting detail: THERAPIES SERIES
End: 2024-01-15
Payer: COMMERCIAL

## 2024-01-16 ENCOUNTER — HOSPITAL ENCOUNTER (OUTPATIENT)
Dept: OCCUPATIONAL THERAPY | Age: 69
Setting detail: THERAPIES SERIES
Discharge: HOME | End: 2024-01-16
Payer: COMMERCIAL

## 2024-01-16 PROCEDURE — 97110 THERAPEUTIC EXERCISES: CPT

## 2024-01-16 NOTE — PROGRESS NOTES
[facility]  OCCUPATIONAL THERAPY  [] EVALUATION  [x] DAILY NOTE (LAND) [] DAILY NOTE (AQUATIC )  PROGRESS NOTE [] DISCHARGE NOTE    [] [facility] [place]   [x] [facility]    [] [facility]   [] [facility]    Date: 2024  Patient Name:  Kalpana Braga  : 1955  MRN: [id number]  CSN: [id number]    Referring Practitioner Carmine Paige DO    Diagnosis Other displaced fracture of upper end of right humerus, sequela    Treatment Diagnosis M25.611 Stiffness of Right Shoulder   Date of Evaluation 23    Additional Pertinent History  has a past medical history of COPD (chronic obstructive pulmonary disease) (HCC), Hyperlipidemia, Hypertension, Osteoarthritis, Smoker, and Urticaria.        Functional Outcome Measure Used UEFS   Functional Outcome Score 33/80 (23)       Insurance: Primary: Payor: ALLIED BENEFIT SYSTEM /  /  / ,   Secondary:    Authorization Information: PRE CERTIFICATION REQUIRED: no  INSURANCE THERAPY BENEFIT:  25 visits combined PT/OT none used  AQUATIC THERAPY COVERED: yes  MODALITIES COVERED:  yes  TELEHEALTH COVERED: na   Approved Procedure Codes: 20776 - Therapeutic Exercise, 48341 - Manual Therapy, 54813 - Therapeutic Activities, 49604 - OT ADL Training, and 69234 - Ultrasound  (Codes requested indicated by red font, codes approved indicated by black font)   Visit # 5/10 for ,     8/10 for progress note   Visits Allowed: 25   Recertification Date: 3/5/24   Physician Follow-Up: 24   Physician Orders: Gentle PROM only   History of Present Illness: Patient reports that she caught her shoe and fell on .  Went to ED for treatment.  X-rays revealed: Displaced, angulated right humeral head/neck fracture.  No dislocation.  Unremarkable soft tissues.     SUBJECTIVE:     Reports that she has had great improvements with her recent abilities, she took her shower on her own, vacuumed the

## 2024-01-19 ENCOUNTER — HOSPITAL ENCOUNTER (OUTPATIENT)
Dept: OCCUPATIONAL THERAPY | Age: 69
Setting detail: THERAPIES SERIES
Discharge: HOME | End: 2024-01-19
Payer: COMMERCIAL

## 2024-01-19 PROCEDURE — 97110 THERAPEUTIC EXERCISES: CPT

## 2024-01-19 NOTE — PROGRESS NOTES
patient 2 times per week for 12 weeks to address the treatment planned outlined above.  [x]  Continue with current plan of care  []  Modify plan of care as follows:    []  Hold pending physician visit  []  Discharge    Time In 1345   Time Out 1430   Timed Code Minutes: 45 min   Total Treatment Time: 45 min       Electronically Signed by: ANTON Neumann, OTR/L [id number]

## 2024-01-23 ENCOUNTER — HOSPITAL ENCOUNTER (OUTPATIENT)
Dept: OCCUPATIONAL THERAPY | Age: 69
Setting detail: THERAPIES SERIES
Discharge: HOME | End: 2024-01-23
Payer: COMMERCIAL

## 2024-01-23 PROCEDURE — 97110 THERAPEUTIC EXERCISES: CPT

## 2024-01-23 NOTE — PROGRESS NOTES
[facility]  OCCUPATIONAL THERAPY  [] EVALUATION  [] DAILY NOTE (LAND) [] DAILY NOTE (AQUATIC )     [x]PROGRESS NOTE [] DISCHARGE NOTE    [] [facility] [place]   [x] [place] [facility]    [] [facility]   [facility]    Date: [facility]  Patient Name:  Kalpana Braga  : 1955  MRN: [id number]  [facility]: [id number]    Referring Practitioner Carmine Paige DO    Diagnosis Other displaced fracture of upper end of right humerus, sequela    Treatment Diagnosis M25.611 Stiffness of Right Shoulder   Date of Evaluation 23    Additional Pertinent History  has a past medical history of COPD (chronic obstructive pulmonary disease) (HCC), Hyperlipidemia, Hypertension, Osteoarthritis, Smoker, and Urticaria.        Functional Outcome Measure Used UEFS   Functional Outcome Score 33/80 (23)       Insurance: Primary: Payor: ALLIED BENEFIT SYSTEM /  /  / ,   Secondary:    Authorization Information: PRE CERTIFICATION REQUIRED: no  INSURANCE THERAPY BENEFIT:  25 visits combined PT/OT none used  AQUATIC THERAPY COVERED: yes  MODALITIES COVERED:  yes  TELEHEALTH COVERED: na   Approved Procedure Codes: 80820 - Therapeutic Exercise, 16256 - Manual Therapy, 93965 - Therapeutic Activities, 95498 - OT ADL Training, and 47133 - Ultrasound  (Codes requested indicated by red font, codes approved indicated by black font)   Visit # 7/10 for ,     progress note 24   Visits Allowed: 25   Recertification Date: 3/5/24   Physician Follow-Up: March   Physician Orders: Gentle PROM only   History of Present Illness: Patient reports that she caught her shoe and fell on .  Went to ED for treatment.  X-rays revealed: Displaced, angulated right humeral head/neck fracture.  No dislocation.  Unremarkable soft tissues.     SUBJECTIVE:  Reports that her shoulder is tender today.  Reports that she is now bathing herself, and is now performing her own

## 2024-01-25 ENCOUNTER — HOSPITAL ENCOUNTER (OUTPATIENT)
Dept: OCCUPATIONAL THERAPY | Age: 69
Setting detail: THERAPIES SERIES
Discharge: HOME | End: 2024-01-25
Payer: COMMERCIAL

## 2024-01-25 PROCEDURE — 97110 THERAPEUTIC EXERCISES: CPT

## 2024-01-25 NOTE — PROGRESS NOTES
Manual Therapy - Soft Tissue Mobilization, Manual Therapy - Joint Manipulation, Home Exercise Program, Patient Education, Aquatics, and Modalities    []  Plan of care initiated.  Plan to see patient 2 times per week for 12 weeks to address the treatment planned outlined above.  [x]  Continue with current plan of care  []  Modify plan of care as follows:    []  Hold pending physician visit  []  Discharge    Time In 1315   Time Out 1355   Timed Code Minutes: 40 min   Total Treatment Time: 40 min       Electronically Signed by: ANTON Neumann, OTR/L [id number]

## 2024-01-30 ENCOUNTER — HOSPITAL ENCOUNTER (OUTPATIENT)
Dept: OCCUPATIONAL THERAPY | Age: 69
Setting detail: THERAPIES SERIES
Discharge: HOME | End: 2024-01-30
Payer: COMMERCIAL

## 2024-01-30 PROCEDURE — 97110 THERAPEUTIC EXERCISES: CPT

## 2024-01-30 NOTE — PROGRESS NOTES
[facility]  OCCUPATIONAL THERAPY  [] EVALUATION  [x] DAILY NOTE (LAND) [] DAILY NOTE (AQUATIC )     []PROGRESS NOTE [] DISCHARGE NOTE    [] [facility] [place]   [x] [place] [facility]    [] [facility]   [] [facility]    Date: 2024  Patient Name:  Kalpana Braga  : 1955  MRN: [id number]  [facility]: [id number]    Referring Practitioner Carmine Paige DO    Diagnosis Other displaced fracture of upper end of right humerus, sequela    Treatment Diagnosis M25.611 Stiffness of Right Shoulder   Date of Evaluation 23    Additional Pertinent History  has a past medical history of COPD (chronic obstructive pulmonary disease) (HCC), Hyperlipidemia, Hypertension, Osteoarthritis, Smoker, and Urticaria.        Functional Outcome Measure Used UEFS   Functional Outcome Score 33/80 (23)       Insurance: Primary: Payor: ALLIED BENEFIT SYSTEM /  /  / ,   Secondary:    Authorization Information: PRE CERTIFICATION REQUIRED: no  INSURANCE THERAPY BENEFIT:  25 visits combined PT/OT none used  AQUATIC THERAPY COVERED: yes  MODALITIES COVERED:  yes  TELEHEALTH COVERED: na   Approved Procedure Codes: 21102 - Therapeutic Exercise, 28074 - Manual Therapy, 23484 - Therapeutic Activities, 26938 - OT ADL Training, and 23486 - Ultrasound  (Codes requested indicated by red font, codes approved indicated by black font)   Visit # 9 for ,   1/10 for PN  progress note 24   Visits Allowed:    Recertification Date: 3/5/24   Physician Follow-Up: March   Physician Orders: Gentle PROM only   History of Present Illness: Patient reports that she caught her shoe and fell on .  Went to ED for treatment.  X-rays revealed: Displaced, angulated right humeral head/neck fracture.  No dislocation.  Unremarkable soft tissues.      SUBJECTIVE:  Patient reports that her greatest difficulty is reaching behind her back.      OBJECTIVE:  TREATMENT

## 2024-02-01 ENCOUNTER — HOSPITAL ENCOUNTER (OUTPATIENT)
Dept: OCCUPATIONAL THERAPY | Age: 69
Setting detail: THERAPIES SERIES
Discharge: HOME | End: 2024-02-01
Payer: COMMERCIAL

## 2024-02-01 PROCEDURE — 97110 THERAPEUTIC EXERCISES: CPT

## 2024-02-01 NOTE — PROGRESS NOTES
[facility]  OCCUPATIONAL THERAPY  [] EVALUATION  [x] DAILY NOTE (LAND) [] DAILY NOTE (AQUATIC )     []PROGRESS NOTE [] DISCHARGE NOTE    [] [facility] [place]   [x] [place] [facility]    [] [facility]   [] [facility]    Date: 2024  Patient Name:  Kalpana Braga  : 1955  MRN: [id number]  [facility]: [id number]    Referring Practitioner Carmine Paige DO    Diagnosis Other displaced fracture of upper end of right humerus, sequela    Treatment Diagnosis M25.611 Stiffness of Right Shoulder   Date of Evaluation 23    Additional Pertinent History  has a past medical history of COPD (chronic obstructive pulmonary disease) (HCC), Hyperlipidemia, Hypertension, Osteoarthritis, Smoker, and Urticaria.        Functional Outcome Measure Used UEFS   Functional Outcome Score 33/80 (23)       Insurance: Primary: Payor: ALLIED BENEFIT SYSTEM /  /  / ,   Secondary:    Authorization Information: PRE CERTIFICATION REQUIRED: no  INSURANCE THERAPY BENEFIT:  25 visits combined PT/OT none used  AQUATIC THERAPY COVERED: yes  MODALITIES COVERED:  yes  TELEHEALTH COVERED: na   Approved Procedure Codes: 37184 - Therapeutic Exercise, 18569 - Manual Therapy, 34892 - Therapeutic Activities, 05391 - OT ADL Training, and 21538 - Ultrasound  (Codes requested indicated by red font, codes approved indicated by black font)   Visit # 10 for ,   2/10 for PN  progress note 24   Visits Allowed:    Recertification Date: 3/5/24   Physician Follow-Up: March   Physician Orders: Gentle PROM only   History of Present Illness: Patient reports that she caught her shoe and fell on .  Went to ED for treatment.  X-rays revealed: Displaced, angulated right humeral head/neck fracture.  No dislocation.  Unremarkable soft tissues.      SUBJECTIVE:  Patient reports increased pain when bringing her arm back to her side in supine.       OBJECTIVE:  TREATMENT

## 2024-02-06 ENCOUNTER — HOSPITAL ENCOUNTER (OUTPATIENT)
Dept: OCCUPATIONAL THERAPY | Age: 69
Setting detail: THERAPIES SERIES
Discharge: HOME | End: 2024-02-06
Payer: COMMERCIAL

## 2024-02-06 PROCEDURE — 97110 THERAPEUTIC EXERCISES: CPT

## 2024-02-06 NOTE — PROGRESS NOTES
[facility]  OCCUPATIONAL THERAPY  [] EVALUATION  [x] DAILY NOTE (LAND) [] DAILY NOTE (AQUATIC )     []PROGRESS NOTE [] DISCHARGE NOTE    [] [facility] [place]   [x] [place] [facility]    [] [facility]   [] [facility]    Date: 2024  Patient Name:  Kalpana Braga  : 1955  MRN: [id number]  CSN: [id number]    Referring Practitioner Carmine Paige DO    Diagnosis Other displaced fracture of upper end of right humerus, sequela    Treatment Diagnosis M25.611 Stiffness of Right Shoulder   Date of Evaluation 23    Additional Pertinent History  has a past medical history of COPD (chronic obstructive pulmonary disease) (HCC), Hyperlipidemia, Hypertension, Osteoarthritis, Smoker, and Urticaria.        Functional Outcome Measure Used UEFS   Functional Outcome Score 33/80 (23)       Insurance: Primary: Payor: ALLIED BENEFIT SYSTEM /  /  / ,   Secondary:    Authorization Information: PRE CERTIFICATION REQUIRED: no  INSURANCE THERAPY BENEFIT:  25 visits combined PT/OT none used  AQUATIC THERAPY COVERED: yes  MODALITIES COVERED:  yes  TELEHEALTH COVERED: na   Approved Procedure Codes: 80775 - Therapeutic Exercise, 54200 - Manual Therapy, 40509 - Therapeutic Activities, 52848 - OT ADL Training, and 24027 - Ultrasound  (Codes requested indicated by red font, codes approved indicated by black font)   Visit # 11 for ,   3/10 for PN  progress note 24   Visits Allowed:    Recertification Date: 3/5/24   Physician Follow-Up: March   Physician Orders: Gentle PROM only   History of Present Illness: Patient reports that she caught her shoe and fell on .  Went to ED for treatment.  X-rays revealed: Displaced, angulated right humeral head/neck fracture.  No dislocation.  Unremarkable soft tissues.      SUBJECTIVE:  Patient reports decreased pain this date from last visit.         OBJECTIVE:  TREATMENT   Precautions: Gentle

## 2024-02-08 ENCOUNTER — HOSPITAL ENCOUNTER (OUTPATIENT)
Dept: OCCUPATIONAL THERAPY | Age: 69
Setting detail: THERAPIES SERIES
Discharge: HOME | End: 2024-02-08
Payer: COMMERCIAL

## 2024-02-08 PROCEDURE — 97110 THERAPEUTIC EXERCISES: CPT

## 2024-02-08 NOTE — PROGRESS NOTES
increase right shoulder AROM to 95 degrees flexion, 75 degrees abduction, 50 degrees ER with the arm at the side and thumb tip to the lateral buttock to increase ease and ability for toileting. GOAL  degrees flexion, 94 degrees abduction, 66 degrees ER in abduction and thumb tip to lateral buttock.  REVISE   Patient will increase right shoulder PROM to 140 degrees flexion, 100 degrees abduction, 54 ER in scaption, and 70 degrees IR to increase ease and ability for house chores.  GOAL  degrees abduction  GOAL NOT  degrees flexion  50 degrees ER, 60 degrees IR  REVISE Patient will increase right shoulder PROM to 140 degrees flexion, 110 degrees abduction, 55 degrees ER and 70 degrees IR to increase ease and ability for house chores.    Patient will be independent with HEP to increase ease and ability to perform grooming and bathing routine.  GOAL MET with current HEP, CONTINUE with upgrades  Patient will report pain with activity no greater than 4/10 to increase ease and ability with HEP.  GOAL MET reports an occasional shoot up of pain, but typically does not have pain.  REVISE Patient will report no greater than 1/10 when reaching into an upper cupboard to gather dishes for eating.        Long Term Goals:  Time Frame: 12 weeks  Patient will report grooming with R UE with no greater than min difficulty.  GOAL MET REVISE Patient will report grooming with R UE with no difficulty.  Patient will report performing bathing with mod I. GOAL MET REVISE Patient will report no difficulty with reaching up to turn on/off the bathroom light.        Patient Education:   [x]  HEP/Education Completed: Plan of Care, Goals  HEP: table slides, scapular pinches, pendulums  ER with dowel, cross body stretch  Sleeper, IR stretch behind her back  Pec stretch in the doorway  Wall slides  []  No new Education completed  []  Reviewed Prior HEP      [x]  Patient verbalized and/or demonstrated understanding of education

## 2024-02-13 ENCOUNTER — HOSPITAL ENCOUNTER (OUTPATIENT)
Dept: OCCUPATIONAL THERAPY | Age: 69
Setting detail: THERAPIES SERIES
Discharge: HOME | End: 2024-02-13
Payer: COMMERCIAL

## 2024-02-13 PROCEDURE — 97110 THERAPEUTIC EXERCISES: CPT

## 2024-02-13 NOTE — PROGRESS NOTES
[facility]  OCCUPATIONAL THERAPY  [] EVALUATION  [x] DAILY NOTE (LAND) [] DAILY NOTE (AQUATIC )     []PROGRESS NOTE [] DISCHARGE NOTE    [] [facility] [place]   [x] [place] [facility]    [] [facility]   [] [facility]    Date: 2024  Patient Name:  Kalpana Braga  : 1955  MRN: [id number]  CSN: [id number]    Referring Practitioner Carmine Paige DO    Diagnosis Other displaced fracture of upper end of right humerus, sequela    Treatment Diagnosis M25.611 Stiffness of Right Shoulder   Date of Evaluation 23    Additional Pertinent History  has a past medical history of COPD (chronic obstructive pulmonary disease) (HCC), Hyperlipidemia, Hypertension, Osteoarthritis, Smoker, and Urticaria.        Functional Outcome Measure Used UEFS   Functional Outcome Score 33/80 (23)       Insurance: Primary: Payor: ALLIED BENEFIT SYSTEM /  /  / ,   Secondary:    Authorization Information: PRE CERTIFICATION REQUIRED: no  INSURANCE THERAPY BENEFIT:  25 visits combined PT/OT none used  AQUATIC THERAPY COVERED: yes  MODALITIES COVERED:  yes  TELEHEALTH COVERED: na   Approved Procedure Codes: 00252 - Therapeutic Exercise, 87319 - Manual Therapy, 24171 - Therapeutic Activities, 64451 - OT ADL Training, and 00929 - Ultrasound  (Codes requested indicated by red font, codes approved indicated by black font)   Visit # 12 for ,   6/10 for PN  progress note 24   Visits Allowed:    Recertification Date: 3/5/24   Physician Follow-Up: March   Physician Orders: Gentle PROM only   History of Present Illness: Patient reports that she caught her shoe and fell on .  Went to ED for treatment.  X-rays revealed: Displaced, angulated right humeral head/neck fracture.  No dislocation.  Unremarkable soft tissues.      SUBJECTIVE:  Patient reports that she typically does not have pain.  Reports that she can not reach comfortably up to the

## 2024-02-15 ENCOUNTER — HOSPITAL ENCOUNTER (OUTPATIENT)
Dept: OCCUPATIONAL THERAPY | Age: 69
Setting detail: THERAPIES SERIES
Discharge: HOME | End: 2024-02-15
Payer: COMMERCIAL

## 2024-02-15 PROCEDURE — 97110 THERAPEUTIC EXERCISES: CPT

## 2024-02-15 NOTE — PROGRESS NOTES
[facility]  OCCUPATIONAL THERAPY  [] EVALUATION  [x] DAILY NOTE (LAND) [] DAILY NOTE (AQUATIC )     []PROGRESS NOTE [] DISCHARGE NOTE    [] [facility] [place]   [x] [place] [facility]    [] [facility]   [] [facility]    Date: 2/15/2024  Patient Name:  Kalpana Braga  : 1955  MRN: [id number]  CSN: [id number]    Referring Practitioner Carmine Paige DO    Diagnosis Other displaced fracture of upper end of right humerus, sequela    Treatment Diagnosis M25.611 Stiffness of Right Shoulder   Date of Evaluation 23    Additional Pertinent History  has a past medical history of COPD (chronic obstructive pulmonary disease) (HCC), Hyperlipidemia, Hypertension, Osteoarthritis, Smoker, and Urticaria.        Functional Outcome Measure Used UEFS   Functional Outcome Score 33/80 (23)       Insurance: Primary: Payor: ALLIED BENEFIT SYSTEM /  /  / ,   Secondary:    Authorization Information: PRE CERTIFICATION REQUIRED: no  INSURANCE THERAPY BENEFIT:  25 visits combined PT/OT none used  AQUATIC THERAPY COVERED: yes  MODALITIES COVERED:  yes  TELEHEALTH COVERED: na   Approved Procedure Codes: 81709 - Therapeutic Exercise, 43382 - Manual Therapy, 31412 - Therapeutic Activities, 76573 - OT ADL Training, and 31055 - Ultrasound  (Codes requested indicated by red font, codes approved indicated by black font)   Visit # 13 for ,   7/10 for PN  progress note 24   Visits Allowed:    Recertification Date: 3/5/24   Physician Follow-Up: March   Physician Orders: Gentle PROM only   History of Present Illness: Patient reports that she caught her shoe and fell on .  Went to ED for treatment.  X-rays revealed: Displaced, angulated right humeral head/neck fracture.  No dislocation.  Unremarkable soft tissues.      SUBJECTIVE:   Patient reports that she is surprised that she was not sore after last visit.      OBJECTIVE:  TREATMENT

## 2024-02-20 ENCOUNTER — HOSPITAL ENCOUNTER (OUTPATIENT)
Dept: OCCUPATIONAL THERAPY | Age: 69
Setting detail: THERAPIES SERIES
Discharge: HOME | End: 2024-02-20
Payer: COMMERCIAL

## 2024-02-20 PROCEDURE — 97110 THERAPEUTIC EXERCISES: CPT

## 2024-02-20 NOTE — PROGRESS NOTES
[facility]  OCCUPATIONAL THERAPY  [] EVALUATION  [x] DAILY NOTE (LAND) [] DAILY NOTE (AQUATIC )     []PROGRESS NOTE [] DISCHARGE NOTE    [] [facility] [place]   [x] [place] [facility]    [] [facility]   [] [facility]    Date: 2024  Patient Name:  Kalpana Braga  : 1955  MRN: [id number]  CSN: [id number]    Referring Practitioner Carmine Paige DO    Diagnosis Other displaced fracture of upper end of right humerus, sequela    Treatment Diagnosis M25.611 Stiffness of Right Shoulder   Date of Evaluation 23    Additional Pertinent History  has a past medical history of COPD (chronic obstructive pulmonary disease) (HCC), Hyperlipidemia, Hypertension, Osteoarthritis, Smoker, and Urticaria.        Functional Outcome Measure Used UEFS   Functional Outcome Score 33/80 (23)       Insurance: Primary: Payor: ALLIED BENEFIT SYSTEM /  /  / ,   Secondary:    Authorization Information: PRE CERTIFICATION REQUIRED: no  INSURANCE THERAPY BENEFIT:  25 visits combined PT/OT none used  AQUATIC THERAPY COVERED: yes  MODALITIES COVERED:  yes  TELEHEALTH COVERED: na   Approved Procedure Codes: 17374 - Therapeutic Exercise, 15033 - Manual Therapy, 64645 - Therapeutic Activities, 06270 - OT ADL Training, and 73978 - Ultrasound  (Codes requested indicated by red font, codes approved indicated by black font)   Visit # 14 for ,   8/10 for PN  progress note 24   Visits Allowed:    Recertification Date: 3/5/24   Physician Follow-Up: March   Physician Orders: Gentle PROM only   History of Present Illness: Patient reports that she caught her shoe and fell on .  Went to ED for treatment.  X-rays revealed: Displaced, angulated right humeral head/neck fracture.  No dislocation.  Unremarkable soft tissues.      SUBJECTIVE:   Patient reports that she can just about pull up her pants without pain, just a twinge.  Reports that she

## 2024-02-22 ENCOUNTER — HOSPITAL ENCOUNTER (OUTPATIENT)
Dept: OCCUPATIONAL THERAPY | Age: 69
Setting detail: THERAPIES SERIES
Discharge: HOME | End: 2024-02-22
Payer: COMMERCIAL

## 2024-02-22 PROCEDURE — 97110 THERAPEUTIC EXERCISES: CPT

## 2024-02-22 NOTE — PROGRESS NOTES
[facility]  OCCUPATIONAL THERAPY  [] EVALUATION  [x] DAILY NOTE (LAND) [] DAILY NOTE (AQUATIC )     []PROGRESS NOTE [] DISCHARGE NOTE    [] [facility] [place]   [x] [place] [facility]    [] [facility]   [] [facility]    Date: 2024  Patient Name:  Kalpana Braga  : 1955  MRN: [id number]  CSN: [id number]    Referring Practitioner Carmine Paige DO    Diagnosis Other displaced fracture of upper end of right humerus, sequela    Treatment Diagnosis M25.611 Stiffness of Right Shoulder   Date of Evaluation 23    Additional Pertinent History  has a past medical history of COPD (chronic obstructive pulmonary disease) (HCC), Hyperlipidemia, Hypertension, Osteoarthritis, Smoker, and Urticaria.        Functional Outcome Measure Used UEFS   Functional Outcome Score 33/80 (23)       Insurance: Primary: Payor: ALLIED BENEFIT SYSTEM /  /  / ,   Secondary:    Authorization Information: PRE CERTIFICATION REQUIRED: no  INSURANCE THERAPY BENEFIT:  25 visits combined PT/OT none used  AQUATIC THERAPY COVERED: yes  MODALITIES COVERED:  yes  TELEHEALTH COVERED: na   Approved Procedure Codes: 03435 - Therapeutic Exercise, 93361 - Manual Therapy, 29961 - Therapeutic Activities, 87264 - OT ADL Training, and 64136 - Ultrasound  (Codes requested indicated by red font, codes approved indicated by black font)   Visit # 15 for ,   9/10 for PN  progress note 24   Visits Allowed:    Recertification Date: 3/5/24   Physician Follow-Up: March   Physician Orders: Gentle PROM only   History of Present Illness: Patient reports that she caught her shoe and fell on .  Went to ED for treatment.  X-rays revealed: Displaced, angulated right humeral head/neck fracture.  No dislocation.  Unremarkable soft tissues.      SUBJECTIVE:   Patient reports that she has a tinge of pain when reaching up, but increased pain when reaching behind her

## 2024-02-27 ENCOUNTER — HOSPITAL ENCOUNTER (OUTPATIENT)
Dept: OCCUPATIONAL THERAPY | Age: 69
Setting detail: THERAPIES SERIES
Discharge: HOME | End: 2024-02-27
Payer: COMMERCIAL

## 2024-02-27 PROCEDURE — 97110 THERAPEUTIC EXERCISES: CPT

## 2024-02-27 NOTE — PROGRESS NOTES
[facility]  OCCUPATIONAL THERAPY  [] EVALUATION  [] DAILY NOTE (LAND) [] DAILY NOTE (AQUATIC )     [x]PROGRESS NOTE [] DISCHARGE NOTE    [] [facility] [place]   [x] [place] [facility]    [] [facility]   [] [facility]    Date: 2024  Patient Name:  Kalpana Braga  : 1955  MRN: [id number]  CSN: [id number]    Referring Practitioner Carmine Paige DO    Diagnosis Other displaced fracture of upper end of right humerus, sequela    Treatment Diagnosis M25.611 Stiffness of Right Shoulder   Date of Evaluation 23    Additional Pertinent History  has a past medical history of COPD (chronic obstructive pulmonary disease) (HCC), Hyperlipidemia, Hypertension, Osteoarthritis, Smoker, and Urticaria.        Functional Outcome Measure Used UEFS   Functional Outcome Score 33/80 (23)       Insurance: Primary: Payor: ALLIED BENEFIT SYSTEM /  /  / ,   Secondary:    Authorization Information: PRE CERTIFICATION REQUIRED: no  INSURANCE THERAPY BENEFIT:  25 visits combined PT/OT none used  AQUATIC THERAPY COVERED: yes  MODALITIES COVERED:  yes  TELEHEALTH COVERED: na   Approved Procedure Codes: 95318 - Therapeutic Exercise, 64812 - Manual Therapy, 21555 - Therapeutic Activities, 31535 - OT ADL Training, and 20231 - Ultrasound  (Codes requested indicated by red font, codes approved indicated by black font)   Visit # 16 for ,    PN 24  progress note 24   Visits Allowed:    Recertification Date: 3/5/24   Physician Follow-Up: March   Physician Orders: Gentle PROM only   History of Present Illness: Patient reports that she caught her shoe and fell on .  Went to ED for treatment.  X-rays revealed: Displaced, angulated right humeral head/neck fracture.  No dislocation.  Unremarkable soft tissues.      SUBJECTIVE:   Patient reports that she can do about anything but reach behind her back.  Tried IR behind the back

## 2024-02-29 ENCOUNTER — HOSPITAL ENCOUNTER (OUTPATIENT)
Dept: OCCUPATIONAL THERAPY | Age: 69
Setting detail: THERAPIES SERIES
End: 2024-02-29
Payer: COMMERCIAL

## 2024-03-06 DIAGNOSIS — E78.00 PURE HYPERCHOLESTEROLEMIA: ICD-10-CM

## 2024-03-06 DIAGNOSIS — I10 ESSENTIAL HYPERTENSION: ICD-10-CM

## 2024-03-06 RX ORDER — LOSARTAN POTASSIUM 100 MG/1
100 TABLET ORAL DAILY
Qty: 30 TABLET | Refills: 5 | OUTPATIENT
Start: 2024-03-06 | End: 2024-09-02

## 2024-03-06 RX ORDER — ATORVASTATIN CALCIUM 40 MG/1
40 TABLET, FILM COATED ORAL DAILY
Qty: 30 TABLET | Refills: 5 | OUTPATIENT
Start: 2024-03-06 | End: 2024-09-02

## 2024-03-06 NOTE — TELEPHONE ENCOUNTER
Kalpana Braga called requesting a refill on the following medications:  Requested Prescriptions     Pending Prescriptions Disp Refills    atorvastatin (LIPITOR) 40 MG tablet 30 tablet 5     Sig: Take 1 tablet by mouth daily    losartan (COZAAR) 100 MG tablet 30 tablet 5     Sig: Take 1 tablet by mouth daily       Date of last visit: 7/21/2023  Date of next visit (if applicable):Visit date not found  Date of last refill: 07/21/23  Pharm[facility] Name: [facility],  Nora Choe MA

## 2024-03-06 NOTE — TELEPHONE ENCOUNTER
Kalpana needs refills on her atorvastatin 40mg 1QD, Losartin 100mg 1QD. She would like these sent to [facility]

## 2024-03-13 DIAGNOSIS — E78.00 PURE HYPERCHOLESTEROLEMIA: ICD-10-CM

## 2024-03-13 DIAGNOSIS — I10 ESSENTIAL HYPERTENSION: ICD-10-CM

## 2024-03-13 RX ORDER — LOSARTAN POTASSIUM 100 MG/1
100 TABLET ORAL DAILY
Qty: 30 TABLET | Refills: 5 | OUTPATIENT
Start: 2024-03-13 | End: 2024-09-09

## 2024-03-13 RX ORDER — ATORVASTATIN CALCIUM 40 MG/1
40 TABLET, FILM COATED ORAL DAILY
Qty: 30 TABLET | Refills: 5 | OUTPATIENT
Start: 2024-03-13 | End: 2024-09-09

## 2024-03-13 NOTE — TELEPHONE ENCOUNTER
Kalpana Braga called requesting a refill on the following medications:  Requested Prescriptions     Pending Prescriptions Disp Refills    atorvastatin (LIPITOR) 40 MG tablet 30 tablet 5     Sig: Take 1 tablet by mouth daily    losartan (COZAAR) 100 MG tablet 30 tablet 5     Sig: Take 1 tablet by mouth daily       Date of last visit: 7/21/2023  Date of next visit (if applicable):3/14/2024  Date of last refill: 7-21-23  Pharmacy Name: [facility] L[facility]

## 2024-03-14 ENCOUNTER — OFFICE VISIT (OUTPATIENT)
Dept: FAMILY MEDICINE CLINIC | Age: 69
End: 2024-03-14
Payer: COMMERCIAL

## 2024-03-14 VITALS
DIASTOLIC BLOOD PRESSURE: 60 MMHG | BODY MASS INDEX: 33.39 KG/M2 | HEIGHT: 64 IN | HEART RATE: 80 BPM | SYSTOLIC BLOOD PRESSURE: 120 MMHG | RESPIRATION RATE: 18 BRPM | TEMPERATURE: 98.5 F | WEIGHT: 195.6 LBS | OXYGEN SATURATION: 87 %

## 2024-03-14 DIAGNOSIS — M54.9 CHRONIC MIDLINE BACK PAIN, UNSPECIFIED BACK LOCATION: ICD-10-CM

## 2024-03-14 DIAGNOSIS — F33.1 MODERATE EPISODE OF RECURRENT MAJOR DEPRESSIVE DISORDER (HCC): ICD-10-CM

## 2024-03-14 DIAGNOSIS — G89.29 CHRONIC MIDLINE BACK PAIN, UNSPECIFIED BACK LOCATION: ICD-10-CM

## 2024-03-14 DIAGNOSIS — F17.200 SMOKER: ICD-10-CM

## 2024-03-14 DIAGNOSIS — J44.1 COPD WITH ACUTE EXACERBATION (HCC): Primary | ICD-10-CM

## 2024-03-14 DIAGNOSIS — I10 ESSENTIAL HYPERTENSION: ICD-10-CM

## 2024-03-14 DIAGNOSIS — R06.02 SHORTNESS OF BREATH: ICD-10-CM

## 2024-03-14 DIAGNOSIS — M15.9 PRIMARY OSTEOARTHRITIS INVOLVING MULTIPLE JOINTS: ICD-10-CM

## 2024-03-14 DIAGNOSIS — E78.00 PURE HYPERCHOLESTEROLEMIA: ICD-10-CM

## 2024-03-14 LAB
Lab: NORMAL
QC PASS/FAIL: NORMAL
SARS-COV-2 RDRP RESP QL NAA+PROBE: NEGATIVE

## 2024-03-14 PROCEDURE — 87635 SARS-COV-2 COVID-19 AMP PRB: CPT | Performed by: NURSE PRACTITIONER

## 2024-03-14 PROCEDURE — 3078F DIAST BP <80 MM HG: CPT | Performed by: NURSE PRACTITIONER

## 2024-03-14 PROCEDURE — 94640 AIRWAY INHALATION TREATMENT: CPT | Performed by: NURSE PRACTITIONER

## 2024-03-14 PROCEDURE — 3074F SYST BP LT 130 MM HG: CPT | Performed by: NURSE PRACTITIONER

## 2024-03-14 PROCEDURE — 99215 OFFICE O/P EST HI 40 MIN: CPT | Performed by: NURSE PRACTITIONER

## 2024-03-14 PROCEDURE — 1123F ACP DISCUSS/DSCN MKR DOCD: CPT | Performed by: NURSE PRACTITIONER

## 2024-03-14 RX ORDER — IPRATROPIUM BROMIDE AND ALBUTEROL SULFATE 2.5; .5 MG/3ML; MG/3ML
1 SOLUTION RESPIRATORY (INHALATION) EVERY 4 HOURS
Qty: 360 ML | Refills: 0 | Status: ON HOLD | OUTPATIENT
Start: 2024-03-14

## 2024-03-14 RX ORDER — ATORVASTATIN CALCIUM 40 MG/1
40 TABLET, FILM COATED ORAL DAILY
Qty: 30 TABLET | Refills: 5 | Status: ON HOLD | OUTPATIENT
Start: 2024-03-14 | End: 2024-09-10

## 2024-03-14 RX ORDER — PREDNISONE 10 MG/1
TABLET ORAL
Qty: 63 TABLET | Refills: 0 | Status: ON HOLD | OUTPATIENT
Start: 2024-03-14

## 2024-03-14 RX ORDER — LOSARTAN POTASSIUM 100 MG/1
100 TABLET ORAL DAILY
Qty: 30 TABLET | Refills: 5 | Status: ON HOLD | OUTPATIENT
Start: 2024-03-14 | End: 2024-09-10

## 2024-03-14 RX ORDER — ALBUTEROL SULFATE 2.5 MG/3ML
2.5 SOLUTION RESPIRATORY (INHALATION) ONCE
Status: COMPLETED | OUTPATIENT
Start: 2024-03-14 | End: 2024-03-14

## 2024-03-14 RX ORDER — LOVASTATIN 20 MG/1
20 TABLET ORAL NIGHTLY
COMMUNITY
End: 2024-03-14 | Stop reason: CLARIF

## 2024-03-14 RX ORDER — VENLAFAXINE HYDROCHLORIDE 75 MG/1
75 CAPSULE, EXTENDED RELEASE ORAL DAILY
Qty: 30 CAPSULE | Refills: 0 | Status: ON HOLD | OUTPATIENT
Start: 2024-03-14

## 2024-03-14 RX ORDER — LEVOFLOXACIN 750 MG/1
750 TABLET, FILM COATED ORAL DAILY
Qty: 7 TABLET | Refills: 0 | Status: SHIPPED | OUTPATIENT
Start: 2024-03-14 | End: 2024-03-21

## 2024-03-14 RX ORDER — AMLODIPINE BESYLATE 5 MG/1
5 TABLET ORAL DAILY
Qty: 90 TABLET | Refills: 1 | Status: ON HOLD | OUTPATIENT
Start: 2024-03-14 | End: 2024-09-10

## 2024-03-14 RX ADMIN — ALBUTEROL SULFATE 2.5 MG: 2.5 SOLUTION RESPIRATORY (INHALATION) at 15:37

## 2024-03-14 ASSESSMENT — PATIENT HEALTH QUESTIONNAIRE - PHQ9
2. FEELING DOWN, DEPRESSED OR HOPELESS: NOT AT ALL
1. LITTLE INTEREST OR PLEASURE IN DOING THINGS: SEVERAL DAYS
SUM OF ALL RESPONSES TO PHQ QUESTIONS 1-9: 5
SUM OF ALL RESPONSES TO PHQ9 QUESTIONS 1 & 2: 1
4. FEELING TIRED OR HAVING LITTLE ENERGY: SEVERAL DAYS
5. POOR APPETITE OR OVEREATING: NOT AT ALL
SUM OF ALL RESPONSES TO PHQ QUESTIONS 1-9: 5
SUM OF ALL RESPONSES TO PHQ QUESTIONS 1-9: 5
10. IF YOU CHECKED OFF ANY PROBLEMS, HOW DIFFICULT HAVE THESE PROBLEMS MADE IT FOR YOU TO DO YOUR WORK, TAKE CARE OF THINGS AT HOME, OR GET ALONG WITH OTHER PEOPLE: NOT DIFFICULT AT ALL
8. MOVING OR SPEAKING SO SLOWLY THAT OTHER PEOPLE COULD HAVE NOTICED. OR THE OPPOSITE, BEING SO FIGETY OR RESTLESS THAT YOU HAVE BEEN MOVING AROUND A LOT MORE THAN USUAL: NOT AT ALL
6. FEELING BAD ABOUT YOURSELF - OR THAT YOU ARE A FAILURE OR HAVE LET YOURSELF OR YOUR FAMILY DOWN: NOT AT ALL
9. THOUGHTS THAT YOU WOULD BE BETTER OFF DEAD, OR OF HURTING YOURSELF: NOT AT ALL
3. TROUBLE FALLING OR STAYING ASLEEP: NEARLY EVERY DAY
7. TROUBLE CONCENTRATING ON THINGS, SUCH AS READING THE NEWSPAPER OR WATCHING TELEVISION: NOT AT ALL
SUM OF ALL RESPONSES TO PHQ QUESTIONS 1-9: 5

## 2024-03-14 NOTE — PROGRESS NOTES
smoking but patient is not ready at this time. She is pre-contemplative at this time.  6. Shortness of breath  -     POCT COVID-19 Rapid, NAAT  -     albuterol (PROVENTIL) (2.5 MG/3ML) 0.083% nebulizer solution 2.5 mg; 2.5 mg, Nebulization, ONCE, 1 dose, On Thu 3/14/24 at 1545Initiate RT Bronchodilator Protocol: No  -     UNABLE TO FIND; 1 each by Does not apply route continuous, Disp-1 each, R-0Print  7. Primary osteoarthritis involving multiple joints  -     UNABLE TO FIND; 1 each by Does not apply route continuous, Disp-1 each, R-0Print  8. Chronic midline back pain, unspecified back location  -     UNABLE TO FIND; 1 each by Does not apply route continuous, Disp-1 each, R-0Print    Albuterol treatment in office - patient reports breathing improved with treatment although O2 remained the same. I suggested further evaluation at ER but patient declines. She is not in respiratory distress so agreed with to attempt outpatient management first but advised patient if she has any worsening of symptoms she is to go to ER. Will start prednisone and ATB. Will give order for DuoNeb with nebulizer. She is currently not on daily treatment for COPD and I do not seen any recent PFT. Will discuss these at next appointment when not ill.    On this date 3/15/2024 I have spent 46 minutes reviewing previous notes, test results and face to face with the patient discussing the diagnosis and importance of compliance with the treatment plan as well as documenting on the day of the visit.      They voiced understanding.  All questions answered. They agreed with treatment plan.   See patient instructions for any educational materials that may have been given.  Discussed use, benefit, and side effects of prescribed medications.     Reviewed health maintenance.    (Please note that portions of this note may have been completed with a voice recognition program.  Efforts were made to edit the dictation but occasionally words are

## 2024-03-14 NOTE — PATIENT INSTRUCTIONS
Decrease fluoxetine to 40 mg daily (2 capsules) for 2 weeks then go to 20 mg daily (1 capsule) for 2 weeks, then stop.  Will start venlafaxine (Effexor) 75 mg daily.   
Statement Selected

## 2024-03-19 ASSESSMENT — ENCOUNTER SYMPTOMS
SHORTNESS OF BREATH: 1
SORE THROAT: 0
SWOLLEN GLANDS: 0
WHEEZING: 1
CHEST TIGHTNESS: 0
NAUSEA: 0
BLURRED VISION: 0
ORTHOPNEA: 0
EYE PAIN: 0
VOMITING: 0
RHINORRHEA: 0
SPUTUM PRODUCTION: 1
ABDOMINAL PAIN: 0

## 2024-03-19 NOTE — ASSESSMENT & PLAN NOTE
Strongly encouraged patient to stop smoking but patient is not ready at this time. She is pre-contemplative at this time.

## 2024-03-19 NOTE — ASSESSMENT & PLAN NOTE
At goal, continue current medications, lifestyle modifications recommended, and will update labs at next appointment.

## 2024-03-21 ENCOUNTER — HOSPITAL ENCOUNTER (INPATIENT)
Age: 69
LOS: 22 days | Discharge: OTHER INSTITUTION | End: 2024-04-13
Attending: EMERGENCY MEDICINE | Admitting: HOSPITALIST
Payer: COMMERCIAL

## 2024-03-21 ENCOUNTER — APPOINTMENT (OUTPATIENT)
Dept: GENERAL RADIOLOGY | Age: 69
End: 2024-03-21
Payer: COMMERCIAL

## 2024-03-21 ENCOUNTER — APPOINTMENT (OUTPATIENT)
Dept: CT IMAGING | Age: 69
End: 2024-03-21
Payer: COMMERCIAL

## 2024-03-21 DIAGNOSIS — R06.83 SNORING: ICD-10-CM

## 2024-03-21 DIAGNOSIS — I63.9 CEREBROVASCULAR ACCIDENT (CVA), UNSPECIFIED MECHANISM (HCC): ICD-10-CM

## 2024-03-21 DIAGNOSIS — R06.00 DYSPNEA, UNSPECIFIED TYPE: ICD-10-CM

## 2024-03-21 DIAGNOSIS — R91.8 LUNG MASS: ICD-10-CM

## 2024-03-21 DIAGNOSIS — J43.1 PANLOBULAR EMPHYSEMA (HCC): Chronic | ICD-10-CM

## 2024-03-21 DIAGNOSIS — I10 ESSENTIAL HYPERTENSION: ICD-10-CM

## 2024-03-21 DIAGNOSIS — R06.09 DYSPNEA ON EXERTION: ICD-10-CM

## 2024-03-21 DIAGNOSIS — J18.9 PNEUMONIA OF BOTH LUNGS DUE TO INFECTIOUS ORGANISM, UNSPECIFIED PART OF LUNG: ICD-10-CM

## 2024-03-21 DIAGNOSIS — C34.11 MALIGNANT NEOPLASM OF UPPER LOBE OF RIGHT LUNG (HCC): ICD-10-CM

## 2024-03-21 DIAGNOSIS — G47.30 SLEEP APNEA, UNSPECIFIED TYPE: ICD-10-CM

## 2024-03-21 DIAGNOSIS — Z87.891 PERSONAL HISTORY OF TOBACCO USE: ICD-10-CM

## 2024-03-21 DIAGNOSIS — Z51.5 PALLIATIVE CARE PATIENT: ICD-10-CM

## 2024-03-21 DIAGNOSIS — J96.01 ACUTE HYPOXEMIC RESPIRATORY FAILURE (HCC): Primary | ICD-10-CM

## 2024-03-21 LAB
ALBUMIN SERPL BCG-MCNC: 4.2 G/DL (ref 3.5–5.1)
ALP SERPL-CCNC: 98 U/L (ref 38–126)
ALT SERPL W/O P-5'-P-CCNC: 19 U/L (ref 11–66)
ANION GAP SERPL CALC-SCNC: 17 MEQ/L (ref 8–16)
ARTERIAL PATENCY WRIST A: POSITIVE
AST SERPL-CCNC: 31 U/L (ref 5–40)
BASE EXCESS BLDA CALC-SCNC: 2.6 MMOL/L (ref -2.5–2.5)
BASOPHILS ABSOLUTE: 0.1 THOU/MM3 (ref 0–0.1)
BASOPHILS NFR BLD AUTO: 0.4 %
BDY SITE: ABNORMAL
BILIRUB CONJ SERPL-MCNC: < 0.2 MG/DL (ref 0–0.3)
BILIRUB SERPL-MCNC: 0.5 MG/DL (ref 0.3–1.2)
BUN SERPL-MCNC: 21 MG/DL (ref 7–22)
CALCIUM SERPL-MCNC: 9.7 MG/DL (ref 8.5–10.5)
CHLORIDE SERPL-SCNC: 92 MEQ/L (ref 98–111)
CO2 SERPL-SCNC: 25 MEQ/L (ref 23–33)
COLLECTED BY:: ABNORMAL
CREAT SERPL-MCNC: 0.8 MG/DL (ref 0.4–1.2)
DEPRECATED RDW RBC AUTO: 46.1 FL (ref 35–45)
DEVICE: ABNORMAL
EOSINOPHIL NFR BLD AUTO: 0.2 %
EOSINOPHILS ABSOLUTE: 0 THOU/MM3 (ref 0–0.4)
ERYTHROCYTE [DISTWIDTH] IN BLOOD BY AUTOMATED COUNT: 14.8 % (ref 11.5–14.5)
FIO2 ON VENT O2 ANALYZER: 6 %
FLUAV RNA RESP QL NAA+PROBE: NOT DETECTED
FLUBV RNA RESP QL NAA+PROBE: NOT DETECTED
GFR SERPL CREATININE-BSD FRML MDRD: > 60 ML/MIN/1.73M2
GLUCOSE SERPL-MCNC: 112 MG/DL (ref 70–108)
HCO3 BLDA-SCNC: 26 MMOL/L (ref 23–28)
HCT VFR BLD AUTO: 45.1 % (ref 37–47)
HGB BLD-MCNC: 14.5 GM/DL (ref 12–16)
IMM GRANULOCYTES # BLD AUTO: 0.42 THOU/MM3 (ref 0–0.07)
IMM GRANULOCYTES NFR BLD AUTO: 2.2 %
LYMPHOCYTES ABSOLUTE: 2.3 THOU/MM3 (ref 1–4.8)
LYMPHOCYTES NFR BLD AUTO: 12.1 %
MCH RBC QN AUTO: 27.6 PG (ref 26–33)
MCHC RBC AUTO-ENTMCNC: 32.2 GM/DL (ref 32.2–35.5)
MCV RBC AUTO: 85.9 FL (ref 81–99)
MONOCYTES ABSOLUTE: 1.5 THOU/MM3 (ref 0.4–1.3)
MONOCYTES NFR BLD AUTO: 7.5 %
NEUTROPHILS NFR BLD AUTO: 77.6 %
NRBC BLD AUTO-RTO: 0 /100 WBC
OSMOLALITY SERPL CALC.SUM OF ELEC: 272 MOSMOL/KG (ref 275–300)
PCO2 BLDA: 36 MMHG (ref 35–45)
PH BLDA: 7.47 [PH] (ref 7.35–7.45)
PLATELET # BLD AUTO: 454 THOU/MM3 (ref 130–400)
PMV BLD AUTO: 10.5 FL (ref 9.4–12.4)
PO2 BLDA: 56 MMHG (ref 71–104)
POTASSIUM SERPL-SCNC: 3.6 MEQ/L (ref 3.5–5.2)
PROCALCITONIN SERPL IA-MCNC: 0.05 NG/ML (ref 0.01–0.09)
PROT SERPL-MCNC: 7.9 G/DL (ref 6.1–8)
RBC # BLD AUTO: 5.25 MILL/MM3 (ref 4.2–5.4)
SAO2 % BLDA: 91 %
SARS-COV-2 RNA RESP QL NAA+PROBE: NOT DETECTED
SEGMENTED NEUTROPHILS ABSOLUTE COUNT: 15.1 THOU/MM3 (ref 1.8–7.7)
SODIUM SERPL-SCNC: 134 MEQ/L (ref 135–145)
TROPONIN, HIGH SENSITIVITY: 18 NG/L (ref 0–12)
WBC # BLD AUTO: 19.4 THOU/MM3 (ref 4.8–10.8)

## 2024-03-21 PROCEDURE — 6360000002 HC RX W HCPCS

## 2024-03-21 PROCEDURE — 87636 SARSCOV2 & INF A&B AMP PRB: CPT

## 2024-03-21 PROCEDURE — 71045 X-RAY EXAM CHEST 1 VIEW: CPT

## 2024-03-21 PROCEDURE — 84484 ASSAY OF TROPONIN QUANT: CPT

## 2024-03-21 PROCEDURE — 82803 BLOOD GASES ANY COMBINATION: CPT

## 2024-03-21 PROCEDURE — 36415 COLL VENOUS BLD VENIPUNCTURE: CPT

## 2024-03-21 PROCEDURE — 82248 BILIRUBIN DIRECT: CPT

## 2024-03-21 PROCEDURE — 96365 THER/PROPH/DIAG IV INF INIT: CPT

## 2024-03-21 PROCEDURE — 6360000004 HC RX CONTRAST MEDICATION

## 2024-03-21 PROCEDURE — 93005 ELECTROCARDIOGRAM TRACING: CPT | Performed by: EMERGENCY MEDICINE

## 2024-03-21 PROCEDURE — 84145 PROCALCITONIN (PCT): CPT

## 2024-03-21 PROCEDURE — 6370000000 HC RX 637 (ALT 250 FOR IP)

## 2024-03-21 PROCEDURE — 96375 TX/PRO/DX INJ NEW DRUG ADDON: CPT

## 2024-03-21 PROCEDURE — 94640 AIRWAY INHALATION TREATMENT: CPT

## 2024-03-21 PROCEDURE — 2700000000 HC OXYGEN THERAPY PER DAY

## 2024-03-21 PROCEDURE — 94761 N-INVAS EAR/PLS OXIMETRY MLT: CPT

## 2024-03-21 PROCEDURE — 99285 EMERGENCY DEPT VISIT HI MDM: CPT

## 2024-03-21 PROCEDURE — 80053 COMPREHEN METABOLIC PANEL: CPT

## 2024-03-21 PROCEDURE — 2580000003 HC RX 258: Performed by: EMERGENCY MEDICINE

## 2024-03-21 PROCEDURE — 85025 COMPLETE CBC W/AUTO DIFF WBC: CPT

## 2024-03-21 PROCEDURE — 71275 CT ANGIOGRAPHY CHEST: CPT

## 2024-03-21 PROCEDURE — 36600 WITHDRAWAL OF ARTERIAL BLOOD: CPT

## 2024-03-21 PROCEDURE — 2580000003 HC RX 258

## 2024-03-21 PROCEDURE — 6360000002 HC RX W HCPCS: Performed by: EMERGENCY MEDICINE

## 2024-03-21 RX ORDER — IPRATROPIUM BROMIDE AND ALBUTEROL SULFATE 2.5; .5 MG/3ML; MG/3ML
2 SOLUTION RESPIRATORY (INHALATION) ONCE
Status: COMPLETED | OUTPATIENT
Start: 2024-03-21 | End: 2024-03-21

## 2024-03-21 RX ORDER — NICOTINE 21 MG/24HR
1 PATCH, TRANSDERMAL 24 HOURS TRANSDERMAL DAILY
Status: DISCONTINUED | OUTPATIENT
Start: 2024-03-22 | End: 2024-03-21

## 2024-03-21 RX ORDER — NICOTINE 21 MG/24HR
1 PATCH, TRANSDERMAL 24 HOURS TRANSDERMAL ONCE
Status: COMPLETED | OUTPATIENT
Start: 2024-03-22 | End: 2024-03-23

## 2024-03-21 RX ADMIN — WATER 125 MG: 1 INJECTION INTRAMUSCULAR; INTRAVENOUS; SUBCUTANEOUS at 21:51

## 2024-03-21 RX ADMIN — IOPAMIDOL 80 ML: 755 INJECTION, SOLUTION INTRAVENOUS at 22:34

## 2024-03-21 RX ADMIN — IPRATROPIUM BROMIDE AND ALBUTEROL SULFATE 2 DOSE: .5; 3 SOLUTION RESPIRATORY (INHALATION) at 22:07

## 2024-03-21 RX ADMIN — CEFTRIAXONE SODIUM 1000 MG: 1 INJECTION, POWDER, FOR SOLUTION INTRAMUSCULAR; INTRAVENOUS at 23:49

## 2024-03-21 ASSESSMENT — PAIN - FUNCTIONAL ASSESSMENT
PAIN_FUNCTIONAL_ASSESSMENT: NONE - DENIES PAIN

## 2024-03-22 PROBLEM — J18.9 PNEUMONIA OF BOTH LUNGS DUE TO INFECTIOUS ORGANISM: Status: ACTIVE | Noted: 2024-03-22

## 2024-03-22 PROBLEM — J96.01 ACUTE HYPOXEMIC RESPIRATORY FAILURE (HCC): Status: ACTIVE | Noted: 2024-03-22

## 2024-03-22 PROBLEM — R91.8 LUNG MASS: Status: ACTIVE | Noted: 2024-03-22

## 2024-03-22 PROBLEM — R06.00 DYSPNEA: Status: ACTIVE | Noted: 2024-03-22

## 2024-03-22 LAB
ANION GAP SERPL CALC-SCNC: 13 MEQ/L (ref 8–16)
B-OH-BUTYR SERPL-MSCNC: 1.16 MG/DL (ref 0.2–2.81)
BUN SERPL-MCNC: 20 MG/DL (ref 7–22)
CALCIUM SERPL-MCNC: 9.1 MG/DL (ref 8.5–10.5)
CHLORIDE SERPL-SCNC: 96 MEQ/L (ref 98–111)
CO2 SERPL-SCNC: 26 MEQ/L (ref 23–33)
CREAT SERPL-MCNC: 0.8 MG/DL (ref 0.4–1.2)
DEPRECATED RDW RBC AUTO: 46.5 FL (ref 35–45)
ERYTHROCYTE [DISTWIDTH] IN BLOOD BY AUTOMATED COUNT: 14.6 % (ref 11.5–14.5)
GFR SERPL CREATININE-BSD FRML MDRD: > 60 ML/MIN/1.73M2
GLUCOSE SERPL-MCNC: 161 MG/DL (ref 70–108)
HCT VFR BLD AUTO: 41.5 % (ref 37–47)
HGB BLD-MCNC: 13.3 GM/DL (ref 12–16)
LACTATE SERPL-SCNC: 1.5 MMOL/L (ref 0.5–2)
LACTATE SERPL-SCNC: 2.3 MMOL/L (ref 0.5–2)
MAGNESIUM SERPL-MCNC: 2.1 MG/DL (ref 1.6–2.4)
MCH RBC QN AUTO: 27.9 PG (ref 26–33)
MCHC RBC AUTO-ENTMCNC: 32 GM/DL (ref 32.2–35.5)
MCV RBC AUTO: 87 FL (ref 81–99)
MRSA DNA SPEC QL NAA+PROBE: NEGATIVE
PLATELET # BLD AUTO: 401 THOU/MM3 (ref 130–400)
PMV BLD AUTO: 10.4 FL (ref 9.4–12.4)
POTASSIUM SERPL-SCNC: 4.3 MEQ/L (ref 3.5–5.2)
PROCALCITONIN SERPL IA-MCNC: 0.04 NG/ML (ref 0.01–0.09)
RBC # BLD AUTO: 4.77 MILL/MM3 (ref 4.2–5.4)
SODIUM SERPL-SCNC: 135 MEQ/L (ref 135–145)
TROPONIN, HIGH SENSITIVITY: 12 NG/L (ref 0–12)
TROPONIN, HIGH SENSITIVITY: 12 NG/L (ref 0–12)
WBC # BLD AUTO: 16.6 THOU/MM3 (ref 4.8–10.8)

## 2024-03-22 PROCEDURE — 6370000000 HC RX 637 (ALT 250 FOR IP)

## 2024-03-22 PROCEDURE — 2060000000 HC ICU INTERMEDIATE R&B

## 2024-03-22 PROCEDURE — 84145 PROCALCITONIN (PCT): CPT

## 2024-03-22 PROCEDURE — 82010 KETONE BODYS QUAN: CPT

## 2024-03-22 PROCEDURE — 85027 COMPLETE CBC AUTOMATED: CPT

## 2024-03-22 PROCEDURE — 93010 ELECTROCARDIOGRAM REPORT: CPT | Performed by: INTERNAL MEDICINE

## 2024-03-22 PROCEDURE — 99233 SBSQ HOSP IP/OBS HIGH 50: CPT | Performed by: HOSPITALIST

## 2024-03-22 PROCEDURE — 94669 MECHANICAL CHEST WALL OSCILL: CPT

## 2024-03-22 PROCEDURE — 94640 AIRWAY INHALATION TREATMENT: CPT

## 2024-03-22 PROCEDURE — 36415 COLL VENOUS BLD VENIPUNCTURE: CPT

## 2024-03-22 PROCEDURE — 83605 ASSAY OF LACTIC ACID: CPT

## 2024-03-22 PROCEDURE — 83735 ASSAY OF MAGNESIUM: CPT

## 2024-03-22 PROCEDURE — 6360000002 HC RX W HCPCS

## 2024-03-22 PROCEDURE — 87641 MR-STAPH DNA AMP PROBE: CPT

## 2024-03-22 PROCEDURE — 80048 BASIC METABOLIC PNL TOTAL CA: CPT

## 2024-03-22 PROCEDURE — 2580000003 HC RX 258

## 2024-03-22 PROCEDURE — 99223 1ST HOSP IP/OBS HIGH 75: CPT | Performed by: INTERNAL MEDICINE

## 2024-03-22 PROCEDURE — 84484 ASSAY OF TROPONIN QUANT: CPT

## 2024-03-22 PROCEDURE — 2700000000 HC OXYGEN THERAPY PER DAY

## 2024-03-22 PROCEDURE — 94761 N-INVAS EAR/PLS OXIMETRY MLT: CPT

## 2024-03-22 RX ORDER — SODIUM CHLORIDE 0.9 % (FLUSH) 0.9 %
5-40 SYRINGE (ML) INJECTION PRN
Status: DISCONTINUED | OUTPATIENT
Start: 2024-03-22 | End: 2024-04-13 | Stop reason: HOSPADM

## 2024-03-22 RX ORDER — SODIUM CHLORIDE 9 MG/ML
INJECTION, SOLUTION INTRAVENOUS CONTINUOUS
Status: ACTIVE | OUTPATIENT
Start: 2024-03-22 | End: 2024-03-22

## 2024-03-22 RX ORDER — PREDNISONE 20 MG/1
40 TABLET ORAL DAILY
Status: DISCONTINUED | OUTPATIENT
Start: 2024-03-23 | End: 2024-03-22

## 2024-03-22 RX ORDER — AZITHROMYCIN 250 MG/1
500 TABLET, FILM COATED ORAL DAILY
Status: COMPLETED | OUTPATIENT
Start: 2024-03-22 | End: 2024-03-24

## 2024-03-22 RX ORDER — ATORVASTATIN CALCIUM 40 MG/1
40 TABLET, FILM COATED ORAL DAILY
Status: DISCONTINUED | OUTPATIENT
Start: 2024-03-22 | End: 2024-03-24

## 2024-03-22 RX ORDER — SODIUM CHLORIDE 9 MG/ML
INJECTION, SOLUTION INTRAVENOUS PRN
Status: DISCONTINUED | OUTPATIENT
Start: 2024-03-22 | End: 2024-04-13 | Stop reason: HOSPADM

## 2024-03-22 RX ORDER — AMLODIPINE BESYLATE 5 MG/1
5 TABLET ORAL DAILY
Status: DISCONTINUED | OUTPATIENT
Start: 2024-03-22 | End: 2024-03-24

## 2024-03-22 RX ORDER — ACETAMINOPHEN 650 MG/1
650 SUPPOSITORY RECTAL EVERY 6 HOURS PRN
Status: DISCONTINUED | OUTPATIENT
Start: 2024-03-22 | End: 2024-03-28

## 2024-03-22 RX ORDER — POLYETHYLENE GLYCOL 3350 17 G/17G
17 POWDER, FOR SOLUTION ORAL DAILY PRN
Status: DISCONTINUED | OUTPATIENT
Start: 2024-03-22 | End: 2024-03-31

## 2024-03-22 RX ORDER — LOSARTAN POTASSIUM 100 MG/1
100 TABLET ORAL DAILY
Status: DISCONTINUED | OUTPATIENT
Start: 2024-03-22 | End: 2024-04-13 | Stop reason: HOSPADM

## 2024-03-22 RX ORDER — ACETAMINOPHEN 325 MG/1
650 TABLET ORAL EVERY 6 HOURS PRN
Status: DISCONTINUED | OUTPATIENT
Start: 2024-03-22 | End: 2024-03-28

## 2024-03-22 RX ORDER — SODIUM CHLORIDE 0.9 % (FLUSH) 0.9 %
5-40 SYRINGE (ML) INJECTION EVERY 12 HOURS SCHEDULED
Status: DISCONTINUED | OUTPATIENT
Start: 2024-03-22 | End: 2024-04-13 | Stop reason: HOSPADM

## 2024-03-22 RX ORDER — IPRATROPIUM BROMIDE AND ALBUTEROL SULFATE 2.5; .5 MG/3ML; MG/3ML
1 SOLUTION RESPIRATORY (INHALATION)
Status: DISCONTINUED | OUTPATIENT
Start: 2024-03-22 | End: 2024-03-23

## 2024-03-22 RX ORDER — ONDANSETRON 2 MG/ML
4 INJECTION INTRAMUSCULAR; INTRAVENOUS EVERY 6 HOURS PRN
Status: DISCONTINUED | OUTPATIENT
Start: 2024-03-22 | End: 2024-04-13 | Stop reason: HOSPADM

## 2024-03-22 RX ORDER — VENLAFAXINE HYDROCHLORIDE 75 MG/1
75 CAPSULE, EXTENDED RELEASE ORAL DAILY
Status: DISCONTINUED | OUTPATIENT
Start: 2024-03-22 | End: 2024-04-13 | Stop reason: HOSPADM

## 2024-03-22 RX ORDER — PREDNISONE 20 MG/1
40 TABLET ORAL DAILY
Status: DISCONTINUED | OUTPATIENT
Start: 2024-03-22 | End: 2024-03-22

## 2024-03-22 RX ORDER — PREDNISONE 20 MG/1
40 TABLET ORAL DAILY
Status: COMPLETED | OUTPATIENT
Start: 2024-03-22 | End: 2024-03-25

## 2024-03-22 RX ORDER — ONDANSETRON 4 MG/1
4 TABLET, ORALLY DISINTEGRATING ORAL EVERY 8 HOURS PRN
Status: DISCONTINUED | OUTPATIENT
Start: 2024-03-22 | End: 2024-04-13 | Stop reason: HOSPADM

## 2024-03-22 RX ADMIN — AZITHROMYCIN DIHYDRATE 500 MG: 250 TABLET ORAL at 09:23

## 2024-03-22 RX ADMIN — ATORVASTATIN CALCIUM 40 MG: 40 TABLET, FILM COATED ORAL at 09:23

## 2024-03-22 RX ADMIN — IPRATROPIUM BROMIDE AND ALBUTEROL SULFATE 1 DOSE: .5; 3 SOLUTION RESPIRATORY (INHALATION) at 15:07

## 2024-03-22 RX ADMIN — SODIUM CHLORIDE: 9 INJECTION, SOLUTION INTRAVENOUS at 18:10

## 2024-03-22 RX ADMIN — PREDNISONE 40 MG: 20 TABLET ORAL at 09:23

## 2024-03-22 RX ADMIN — SODIUM CHLORIDE, PRESERVATIVE FREE 10 ML: 5 INJECTION INTRAVENOUS at 20:31

## 2024-03-22 RX ADMIN — ACETAMINOPHEN 650 MG: 325 TABLET ORAL at 19:34

## 2024-03-22 RX ADMIN — SODIUM CHLORIDE: 9 INJECTION, SOLUTION INTRAVENOUS at 03:50

## 2024-03-22 RX ADMIN — LOSARTAN POTASSIUM 100 MG: 100 TABLET, FILM COATED ORAL at 09:23

## 2024-03-22 RX ADMIN — CEFTRIAXONE SODIUM 1000 MG: 1 INJECTION, POWDER, FOR SOLUTION INTRAMUSCULAR; INTRAVENOUS at 23:58

## 2024-03-22 RX ADMIN — VENLAFAXINE HYDROCHLORIDE 75 MG: 75 CAPSULE, EXTENDED RELEASE ORAL at 09:23

## 2024-03-22 RX ADMIN — IPRATROPIUM BROMIDE AND ALBUTEROL SULFATE 1 DOSE: .5; 3 SOLUTION RESPIRATORY (INHALATION) at 21:37

## 2024-03-22 RX ADMIN — IPRATROPIUM BROMIDE AND ALBUTEROL SULFATE 1 DOSE: .5; 3 SOLUTION RESPIRATORY (INHALATION) at 08:00

## 2024-03-22 RX ADMIN — IPRATROPIUM BROMIDE AND ALBUTEROL SULFATE 1 DOSE: .5; 3 SOLUTION RESPIRATORY (INHALATION) at 12:08

## 2024-03-22 RX ADMIN — AMLODIPINE BESYLATE 5 MG: 5 TABLET ORAL at 09:23

## 2024-03-22 ASSESSMENT — PAIN - FUNCTIONAL ASSESSMENT
PAIN_FUNCTIONAL_ASSESSMENT: NONE - DENIES PAIN
PAIN_FUNCTIONAL_ASSESSMENT: NONE - DENIES PAIN

## 2024-03-22 ASSESSMENT — LIFESTYLE VARIABLES
HOW OFTEN DO YOU HAVE A DRINK CONTAINING ALCOHOL: NEVER
HOW MANY STANDARD DRINKS CONTAINING ALCOHOL DO YOU HAVE ON A TYPICAL DAY: PATIENT DOES NOT DRINK

## 2024-03-22 NOTE — ED NOTES
Pt vitals collected. Dr. Saavedra notified about pot's pulse ox on 6L. Dr. Saavedra to pt bedside.

## 2024-03-22 NOTE — PROGRESS NOTES
Internal Medicine Resident Progress Note    Name: Kalpana Braga, female, : 1955, MRN: [id number]    PCP: Tatiana Velasquez, PAO - CNP    Date of Admission: 3/21/2024  Date of Service: Pt seen/examined on 24      Assessment/Plan:  Acute hypoxic respiratory failure: multifactorial, secondary to right lung collapse with superimposing mass and likely pneumonia with possible COPD exacerbation.  Not on supplemental oxygen at baseline.  In the ED requiring 6 L nasal cannula running 87 to 89%.  Now requiring HHFNC.  -Goal SpO2 89 to 93%  -Wean supplemental O2 as tolerated  -Treatment as below    Suspected COPD with possible acute exacerbation- no PFTs on file: Presented with increased worsening cough, purulent sputum, wheezing, and chest tightness.  Chest x-ray on admission showed faint hazy opacities in the left midlung which may reflect infiltrates.  New consolidation versus atelectasis in the right lung apex.  Patient is a active smoker with greater than 1 pack a day for 45 years.  No baseline usage of supplemental oxygen.  Patient reports home inhalers ,DuoNebs and albuterol.  Pro-Deniz negative at 0.04.  -Continue prednisone 40 mg daily for 5 days total.start date 3/21/2024-DuoNebs every 4 hours while awake, de-escalate as appropriate  -Continue azithromycin  -Acapella, incentive spirometer, recommended aggressive pulmonary toileting.  -Recommend PFTs and outpatient follow-up with pulm    Suspect community-acquired pneumonia: Patient presented with shortness of breath, worsened cough, yellow-green sputum production.  WBC 19.4 although she was on a steroid taper at home.  Denies any fevers however, has been having chills.  Chest x-ray shows new consolidations versus atelectasis in right lung apex.  CTA chest shows nonspecific patchy groundglass consolidations throughout the left lung are consistent with an infectious process.  COVID and flu negative  -ceftriaxone for 7 days (first dose 3/21/2024) and

## 2024-03-22 NOTE — ED NOTES
Pt updated on POC. Pt swabbed. Pt medicated per MAR. No needs expressed at this time. Call light within reach. VSS

## 2024-03-22 NOTE — ED PROVIDER NOTES
40 MG tablet, Take 1 tablet by mouth daily, Disp: 30 tablet, Rfl: 5    losartan (COZAAR) 100 MG tablet, Take 1 tablet by mouth daily, Disp: 30 tablet, Rfl: 5    venlafaxine (EFFEXOR XR) 75 MG extended release capsule, Take 1 capsule by mouth daily, Disp: 30 capsule, Rfl: 0    ipratropium 0.5 mg-albuterol 2.5 mg (DUONEB) 0.5-2.5 (3) MG/3ML SOLN nebulizer solution, Inhale 3 mLs into the lungs every 4 hours, Disp: 360 mL, Rfl: 0    predniSONE (DELTASONE) 10 MG tablet, Take 6 tabs daily for 3 days, then 5 tabs daily for 3 days, then 4 tabs daily for 3 days, then 3 tabs daily for 3 days, then 2 tab daily for 3 days, then 1 tab daily for 3 days., Disp: 63 tablet, Rfl: 0    levoFLOXacin (LEVAQUIN) 750 MG tablet, Take 1 tablet by mouth daily for 7 days, Disp: 7 tablet, Rfl: 0    UNABLE TO FIND, 1 each by Does not apply route continuous, Disp: 1 each, Rfl: 0    Probiotic Product (PROBIOTIC BLEND PO), Take by mouth, Disp: , Rfl:     Cholecalciferol (VITAMIN D3) 50 MCG (2000 UT) CAPS, Take by mouth, Disp: , Rfl:     calcium carbonate-vitamin D3 (CALTRATE) 600-400 MG-UNIT TABS per tab, Take 1 tablet by mouth daily, Disp: , Rfl:     acetaminophen (TYLENOL) 650 MG extended release tablet, Take 2 tablets by mouth daily, Disp: , Rfl:     albuterol sulfate HFA (PROVENTIL HFA) 108 (90 Base) MCG/ACT inhaler, Inhale 2 puffs into the lungs every 4 hours as needed for Wheezing or Shortness of Breath (Patient not taking: Reported on 7/21/2023), Disp: 1 Inhaler, Rfl: 3    albuterol (PROVENTIL) (2.5 MG/3ML) 0.083% nebulizer solution, Take 3 mLs by nebulization every 6 hours as needed for Wheezing (Patient not taking: Reported on 3/14/2024), Disp: 2 Package, Rfl: 2    Glucosamine-Chondroit-Vit C-Mn (GLUCOSAMINE CHONDR 1500 COMPLX PO), Take by mouth, Disp: , Rfl:     Multiple Vitamins-Minerals (CENTRUM SILVER) TABS, Take 1 tablet by mouth daily, Disp: , Rfl:     Previous Medications    ACETAMINOPHEN (TYLENOL) 650 MG EXTENDED RELEASE TABLET     (NICODERM CQ) 21 MG/24HR 1 patch (has no administration in time range)   ipratropium 0.5 mg-albuterol 2.5 mg (DUONEB) nebulizer solution 2 Dose (2 Doses Inhalation Given 3/21/24 2207)   iopamidol (ISOVUE-370) 76 % injection 80 mL (80 mLs IntraVENous Given 3/21/24 2234)   methylPREDNISolone sodium succ (SOLU-MEDROL) 125 mg in sterile water 2 mL injection (125 mg IntraVENous Given 3/21/24 2151)                PROCEDURES: (None if blank)  Procedures:     CRITICAL CARE:  None    MEDICATION CHANGES     New Prescriptions    No medications on file         FINAL DISPOSITION   Shared Decision-Making was performed, disposition discussed with the patient/family and questions answered.      Outpatient follow up (If applicable):  No follow-up provider specified.  Not applicable              FINAL DIAGNOSES:  Final diagnoses:   Acute hypoxemic respiratory failure (HCC)   Lung mass       Condition: condition: stable  Dispo: Admit to med/surg floor  DISPOSITION Decision To Admit 03/21/2024 11:48:57 PM      This transcription was electronically signed. It was dictated by use of voice recognition software and electronically transcribed. The transcription may contain errors not detected in proofreading.

## 2024-03-22 NOTE — CONSULTS
[facility]      Patient - Kalpana Braga   MRN -  [id number]   [facility] # - [id number]   - 1955      Date of Admission -  3/21/2024  8:53 PM  Date of evaluation -  3/22/2024  Room - --A   Hospital Day - 0  Consulting - Sulema Lagos MD Primary Care Physician - Tatiana Velasquez APRN - MIKHAIL     Problem List      Active Hospital Problems    Diagnosis Date Noted    Dyspnea [R06.00] 2024    Acute hypoxemic respiratory failure (HCC) [J96.01] 2024    Lung mass [R91.8] 2024    Pneumonia of both lungs due to infectious organism [J18.9] 2024     Reason for Consult    Right suprahilar mass concerning for neoplasm, PNA, COPD  HPI   History Obtained From: Patient and electronic medical record.    Kalpana Braga is a 68 y.o. female with a past medical history of COPD, Hypertension, Hyperlipidemia, osteoarthritis, and anxiety/depression who presented to [facility] ED on 3/21/2024 with complaints of shortness of breath. Patient states she has has shortness of breath for the last 4 days which is worse with exertion. She also has had a productive cough with light yellow-green sputum. She has had chills as well without fevers. She is a current everyday smoker and smokes about a pack a day for 45 years. She does not wear any oxygen at baseline.     In the ED, patient was found to be saturating at 70% on room air. She was placed on 6L NC with saturations around 86 to 90%. Chest xray showed faint hazy opacities in the left midlung, possibly reflecting infiltrates. New consolidation versus atelectasis in right lung apex. CTA chest showed no pulmonary embolism. Hypodense 3.9 x 5.5 cm right suprahilar mass extending to the right mediastinum and subcarinal region, suspicious for neoplasm until proven otherwise. Complete right upper lung collapse secondary to the right suprahilar mass. Nonspecific patchy ground-glass consolidations throughout the left lung.

## 2024-03-22 NOTE — ED PROVIDER NOTES
PATIENT NAME: Kalpana Braga  MRN: [id number]  : 1955  LEARY: 3/21/2024    I performed a history and physical examination of the patient and discussed management with the Resident. I reviewed the Resident's note and agree with the documented findings and plan of care. Any areas of disagreement are noted on the chart. I was personally present for the key portions of any procedures and have documented in the chart those procedures where I was not present during the key portions. I have reviewed the emergency nurses triage note and agree with the chief complaint, past medical history, past surgical history, allergies, medications, social and family history as documented unless otherwise noted below.    MEDICAL DEDISION MAKING (MDM)     Kalpana Braga is a 68 y.o. female who presents to Emergency Department with Shortness of Breath     SOB since 3/19/2024. Hx of COPD but not on Home O2. SaO2 70% RA on arrival.     Exam: AVSS. Nontoxic appearing. Heart: RRR, S1 and S2.  Crackles from both lungs, greater to left side, no wheezings. Soft abdomen w/o tenderness. Neurologically intact. No skin rashes.     She has new onset hypoxemia, and her lung exams show some crackles from both lungs instead of wheezing, this is not consistent with COPD exacerbation.  Most likely patient has pneumonia, PE or lung malignancy. CTA chest shows a hypertense 3.9 x 5.5 cm right suprahilar mass extending to the right mediastinum and subcarinal region, suspicious for malignancy as well as left lung nonspecific infiltrates.    Admission is indicated.  Discussed and admitted by hospital medicine service.  Stable ED stay.  ED treatment includs DuoNeb, Solu-Medrol, IV Rocephin and Zithromax.    Vitals:    24 2335 24 0039 24 0102 24 0135   BP: 123/67 (!) 153/74  132/77   Pulse: 71 70 83 79   Resp:    Temp:       TempSrc:       SpO2: 90% 94% 94% 94%   Weight:       Height:         Labs Reviewed   CBC WITH AUTO

## 2024-03-22 NOTE — CARE COORDINATION
Case Management Assessment  Initial Evaluation    Date/Time of Evaluation: 3/22/2024 3:11 PM  Assessment Completed by: Concetta Renner RN    If patient is discharged prior to next notation, then this note serves as note for discharge by case management.    Patient Name: Kalpana Braga                   YOB: 1955  Diagnosis: Lung mass [R91.8]  Dyspnea [R06.00]  Acute hypoxemic respiratory failure (HCC) [J96.01]                   Date / Time: 3/21/2024  8:53 PM  Location: [address] [place] [address]     Patient Admission Status: Inpatient   Readmission Risk Low 0-14, Mod 15-19), High > 20: Readmission Risk Score: 10.7    Current PCP: Tatiana Velasquez APRN - CNP  PCP verified by CM? Yes    Chart Reviewed: Yes      History Provided by: Patient  Patient Orientation: Alert and Oriented    Patient Cognition: Alert    Hospitalization in the last 30 days (Readmission):  No    If yes, Readmission Assessment in CM Navigator will be completed.    Advance Directives:      Code Status: Full Code   Patient's Primary Decision Maker is: Legal Next of Kin    Primary Decision Maker (Active): Kristopher Guevara - Brother/Sister - [phone]    Secondary Decision Maker: PaolaDario - Brother/Sister - [phone]    Discharge Planning:    Patient lives with: Alone Type of Home: House  Primary Care Giver: Self  Patient Support Systems include: Family Members, Friends/Neighbors   Current Financial resources: Other (Comment) ([employer])  Current community resources: None  Current services prior to admission: Durable Medical Equipment            Current DME: Home Aerosol, Other (Comment) (Lift chair)            Type of Home Care services:  None    ADLS  Prior functional level: Independent in ADLs/IADLs  Current functional level: Independent in ADLs/IADLs    Family can provide assistance at DC: Yes  Would you like Case Management to discuss the discharge plan with any other family members/significant others, and if so, who?

## 2024-03-22 NOTE — ED NOTES
ED to inpatient nurses report      Chief Complaint:  Chief Complaint   Patient presents with    Shortness of Breath     Present to ED from: home    MOA:     LOC: alert and orientated to name, place, date  Mobility: Requires assistance * 1  Oxygen Baseline: room air    Current needs required: heated high flow oxygen     Code Status:   Full Code    What abnormal results were found and what did you give/do to treat them? Mass on lung shown in Ct scan, Rocephin given      Mental Status:  Level of Consciousness: Alert (0)    Psych Assessment:        Vitals:  Patient Vitals for the past 24 hrs:   BP Temp Temp src Pulse Resp SpO2 Height Weight   03/22/24 0039 (!) 153/74 -- -- 70 23 94 % -- --   03/21/24 2335 123/67 -- -- 71 22 90 % -- --   03/21/24 2243 100/66 -- -- 82 26 92 % -- --   03/21/24 2234 -- -- -- -- -- 94 % -- --   03/21/24 2153 (!) 119/94 -- -- 72 22 94 % -- --   03/21/24 2101 121/80 98.9 °F (37.2 °C) Oral 78 27 90 % 1.626 m (5' 4\") 56.7 kg (125 lb)   03/21/24 2100 -- -- -- -- 26 (!) 70 % -- --        LDAs:   Peripheral IV 03/21/24 Left Antecubital (Active)   Site Assessment Clean, dry & intact 03/21/24 2243   Line Status Normal saline locked 03/21/24 2243   Phlebitis Assessment No symptoms 03/21/24 2243   Infiltration Assessment 0 03/21/24 2243   Dressing Status Clean, dry & intact 03/21/24 2243   Dressing Type Transparent 03/21/24 2111   Dressing Intervention New 03/21/24 2111       Ambulatory Status:  Presents to emergency department  because of falls (Syncope, seizure, or loss of consciousness): No, Age > 70: No, Altered Mental Status, Intoxication with alcohol or substance confusion (Disorientation, impaired judgment, poor safety awaremess, or inability to follow instructions): No, Impaired Mobility: Ambulates or transfers with assistive devices or assistance; Unable to ambulate or transer.: Yes, Nursing Judgement: No    Diagnosis:  DISPOSITION Admitted 03/22/2024 12:18:46 AM   Final diagnoses:   Acute

## 2024-03-22 NOTE — H&P
Internal Medicine Resident History and Physical          Name: Kalpana Braga, female, : 1955, MRN: [id number]    PCP: Tatiana Velasquez, APRN - CNP    Date of Admission: 3/21/2024  Date of Service: Pt seen/examined on 24  and Admitted to Inpatient with expected LOS greater than two midnights due to medical therapy.         Assessment and Plan:  Acute hypoxic respiratory failure: Likely multifactorial, possibly in the setting of pneumonia, COPD exacerbation, right lung mass.  No oxygen at home.  In the ED requiring 6 L nasal cannula running 87 to 89%.  Goal SpO2 89 to 93%  Wean off oxygen as tolerated  Treatment as below  COPD, no PFTs on file: Presented with increased dyspnea, cough, purulent sputum, wheezing, rhonchi, chest tightness.  Chest x-ray on admission shows faint hazy opacities in the left midlung which may reflect infiltrates.  New consolidation versus atelectasis in the right lung apex.  Patient is a active smoker with 1 pack a day for 45 years.  No baseline oxygen.  Home inhalers include DuoNebs, albuterol.  Ordered Pro-Deniz  Started on prednisone 40 mg daily for 4 more doses start date 3/22/2024, was given Solu-Medrol 125 mg in the ED on 3/21/2024.  DuoNebs every 4 hours while awake, de-escalate as appropriate  On ceftriaxone azithromycin  Acapella, incentive spirometer, recommended aggressive pulmonary toileting.  Pulmonology consulted  Suspect community-acquired pneumonia: Patient presented with shortness of breath, cough, yellow-green sputum production.  WBC 19.4 although she was on a steroid taper at home.  Denies any fevers however, has been having chills.  Chest x-ray shows new consolidations versus atelectasis in right lung apex.  CTA chest shows nonspecific patchy groundglass consolidations throughout the left lung.  Pulmonary edema is possible but cannot be followed to exclude atypical infections or inflammatory pneumonitis.  COVID and flu negative  Started on ceftriaxone for

## 2024-03-22 NOTE — PLAN OF CARE
Problem: Discharge Planning  Goal: Discharge to home or other facility with appropriate resources  Outcome: Progressing  Flowsheets (Taken 3/22/2024 0411)  Discharge to home or other facility with appropriate resources:   Identify barriers to discharge with patient and caregiver   Identify discharge learning needs (meds, wound care, etc)   Arrange for needed discharge resources and transportation as appropriate   Arrange for interpreters to assist at discharge as needed     Problem: Safety - Adult  Goal: Free from fall injury  Outcome: Progressing  Flowsheets (Taken 3/22/2024 0411)  Free From Fall Injury: Instruct family/caregiver on patient safety     Problem: Chronic Conditions and Co-morbidities  Goal: Patient's chronic conditions and co-morbidity symptoms are monitored and maintained or improved  Outcome: Progressing  Flowsheets (Taken 3/22/2024 0411)  Care Plan - Patient's Chronic Conditions and Co-Morbidity Symptoms are Monitored and Maintained or Improved:   Monitor and assess patient's chronic conditions and comorbid symptoms for stability, deterioration, or improvement   Collaborate with multidisciplinary team to address chronic and comorbid conditions and prevent exacerbation or deterioration     Problem: Pain  Goal: Verbalizes/displays adequate comfort level or baseline comfort level  Outcome: Progressing  Flowsheets (Taken 3/22/2024 0411)  Verbalizes/displays adequate comfort level or baseline comfort level:   Encourage patient to monitor pain and request assistance   Assess pain using appropriate pain scale   Administer analgesics based on type and severity of pain and evaluate response   Implement non-pharmacological measures as appropriate and evaluate response     Problem: Skin/Tissue Integrity  Goal: Absence of new skin breakdown  Description: 1.  Monitor for areas of redness and/or skin breakdown  2.  Assess vascular access sites hourly  3.  Every 4-6 hours minimum:  Change oxygen saturation  probe site  4.  Every 4-6 hours:  If on nasal continuous positive airway pressure, respiratory therapy assess nares and determine need for appliance change or resting period.  Outcome: Progressing  Note: No new skin breakdown     Problem: Respiratory - Adult  Goal: Achieves optimal ventilation and oxygenation  Outcome: Progressing  Flowsheets (Taken 3/22/2024 0411)  Achieves optimal ventilation and oxygenation:   Assess for changes in respiratory status   Assess for changes in mentation and behavior   Oxygen supplementation based on oxygen saturation or arterial blood gases   Position to facilitate oxygenation and minimize respiratory effort     Problem: Infection - Adult  Goal: Absence of infection at discharge  Outcome: Progressing  Flowsheets (Taken 3/22/2024 0411)  Absence of infection at discharge:   Assess and monitor for signs and symptoms of infection   Monitor lab/diagnostic results   Monitor all insertion sites i.e., indwelling lines, tubes and drains

## 2024-03-22 NOTE — ED TRIAGE NOTES
Pt presents to the ER with c/o SOB x1 week. Pt has hx of COPD. Pt went to PCP last Thursday and was given a steroid, abx, and albuterol- all of which pt states she has been taking. Pt denies pain. Upon arrival, pt is 70% on RA with labored breathing. Pt placed on 6L NC, now 92%. EKG completed. IV established.

## 2024-03-22 NOTE — ED NOTES
Pt back from CT. Pt states she is feeling a little better after the breathing treatment. Pt denies needs at this time. VSS

## 2024-03-22 NOTE — PLAN OF CARE
Problem: Respiratory - Adult  Goal: Achieves optimal ventilation and oxygenation  3/22/2024 1511 by Kobe Ramos, P  Outcome: Progressing   Continue aerosols to help improve aeration and shortness of breath.  Patient mutually agreed on goals.

## 2024-03-23 ENCOUNTER — APPOINTMENT (OUTPATIENT)
Dept: CT IMAGING | Age: 69
End: 2024-03-23
Payer: COMMERCIAL

## 2024-03-23 LAB
ANION GAP SERPL CALC-SCNC: 17 MEQ/L (ref 8–16)
ARTERIAL PATENCY WRIST A: POSITIVE
BASE EXCESS BLDA CALC-SCNC: 3.2 MMOL/L (ref -2.5–2.5)
BASOPHILS ABSOLUTE: 0 THOU/MM3 (ref 0–0.1)
BASOPHILS NFR BLD AUTO: 0.2 %
BDY SITE: ABNORMAL
BUN SERPL-MCNC: 17 MG/DL (ref 7–22)
CALCIUM SERPL-MCNC: 8.7 MG/DL (ref 8.5–10.5)
CHLORIDE SERPL-SCNC: 94 MEQ/L (ref 98–111)
CO2 SERPL-SCNC: 23 MEQ/L (ref 23–33)
COLLECTED BY:: ABNORMAL
CREAT SERPL-MCNC: 0.7 MG/DL (ref 0.4–1.2)
DEPRECATED RDW RBC AUTO: 46.6 FL (ref 35–45)
DEVICE: ABNORMAL
EOSINOPHIL NFR BLD AUTO: 0.2 %
EOSINOPHILS ABSOLUTE: 0 THOU/MM3 (ref 0–0.4)
ERYTHROCYTE [DISTWIDTH] IN BLOOD BY AUTOMATED COUNT: 14.6 % (ref 11.5–14.5)
FIO2 ON VENT O2 ANALYZER: 45 %
GFR SERPL CREATININE-BSD FRML MDRD: > 60 ML/MIN/1.73M2
GLUCOSE BLD STRIP.AUTO-MCNC: 132 MG/DL (ref 70–108)
GLUCOSE SERPL-MCNC: 101 MG/DL (ref 70–108)
HCO3 BLDA-SCNC: 28 MMOL/L (ref 23–28)
HCT VFR BLD AUTO: 41 % (ref 37–47)
HGB BLD-MCNC: 13.2 GM/DL (ref 12–16)
IMM GRANULOCYTES # BLD AUTO: 0.35 THOU/MM3 (ref 0–0.07)
IMM GRANULOCYTES NFR BLD AUTO: 1.6 %
LACTATE SERPL-SCNC: 1 MMOL/L (ref 0.5–2)
LYMPHOCYTES ABSOLUTE: 2.6 THOU/MM3 (ref 1–4.8)
LYMPHOCYTES NFR BLD AUTO: 11.7 %
MCH RBC QN AUTO: 27.9 PG (ref 26–33)
MCHC RBC AUTO-ENTMCNC: 32.2 GM/DL (ref 32.2–35.5)
MCV RBC AUTO: 86.7 FL (ref 81–99)
MONOCYTES ABSOLUTE: 1.3 THOU/MM3 (ref 0.4–1.3)
MONOCYTES NFR BLD AUTO: 5.8 %
NEUTROPHILS NFR BLD AUTO: 80.5 %
NRBC BLD AUTO-RTO: 0 /100 WBC
PCO2 BLDA: 41 MMHG (ref 35–45)
PH BLDA: 7.44 [PH] (ref 7.35–7.45)
PLATELET # BLD AUTO: 416 THOU/MM3 (ref 130–400)
PMV BLD AUTO: 10.1 FL (ref 9.4–12.4)
PO2 BLDA: 80 MMHG (ref 71–104)
POTASSIUM SERPL-SCNC: 4.2 MEQ/L (ref 3.5–5.2)
RBC # BLD AUTO: 4.73 MILL/MM3 (ref 4.2–5.4)
SAO2 % BLDA: 96 %
SEGMENTED NEUTROPHILS ABSOLUTE COUNT: 17.7 THOU/MM3 (ref 1.8–7.7)
SODIUM SERPL-SCNC: 134 MEQ/L (ref 135–145)
WBC # BLD AUTO: 22 THOU/MM3 (ref 4.8–10.8)

## 2024-03-23 PROCEDURE — 85025 COMPLETE CBC W/AUTO DIFF WBC: CPT

## 2024-03-23 PROCEDURE — 6360000002 HC RX W HCPCS

## 2024-03-23 PROCEDURE — 94640 AIRWAY INHALATION TREATMENT: CPT

## 2024-03-23 PROCEDURE — 36600 WITHDRAWAL OF ARTERIAL BLOOD: CPT

## 2024-03-23 PROCEDURE — 2700000000 HC OXYGEN THERAPY PER DAY

## 2024-03-23 PROCEDURE — 94669 MECHANICAL CHEST WALL OSCILL: CPT

## 2024-03-23 PROCEDURE — 6370000000 HC RX 637 (ALT 250 FOR IP)

## 2024-03-23 PROCEDURE — 83605 ASSAY OF LACTIC ACID: CPT

## 2024-03-23 PROCEDURE — 80048 BASIC METABOLIC PNL TOTAL CA: CPT

## 2024-03-23 PROCEDURE — 82803 BLOOD GASES ANY COMBINATION: CPT

## 2024-03-23 PROCEDURE — 93005 ELECTROCARDIOGRAM TRACING: CPT

## 2024-03-23 PROCEDURE — 94761 N-INVAS EAR/PLS OXIMETRY MLT: CPT

## 2024-03-23 PROCEDURE — 70498 CT ANGIOGRAPHY NECK: CPT

## 2024-03-23 PROCEDURE — 99233 SBSQ HOSP IP/OBS HIGH 50: CPT | Performed by: HOSPITALIST

## 2024-03-23 PROCEDURE — 2580000003 HC RX 258

## 2024-03-23 PROCEDURE — 70450 CT HEAD/BRAIN W/O DYE: CPT

## 2024-03-23 PROCEDURE — 70496 CT ANGIOGRAPHY HEAD: CPT

## 2024-03-23 PROCEDURE — 6360000004 HC RX CONTRAST MEDICATION: Performed by: HOSPITALIST

## 2024-03-23 PROCEDURE — 99232 SBSQ HOSP IP/OBS MODERATE 35: CPT

## 2024-03-23 PROCEDURE — 36415 COLL VENOUS BLD VENIPUNCTURE: CPT

## 2024-03-23 PROCEDURE — 2060000000 HC ICU INTERMEDIATE R&B

## 2024-03-23 PROCEDURE — 82948 REAGENT STRIP/BLOOD GLUCOSE: CPT

## 2024-03-23 RX ORDER — ASPIRIN 81 MG/1
81 TABLET, CHEWABLE ORAL DAILY
Status: DISCONTINUED | OUTPATIENT
Start: 2024-03-24 | End: 2024-04-13 | Stop reason: HOSPADM

## 2024-03-23 RX ORDER — ALBUTEROL SULFATE 2.5 MG/3ML
2.5 SOLUTION RESPIRATORY (INHALATION) EVERY 4 HOURS PRN
Status: DISCONTINUED | OUTPATIENT
Start: 2024-03-23 | End: 2024-04-13 | Stop reason: HOSPADM

## 2024-03-23 RX ORDER — ASPIRIN 81 MG/1
324 TABLET, CHEWABLE ORAL ONCE
Status: COMPLETED | OUTPATIENT
Start: 2024-03-23 | End: 2024-03-23

## 2024-03-23 RX ORDER — ASPIRIN 81 MG/1
243 TABLET, CHEWABLE ORAL ONCE
Status: DISCONTINUED | OUTPATIENT
Start: 2024-03-23 | End: 2024-03-23

## 2024-03-23 RX ORDER — ASPIRIN 81 MG/1
81 TABLET, CHEWABLE ORAL DAILY
Status: DISCONTINUED | OUTPATIENT
Start: 2024-03-24 | End: 2024-03-23 | Stop reason: DRUGHIGH

## 2024-03-23 RX ORDER — SODIUM CHLORIDE 9 MG/ML
INJECTION, SOLUTION INTRAVENOUS CONTINUOUS
Status: DISCONTINUED | OUTPATIENT
Start: 2024-03-23 | End: 2024-03-25

## 2024-03-23 RX ORDER — IPRATROPIUM BROMIDE AND ALBUTEROL SULFATE 2.5; .5 MG/3ML; MG/3ML
1 SOLUTION RESPIRATORY (INHALATION)
Status: DISCONTINUED | OUTPATIENT
Start: 2024-03-23 | End: 2024-03-24

## 2024-03-23 RX ORDER — 0.9 % SODIUM CHLORIDE 0.9 %
500 INTRAVENOUS SOLUTION INTRAVENOUS ONCE
Status: COMPLETED | OUTPATIENT
Start: 2024-03-23 | End: 2024-03-23

## 2024-03-23 RX ORDER — 0.9 % SODIUM CHLORIDE 0.9 %
500 INTRAVENOUS SOLUTION INTRAVENOUS ONCE
Status: COMPLETED | OUTPATIENT
Start: 2024-03-23 | End: 2024-03-24

## 2024-03-23 RX ORDER — IPRATROPIUM BROMIDE AND ALBUTEROL SULFATE 2.5; .5 MG/3ML; MG/3ML
1 SOLUTION RESPIRATORY (INHALATION)
Status: DISCONTINUED | OUTPATIENT
Start: 2024-03-23 | End: 2024-03-23

## 2024-03-23 RX ORDER — CLOPIDOGREL BISULFATE 75 MG/1
75 TABLET ORAL DAILY
Status: DISCONTINUED | OUTPATIENT
Start: 2024-03-23 | End: 2024-03-27

## 2024-03-23 RX ORDER — GUAIFENESIN 600 MG/1
1200 TABLET, EXTENDED RELEASE ORAL 2 TIMES DAILY
Status: DISCONTINUED | OUTPATIENT
Start: 2024-03-23 | End: 2024-04-13 | Stop reason: HOSPADM

## 2024-03-23 RX ORDER — ASPIRIN 81 MG/1
324 TABLET, CHEWABLE ORAL DAILY
Status: DISCONTINUED | OUTPATIENT
Start: 2024-03-24 | End: 2024-03-23

## 2024-03-23 RX ADMIN — ATORVASTATIN CALCIUM 40 MG: 40 TABLET, FILM COATED ORAL at 07:44

## 2024-03-23 RX ADMIN — GUAIFENESIN 1200 MG: 600 TABLET, EXTENDED RELEASE ORAL at 10:09

## 2024-03-23 RX ADMIN — IOPAMIDOL 80 ML: 755 INJECTION, SOLUTION INTRAVENOUS at 21:26

## 2024-03-23 RX ADMIN — ASPIRIN 81 MG 324 MG: 81 TABLET ORAL at 23:09

## 2024-03-23 RX ADMIN — SODIUM CHLORIDE: 9 INJECTION, SOLUTION INTRAVENOUS at 23:15

## 2024-03-23 RX ADMIN — IPRATROPIUM BROMIDE AND ALBUTEROL SULFATE 1 DOSE: .5; 3 SOLUTION RESPIRATORY (INHALATION) at 19:51

## 2024-03-23 RX ADMIN — SODIUM CHLORIDE, PRESERVATIVE FREE 10 ML: 5 INJECTION INTRAVENOUS at 20:45

## 2024-03-23 RX ADMIN — IPRATROPIUM BROMIDE AND ALBUTEROL SULFATE 1 DOSE: .5; 3 SOLUTION RESPIRATORY (INHALATION) at 15:59

## 2024-03-23 RX ADMIN — ACETAMINOPHEN 650 MG: 325 TABLET ORAL at 12:20

## 2024-03-23 RX ADMIN — LOSARTAN POTASSIUM 100 MG: 100 TABLET, FILM COATED ORAL at 07:44

## 2024-03-23 RX ADMIN — AMLODIPINE BESYLATE 5 MG: 5 TABLET ORAL at 07:44

## 2024-03-23 RX ADMIN — IPRATROPIUM BROMIDE AND ALBUTEROL SULFATE 1 DOSE: .5; 3 SOLUTION RESPIRATORY (INHALATION) at 08:43

## 2024-03-23 RX ADMIN — SODIUM CHLORIDE 500 ML: 9 INJECTION, SOLUTION INTRAVENOUS at 21:53

## 2024-03-23 RX ADMIN — GUAIFENESIN 1200 MG: 600 TABLET, EXTENDED RELEASE ORAL at 20:45

## 2024-03-23 RX ADMIN — LEVETIRACETAM 1000 MG: 100 INJECTION, SOLUTION INTRAVENOUS at 23:09

## 2024-03-23 RX ADMIN — SODIUM CHLORIDE 500 ML: 9 INJECTION, SOLUTION INTRAVENOUS at 23:16

## 2024-03-23 RX ADMIN — SODIUM CHLORIDE, PRESERVATIVE FREE 10 ML: 5 INJECTION INTRAVENOUS at 07:43

## 2024-03-23 RX ADMIN — PREDNISONE 40 MG: 20 TABLET ORAL at 07:44

## 2024-03-23 RX ADMIN — ALBUTEROL SULFATE 2.5 MG: 2.5 SOLUTION RESPIRATORY (INHALATION) at 05:08

## 2024-03-23 RX ADMIN — VENLAFAXINE HYDROCHLORIDE 75 MG: 75 CAPSULE, EXTENDED RELEASE ORAL at 07:44

## 2024-03-23 RX ADMIN — IPRATROPIUM BROMIDE AND ALBUTEROL SULFATE 1 DOSE: .5; 3 SOLUTION RESPIRATORY (INHALATION) at 12:43

## 2024-03-23 RX ADMIN — AZITHROMYCIN DIHYDRATE 500 MG: 250 TABLET ORAL at 07:44

## 2024-03-23 RX ADMIN — ACETAMINOPHEN 650 MG: 325 TABLET ORAL at 04:55

## 2024-03-23 RX ADMIN — CLOPIDOGREL BISULFATE 75 MG: 75 TABLET ORAL at 23:09

## 2024-03-23 ASSESSMENT — PAIN - FUNCTIONAL ASSESSMENT
PAIN_FUNCTIONAL_ASSESSMENT: ACTIVITIES ARE NOT PREVENTED
PAIN_FUNCTIONAL_ASSESSMENT: ACTIVITIES ARE NOT PREVENTED

## 2024-03-23 ASSESSMENT — PAIN DESCRIPTION - ORIENTATION
ORIENTATION: MID
ORIENTATION: MID

## 2024-03-23 ASSESSMENT — PAIN DESCRIPTION - DESCRIPTORS
DESCRIPTORS: PRESSURE
DESCRIPTORS: PRESSURE

## 2024-03-23 ASSESSMENT — PAIN DESCRIPTION - FREQUENCY
FREQUENCY: INTERMITTENT
FREQUENCY: INTERMITTENT

## 2024-03-23 ASSESSMENT — PAIN DESCRIPTION - LOCATION
LOCATION: CHEST
LOCATION: CHEST

## 2024-03-23 ASSESSMENT — PAIN SCALES - GENERAL
PAINLEVEL_OUTOF10: 5
PAINLEVEL_OUTOF10: 5
PAINLEVEL_OUTOF10: 0

## 2024-03-23 ASSESSMENT — PAIN DESCRIPTION - ONSET
ONSET: GRADUAL
ONSET: GRADUAL

## 2024-03-23 ASSESSMENT — PAIN DESCRIPTION - PAIN TYPE
TYPE: ACUTE PAIN
TYPE: ACUTE PAIN

## 2024-03-23 NOTE — PLAN OF CARE
Problem: Respiratory - Adult  Goal: Achieves optimal ventilation and oxygenation  3/23/2024 0854 by Chantelle Abel RCP  Outcome: Progressing  Achieves optimal ventilation and oxygenation:   Assess for changes in respiratory status   Assess for changes in mentation and behavior   Position to facilitate oxygenation and minimize respiratory effort   Oxygen supplementation based on oxygen saturation or arterial blood gases     Problem: Respiratory - Adult  Goal: Clear lung sounds  3/23/2024 0854 by Chantelle Abel RCP  Continue therapy as ordered to achieve clear breath sounds and improve aeration.

## 2024-03-23 NOTE — RT PROTOCOL NOTE
RT Inhaler-Nebulizer Bronchodilator Protocol Note    There is a bronchodilator order in the chart from a provider indicating to follow the RT Bronchodilator Protocol and there is an “Initiate RT Inhaler-Nebulizer Bronchodilator Protocol” order as well (see protocol at bottom of note).    CXR Findings:  XR CHEST PORTABLE    Result Date: 3/21/2024  1. Faint hazy opacities in the left mid lung may reflect infiltrates. 2. New consolidation versus atelectasis in the right lung apex. CT can be helpful for further evaluation. This document has been electronically signed by: Fernando Preciado MD on 03/21/2024 10:22 PM      The findings from the last RT Protocol Assessment were as follows:   History Pulmonary Disease: Chronic pulmonary disease  Respiratory Pattern: Mild dyspnea at rest, irregular pattern, or RR 21-25 bpm  Breath Sounds: Inspiratory and expiratory or bilateral wheezing and/or rhonchi  Cough: Strong, spontaneous, non-productive  Indication for Bronchodilator Therapy: Decreased or absent breath sounds  Bronchodilator Assessment Score: 12    Aerosolized bronchodilator medication orders have been revised according to the RT Inhaler-Nebulizer Bronchodilator Protocol below.    Respiratory Therapist to perform RT Therapy Protocol Assessment initially then follow the protocol.  Repeat RT Therapy Protocol Assessment PRN for score 0-3 or on second treatment, BID, and PRN for scores above 3.    No Indications - adjust the frequency to every 6 hours PRN wheezing or bronchospasm, if no treatments needed after 48 hours then discontinue using Per Protocol order mode.     If indication present, adjust the RT bronchodilator orders based on the Bronchodilator Assessment Score as indicated below.  Use Inhaler orders unless patient has one or more of the following: on home nebulizer, not able to hold breath for 10 seconds, is not alert and oriented, cannot activate and use MDI correctly, or respiratory rate 25 breaths per minute or

## 2024-03-23 NOTE — PLAN OF CARE
Problem: Respiratory - Adult  Goal: Achieves optimal ventilation and oxygenation  3/23/2024 0512 by Soha Lubin, RCP  Outcome: Progressing   Remains on HFNC to support breathing and oxygenation. Will continue weaning as tolerated.     Patient mutually agreed on goals.

## 2024-03-23 NOTE — PLAN OF CARE
Problem: Respiratory - Adult  Goal: Clear lung sounds  Outcome: Progressing    Patient lung sounds are considered normal for their current lung condition. No signs of distress noted. Current treatment regimen appropriate

## 2024-03-23 NOTE — PROGRESS NOTES
Internal Medicine Resident Progress Note    Name: Kalpana Braga, female, : 1955, MRN: [id number]    PCP: Tatiana Velasquez, PAO - CNP    Date of Admission: 3/21/2024  Date of Service: Pt seen/examined on 24      Assessment/Plan:  Acute hypoxic respiratory failure: Secondary to right lung collapse with superimposed mass as well as pneumonia with suspected COPD exacerbation.  No oxygen use at baseline, in the ED requiring 6 L nasal cannula as she was saturating 87 to 89%.  Now requiring high flow.    Suspected COPD exacerbation: No formal PFTs on file, presenting with increased shortness of breath with worsening cough increase mucus production, wheeze on physical exam with complaints of chest tightness.  Chest x-ray showing faint hazy opacities in the left midlung on admission.  Findings suggestive of new consolidation versus atelectasis in the right lung apex.  Patient is a 45-year pack smoking history.  Reports no home inhalers.  -Continue prednisone 40 mg 5-day total   -DuoNebs every 4  -Azithromycin for 3 days  -Rocephin for 7 days  -Acapella/incentive spirometer  -Patient need follow-up with pulmonology as outpatient    Community-acquired pneumonia: As above, COVID and flu negative  -Continue Rocephin for 7 days  -Continue azithromycin for 3 days  -Pneumonia panel and respiratory culture sent    Leukocytosis secondary to pneumonia    3.9 x 5.7 cm right suprahilar mass concerning for neoplasm: CTA chest showing a hypodense suprahilar mass extending into the right mediastinum and subcarinal region, suspicious for neoplasm.  45-pack-year smoking history  -Pulmonology following recommending EBUS planned for Monday at 3 PM  N.p.o. midnight before procedure    Hypertension: Continue Norvasc and losartan    Hyperlipidemia: Continue Lipitor    Anxiety depression: Continue Effexor        Expected discharge date: TBD    Disposition: TBD  [] Home  [] Inpatient Rehab  [] Psychiatric Unit  [] SNF  [] Long  Term Care Facility  [] Other-    ===================================================================      Chief Complaint: Shortness of breath    Hospital Course:   68-year-old female PMH COPD, active smoker 45-pack-year history, hypertension, depression, hyperlipidemia presented to [facility] 3/21/2024 secondary to shortness of breath.  Patient states this has been progressive for the past 2 weeks worse with exertion.  She reports she has been coughing up some light yellow-green sputum however denies any fevers or chills or night sweats.  Denies any nausea vomiting at this time.  Active smoker 45-pack-year history.  No oxygen at baseline.  In the ED it was originally saturating 70% room air escalated 6 L nasal cannula and then ultimately escalated up to high flow nasal cannula.  Chest x-ray showing faint hazy opacities in the left lung base, new concerns for consolidation versus mass in the right lung apex.  CTA showing no PE.    Subjective (past 24 hours):   3/23/2024  Patient seen and evaluated at bedside currently on heated high flow resting comfortably in bed lungs sound a bit wheezy and congested during time of evaluation will increase DuoNebs to every 2 from every 4.  Plans for EBUS Monday at 3 PM , Patient made n.p.o. the midnight before.    ROS: reviewed complete ROS unchanged unless otherwise stated in hospital course/subjective portion.       Medications:  Reviewed    guaiFENesin, 1,200 mg, Oral, BID    ipratropium 0.5 mg-albuterol 2.5 mg, 1 Dose, Inhalation, Q2H While awake    sodium chloride flush, 5-40 mL, IntraVENous, 2 times per day    azithromycin, 500 mg, Oral, Daily    cefTRIAXone (ROCEPHIN) IV, 1,000 mg, IntraVENous, Q24H    predniSONE, 40 mg, Oral, Daily    amLODIPine, 5 mg, Oral, Daily    atorvastatin, 40 mg, Oral, Daily    losartan, 100 mg, Oral, Daily    venlafaxine, 75 mg, Oral, Daily         Intake/Output Summary (Last 24 hours) at 3/23/2024 1328  Last data filed at 3/23/2024 1215  Gross per

## 2024-03-23 NOTE — PLAN OF CARE
Problem: Discharge Planning  Goal: Discharge to home or other facility with appropriate resources  Outcome: Progressing  Flowsheets (Taken 3/23/2024 0013)  Discharge to home or other facility with appropriate resources:   Identify barriers to discharge with patient and caregiver   Identify discharge learning needs (meds, wound care, etc)   Refer to discharge planning if patient needs post-hospital services based on physician order or complex needs related to functional status, cognitive ability or social support system     Problem: Safety - Adult  Goal: Free from fall injury  Outcome: Progressing  Flowsheets (Taken 3/23/2024 0013)  Free From Fall Injury: Instruct family/caregiver on patient safety     Problem: Chronic Conditions and Co-morbidities  Goal: Patient's chronic conditions and co-morbidity symptoms are monitored and maintained or improved  Outcome: Progressing  Flowsheets (Taken 3/23/2024 0013)  Care Plan - Patient's Chronic Conditions and Co-Morbidity Symptoms are Monitored and Maintained or Improved:   Monitor and assess patient's chronic conditions and comorbid symptoms for stability, deterioration, or improvement   Collaborate with multidisciplinary team to address chronic and comorbid conditions and prevent exacerbation or deterioration     Problem: Pain  Goal: Verbalizes/displays adequate comfort level or baseline comfort level  Outcome: Progressing  Flowsheets (Taken 3/23/2024 0013)  Verbalizes/displays adequate comfort level or baseline comfort level:   Encourage patient to monitor pain and request assistance   Administer analgesics based on type and severity of pain and evaluate response   Consider cultural and social influences on pain and pain management   Assess pain using appropriate pain scale     Problem: Skin/Tissue Integrity  Goal: Absence of new skin breakdown  Description: 1.  Monitor for areas of redness and/or skin breakdown  2.  Assess vascular access sites hourly  3.  Every 4-6

## 2024-03-23 NOTE — PROGRESS NOTES
[facility]      Patient - Kalpana Braga   MRN -  [id number]   [facility] # - [id number]   - 1955      Date of Admission -  3/21/2024  8:53 PM  Date of evaluation -  3/23/2024  Room - --A   Hospital Day - 1  Consulting - Sulema Lagos MD Primary Care Physician - Tatiana Velasquez APRN - MIKHAIL     Problem List      Active Hospital Problems    Diagnosis Date Noted    Dyspnea [R06.00] 2024    Acute hypoxemic respiratory failure (HCC) [J96.01] 2024    Hilar mass [R91.8] 2024    Pneumonia of right upper lobe due to infectious organism [J18.9] 2024     Reason for Consult    Right suprahilar mass concerning for neoplasm, pneumonia, and COPD  HPI   History Obtained From: Patient and electronic medical record.    Kalpana Braga is a 68 y.o. female with a past medical history of COPD, Hypertension, Hyperlipidemia, osteoarthritis, and anxiety/depression who presented to [facility] ED on 3/21/2024 with complaints of shortness of breath. Patient states she has has shortness of breath for the last 4 days which is worse with exertion. She also has had a productive cough with light yellow-green sputum. She has had chills as well without fevers. She is a current everyday smoker and smokes about a pack a day for 45 years. She does not wear any oxygen at baseline.     In the ED, patient was found to be saturating at 70% on room air. She was placed on 6L NC with saturations around 86 to 90%. Chest xray showed faint hazy opacities in the left midlung, possibly reflecting infiltrates. New consolidation versus atelectasis in right lung apex. CTA chest showed no pulmonary embolism. Hypodense 3.9 x 5.5 cm right suprahilar mass extending to the right mediastinum and subcarinal region, suspicious for neoplasm until proven otherwise. Complete right upper lung collapse secondary to the right suprahilar mass. Nonspecific patchy ground-glass consolidations throughout

## 2024-03-24 ENCOUNTER — APPOINTMENT (OUTPATIENT)
Dept: MRI IMAGING | Age: 69
End: 2024-03-24
Payer: COMMERCIAL

## 2024-03-24 PROBLEM — I63.9 CVA (CEREBRAL VASCULAR ACCIDENT) (HCC): Status: ACTIVE | Noted: 2024-03-24

## 2024-03-24 LAB
ANION GAP SERPL CALC-SCNC: 13 MEQ/L (ref 8–16)
BACTERIA URNS QL MICRO: ABNORMAL /HPF
BASOPHILS ABSOLUTE: 0 THOU/MM3 (ref 0–0.1)
BASOPHILS NFR BLD AUTO: 0.2 %
BILIRUB UR QL STRIP.AUTO: NEGATIVE
BUN SERPL-MCNC: 13 MG/DL (ref 7–22)
CALCIUM SERPL-MCNC: 8.2 MG/DL (ref 8.5–10.5)
CASTS #/AREA URNS LPF: ABNORMAL /LPF
CASTS 2: ABNORMAL /LPF
CHARACTER UR: CLEAR
CHLORIDE SERPL-SCNC: 97 MEQ/L (ref 98–111)
CHOLEST SERPL-MCNC: 103 MG/DL (ref 100–199)
CO2 SERPL-SCNC: 24 MEQ/L (ref 23–33)
COLOR: YELLOW
CREAT SERPL-MCNC: 0.6 MG/DL (ref 0.4–1.2)
CRYSTALS URNS MICRO: ABNORMAL
DEPRECATED MEAN GLUCOSE BLD GHB EST-ACNC: 114 MG/DL (ref 70–126)
DEPRECATED RDW RBC AUTO: 46.5 FL (ref 35–45)
EOSINOPHIL NFR BLD AUTO: 0.6 %
EOSINOPHILS ABSOLUTE: 0.1 THOU/MM3 (ref 0–0.4)
EPITHELIAL CELLS, UA: ABNORMAL /HPF
ERYTHROCYTE [DISTWIDTH] IN BLOOD BY AUTOMATED COUNT: 14.6 % (ref 11.5–14.5)
GFR SERPL CREATININE-BSD FRML MDRD: > 60 ML/MIN/1.73M2
GLUCOSE BLD STRIP.AUTO-MCNC: 100 MG/DL (ref 70–108)
GLUCOSE BLD STRIP.AUTO-MCNC: 102 MG/DL (ref 70–108)
GLUCOSE BLD STRIP.AUTO-MCNC: 107 MG/DL (ref 70–108)
GLUCOSE SERPL-MCNC: 84 MG/DL (ref 70–108)
GLUCOSE UR QL STRIP.AUTO: NEGATIVE MG/DL
HBA1C MFR BLD HPLC: 5.8 % (ref 4.4–6.4)
HCT VFR BLD AUTO: 37.6 % (ref 37–47)
HDLC SERPL-MCNC: 53 MG/DL
HGB BLD-MCNC: 12 GM/DL (ref 12–16)
HGB UR QL STRIP.AUTO: NEGATIVE
IMM GRANULOCYTES # BLD AUTO: 0.29 THOU/MM3 (ref 0–0.07)
IMM GRANULOCYTES NFR BLD AUTO: 1.7 %
KETONES UR QL STRIP.AUTO: NEGATIVE
LDLC SERPL CALC-MCNC: 32 MG/DL
LYMPHOCYTES ABSOLUTE: 2.6 THOU/MM3 (ref 1–4.8)
LYMPHOCYTES NFR BLD AUTO: 15.2 %
MCH RBC QN AUTO: 27.9 PG (ref 26–33)
MCHC RBC AUTO-ENTMCNC: 31.9 GM/DL (ref 32.2–35.5)
MCV RBC AUTO: 87.4 FL (ref 81–99)
MISCELLANEOUS 2: ABNORMAL
MONOCYTES ABSOLUTE: 1.2 THOU/MM3 (ref 0.4–1.3)
MONOCYTES NFR BLD AUTO: 7.2 %
NEUTROPHILS NFR BLD AUTO: 75.1 %
NITRITE UR QL STRIP: NEGATIVE
NRBC BLD AUTO-RTO: 0 /100 WBC
PH UR STRIP.AUTO: 6 [PH] (ref 5–9)
PLATELET # BLD AUTO: 357 THOU/MM3 (ref 130–400)
PMV BLD AUTO: 10.2 FL (ref 9.4–12.4)
POTASSIUM SERPL-SCNC: 3.4 MEQ/L (ref 3.5–5.2)
PROT UR STRIP.AUTO-MCNC: NEGATIVE MG/DL
RBC # BLD AUTO: 4.3 MILL/MM3 (ref 4.2–5.4)
RBC URINE: ABNORMAL /HPF
RENAL EPI CELLS #/AREA URNS HPF: ABNORMAL /[HPF]
SEGMENTED NEUTROPHILS ABSOLUTE COUNT: 12.8 THOU/MM3 (ref 1.8–7.7)
SODIUM SERPL-SCNC: 134 MEQ/L (ref 135–145)
SP GR UR REFRACT.AUTO: > 1.03 (ref 1–1.03)
TRIGL SERPL-MCNC: 88 MG/DL (ref 0–199)
UROBILINOGEN, URINE: 0.2 EU/DL (ref 0–1)
WBC # BLD AUTO: 17.1 THOU/MM3 (ref 4.8–10.8)
WBC #/AREA URNS HPF: ABNORMAL /HPF
WBC #/AREA URNS HPF: ABNORMAL /[HPF]
YEAST LIKE FUNGI URNS QL MICRO: ABNORMAL

## 2024-03-24 PROCEDURE — 99233 SBSQ HOSP IP/OBS HIGH 50: CPT | Performed by: HOSPITALIST

## 2024-03-24 PROCEDURE — 6370000000 HC RX 637 (ALT 250 FOR IP)

## 2024-03-24 PROCEDURE — 82948 REAGENT STRIP/BLOOD GLUCOSE: CPT

## 2024-03-24 PROCEDURE — 6360000002 HC RX W HCPCS

## 2024-03-24 PROCEDURE — 83036 HEMOGLOBIN GLYCOSYLATED A1C: CPT

## 2024-03-24 PROCEDURE — 2580000003 HC RX 258

## 2024-03-24 PROCEDURE — 2700000000 HC OXYGEN THERAPY PER DAY

## 2024-03-24 PROCEDURE — 70551 MRI BRAIN STEM W/O DYE: CPT

## 2024-03-24 PROCEDURE — 93010 ELECTROCARDIOGRAM REPORT: CPT | Performed by: INTERNAL MEDICINE

## 2024-03-24 PROCEDURE — 2060000000 HC ICU INTERMEDIATE R&B

## 2024-03-24 PROCEDURE — 94640 AIRWAY INHALATION TREATMENT: CPT

## 2024-03-24 PROCEDURE — 81001 URINALYSIS AUTO W/SCOPE: CPT

## 2024-03-24 PROCEDURE — 99232 SBSQ HOSP IP/OBS MODERATE 35: CPT

## 2024-03-24 PROCEDURE — 80048 BASIC METABOLIC PNL TOTAL CA: CPT

## 2024-03-24 PROCEDURE — 92610 EVALUATE SWALLOWING FUNCTION: CPT | Performed by: SPEECH-LANGUAGE PATHOLOGIST

## 2024-03-24 PROCEDURE — 99223 1ST HOSP IP/OBS HIGH 75: CPT | Performed by: NURSE PRACTITIONER

## 2024-03-24 PROCEDURE — 80061 LIPID PANEL: CPT

## 2024-03-24 PROCEDURE — 94761 N-INVAS EAR/PLS OXIMETRY MLT: CPT

## 2024-03-24 PROCEDURE — 97530 THERAPEUTIC ACTIVITIES: CPT

## 2024-03-24 PROCEDURE — 97162 PT EVAL MOD COMPLEX 30 MIN: CPT

## 2024-03-24 PROCEDURE — 87086 URINE CULTURE/COLONY COUNT: CPT

## 2024-03-24 PROCEDURE — 92523 SPEECH SOUND LANG COMPREHEN: CPT | Performed by: SPEECH-LANGUAGE PATHOLOGIST

## 2024-03-24 PROCEDURE — 85025 COMPLETE CBC W/AUTO DIFF WBC: CPT

## 2024-03-24 PROCEDURE — 36415 COLL VENOUS BLD VENIPUNCTURE: CPT

## 2024-03-24 RX ORDER — ATORVASTATIN CALCIUM 80 MG/1
80 TABLET, FILM COATED ORAL DAILY
Status: DISCONTINUED | OUTPATIENT
Start: 2024-03-25 | End: 2024-03-25

## 2024-03-24 RX ORDER — IPRATROPIUM BROMIDE AND ALBUTEROL SULFATE 2.5; .5 MG/3ML; MG/3ML
1 SOLUTION RESPIRATORY (INHALATION) 3 TIMES DAILY
Status: DISCONTINUED | OUTPATIENT
Start: 2024-03-24 | End: 2024-03-27

## 2024-03-24 RX ORDER — POTASSIUM CHLORIDE 7.45 MG/ML
10 INJECTION INTRAVENOUS PRN
Status: DISCONTINUED | OUTPATIENT
Start: 2024-03-24 | End: 2024-04-13 | Stop reason: HOSPADM

## 2024-03-24 RX ORDER — ENOXAPARIN SODIUM 100 MG/ML
40 INJECTION SUBCUTANEOUS DAILY
Status: DISCONTINUED | OUTPATIENT
Start: 2024-03-24 | End: 2024-04-13 | Stop reason: HOSPADM

## 2024-03-24 RX ORDER — POTASSIUM CHLORIDE 20 MEQ/1
40 TABLET, EXTENDED RELEASE ORAL PRN
Status: DISCONTINUED | OUTPATIENT
Start: 2024-03-24 | End: 2024-04-13 | Stop reason: HOSPADM

## 2024-03-24 RX ADMIN — SODIUM CHLORIDE: 9 INJECTION, SOLUTION INTRAVENOUS at 11:39

## 2024-03-24 RX ADMIN — LEVETIRACETAM 500 MG: 100 INJECTION, SOLUTION INTRAVENOUS at 11:35

## 2024-03-24 RX ADMIN — VENLAFAXINE HYDROCHLORIDE 75 MG: 75 CAPSULE, EXTENDED RELEASE ORAL at 09:10

## 2024-03-24 RX ADMIN — AMLODIPINE BESYLATE 5 MG: 5 TABLET ORAL at 09:09

## 2024-03-24 RX ADMIN — SODIUM CHLORIDE: 9 INJECTION, SOLUTION INTRAVENOUS at 23:34

## 2024-03-24 RX ADMIN — POTASSIUM CHLORIDE 40 MEQ: 1500 TABLET, EXTENDED RELEASE ORAL at 09:09

## 2024-03-24 RX ADMIN — ENOXAPARIN SODIUM 40 MG: 100 INJECTION SUBCUTANEOUS at 01:10

## 2024-03-24 RX ADMIN — ASPIRIN 81 MG 81 MG: 81 TABLET ORAL at 09:09

## 2024-03-24 RX ADMIN — GUAIFENESIN 1200 MG: 600 TABLET, EXTENDED RELEASE ORAL at 20:20

## 2024-03-24 RX ADMIN — CEFTRIAXONE SODIUM 1000 MG: 1 INJECTION, POWDER, FOR SOLUTION INTRAMUSCULAR; INTRAVENOUS at 01:09

## 2024-03-24 RX ADMIN — IPRATROPIUM BROMIDE AND ALBUTEROL SULFATE 1 DOSE: .5; 3 SOLUTION RESPIRATORY (INHALATION) at 08:36

## 2024-03-24 RX ADMIN — CLOPIDOGREL BISULFATE 75 MG: 75 TABLET ORAL at 09:10

## 2024-03-24 RX ADMIN — ALBUTEROL SULFATE 2.5 MG: 2.5 SOLUTION RESPIRATORY (INHALATION) at 01:38

## 2024-03-24 RX ADMIN — LOSARTAN POTASSIUM 100 MG: 100 TABLET, FILM COATED ORAL at 09:09

## 2024-03-24 RX ADMIN — ATORVASTATIN CALCIUM 40 MG: 40 TABLET, FILM COATED ORAL at 09:09

## 2024-03-24 RX ADMIN — GUAIFENESIN 1200 MG: 600 TABLET, EXTENDED RELEASE ORAL at 09:10

## 2024-03-24 RX ADMIN — SODIUM CHLORIDE, PRESERVATIVE FREE 10 ML: 5 INJECTION INTRAVENOUS at 09:27

## 2024-03-24 RX ADMIN — CEFTRIAXONE SODIUM 1000 MG: 1 INJECTION, POWDER, FOR SOLUTION INTRAMUSCULAR; INTRAVENOUS at 23:16

## 2024-03-24 RX ADMIN — PREDNISONE 40 MG: 20 TABLET ORAL at 09:09

## 2024-03-24 RX ADMIN — IPRATROPIUM BROMIDE AND ALBUTEROL SULFATE 1 DOSE: .5; 3 SOLUTION RESPIRATORY (INHALATION) at 18:27

## 2024-03-24 RX ADMIN — AZITHROMYCIN DIHYDRATE 500 MG: 250 TABLET ORAL at 09:09

## 2024-03-24 RX ADMIN — POTASSIUM CHLORIDE 40 MEQ: 1500 TABLET, EXTENDED RELEASE ORAL at 06:51

## 2024-03-24 RX ADMIN — SODIUM CHLORIDE, PRESERVATIVE FREE 10 ML: 5 INJECTION INTRAVENOUS at 20:21

## 2024-03-24 RX ADMIN — IPRATROPIUM BROMIDE AND ALBUTEROL SULFATE 1 DOSE: .5; 3 SOLUTION RESPIRATORY (INHALATION) at 13:26

## 2024-03-24 ASSESSMENT — PAIN SCALES - GENERAL
PAINLEVEL_OUTOF10: 0

## 2024-03-24 NOTE — PROGRESS NOTES
Internal Medicine Resident Progress Note    Name: Kalpana Braga, female, : 1955, MRN: [id number]    PCP: Tatiana Velasquez APRN - CNP    Date of Admission: 3/21/2024  Date of Service: Pt seen/examined on 24      Assessment/Plan:  New CVA: Patient developed expressive aphasia and difficulty with writing on 3/24.  Per reports from people who understands and she was also having trouble identifying her current location and appeared confused.  CT of the head showed no acute bleed.  CTA of the neck was negative.  CTA of the head showed a small possible occlusion of M2 segment of the left MCA.  MRI on the morning of 3/24 showed increase in brightness in the affected area on diffusion-weighted imaging along with decrease in ADC corroborating an acute infarct in the area.  A1c 5.8, suggesting no uncontrolled diabetes.  LDL at goal at 32 which is below 70.   -Continuing Keppra per neurology   -Continue aspirin, Plavix, and high-dose statin   -Permissive hypertension up to 220/120 for 24 hours from onset of 10 PM on 3/23    -BP goal of less than 130/80 afterwards   -Speech Recommended minced and moist diet and thin liquids with direct staff supervision    -Make patient n.p.o. in the event of aspiration event.   -Echo with bubble study ordered to evaluate possibility of PFO   -30-day cardiac event monitor to evaluate for arrhythmia as possible source of emboli   -Will reiterate importance of tobacco smoking cessation   -Stat head CT if there are any changes in patient mental status, severe headache, or new neurological deficit    Acute hypoxic respiratory failure: multifactorial, secondary to right lung collapse with superimposing mass and likely pneumonia with possible COPD exacerbation.  Not on supplemental oxygen at baseline.  In the ED requiring 6 L nasal cannula running 87 to 89%.  still requiring HHFNC.  -Goal SpO2 >90%  -Wean supplemental O2 as tolerated  -Treatment as below    Suspected COPD with

## 2024-03-24 NOTE — PLAN OF CARE
Problem: Discharge Planning  Goal: Discharge to home or other facility with appropriate resources  Outcome: Progressing  Flowsheets (Taken 3/24/2024 1724)  Discharge to home or other facility with appropriate resources:   Identify barriers to discharge with patient and caregiver   Arrange for needed discharge resources and transportation as appropriate   Identify discharge learning needs (meds, wound care, etc)     Problem: Safety - Adult  Goal: Free from fall injury  Outcome: Progressing  Flowsheets (Taken 3/24/2024 1724)  Free From Fall Injury: Instruct family/caregiver on patient safety     Problem: Chronic Conditions and Co-morbidities  Goal: Patient's chronic conditions and co-morbidity symptoms are monitored and maintained or improved  Outcome: Progressing  Flowsheets (Taken 3/24/2024 1724)  Care Plan - Patient's Chronic Conditions and Co-Morbidity Symptoms are Monitored and Maintained or Improved:   Monitor and assess patient's chronic conditions and comorbid symptoms for stability, deterioration, or improvement   Collaborate with multidisciplinary team to address chronic and comorbid conditions and prevent exacerbation or deterioration     Problem: Pain  Goal: Verbalizes/displays adequate comfort level or baseline comfort level  Outcome: Progressing  Flowsheets (Taken 3/24/2024 1724)  Verbalizes/displays adequate comfort level or baseline comfort level:   Encourage patient to monitor pain and request assistance   Assess pain using appropriate pain scale   Administer analgesics based on type and severity of pain and evaluate response   Implement non-pharmacological measures as appropriate and evaluate response     Problem: Skin/Tissue Integrity  Goal: Absence of new skin breakdown  Description: 1.  Monitor for areas of redness and/or skin breakdown  2.  Assess vascular access sites hourly  3.  Every 4-6 hours minimum:  Change oxygen saturation probe site  4.  Every 4-6 hours:  If on nasal continuous

## 2024-03-24 NOTE — PROGRESS NOTES
[facility]  SPEECH THERAPY MISSED TREATMENT NOTE  STRZ ICU STEPDOWN TELEMETRY 4K      Date: 3/24/2024  Patient Name: Kalpana Braga        MRN: [id number]    : 1955  (68 y.o.)    REASON FOR MISSED TREATMENT:    Order received for completion of speech therapy evaluation. Per chart review, patient admitted on 3/21/24 with SOB and hypoxia. Imaging indicating COPD exacerbation, pneumonia and a R lung mass. On 3/23/24 nursing staff noting patient exhibiting word finding issues and could not write with right hand. CT of the head indicating \"abrupt occlusion of the anterior branch of the M2 segment of the left MCA.\" Attempted to see patient at 0945, however patient AYANA for MRI. Will attempt to see later today as able for completion of clinical swallow evaluation and assessment of language function.    Lory Day, MS, CCC-SLP 2118

## 2024-03-24 NOTE — PROGRESS NOTES
[facility]  INPATIENT PHYSICAL THERAPY  EVALUATION  STRZ ICU STEPDOWN TELEMETRY 4K - 4K-26/026-A    Time In: 1134  Time Out: 1155  Timed Code Treatment Minutes: 13 Minutes  Minutes: 21          Date: 3/24/2024  Patient Name: Kalpana Braga,  Gender:  female        MRN: [id number]  : 1955  (68 y.o.)      Referring Practitioner: Marcus Ga MD  Diagnosis: lung mass  Additional Pertinent Hx: Per EMR \"Kalpana Braga is a 68 y.o. female active smoker with PMHx of COPD, HTN, depression, HLD, osteoarthritis who presents to [facility] with shortness of breath.  Patient reports starting  night she has been having shortness of breath worse with exertion.  Progressively worsening since .  She reports that she has been coughing up some light yellow-green sputum no fevers however she has had chills.  She denies any nausea or vomiting she denies being around anyone that is been sick.  She is a active smoker about a pack a day for 45 years.  No oxygen at baseline.  In the ED initially saturating 70% on room air, escalated to 6 L nasal cannula saturating 86 to 90%.  Denies any chest pain.\" code stroke 3/23.  CT angiogram head and neck read as an occlusion of the anterior branch of the left M2 segment of the MCA. MRI of brain without contrast shows acute infarct in the distribution of the left middle cerebral artery     Restrictions/Precautions:  Restrictions/Precautions: General Precautions, Fall Risk  Position Activity Restriction  Other position/activity restrictions: aphasia (expressive >receptive), impulsive    Subjective:  Chart Reviewed: Yes  Patient assessed for rehabilitation services?: Yes  Family / Caregiver Present: No  Subjective: OK to see pt per nursing. Pt in bed when PT arrived, agreeable to PT session. Pt declined to sit in chair. Pt with limited communication, shakes head yes and no a lot, will communicate with more open questions at times, difficult to

## 2024-03-24 NOTE — PROGRESS NOTES
Late Entry: Report received on pt from Bruna FOUNTAIN who stated at 1830 she noted that the pt ws confused, did not know where she was at, and was having difficulty getting her words out. LKW was unable to be determined. She notified IM Residents who came to bedside and orders were received with possible CT of head if blood gases WNL. At bedside report pt had a visitor and was having a conversation with visitor. RT at bedside getting blood gases. Blood gases WNL. When completing shift assessment pt was unable to answer orientation questions correctly. NIHSS stroke cards obtained and pt scored 3. Unable to answer age and month, and unable to identify some objects on the naming list and unable to describe anything on the picture description card. No unilateral weakness, facial palsy, visual deficits, dizziness, ataxia or dysarthria noted. IM Residents messaged and Dr. Madden and Akshat came to bedside for quick exam. 20g PIV obtained. Rapid called at 2116. Code stroke called at 2120. Pt taken to CT scanner.   Critical result called \"Occlusion anterior branch of left M2 segment MCA\". Dr. Chavira contacted and orders received. Dr. Madden updated. Will continue to monitor closely.

## 2024-03-24 NOTE — CONSULTS
Neurology Consult Note    Date:3/24/2024       Room:[facility]26/026-A  Patient Name:Kalpana Braga     YOB: 1955     Age:68 y.o.    Requesting Physician: Sulema Lagos MD     Reason for Consult:  Evaluate for new onset expressive aphasia      Chief Complaint:   Chief Complaint   Patient presents with    Shortness of Breath       Subjective     Kalpana Braga is a 68 y.o. female with a history of hypertension, hyperlipidemia, COPD, anxiety, depression who presents to [facility] on 3/21/2024 for shortness of breath x 4 days.  She describes the shortness of breath as being worse with exertion.  Currently she is on high flow for comfort.  Yesterday around shift change she was noted to be confused, not knowing where she was at, and having difficulty getting her words out.  Her blood gas was within normal limits.  She was having difficulty naming the objects on the NIHSS.  A rapid response was called and then a code stroke was called around 2120. Her NIH was a 3 for LOC questions and best language. She did not have any focal weakness, numbness, vision changes, facial droop, or ataxia.  She was taken to CT where a CT of her head revealed no acute intracranial abnormalities.  CT angiogram head and neck read as an occlusion of the anterior branch of the left M2 segment of the MCA, which was thought to be artifact.  Her last known well was not clear therefore she was not a candidate for TNK.  There was concern for seizure and therefore she was given a loading dose of 1 g of Keppra load.  She was also started on aspirin 81 mg and Plavix 75 mg.  MRI brain without contrast ordered to further evaluate.  On exam today she is alert and oriented to person, place, time, and situation.  She is able to repeat on exam and name objects.  She does have intermittent difficulty with naming objects.  Her speech is mildly slurred but she does not have her teeth in this morning.  She does not have any focal  Unremarkable. No occlusion or significant stenosis. No aneurysm. LEFT MIDDLE CEREBRAL ARTERY: Abrupt occlusion of an anterior branch of the left M2 segment MCA. No aneurysm. LEFT POSTERIOR CEREBRAL ARTERY: Multifocal stenoses along the distal left PCA segments. No aneurysm. LEFT VERTEBRAL ARTERY: Unremarkable as visualized. BASILAR ARTERY: Unremarkable. No occlusion or significant stenosis. No aneurysm.     Abrupt occlusion of an anterior branch of the left M2 segment MCA. This document has been electronically signed by: Garrick Shell MD on 03/23/2024 10:08 PM All CTs at this facility use dose modulation techniques and iterative reconstructions, and/or weight-based dosing when appropriate to reduce radiation to a low as reasonably achievable. 3D Post-processing was performed on this study.    CT HEAD WO CONTRAST    Result Date: 3/23/2024  EXAM: CT Head Without Intravenous Contrast COMPARISON: No relevant prior studies available. FINDINGS: BRAIN AND EXTRA-AXIAL SPACES: Scattered subcortical and periventricular hypoattenuation, likely in keeping with chronic small vessel ischemic disease. Parenchymal volume loss with compensatory prominence of the ventricles and CSF spaces. No acute territorial infarction, intracranial hemorrhage, midline shift or hydrocephalus. Patchy hypoattenuation in the right cerebellum, felt to be artifactual related to motion/streak. BONES/JOINTS: Unremarkable. No acute fracture. SOFT TISSUES: Unremarkable. SINUSES: Air-fluid level in the left maxillary sinus suggesting acute sinusitis. MASTOID AIR CELLS: Unremarkable as visualized. No mastoid effusion.     1. No acute intracranial hemorrhage or large territorial infarction. 2. Additional findings as described. This document has been electronically signed by: Garrick Shell MD on 03/23/2024 09:51 PM All CTs at this facility use dose modulation techniques and iterative reconstructions, and/or weight-based dosing when appropriate to reduce

## 2024-03-24 NOTE — PROGRESS NOTES
[facility]  SPEECH THERAPY  STRZ ICU STEPDOWN TELEMETRY 4K  Speech - Language - Cognitive Evaluation + Clinical Swallow Evaluation    SLP Individual Minutes  Time In: 1039  Time Out: 1105  Minutes: 26  Timed Code Treatment Minutes: 0 Minutes     Speech, Language, Cognitive Evaluation: 16  Clinical Swallow Evaluation: 10    DIET ORDER RECOMMENDATIONS AFTER EVALUATION: Minced and Moist with Thin liquids- Direct 1:1 staff supervision    Date: 3/24/2024  Patient Name: Kalapna Braga      CSN: [id number]   : 1955  (68 y.o.)  Gender: female   Referring Physician:  Chantelle Garcia MD  Diagnosis: Dyspnea  Precautions: Fall risk  History of Present Illness/Injury: Per physician H&P \"Kalpana Braga is a 68 y.o. female with a past medical history of COPD, Hypertension, Hyperlipidemia, osteoarthritis, and anxiety/depression who presented to [facility] ED on 3/21/2024 with complaints of shortness of breath. Patient states she has has shortness of breath for the last 4 days which is worse with exertion. She also has had a productive cough with light yellow-green sputum. She has had chills as well without fevers. She is a current everyday smoker and smokes about a pack a day for 45 years. She does not wear any oxygen at baseline.      In the ED, patient was found to be saturating at 70% on room air. She was placed on 6L NC with saturations around 86 to 90%. Chest xray showed faint hazy opacities in the left midlung, possibly reflecting infiltrates. New consolidation versus atelectasis in right lung apex. CTA chest showed no pulmonary embolism. Hypodense 3.9 x 5.5 cm right suprahilar mass extending to the right mediastinum and subcarinal region, suspicious for neoplasm until proven otherwise. Complete right upper lung collapse secondary to the right suprahilar mass. Nonspecific patchy ground-glass consolidations throughout the left lung. Dilation of the central pulmonary artery suggesting a degree of

## 2024-03-24 NOTE — RT PROTOCOL NOTE
RT Inhaler-Nebulizer Bronchodilator Protocol Note    There is a bronchodilator order in the chart from a provider indicating to follow the RT Bronchodilator Protocol and there is an “Initiate RT Inhaler-Nebulizer Bronchodilator Protocol” order as well (see protocol at bottom of note).    CXR Findings:  No results found.    The findings from the last RT Protocol Assessment were as follows:   History Pulmonary Disease: Chronic pulmonary disease  Respiratory Pattern: Mild dyspnea at rest, irregular pattern, or RR 21-25 bpm  Breath Sounds: Intermittent or unilateral wheezes  Cough: Strong, spontaneous, non-productive  Indication for Bronchodilator Therapy: On home bronchodilators  Bronchodilator Assessment Score: 10    Aerosolized bronchodilator medication orders have been revised according to the RT Inhaler-Nebulizer Bronchodilator Protocol below.    Respiratory Therapist to perform RT Therapy Protocol Assessment initially then follow the protocol.  Repeat RT Therapy Protocol Assessment PRN for score 0-3 or on second treatment, BID, and PRN for scores above 3.    No Indications - adjust the frequency to every 6 hours PRN wheezing or bronchospasm, if no treatments needed after 48 hours then discontinue using Per Protocol order mode.     If indication present, adjust the RT bronchodilator orders based on the Bronchodilator Assessment Score as indicated below.  Use Inhaler orders unless patient has one or more of the following: on home nebulizer, not able to hold breath for 10 seconds, is not alert and oriented, cannot activate and use MDI correctly, or respiratory rate 25 breaths per minute or more, then use the equivalent nebulizer order(s) with same Frequency and PRN reasons based on the score.  If a patient is on this medication at home then do not decrease Frequency below that used at home.    0-3 - enter or revise RT bronchodilator order(s) to equivalent RT Bronchodilator order with Frequency of every 4 hours PRN  for wheezing or increased work of breathing using Per Protocol order mode.        4-6 - enter or revise RT Bronchodilator order(s) to two equivalent RT bronchodilator orders with one order with BID Frequency and one order with Frequency of every 4 hours PRN wheezing or increased work of breathing using Per Protocol order mode.        7-10 - enter or revise RT Bronchodilator order(s) to two equivalent RT bronchodilator orders with one order with TID Frequency and one order with Frequency of every 4 hours PRN wheezing or increased work of breathing using Per Protocol order mode.       11-13 - enter or revise RT Bronchodilator order(s) to one equivalent RT bronchodilator order with QID Frequency and an Albuterol order with Frequency of every 4 hours PRN wheezing or increased work of breathing using Per Protocol order mode.      Greater than 13 - enter or revise RT Bronchodilator order(s) to one equivalent RT bronchodilator order with every 4 hours Frequency and an Albuterol order with Frequency of every 2 hours PRN wheezing or increased work of breathing using Per Protocol order mode.     RT to enter RT Home Evaluation for COPD & MDI Assessment order using Per Protocol order mode.    Electronically signed by Erika Tyler RCP on 3/24/2024 at 8:44 AM

## 2024-03-24 NOTE — PROGRESS NOTES
[facility]      Patient - Kalpana Braga   MRN -  [id number]   [facility] # - [id number]   - 1955      Date of Admission -  3/21/2024  8:53 PM  Date of evaluation -  3/24/2024  Room - -026-   Hospital Day - 2  Consulting - Sulema Lagos MD Primary Care Physician - Tatiana Velasquez, PAO - MIKHAIL     Problem List      Active Hospital Problems    Diagnosis Date Noted    CVA (cerebral vascular accident) (HCC) [I63.9] 2024    Dyspnea [R06.00] 2024    Acute hypoxemic respiratory failure (HCC) [J96.01] 2024    Lung mass [R91.8] 2024    Pneumonia of both lungs due to infectious organism [J18.9] 2024     Reason for Consult    Right suprahilar mass concerning for neoplasm, pneumonia, and COPD  HPI   History Obtained From: Patient and electronic medical record.    Kalpana Braag is a 68 y.o. female with a past medical history of COPD, Hypertension, Hyperlipidemia, osteoarthritis, and anxiety/depression who presented to [facility] ED on 3/21/2024 with complaints of shortness of breath. Patient states she has has shortness of breath for the last 4 days which is worse with exertion. She also has had a productive cough with light yellow-green sputum. She has had chills as well without fevers. She is a current everyday smoker and smokes about a pack a day for 45 years. She does not wear any oxygen at baseline.     In the ED, patient was found to be saturating at 70% on room air. She was placed on 6L NC with saturations around 86 to 90%. Chest xray showed faint hazy opacities in the left midlung, possibly reflecting infiltrates. New consolidation versus atelectasis in right lung apex. CTA chest showed no pulmonary embolism. Hypodense 3.9 x 5.5 cm right suprahilar mass extending to the right mediastinum and subcarinal region, suspicious for neoplasm until proven otherwise. Complete right upper lung collapse secondary to the right suprahilar mass.

## 2024-03-24 NOTE — PROGRESS NOTES
This RN to patient's room at 1830. Patient's family at bedside stating that the patient doesn't seem to be getting her words out well and seems out of breath. Patient able to communicate with this RN stating that she doesn't feel out of breath, short of breath, or have any chest pain at this time. This RN notified Dr. Arnaud Castaneda resident of patients and familys concerns of not being able to get her words out. Dr. Arnaud Castaneda resident en route to patient bedside.     1845 - Arnaud Castaneda resident to bedside to assess patient. Patient alert and oriented x3, stated incorrectly that we were in [place]. This disorientation is new onset. Patient also repeatedly trying to take off high flow stating it is \"uncomfortable\". Dr. Castaneda ordered ABG's, EKG, and UA.     1900 - Respiratory and EKG at bedside.

## 2024-03-24 NOTE — SIGNIFICANT EVENT
Patient:  Kalpana Braga                  Unit/Bed:  4K-26/026-A   YOB: 1955   MRN:  [id number]    Acct:  [id number]  Date of Admission:  3/21/2024     Significant event    Called patient room by nursing staff, who noted that patient was having difficulty with word finding.  Had also noticed that patient could not write with her right hand.  On arrival to room nursing staff and resident completing NIHSS, score was 3.  Patient demonstrated expressive aphasia, no signs or symptoms of delirium, CT head and CTA head/neck ordered.  Rapid response and subsequent code stroke called.  Discussed case with interventional neurology, Dr. Chavira, who recommended loading dose of Keppra 1000 mg followed by 500 mg twice daily.  Patient may start low-dose aspirin in the morning.  Will review images when results are available.    Addendum: Images reviewed, per radiologist read patient has abrupt occlusion of the anterior branch of the M2 segment of the left MCA.  Report given to Dr. Chavira by nursing staff, who reviewed the images and believes that may be artifact.  Patient given standard dose of aspirin, has already received 100 mg of Keppra, and will receive SLP eval prior to renewing diet.  Neurochecks in place.  MRI and neurology to see in the morning.    Chantelle Garcia MD, IM-PGY2  03/23/24 at 9:55 PM

## 2024-03-25 ENCOUNTER — APPOINTMENT (OUTPATIENT)
Age: 69
End: 2024-03-25
Payer: COMMERCIAL

## 2024-03-25 LAB
ANION GAP SERPL CALC-SCNC: 13 MEQ/L (ref 8–16)
BASOPHILS ABSOLUTE: 0 THOU/MM3 (ref 0–0.1)
BASOPHILS NFR BLD AUTO: 0.2 %
BUN SERPL-MCNC: 8 MG/DL (ref 7–22)
CALCIUM SERPL-MCNC: 8.5 MG/DL (ref 8.5–10.5)
CHLORIDE SERPL-SCNC: 96 MEQ/L (ref 98–111)
CO2 SERPL-SCNC: 26 MEQ/L (ref 23–33)
CREAT SERPL-MCNC: 0.7 MG/DL (ref 0.4–1.2)
DEPRECATED RDW RBC AUTO: 46.5 FL (ref 35–45)
ECHO AO ASC DIAM: 3.3 CM
ECHO AO ASCENDING AORTA INDEX: 1.72 CM/M2
ECHO AO SINUS VALSALVA DIAM: 3.1 CM
ECHO AO SINUS VALSALVA INDEX: 1.61 CM/M2
ECHO AO ST JNCT DIAM: 2.8 CM
ECHO AV CUSP MM: 1.8 CM
ECHO AV MEAN GRADIENT: 4 MMHG
ECHO AV MEAN VELOCITY: 1 M/S
ECHO AV PEAK GRADIENT: 9 MMHG
ECHO AV PEAK VELOCITY: 1.5 M/S
ECHO AV VELOCITY RATIO: 0.93
ECHO AV VTI: 33.1 CM
ECHO BSA: 1.98 M2
ECHO EST RA PRESSURE: 5 MMHG
ECHO LA AREA 4C: 18 CM2
ECHO LA DIAMETER INDEX: 1.61 CM/M2
ECHO LA DIAMETER: 3.1 CM
ECHO LA MAJOR AXIS: 5.3 CM
ECHO LA VOL MOD A4C: 47 ML (ref 22–52)
ECHO LA VOLUME INDEX MOD A4C: 24 ML/M2 (ref 16–34)
ECHO LV E' LATERAL VELOCITY: 9 CM/S
ECHO LV E' SEPTAL VELOCITY: 8 CM/S
ECHO LV FRACTIONAL SHORTENING: 34 % (ref 28–44)
ECHO LV INTERNAL DIMENSION DIASTOLE INDEX: 1.82 CM/M2
ECHO LV INTERNAL DIMENSION DIASTOLIC: 3.5 CM (ref 3.9–5.3)
ECHO LV INTERNAL DIMENSION SYSTOLIC INDEX: 1.2 CM/M2
ECHO LV INTERNAL DIMENSION SYSTOLIC: 2.3 CM
ECHO LV ISOVOLUMETRIC RELAXATION TIME (IVRT): 95 MS
ECHO LV IVSD: 1.1 CM (ref 0.6–0.9)
ECHO LV MASS 2D: 119 G (ref 67–162)
ECHO LV MASS INDEX 2D: 62 G/M2 (ref 43–95)
ECHO LV POSTERIOR WALL DIASTOLIC: 1.1 CM (ref 0.6–0.9)
ECHO LV RELATIVE WALL THICKNESS RATIO: 0.63
ECHO LVOT AV VTI INDEX: 0.85
ECHO LVOT MEAN GRADIENT: 4 MMHG
ECHO LVOT PEAK GRADIENT: 8 MMHG
ECHO LVOT PEAK VELOCITY: 1.4 M/S
ECHO LVOT VTI: 28.2 CM
ECHO MV A VELOCITY: 1.61 M/S
ECHO MV E DECELERATION TIME (DT): 356 MS
ECHO MV E VELOCITY: 1.06 M/S
ECHO MV E/A RATIO: 0.66
ECHO MV E/E' LATERAL: 11.78
ECHO MV E/E' RATIO (AVERAGED): 12.51
ECHO PV MAX VELOCITY: 0.9 M/S
ECHO PV PEAK GRADIENT: 4 MMHG
ECHO RIGHT VENTRICULAR SYSTOLIC PRESSURE (RVSP): 30 MMHG
ECHO RV INTERNAL DIMENSION: 2.8 CM
ECHO TV E WAVE: 0.5 M/S
ECHO TV REGURGITANT MAX VELOCITY: 2.5 M/S
ECHO TV REGURGITANT PEAK GRADIENT: 25 MMHG
EOSINOPHIL NFR BLD AUTO: 0.6 %
EOSINOPHILS ABSOLUTE: 0.1 THOU/MM3 (ref 0–0.4)
ERYTHROCYTE [DISTWIDTH] IN BLOOD BY AUTOMATED COUNT: 14.3 % (ref 11.5–14.5)
GFR SERPL CREATININE-BSD FRML MDRD: > 90 ML/MIN/1.73M2
GLUCOSE SERPL-MCNC: 79 MG/DL (ref 70–108)
HCT VFR BLD AUTO: 38.9 % (ref 37–47)
HGB BLD-MCNC: 12.3 GM/DL (ref 12–16)
IMM GRANULOCYTES # BLD AUTO: 0.26 THOU/MM3 (ref 0–0.07)
IMM GRANULOCYTES NFR BLD AUTO: 1.5 %
LYMPHOCYTES ABSOLUTE: 2.6 THOU/MM3 (ref 1–4.8)
LYMPHOCYTES NFR BLD AUTO: 15.2 %
MCH RBC QN AUTO: 28 PG (ref 26–33)
MCHC RBC AUTO-ENTMCNC: 31.6 GM/DL (ref 32.2–35.5)
MCV RBC AUTO: 88.4 FL (ref 81–99)
MONOCYTES ABSOLUTE: 1.3 THOU/MM3 (ref 0.4–1.3)
MONOCYTES NFR BLD AUTO: 7.7 %
NEUTROPHILS NFR BLD AUTO: 74.8 %
NRBC BLD AUTO-RTO: 0 /100 WBC
PLATELET # BLD AUTO: 356 THOU/MM3 (ref 130–400)
PMV BLD AUTO: 10.3 FL (ref 9.4–12.4)
POTASSIUM SERPL-SCNC: 4.3 MEQ/L (ref 3.5–5.2)
RBC # BLD AUTO: 4.4 MILL/MM3 (ref 4.2–5.4)
SEGMENTED NEUTROPHILS ABSOLUTE COUNT: 12.6 THOU/MM3 (ref 1.8–7.7)
SODIUM SERPL-SCNC: 135 MEQ/L (ref 135–145)
WBC # BLD AUTO: 16.8 THOU/MM3 (ref 4.8–10.8)

## 2024-03-25 PROCEDURE — 6370000000 HC RX 637 (ALT 250 FOR IP)

## 2024-03-25 PROCEDURE — 94761 N-INVAS EAR/PLS OXIMETRY MLT: CPT

## 2024-03-25 PROCEDURE — 2580000003 HC RX 258

## 2024-03-25 PROCEDURE — 99233 SBSQ HOSP IP/OBS HIGH 50: CPT | Performed by: HOSPITALIST

## 2024-03-25 PROCEDURE — 93306 TTE W/DOPPLER COMPLETE: CPT

## 2024-03-25 PROCEDURE — 94669 MECHANICAL CHEST WALL OSCILL: CPT

## 2024-03-25 PROCEDURE — 80048 BASIC METABOLIC PNL TOTAL CA: CPT

## 2024-03-25 PROCEDURE — 6360000002 HC RX W HCPCS

## 2024-03-25 PROCEDURE — 97535 SELF CARE MNGMENT TRAINING: CPT

## 2024-03-25 PROCEDURE — 2060000000 HC ICU INTERMEDIATE R&B

## 2024-03-25 PROCEDURE — 97530 THERAPEUTIC ACTIVITIES: CPT

## 2024-03-25 PROCEDURE — 99233 SBSQ HOSP IP/OBS HIGH 50: CPT | Performed by: INTERNAL MEDICINE

## 2024-03-25 PROCEDURE — 94640 AIRWAY INHALATION TREATMENT: CPT

## 2024-03-25 PROCEDURE — 2700000000 HC OXYGEN THERAPY PER DAY

## 2024-03-25 PROCEDURE — 97110 THERAPEUTIC EXERCISES: CPT

## 2024-03-25 PROCEDURE — 99232 SBSQ HOSP IP/OBS MODERATE 35: CPT

## 2024-03-25 PROCEDURE — 85025 COMPLETE CBC W/AUTO DIFF WBC: CPT

## 2024-03-25 PROCEDURE — 97167 OT EVAL HIGH COMPLEX 60 MIN: CPT

## 2024-03-25 PROCEDURE — 92612 ENDOSCOPY SWALLOW (FEES) VID: CPT

## 2024-03-25 PROCEDURE — 97129 THER IVNTJ 1ST 15 MIN: CPT

## 2024-03-25 PROCEDURE — 93306 TTE W/DOPPLER COMPLETE: CPT | Performed by: INTERNAL MEDICINE

## 2024-03-25 PROCEDURE — 92526 ORAL FUNCTION THERAPY: CPT

## 2024-03-25 PROCEDURE — 36415 COLL VENOUS BLD VENIPUNCTURE: CPT

## 2024-03-25 RX ORDER — ATORVASTATIN CALCIUM 10 MG/1
10 TABLET, FILM COATED ORAL DAILY
Status: DISCONTINUED | OUTPATIENT
Start: 2024-03-26 | End: 2024-03-25

## 2024-03-25 RX ORDER — HYDRALAZINE HYDROCHLORIDE 20 MG/ML
10 INJECTION INTRAMUSCULAR; INTRAVENOUS EVERY 6 HOURS PRN
Status: DISCONTINUED | OUTPATIENT
Start: 2024-03-25 | End: 2024-04-13 | Stop reason: HOSPADM

## 2024-03-25 RX ORDER — ATORVASTATIN CALCIUM 40 MG/1
40 TABLET, FILM COATED ORAL DAILY
Status: DISCONTINUED | OUTPATIENT
Start: 2024-03-26 | End: 2024-04-13 | Stop reason: HOSPADM

## 2024-03-25 RX ORDER — LANOLIN ALCOHOL/MO/W.PET/CERES
3 CREAM (GRAM) TOPICAL NIGHTLY PRN
Status: DISCONTINUED | OUTPATIENT
Start: 2024-03-25 | End: 2024-04-13 | Stop reason: HOSPADM

## 2024-03-25 RX ORDER — AMLODIPINE BESYLATE 5 MG/1
5 TABLET ORAL ONCE
Status: DISCONTINUED | OUTPATIENT
Start: 2024-03-25 | End: 2024-03-26

## 2024-03-25 RX ADMIN — ATORVASTATIN CALCIUM 80 MG: 80 TABLET, FILM COATED ORAL at 08:11

## 2024-03-25 RX ADMIN — SODIUM CHLORIDE: 9 INJECTION, SOLUTION INTRAVENOUS at 11:23

## 2024-03-25 RX ADMIN — SODIUM CHLORIDE, PRESERVATIVE FREE 10 ML: 5 INJECTION INTRAVENOUS at 19:27

## 2024-03-25 RX ADMIN — GUAIFENESIN 1200 MG: 600 TABLET, EXTENDED RELEASE ORAL at 19:27

## 2024-03-25 RX ADMIN — IPRATROPIUM BROMIDE AND ALBUTEROL SULFATE 1 DOSE: .5; 3 SOLUTION RESPIRATORY (INHALATION) at 08:07

## 2024-03-25 RX ADMIN — Medication 3 MG: at 03:00

## 2024-03-25 RX ADMIN — VENLAFAXINE HYDROCHLORIDE 75 MG: 75 CAPSULE, EXTENDED RELEASE ORAL at 08:11

## 2024-03-25 RX ADMIN — IPRATROPIUM BROMIDE AND ALBUTEROL SULFATE 1 DOSE: .5; 3 SOLUTION RESPIRATORY (INHALATION) at 13:29

## 2024-03-25 RX ADMIN — GUAIFENESIN 1200 MG: 600 TABLET, EXTENDED RELEASE ORAL at 08:11

## 2024-03-25 RX ADMIN — ENOXAPARIN SODIUM 40 MG: 100 INJECTION SUBCUTANEOUS at 09:48

## 2024-03-25 RX ADMIN — LOSARTAN POTASSIUM 100 MG: 100 TABLET, FILM COATED ORAL at 08:11

## 2024-03-25 RX ADMIN — ACETAMINOPHEN 650 MG: 325 TABLET ORAL at 05:40

## 2024-03-25 RX ADMIN — ASPIRIN 81 MG 81 MG: 81 TABLET ORAL at 09:48

## 2024-03-25 RX ADMIN — CLOPIDOGREL BISULFATE 75 MG: 75 TABLET ORAL at 09:48

## 2024-03-25 RX ADMIN — PREDNISONE 40 MG: 20 TABLET ORAL at 08:11

## 2024-03-25 RX ADMIN — IPRATROPIUM BROMIDE AND ALBUTEROL SULFATE 1 DOSE: .5; 3 SOLUTION RESPIRATORY (INHALATION) at 20:19

## 2024-03-25 RX ADMIN — CEFTRIAXONE SODIUM 1000 MG: 1 INJECTION, POWDER, FOR SOLUTION INTRAMUSCULAR; INTRAVENOUS at 23:20

## 2024-03-25 ASSESSMENT — PAIN SCALES - GENERAL
PAINLEVEL_OUTOF10: 0
PAINLEVEL_OUTOF10: 3

## 2024-03-25 ASSESSMENT — PAIN DESCRIPTION - ONSET: ONSET: ON-GOING

## 2024-03-25 ASSESSMENT — PAIN - FUNCTIONAL ASSESSMENT: PAIN_FUNCTIONAL_ASSESSMENT: ACTIVITIES ARE NOT PREVENTED

## 2024-03-25 ASSESSMENT — PAIN DESCRIPTION - FREQUENCY: FREQUENCY: INTERMITTENT

## 2024-03-25 ASSESSMENT — PAIN DESCRIPTION - PAIN TYPE: TYPE: ACUTE PAIN

## 2024-03-25 ASSESSMENT — PAIN DESCRIPTION - DESCRIPTORS: DESCRIPTORS: DISCOMFORT

## 2024-03-25 ASSESSMENT — PAIN DESCRIPTION - ORIENTATION: ORIENTATION: MID

## 2024-03-25 ASSESSMENT — PAIN DESCRIPTION - LOCATION: LOCATION: CHEST

## 2024-03-25 NOTE — PROGRESS NOTES
Phrasing    Articulation: Impaired: Decreased Articulatory Precision, Blended Word Boundaries, and Decreased Articulatory Coordination  DDK Rates (Norm Rates 4.5-7.5)  Rate: .   Too Fast  Rhythm: .Incoordinated  Precision: .  Incoordinated  Word Level: WFL  Sentence Level: Incoordinated    Conversation:.   50%-75%      Short-Term Goals:  SHORT TERM GOAL #1:  Goal 1: Patient will safely consume a minced and moist diet with thin liquids with Direct 1:1 staff supervision to optimize safety with po intake via proper positioning and use of compensatory strategies to ensure adequate nutritional intake. GOAL NOT MET  INTERVENTIONS: Pertinent findings from MRI brain to signify L MCA infarct with ongoing requirements for heightened respiratory parameters via HHFNC to negatively compound the safety and efficiency of the swallow mechanism; patient able to self indicate increased coughing during consumption of oral fluids. Conservative trials henceforth administered with throat clear produced post intake of thin H20 via tsp. Concerns prominent at this time for potential onset of an acute pharyngeal system compromise.     Recommendations to transgress to NPO diet status, exceptions for ice chips post oral care and critical/crucial medications whole in puree. F/u instrumental evaluation via FEES warranted for further diagnostic assessment of oropharyngeal integrities.   *post service provision, patient without respiratory distress upon leaving room; RN Annie notified re: clinical findings and recommendations; verbal receptiveness noted     SHORT TERM GOAL #2:  Goal 2: Continue to monitor pulmonary and neurological functiong to determine need for instrumental swallow assessment.  INTERVENTIONS: FEES to be completed at date; see STG1 above    SHORT TERM GOAL #3:  Goal 3: Patient will complete expression (basic naming,verbal descriptions, basic writing) and receptive (moderately complex auditory/reading comprehension) langauge  M.A., CCC-SLP [id number]

## 2024-03-25 NOTE — PROGRESS NOTES
a active smoker about a pack a day for 45 years.  No oxygen at baseline.  In the ED initially saturating 70% on room air, escalated to 6 L nasal cannula saturating 86 to 90%.  Denies any chest pain.  Chest x-ray showed faint hazy opacities in the left midlung, possibly reflecting infiltrates.  New consolidation versus atelectasis in right lung apex.  CTA chest shows no pulmonary emboli.  However has hypodense 3.9 x 5.5 cm right suprahilar mass extending to the right mediastinum and subcarinal region, suspicious for neoplasm until proven otherwise.  Complete right upper lung collapse secondary to right suprahilar mass.\"    -Update to HPI history: Patient reports onset around 2 weeks ago.  No shortness of breath on exertion prior to that.  Does also report frequent history of bronchitis.    3/24:Patient developed acute CVA as described in significant event note.  MRI does show acute infarct in subsegment of the left MCA.  Patient was still having some mild trouble with word finding this morning.  She was able to name objects I was touching.  There were no obvious focal neurological or sensory deficits.  Speech was garbled, but this is near her baseline as she wears dentures at home and does not have them.  Changes in management described above    Subjective (past 24 hours): Patient has no acute changes over the night.  She denies any complaints.  She says she does not feel short of breath while on HHFNC.  There are no new neurological changes present.  Patient is alert and oriented on exam.        ROS: reviewed complete ROS unchanged unless otherwise stated in hospital course/subjective portion.       Medications:  Reviewed    amLODIPine, 5 mg, Oral, Once    enoxaparin, 40 mg, SubCUTAneous, Daily    ipratropium 0.5 mg-albuterol 2.5 mg, 1 Dose, Inhalation, TID    atorvastatin, 80 mg, Oral, Daily    guaiFENesin, 1,200 mg, Oral, BID    clopidogrel, 75 mg, Oral, Daily    aspirin, 81 mg, Oral, Daily    sodium chloride

## 2024-03-25 NOTE — PLAN OF CARE
Problem: Discharge Planning  Goal: Discharge to home or other facility with appropriate resources  3/24/2024 2158 by Audie Alfaro RN  Flowsheets (Taken 3/24/2024 2158)  Discharge to home or other facility with appropriate resources:   Identify barriers to discharge with patient and caregiver   Identify discharge learning needs (meds, wound care, etc)     Problem: Safety - Adult  Goal: Free from fall injury  3/24/2024 2158 by Audie Alfaro RN  Flowsheets (Taken 3/24/2024 2158)  Free From Fall Injury: Instruct family/caregiver on patient safety     Problem: Pain  Goal: Verbalizes/displays adequate comfort level or baseline comfort level  3/24/2024 2158 by Audie Alfaro RN  Flowsheets (Taken 3/24/2024 2158)  Verbalizes/displays adequate comfort level or baseline comfort level:   Encourage patient to monitor pain and request assistance   Administer analgesics based on type and severity of pain and evaluate response   Consider cultural and social influences on pain and pain management   Assess pain using appropriate pain scale   Implement non-pharmacological measures as appropriate and evaluate response     Problem: Respiratory - Adult  Goal: Achieves optimal ventilation and oxygenation  3/24/2024 2158 by Audie Alfaro RN  Flowsheets (Taken 3/24/2024 2158)  Achieves optimal ventilation and oxygenation:   Assess for changes in respiratory status   Position to facilitate oxygenation and minimize respiratory effort   Respiratory therapy support as indicated   Assess for changes in mentation and behavior   Oxygen supplementation based on oxygen saturation or arterial blood gases   Encourage broncho-pulmonary hygiene including cough, deep breathe, incentive spirometry   Assess and instruct to report shortness of breath or any respiratory difficulty     Problem: Infection - Adult  Goal: Absence of infection at discharge  3/24/2024 2158 by Audie Alfaro RN  Flowsheets (Taken 3/24/2024 2158)  Absence of

## 2024-03-25 NOTE — PROGRESS NOTES
Neurology Progress Note    Date:3/25/2024       Room:[facility]/026-A  Patient Name:Kalpana Braga     YOB: 1955     Age:68 y.o.    Requesting Physician: Sulema Lagos MD     Reason for Consult:  Evaluate for new onset expressive aphasia      Chief Complaint:   Chief Complaint   Patient presents with    Shortness of Breath       Subjective     Kalpana Braga is a 68 y.o. female with a history of hypertension, hyperlipidemia, COPD, anxiety, depression who presents to [facility] on 3/21/2024 for shortness of breath x 4 days.  She describes the shortness of breath as being worse with exertion.  Currently she is on high flow for comfort.  Yesterday around shift change she was noted to be confused, not knowing where she was at, and having difficulty getting her words out.  Her blood gas was within normal limits.  She was having difficulty naming the objects on the NIHSS.  A rapid response was called and then a code stroke was called around 2120. Her NIH was a 3 for LOC questions and best language. She did not have any focal weakness, numbness, vision changes, facial droop, or ataxia.  She was taken to CT where a CT of her head revealed no acute intracranial abnormalities.  CT angiogram head and neck read as an occlusion of the anterior branch of the left M2 segment of the MCA, which was thought to be artifact.  Her last known well was not clear therefore she was not a candidate for TNK.  There was concern for seizure and therefore she was given a loading dose of 1 g of Keppra load.  She was also started on aspirin 81 mg and Plavix 75 mg.  MRI brain without contrast ordered to further evaluate.  On exam today she is alert and oriented to person, place, time, and situation.  She is able to repeat on exam and name objects.  She does have intermittent difficulty with naming objects.  Her speech is mildly slurred but she does not have her teeth in this morning.  She does not have any focal  there is some changes already related and correlating with the stroke seen on MRI  CTA of head and neck revealed occlusion of the anterior branch of the left M2 segment. No need for carotid ultrasound.  MRI of brain without contrast shows acute infarct in the distribution of the left middle cerebral artery  2D echocardiogram pending, primary team to follow echo results    Stat CT head is needed if the patient develops new-onset altered mental status, a severe headache, or new-onset neurologic deficit  Risk factors and medications  Blood pressure goal: less than 130/80  Keep well hydrated. Initiate normal saline at 75 ml/hr as needed.  Antithrombotics: Aspirin 81 mg, Plavix 75 mg indefinitely.  Patient may need anticoagulation in future for possible hypercoagulable state.  If anticoagulation is started recommend stopping antiplatelets  Pulmonology can hold ASA and plavix for EBUS; restart as soon as able   HgbA1C 5.8  LDL 32. LDL goal of 45-70.  De-escalate atorvastatin to 10 mg daily - Updated Dr. Ga   smoking and alcohol cessation when applicable. Provide stroke eduction for individualized risk factors.  EKG/telemetry to monitor for atrial fibrillation  EKG - NSR   Core stroke metrics  Dysphagia screen prior to oral intake  PT/OT/SLP consult. IPR consult if applicable.  DVT prophylaxis: Lovenox  NIHSS every shift. Neuro checks per unit unless otherwise specified.  Pre-morbid Modified [place] Scale: 3 - Moderate disability:  requiring some help, but able to walk without assistance  30-day cardiac event monitor upon discharge    Follow-up with Dr. Rowland in 2-4 weeks.     No further work-up or recommendations per neurology, we will sign off at this time.  Please call with any questions or if we can be of additional assistance.  Thank you for this consult.     This case was discussed with Dr. Easton and he is in agreement with the assessment and plan.    Electronically signed by Cathryn Odonnell, APRN - CNP

## 2024-03-25 NOTE — PROGRESS NOTES
[facility]      Patient - Kalpana Braga   MRN -  [id number]   [facility] # - [id number]   - 1955      Date of Admission -  3/21/2024  8:53 PM  Date of evaluation -  3/25/2024  Room - -026   Hospital Day - 3  Consulting - Sulema Lagos MD Primary Care Physician - Tatiana Velasquez, PAO - CNP     Problem List      Active Hospital Problems    Diagnosis Date Noted    CVA (cerebral vascular accident) (HCC) [I63.9] 2024    Dyspnea [R06.00] 2024    Acute hypoxemic respiratory failure (HCC) [J96.01] 2024    Lung mass [R91.8] 2024    Pneumonia of both lungs due to infectious organism [J18.9] 2024     Reason for Consult    Right suprahilar mass   HPI   History Obtained From: Patient and electronic medical record.    Kalpana Braga is a 68 y.o. female with a past medical history of COPD, Hypertension, Hyperlipidemia, osteoarthritis, and anxiety/depression who presented to [facility] ED on 3/21/2024 with complaints of shortness of breath. Patient states she has has shortness of breath for the last 4 days which is worse with exertion. She also has had a productive cough with light yellow-green sputum. She has had chills as well without fevers. She is a current everyday smoker and smokes about a pack a day for 45 years. She does not wear any oxygen at baseline.     In the ED, patient was found to be saturating at 70% on room air. She was placed on 6L NC with saturations around 86 to 90%. Chest xray showed faint hazy opacities in the left midlung, possibly reflecting infiltrates. New consolidation versus atelectasis in right lung apex. CTA chest showed no pulmonary embolism. Hypodense 3.9 x 5.5 cm right suprahilar mass extending to the right mediastinum and subcarinal region, suspicious for neoplasm until proven otherwise. Complete right upper lung collapse secondary to the right suprahilar mass. Nonspecific patchy ground-glass consolidations

## 2024-03-25 NOTE — PROGRESS NOTES
[facility]  INPATIENT OCCUPATIONAL THERAPY  STRZ ICU STEPDOWN TELEMETRY 4K  EVALUATION    Time:   Time In: 911  Time Out: 942  Timed Code Treatment Minutes: 23 Minutes  Minutes: 31          Date: 3/25/2024  Patient Name: Kalpana Braga,   Gender: female      MRN: [id number]  : 1955  (68 y.o.)  Referring Practitioner: Marcus Ga MD  Diagnosis: lung mass  Additional Pertinent Hx: Per EMR \"Kalpana Braga is a 68 y.o. female active smoker with PMHx of COPD, HTN, depression, HLD, osteoarthritis who presents to [facility] with shortness of breath.  Patient reports starting  night she has been having shortness of breath worse with exertion.  Progressively worsening since .  She reports that she has been coughing up some light yellow-green sputum no fevers however she has had chills.  She denies any nausea or vomiting she denies being around anyone that is been sick.  She is a active smoker about a pack a day for 45 years.  No oxygen at baseline.  In the ED initially saturating 70% on room air, escalated to 6 L nasal cannula saturating 86 to 90%.  Denies any chest pain.\" code stroke 3/23.  CT angiogram head and neck read as an occlusion of the anterior branch of the left M2 segment of the MCA. MRI of brain without contrast shows acute infarct in the distribution of the left middle cerebral artery    Restrictions/Precautions:  Restrictions/Precautions: General Precautions, Fall Risk  Position Activity Restriction  Other position/activity restrictions: aphasia (expressive >receptive), impulsive    Subjective  Chart Reviewed: Yes, History and Physical, Progress Notes, Imaging  Patient assessed for rehabilitation services?: Yes  Family / Caregiver Present: No    Subjective: RN approved OT session, patient resting in bed fidgeting with balloons. patient pleasant and agreeable, patient able to communicate with therapist appropriately throughout.    Pain: denied/10:

## 2024-03-25 NOTE — PLAN OF CARE
Problem: Discharge Planning  Goal: Discharge to home or other facility with appropriate resources  Outcome: Progressing  Flowsheets  Taken 3/25/2024 1433  Discharge to home or other facility with appropriate resources:   Identify barriers to discharge with patient and caregiver   Arrange for needed discharge resources and transportation as appropriate   Identify discharge learning needs (meds, wound care, etc)  Taken 3/25/2024 0807  Discharge to home or other facility with appropriate resources:   Identify barriers to discharge with patient and caregiver   Arrange for needed discharge resources and transportation as appropriate   Identify discharge learning needs (meds, wound care, etc)     Problem: Safety - Adult  Goal: Free from fall injury  Outcome: Progressing  Flowsheets (Taken 3/25/2024 1433)  Free From Fall Injury: Instruct family/caregiver on patient safety     Problem: Chronic Conditions and Co-morbidities  Goal: Patient's chronic conditions and co-morbidity symptoms are monitored and maintained or improved  Outcome: Progressing  Flowsheets  Taken 3/25/2024 1433  Care Plan - Patient's Chronic Conditions and Co-Morbidity Symptoms are Monitored and Maintained or Improved:   Monitor and assess patient's chronic conditions and comorbid symptoms for stability, deterioration, or improvement   Collaborate with multidisciplinary team to address chronic and comorbid conditions and prevent exacerbation or deterioration   Update acute care plan with appropriate goals if chronic or comorbid symptoms are exacerbated and prevent overall improvement and discharge  Taken 3/25/2024 0807  Care Plan - Patient's Chronic Conditions and Co-Morbidity Symptoms are Monitored and Maintained or Improved:   Monitor and assess patient's chronic conditions and comorbid symptoms for stability, deterioration, or improvement   Collaborate with multidisciplinary team to address chronic and comorbid conditions and prevent exacerbation or  infection at discharge:   Assess and monitor for signs and symptoms of infection   Monitor lab/diagnostic results   Monitor all insertion sites i.e., indwelling lines, tubes and drains   Administer medications as ordered   Instruct and encourage patient and family to use good hand hygiene technique   Identify and instruct in appropriate isolation precautions for identified infection/condition  Taken 3/25/2024 0807  Absence of infection at discharge:   Assess and monitor for signs and symptoms of infection   Monitor lab/diagnostic results   Monitor all insertion sites i.e., indwelling lines, tubes and drains   Administer medications as ordered   Identify and instruct in appropriate isolation precautions for identified infection/condition   Instruct and encourage patient and family to use good hand hygiene technique     Problem: Neurosensory - Adult  Goal: Achieves stable or improved neurological status  Outcome: Progressing  Flowsheets  Taken 3/25/2024 1433  Achieves stable or improved neurological status: Assess for and report changes in neurological status  Taken 3/25/2024 0807  Achieves stable or improved neurological status: Assess for and report changes in neurological status     Problem: Neurosensory - Adult  Goal: Achieves maximal functionality and self care  Outcome: Progressing  Flowsheets  Taken 3/25/2024 1433  Achieves maximal functionality and self care: Monitor swallowing and airway patency with patient fatigue and changes in neurological status  Taken 3/25/2024 0807  Achieves maximal functionality and self care:   Monitor swallowing and airway patency with patient fatigue and changes in neurological status   Encourage and assist patient to increase activity and self care with guidance from physical therapy/occupational therapy    Care plan reviewed with patient.  Patient verbalized understanding of the plan of care and contributed to goal setting.

## 2024-03-25 NOTE — CARE COORDINATION
3/25/24, 7:40 AM EDT    DISCHARGE ON GOING EVALUATION    Kalpana DIETRICH [facility] day: 3  Location: [facility]26/026-A Reason for admit: Lung mass [R91.8]  Dyspnea [R06.00]  Acute hypoxemic respiratory failure (HCC) [J96.01]   Procedure:     3-24-34 MRI Brain  IMPRESSION:  1. Acute infarct in the distribution of the left middle cerebral artery.  2. Mild atrophy and probable ischemic changes in the white matter.  3. Mild inflammatory changes in the left maxillary sinus.    3-21-24 CTA Chest  CTA CHEST W WO CONTRAST PE Eval   Final Result   1. No pulmonary emboli.   2. Hypodense 3.9 x 5.5 cm right suprahilar mass extending to the right    mediastinum and subcarinal region, suspicious for neoplasm until proven    otherwise.   3. Complete right upper lung collapse secondary to the right suprahilar    mass.   4. Nonspecific patchy ground-glass consolidations throughout the left    lung. Pulmonary edema is      Barriers to Discharge: Code Stroke called on 3-23. Developed reported expressive aphasia and disoriented to place. Possible acute infarct. Neuro following and with recommendations. Cont  Keppra, ASA, Plavix, Statin. Minced/Moist diet. Echo ordered. EBUS planned for today now on hold due to needing antiplatelets. Pulmonary cont to follow for lung mass. PT/OT.  PCP: Tatiana Velasquez APRN - CNP  Readmission Risk Score: 10.4%  Patient Goals/Plan/Treatment Preferences: From home alone. Follow for potential discharge needs.

## 2024-03-25 NOTE — PLAN OF CARE
Problem: Respiratory - Adult  Goal: Achieves optimal ventilation and oxygenation  3/25/2024 0816 by Kobe Ramos, P  Outcome: Progressing     Problem: Respiratory - Adult  Goal: Clear lung sounds  Outcome: Progressing   Continue aerosols to help improve breath sounds and aeration throughout all lung fields.    Patient mutually agreed on goals.

## 2024-03-25 NOTE — PROGRESS NOTES
[facility]  Fiberoptic Endoscopic Evaluation of Swallowing+ Treatment  STRZ ICU STEPDOWN TELEMETRY 4K      SLP Individual Minutes  Time In: 1003  Time Out: 1029  Minutes: 26  Timed Code Treatment Minutes: 0 Minutes       DIET ORDER RECOMMENDATIONS AFTER EVALUATION: Minced and moist and thin liquids, medications whole in puree    Date: 3/25/2024  Patient Name: Kalpana Braga       CSN: [id number]   : 1955  (68 y.o.)  Gender: female   Referring Physician:  Sulema Lagos MD  Diagnosis: Dyspnea  Date of Last MBS/FEES:  n/a  Diet:  NPO    History of Present Illness/Injury: Patient presents to [facility] with above dx. Per physician H+P:\"Kalpana Braga is a 68 y.o. female active smoker with PMHx of COPD, HTN, depression, HLD, osteoarthritis who presents to [facility] with shortness of breath.  Patient reports starting  night she has been having shortness of breath worse with exertion.  Progressively worsening since .  She reports that she has been coughing up some light yellow-green sputum no fevers however she has had chills.  She denies any nausea or vomiting she denies being around anyone that is been sick.  She is a active smoker about a pack a day for 45 years.  No oxygen at baseline.  In the ED initially saturating 70% on room air, escalated to 6 L nasal cannula saturating 86 to 90%.  Denies any chest pain.  Chest x-ray showed faint hazy opacities in the left midlung, possibly reflecting infiltrates.  New consolidation versus atelectasis in right lung apex.  CTA chest shows no pulmonary emboli.  However has hypodense 3.9 x 5.5 cm right suprahilar mass extending to the right mediastinum and subcarinal region, suspicious for neoplasm until proven otherwise.  Complete right upper lung collapse secondary to right suprahilar mass. \"    ST consulted to further assess pharyngeal integrity and assist in development of POC.      has a past medical history of COPD

## 2024-03-25 NOTE — PROGRESS NOTES
[facility]  INPATIENT PHYSICAL THERAPY  DAILY NOTE  STRZ ICU STEPDOWN TELEMETRY 4K - 4K-26/026-A    Time In: 1030  Time Out: 1056  Timed Code Treatment Minutes: 26 Minutes  Minutes: 26          Date: 3/25/2024  Patient Name: Kalpana Braga,  Gender:  female        MRN: [id number]  : 1955  (68 y.o.)     Referring Practitioner: Marcus Ga MD  Diagnosis: lung mass  Additional Pertinent Hx: Per EMR \"Kalpana Braga is a 68 y.o. female active smoker with PMHx of COPD, HTN, depression, HLD, osteoarthritis who presents to [facility] with shortness of breath.  Patient reports starting  night she has been having shortness of breath worse with exertion.  Progressively worsening since .  She reports that she has been coughing up some light yellow-green sputum no fevers however she has had chills.  She denies any nausea or vomiting she denies being around anyone that is been sick.  She is a active smoker about a pack a day for 45 years.  No oxygen at baseline.  In the ED initially saturating 70% on room air, escalated to 6 L nasal cannula saturating 86 to 90%.  Denies any chest pain.\" code stroke 3/23.  CT angiogram head and neck read as an occlusion of the anterior branch of the left M2 segment of the MCA. MRI of brain without contrast shows acute infarct in the distribution of the left middle cerebral artery     Prior Level of Function:  Lives With: Spouse  Type of Home: House  Home Layout: Two level, Performs ADL's on one level  Home Access: Ramped entrance  Home Equipment: None   Bathroom Shower/Tub: Walk-in shower  Bathroom Toilet: Standard  Bathroom Equipment: Shower chair    ADL Assistance: Independent  Homemaking Assistance: Independent  Ambulation Assistance: Independent  Transfer Assistance: Independent  Active : Yes  Additional Comments: Pt reports being IND with functional tasks prior, no use of an AD. Unsure of accracy of information due to

## 2024-03-25 NOTE — NURSE NAVIGATOR
Neuro Nurse Navigator Follow Up    This RN attempting stroke education. Pt working with therapy at this time. Stroke folder given to primary RN, Annie. She is to give to patient and provide education when pt available.

## 2024-03-26 ENCOUNTER — APPOINTMENT (OUTPATIENT)
Dept: CT IMAGING | Age: 69
End: 2024-03-26
Payer: COMMERCIAL

## 2024-03-26 LAB
ANION GAP SERPL CALC-SCNC: 9 MEQ/L (ref 8–16)
ARTERIAL PATENCY WRIST A: POSITIVE
BACTERIA UR CULT: NORMAL
BASE EXCESS BLDA CALC-SCNC: 3.8 MMOL/L (ref -2.5–2.5)
BDY SITE: ABNORMAL
BUN SERPL-MCNC: 7 MG/DL (ref 7–22)
CALCIUM SERPL-MCNC: 9 MG/DL (ref 8.5–10.5)
CHLORIDE SERPL-SCNC: 94 MEQ/L (ref 98–111)
CO2 SERPL-SCNC: 31 MEQ/L (ref 23–33)
COLLECTED BY:: ABNORMAL
CREAT SERPL-MCNC: 0.6 MG/DL (ref 0.4–1.2)
DEPRECATED RDW RBC AUTO: 45.1 FL (ref 35–45)
DEVICE: ABNORMAL
ERYTHROCYTE [DISTWIDTH] IN BLOOD BY AUTOMATED COUNT: 14.3 % (ref 11.5–14.5)
FIO2 ON VENT O2 ANALYZER: 3 %
GFR SERPL CREATININE-BSD FRML MDRD: > 90 ML/MIN/1.73M2
GLUCOSE BLD STRIP.AUTO-MCNC: 113 MG/DL (ref 70–108)
GLUCOSE BLD STRIP.AUTO-MCNC: 125 MG/DL (ref 70–108)
GLUCOSE BLD STRIP.AUTO-MCNC: 134 MG/DL (ref 70–108)
GLUCOSE SERPL-MCNC: 74 MG/DL (ref 70–108)
HCO3 BLDA-SCNC: 28 MMOL/L (ref 23–28)
HCT VFR BLD AUTO: 42.1 % (ref 37–47)
HGB BLD-MCNC: 13.4 GM/DL (ref 12–16)
MAGNESIUM SERPL-MCNC: 1.9 MG/DL (ref 1.6–2.4)
MCH RBC QN AUTO: 27.5 PG (ref 26–33)
MCHC RBC AUTO-ENTMCNC: 31.8 GM/DL (ref 32.2–35.5)
MCV RBC AUTO: 86.4 FL (ref 81–99)
PCO2 BLDA: 42 MMHG (ref 35–45)
PH BLDA: 7.44 [PH] (ref 7.35–7.45)
PLATELET # BLD AUTO: 379 THOU/MM3 (ref 130–400)
PMV BLD AUTO: 9.9 FL (ref 9.4–12.4)
PO2 BLDA: 78 MMHG (ref 71–104)
POTASSIUM SERPL-SCNC: 3.7 MEQ/L (ref 3.5–5.2)
RBC # BLD AUTO: 4.87 MILL/MM3 (ref 4.2–5.4)
SAO2 % BLDA: 96 %
SODIUM SERPL-SCNC: 134 MEQ/L (ref 135–145)
TROPONIN, HIGH SENSITIVITY: 20 NG/L (ref 0–12)
TROPONIN, HIGH SENSITIVITY: 21 NG/L (ref 0–12)
TROPONIN, HIGH SENSITIVITY: 29 NG/L (ref 0–12)
WBC # BLD AUTO: 16.6 THOU/MM3 (ref 4.8–10.8)

## 2024-03-26 PROCEDURE — 6360000002 HC RX W HCPCS

## 2024-03-26 PROCEDURE — 6370000000 HC RX 637 (ALT 250 FOR IP)

## 2024-03-26 PROCEDURE — 97116 GAIT TRAINING THERAPY: CPT

## 2024-03-26 PROCEDURE — 70450 CT HEAD/BRAIN W/O DYE: CPT

## 2024-03-26 PROCEDURE — 82948 REAGENT STRIP/BLOOD GLUCOSE: CPT

## 2024-03-26 PROCEDURE — 36600 WITHDRAWAL OF ARTERIAL BLOOD: CPT

## 2024-03-26 PROCEDURE — 4A03X5D MEASUREMENT OF ARTERIAL FLOW, INTRACRANIAL, EXTERNAL APPROACH: ICD-10-PCS

## 2024-03-26 PROCEDURE — 97530 THERAPEUTIC ACTIVITIES: CPT

## 2024-03-26 PROCEDURE — 93005 ELECTROCARDIOGRAM TRACING: CPT | Performed by: HOSPITALIST

## 2024-03-26 PROCEDURE — 99233 SBSQ HOSP IP/OBS HIGH 50: CPT | Performed by: HOSPITALIST

## 2024-03-26 PROCEDURE — 2580000003 HC RX 258

## 2024-03-26 PROCEDURE — 92507 TX SP LANG VOICE COMM INDIV: CPT

## 2024-03-26 PROCEDURE — 2060000000 HC ICU INTERMEDIATE R&B

## 2024-03-26 PROCEDURE — 94761 N-INVAS EAR/PLS OXIMETRY MLT: CPT

## 2024-03-26 PROCEDURE — 70498 CT ANGIOGRAPHY NECK: CPT

## 2024-03-26 PROCEDURE — 99232 SBSQ HOSP IP/OBS MODERATE 35: CPT | Performed by: INTERNAL MEDICINE

## 2024-03-26 PROCEDURE — 36415 COLL VENOUS BLD VENIPUNCTURE: CPT

## 2024-03-26 PROCEDURE — 80048 BASIC METABOLIC PNL TOTAL CA: CPT

## 2024-03-26 PROCEDURE — 82803 BLOOD GASES ANY COMBINATION: CPT

## 2024-03-26 PROCEDURE — 93005 ELECTROCARDIOGRAM TRACING: CPT

## 2024-03-26 PROCEDURE — 84484 ASSAY OF TROPONIN QUANT: CPT

## 2024-03-26 PROCEDURE — 85027 COMPLETE CBC AUTOMATED: CPT

## 2024-03-26 PROCEDURE — 2700000000 HC OXYGEN THERAPY PER DAY

## 2024-03-26 PROCEDURE — 93010 ELECTROCARDIOGRAM REPORT: CPT | Performed by: INTERNAL MEDICINE

## 2024-03-26 PROCEDURE — 6360000004 HC RX CONTRAST MEDICATION

## 2024-03-26 PROCEDURE — 83735 ASSAY OF MAGNESIUM: CPT

## 2024-03-26 PROCEDURE — 70496 CT ANGIOGRAPHY HEAD: CPT

## 2024-03-26 PROCEDURE — 99221 1ST HOSP IP/OBS SF/LOW 40: CPT

## 2024-03-26 PROCEDURE — 94640 AIRWAY INHALATION TREATMENT: CPT

## 2024-03-26 RX ORDER — SODIUM CHLORIDE 9 MG/ML
INJECTION, SOLUTION INTRAVENOUS CONTINUOUS
Status: DISCONTINUED | OUTPATIENT
Start: 2024-03-26 | End: 2024-03-27

## 2024-03-26 RX ADMIN — CLOPIDOGREL BISULFATE 75 MG: 75 TABLET ORAL at 09:23

## 2024-03-26 RX ADMIN — IOPAMIDOL 80 ML: 755 INJECTION, SOLUTION INTRAVENOUS at 11:57

## 2024-03-26 RX ADMIN — SODIUM CHLORIDE: 9 INJECTION, SOLUTION INTRAVENOUS at 12:51

## 2024-03-26 RX ADMIN — ATORVASTATIN CALCIUM 40 MG: 40 TABLET, FILM COATED ORAL at 07:30

## 2024-03-26 RX ADMIN — ENOXAPARIN SODIUM 40 MG: 100 INJECTION SUBCUTANEOUS at 07:29

## 2024-03-26 RX ADMIN — Medication: at 05:38

## 2024-03-26 RX ADMIN — GUAIFENESIN 1200 MG: 600 TABLET, EXTENDED RELEASE ORAL at 07:29

## 2024-03-26 RX ADMIN — IPRATROPIUM BROMIDE AND ALBUTEROL SULFATE 1 DOSE: .5; 3 SOLUTION RESPIRATORY (INHALATION) at 08:52

## 2024-03-26 RX ADMIN — LOSARTAN POTASSIUM 100 MG: 100 TABLET, FILM COATED ORAL at 07:29

## 2024-03-26 RX ADMIN — ACETAMINOPHEN 650 MG: 325 TABLET ORAL at 07:29

## 2024-03-26 RX ADMIN — IPRATROPIUM BROMIDE AND ALBUTEROL SULFATE 1 DOSE: .5; 3 SOLUTION RESPIRATORY (INHALATION) at 22:37

## 2024-03-26 RX ADMIN — ONDANSETRON 4 MG: 2 INJECTION INTRAMUSCULAR; INTRAVENOUS at 05:29

## 2024-03-26 RX ADMIN — VENLAFAXINE HYDROCHLORIDE 75 MG: 75 CAPSULE, EXTENDED RELEASE ORAL at 07:29

## 2024-03-26 RX ADMIN — GUAIFENESIN 1200 MG: 600 TABLET, EXTENDED RELEASE ORAL at 19:57

## 2024-03-26 RX ADMIN — SODIUM CHLORIDE, PRESERVATIVE FREE 10 ML: 5 INJECTION INTRAVENOUS at 19:58

## 2024-03-26 RX ADMIN — ASPIRIN 81 MG 81 MG: 81 TABLET ORAL at 07:29

## 2024-03-26 RX ADMIN — HYDRALAZINE HYDROCHLORIDE 10 MG: 20 INJECTION, SOLUTION INTRAMUSCULAR; INTRAVENOUS at 03:49

## 2024-03-26 RX ADMIN — SODIUM CHLORIDE, PRESERVATIVE FREE 10 ML: 5 INJECTION INTRAVENOUS at 07:30

## 2024-03-26 RX ADMIN — IPRATROPIUM BROMIDE AND ALBUTEROL SULFATE 1 DOSE: .5; 3 SOLUTION RESPIRATORY (INHALATION) at 14:04

## 2024-03-26 ASSESSMENT — PAIN SCALES - GENERAL
PAINLEVEL_OUTOF10: 5
PAINLEVEL_OUTOF10: 0
PAINLEVEL_OUTOF10: 0
PAINLEVEL_OUTOF10: 5
PAINLEVEL_OUTOF10: 0
PAINLEVEL_OUTOF10: 3
PAINLEVEL_OUTOF10: 0
PAINLEVEL_OUTOF10: 0
PAINLEVEL_OUTOF10: 5
PAINLEVEL_OUTOF10: 0
PAINLEVEL_OUTOF10: 3

## 2024-03-26 ASSESSMENT — PAIN DESCRIPTION - ONSET
ONSET: ON-GOING

## 2024-03-26 ASSESSMENT — PAIN - FUNCTIONAL ASSESSMENT
PAIN_FUNCTIONAL_ASSESSMENT: ACTIVITIES ARE NOT PREVENTED

## 2024-03-26 ASSESSMENT — PAIN DESCRIPTION - ORIENTATION
ORIENTATION: MID

## 2024-03-26 ASSESSMENT — PAIN DESCRIPTION - FREQUENCY
FREQUENCY: INTERMITTENT

## 2024-03-26 ASSESSMENT — PAIN DESCRIPTION - DESCRIPTORS
DESCRIPTORS: DISCOMFORT

## 2024-03-26 ASSESSMENT — PAIN DESCRIPTION - LOCATION
LOCATION: CHEST

## 2024-03-26 ASSESSMENT — PAIN DESCRIPTION - PAIN TYPE
TYPE: ACUTE PAIN

## 2024-03-26 NOTE — PROGRESS NOTES
[place] [facility]      Patient - Kalpana Braga   MRN -  [id number]   [facility] # - [id number]   - 1955      Date of Admission -  3/21/2024  8:53 PM  Date of evaluation -  3/26/2024  Room - --   Hospital Day - 4  Consulting - Sulema Lagos MD Primary Care Physician - Tatiana Velasquez APRN - CNP     Problem List      Active Hospital Problems    Diagnosis Date Noted    CVA (cerebral vascular accident) (HCC) [I63.9] 2024    Dyspnea [R06.00] 2024    Acute hypoxemic respiratory failure (HCC) [J96.01] 2024    Hilar mass [R91.8] 2024    Pneumonia of right upper lobe due to infectious organism [J18.9] 2024     Reason for Consult    Right suprahilar mass   HPI   History Obtained From: Patient and electronic medical record.    Kalpana Braga is a 68 y.o. female with a past medical history of COPD, Hypertension, Hyperlipidemia, osteoarthritis, and anxiety/depression who presented to [facility] ED on 3/21/2024 with complaints of shortness of breath. Patient states she has has shortness of breath for the last 4 days which is worse with exertion. She also has had a productive cough with light yellow-green sputum. She has had chills as well without fevers. She is a current everyday smoker and smokes about a pack a day for 45 years. She does not wear any oxygen at baseline.     In the ED, patient was found to be saturating at 70% on room air. She was placed on 6L NC with saturations around 86 to 90%. Chest xray showed faint hazy opacities in the left midlung, possibly reflecting infiltrates. New consolidation versus atelectasis in right lung apex. CTA chest showed no pulmonary embolism. Hypodense 3.9 x 5.5 cm right suprahilar mass extending to the right mediastinum and subcarinal region, suspicious for neoplasm until proven otherwise. Complete right upper lung collapse secondary to the right suprahilar mass. Nonspecific patchy ground-glass  consolidations throughout the left lung. Dilation of the central pulmonary artery suggesting a degree of pulmonary arterial hypertension. She received a dose of Rocephin 1000 mg IV once, Solumedrol 125 mg IV once, and one dose of Duoneb in the ED.     She is currently on heated high flow- 70 FiO2, 50 O2, SpO2 100.       Past 24 hrs   -On 3 LPM via NC   -Reports new dull aching chest pain 5/10   -EKG obtained this AM   All other systems reviewed    PMHx   Past Medical History      Diagnosis Date    COPD (chronic obstructive pulmonary disease) (HCC)     Hyperlipidemia     Hypertension     Osteoarthritis     Smoker     Urticaria       Past Surgical History        Procedure Laterality Date    DILATION AND CURETTAGE OF UTERUS      KNEE ARTHROSCOPY       Meds    Current Medications    atorvastatin  40 mg Oral Daily    enoxaparin  40 mg SubCUTAneous Daily    ipratropium 0.5 mg-albuterol 2.5 mg  1 Dose Inhalation TID    guaiFENesin  1,200 mg Oral BID    clopidogrel  75 mg Oral Daily    aspirin  81 mg Oral Daily    sodium chloride flush  5-40 mL IntraVENous 2 times per day    cefTRIAXone (ROCEPHIN) IV  1,000 mg IntraVENous Q24H    losartan  100 mg Oral Daily    venlafaxine  75 mg Oral Daily     melatonin, hydrALAZINE, potassium chloride **OR** potassium alternative oral replacement **OR** potassium chloride, albuterol, sodium chloride flush, sodium chloride, ondansetron **OR** ondansetron, polyethylene glycol, acetaminophen **OR** acetaminophen  IV Drips/Infusions   sodium chloride 100 mL/hr at 03/22/24 1810     Home Medications  Medications Prior to Admission: amLODIPine (NORVASC) 5 MG tablet, Take 1 tablet by mouth daily  atorvastatin (LIPITOR) 40 MG tablet, Take 1 tablet by mouth daily  losartan (COZAAR) 100 MG tablet, Take 1 tablet by mouth daily  venlafaxine (EFFEXOR XR) 75 MG extended release capsule, Take 1 capsule by mouth daily  ipratropium 0.5 mg-albuterol 2.5 mg (DUONEB) 0.5-2.5 (3) MG/3ML SOLN nebulizer solution,

## 2024-03-26 NOTE — PROGRESS NOTES
[facility]  INPATIENT SPEECH THERAPY  STRZ ICU STEPDOWN TELEMETRY 4K  DAILY NOTE    TIME   SLP Individual Minutes  Time In: 1450  Time Out: 1503  Minutes: 13  Timed Code Treatment Minutes: 0 Minutes       Date: 3/26/2024  Patient Name: Kalpana Braga      CSN: [id number]   : 1955  (68 y.o.)  Gender: female   Referring Physician:  Chantelle Garcia MD   Diagnosis: Lung mass  Precautions: Fall risk, aspiration precautions  Current Diet: Minced and moist with thin liquids  Respiratory Status: Nasal Canula: 2LPM  Swallowing Strategies:  Full Upright Position, Small Bite/Sip, Medications Whole with Puree, Limit Distractions, and Monitor for Fatigue   Date of Last MBS/FEES:  FEES on 3/25/2024    Pain:  No pain reported.    Subjective: Code stroke called for this patient earlier on this date d/t sudden decline in expressive language abilities. However, CT head imaging did not show new infarcts. Visit approved by RN. Patient seen in bed watching TV.       Short-Term Goals:  SHORT TERM GOAL #1:  Goal 1: Patient will safely consume a minced and moist diet (advanced textures as indicated) with thin liquids with direct 1:1 staff supervision to optimize safety with po intake via proper positioning and use of compensatory strategies to ensure adequate nutritional intake.   INTERVENTIONS:  Non-billable time*  Patient observed to take x3 large drink from the straw. No overt s/s of aspiration. Patient reminded of safe swallow strategies to take small bites/sips. Continue minced and moist diet + thin liquids.    SHORT TERM GOAL #2:  Goal 2: PAtient will complete pharyngeal strenghtening program x15 with adequate success to improve airway protection measures  INTERVENTIONS: DNT given focus on other STGs.    SHORT TERM GOAL #3:  Goal 3: Patient will complete expression (basic naming,verbal descriptions, basic writing) and receptive (moderately complex auditory/reading comprehension) langauge tasks with

## 2024-03-26 NOTE — PROGRESS NOTES
[facility]  INPATIENT REHABILITATION  ADMISSIONS COORDINATOR CONSULT    Referral Type: internal    Patient Name: Kalpana Braga      MRN: [id number]    : 1955  (68 y.o.)  Gender: female   Race:White (non-)     Payor Source: Payor: ALLIED BENEFIT SYSTEM / Plan: ALLIED BENEFIT SYSTEM / Product Type: *No Product type* /   Secondary Payor Source:      Isolation Status: No active isolations    Lives With: Spouse  Type of Home: House  Home Layout: Two level, Performs ADL's on one level  Home Access: Ramped entrance     Occupation: Retired  Additional Comments: Pt reports being IND with functional tasks prior, no use of an AD. Unsure of accracy of information due to cognition    Disciplines Required upon Admission to Inpatient Rehabilitation: Physical Therapy, Occupational Therapy, and Speech Therapy  Post operative: No  Fall: No  Dialysis: No  Diet: ADULT DIET; Dysphagia - Minced and Moist  Discussed patient with  and PM&R provider:  Recommend PM&R consult for further determination of patient needs.

## 2024-03-26 NOTE — PROGRESS NOTES
Stroke Education provided to patient and the following topics were discussed    1. Patients personal risk factors for stroke are hyperlipidemia, hypertension, and smoking    2. Warning signs of Stroke:        * Sudden numbness or weakness of the face, arm or leg, especially on one side of          The body            * Sudden confusion, trouble speaking or understanding        * Sudden trouble seeing in one or both eyes        * Sudden trouble walking, dizziness, loss of balance or coordination        * Sudden severe headache with no known cause      3. Importance of activation Emergency Medical Services ( 9-1-1 ) immediately if experience any warning signs of stroke.    4. Be sure and schedule a follow-up appointment with your primary care doctor or any specialists as instructed.     5. You must take medicine every day to treat your risk factors for stroke.  Be sure to take your medicines exactly as your doctor tells you: no more, no less.  Know what your medicines are for , what they do.  Anti-thrombotics /anticoagulants can help prevent strokes.  You are taking the following medicine(s)  ASA 81 mg, Lovenox, Plavix 75 mg     6.  Smoking and second-hand smoke greatly increase your risk of stroke, cardiovascular disease and death. Smoking history packs, 1 per day    7. Information provided was [facility] Stroke Education Binder    8. Documentation of teaching completed in Patient Education Activity and on Care Plan with teaching response noted?  yes

## 2024-03-26 NOTE — PROGRESS NOTES
[facility]  INPATIENT PHYSICAL THERAPY  DAILY NOTE  STRZ ICU STEPDOWN TELEMETRY 4K - 4K-26/026-A    Time In: 1013  Time Out: 1036  Timed Code Treatment Minutes: 23 Minutes  Minutes: 23          Date: 3/26/2024  Patient Name: Kalpana Braga,  Gender:  female        MRN: [id number]  : 1955  (68 y.o.)     Referring Practitioner: Marcus Ga MD  Diagnosis: lung mass  Additional Pertinent Hx: Per EMR \"Kalpana Braga is a 68 y.o. female active smoker with PMHx of COPD, HTN, depression, HLD, osteoarthritis who presents to [facility] with shortness of breath.  Patient reports starting  night she has been having shortness of breath worse with exertion.  Progressively worsening since .  She reports that she has been coughing up some light yellow-green sputum no fevers however she has had chills.  She denies any nausea or vomiting she denies being around anyone that is been sick.  She is a active smoker about a pack a day for 45 years.  No oxygen at baseline.  In the ED initially saturating 70% on room air, escalated to 6 L nasal cannula saturating 86 to 90%.  Denies any chest pain.\" code stroke 3/23.  CT angiogram head and neck read as an occlusion of the anterior branch of the left M2 segment of the MCA. MRI of brain without contrast shows acute infarct in the distribution of the left middle cerebral artery     Prior Level of Function:  Lives With: Spouse  Type of Home: House  Home Layout: Two level, Performs ADL's on one level  Home Access: Ramped entrance  Home Equipment: None   Bathroom Shower/Tub: Walk-in shower  Bathroom Toilet: Standard  Bathroom Equipment: Shower chair    ADL Assistance: Independent  Homemaking Assistance: Independent  Ambulation Assistance: Independent  Transfer Assistance: Independent  Active : Yes  Additional Comments: Pt reports being IND with functional tasks prior, no use of an AD. Unsure of accracy of information due to

## 2024-03-26 NOTE — PROGRESS NOTES
0730 Report taken from nurse Valencia.    Head to Toe Assessment    [facility] Student Nurse  Head to Toe Assessment Performed at 0930  Skin  General skin appearance / Description: pink, warm, dry  Incisions / Wounds: n/a    Neuro/Head  LOC: A+O x 4  Speech: slurred, difficulty speaking  Eyes PERRLA 3 mm  Symmetry of face / tongue: slight facial drooping  JVD of neck: n/a    Chest  Heart sounds: S1, S2 Apical Pulse: 74  Dyspnea: present with exertion  Lung sounds: diminished  Cough: present    Abdomen/ Pelvis  Bowel sounds: active x4  Passing flatus: yes  Palpation / Inspection: rounded, soft, non-tender  Last BM: 3/25/2024 @1231  Stool assessment: no stool assessed at this time  Any GI issues: n/a  Urinary issues: n/a  Continence: continent, external catheter  Urine color / turbidity: yellow, clear    Extremities  Edema: n/a  Altered Sensation: n/a  Upper extremity push / pulls: strong, equal bilaterally  Left Arm Radial Pulse: +2   Left Upper extremity Capillary Refill: <3 sec  Right Arm Radial Pulse: +2   Right Upper extremity Capillary Refill: <3 sec  Lower extremity pedal push / pulls: strong, equal bilaterally  Left pedal pulse: +1   Left posterior tibialis pulse: +1   Left lower extremity capillary refill: <3 sec  Right pedal pulse: +1   Right posterior tibialis pulse: +1   Right lower extremity capillary refill: <3 sec  Drift of extremities: negative  Pain: 5/10    Other issues: n/a    1145 Reassessment done  1300 Reported off to primary RNAnnie.    1300 Donna RAM /

## 2024-03-26 NOTE — PLAN OF CARE
Problem: Discharge Planning  Goal: Discharge to home or other facility with appropriate resources  3/25/2024 2145 by Audie Alfaro RN  Flowsheets (Taken 3/25/2024 2145)  Discharge to home or other facility with appropriate resources:   Identify barriers to discharge with patient and caregiver   Identify discharge learning needs (meds, wound care, etc)     Problem: Safety - Adult  Goal: Free from fall injury  3/25/2024 2145 by Audie Alfaro RN  Flowsheets (Taken 3/25/2024 2145)  Free From Fall Injury: Instruct family/caregiver on patient safety     Problem: Pain  Goal: Verbalizes/displays adequate comfort level or baseline comfort level  3/25/2024 2145 by Audei Alfaro RN  Flowsheets (Taken 3/25/2024 2145)  Verbalizes/displays adequate comfort level or baseline comfort level:   Encourage patient to monitor pain and request assistance   Administer analgesics based on type and severity of pain and evaluate response     Problem: Respiratory - Adult  Goal: Achieves optimal ventilation and oxygenation  3/25/2024 2145 by Audie Alfaro RN  Flowsheets (Taken 3/25/2024 2145)  Achieves optimal ventilation and oxygenation:   Assess for changes in respiratory status   Assess for changes in mentation and behavior   Oxygen supplementation based on oxygen saturation or arterial blood gases   Encourage broncho-pulmonary hygiene including cough, deep breathe, incentive spirometry   Assess and instruct to report shortness of breath or any respiratory difficulty     Problem: Infection - Adult  Goal: Absence of infection at discharge  3/25/2024 2145 by Audie Alfaro RN  Flowsheets (Taken 3/25/2024 2145)  Absence of infection at discharge:   Assess and monitor for signs and symptoms of infection   Monitor lab/diagnostic results   Monitor all insertion sites i.e., indwelling lines, tubes and drains   Instruct and encourage patient and family to use good hand hygiene technique     Problem: Neurosensory - Adult  Goal:

## 2024-03-26 NOTE — PROGRESS NOTES
1126- Primary RN in to assess patient and obtain vital signs. Patient alert and following commands, but unable to communicate with primary RN and staff. Primary RN messaged Dr. St at 1130. Dr. St at bedside.    1137- Rapid Response called. Blood sugar 134. Vital signs obtained. Patient up in the chair.    1139- Code stroke called per Dr. St.    1140- Patient to CT Scan with Primary RN, House Supervisor, Resource RN, and Dr. St.

## 2024-03-26 NOTE — PLAN OF CARE
Problem: Discharge Planning  Goal: Discharge to home or other facility with appropriate resources  Outcome: Progressing  Flowsheets  Taken 3/26/2024 1255  Discharge to home or other facility with appropriate resources:   Identify barriers to discharge with patient and caregiver   Arrange for needed discharge resources and transportation as appropriate   Identify discharge learning needs (meds, wound care, etc)  Taken 3/26/2024 0724  Discharge to home or other facility with appropriate resources:   Identify barriers to discharge with patient and caregiver   Arrange for needed discharge resources and transportation as appropriate   Identify discharge learning needs (meds, wound care, etc)     Problem: Safety - Adult  Goal: Free from fall injury  Outcome: Progressing  Flowsheets (Taken 3/26/2024 1255)  Free From Fall Injury: Instruct family/caregiver on patient safety     Problem: Chronic Conditions and Co-morbidities  Goal: Patient's chronic conditions and co-morbidity symptoms are monitored and maintained or improved  Outcome: Progressing  Flowsheets  Taken 3/26/2024 1255  Care Plan - Patient's Chronic Conditions and Co-Morbidity Symptoms are Monitored and Maintained or Improved:   Monitor and assess patient's chronic conditions and comorbid symptoms for stability, deterioration, or improvement   Collaborate with multidisciplinary team to address chronic and comorbid conditions and prevent exacerbation or deterioration   Update acute care plan with appropriate goals if chronic or comorbid symptoms are exacerbated and prevent overall improvement and discharge  Taken 3/26/2024 0724  Care Plan - Patient's Chronic Conditions and Co-Morbidity Symptoms are Monitored and Maintained or Improved:   Monitor and assess patient's chronic conditions and comorbid symptoms for stability, deterioration, or improvement   Collaborate with multidisciplinary team to address chronic and comorbid conditions and prevent exacerbation or  3/26/2024 1255  Achieves stable or improved neurological status: Assess for and report changes in neurological status  Taken 3/26/2024 0724  Achieves stable or improved neurological status:   Assess for and report changes in neurological status   Initiate measures to prevent increased intracranial pressure   Maintain blood pressure and fluid volume within ordered parameters to optimize cerebral perfusion and minimize risk of hemorrhage   Monitor temperature, glucose, and sodium. Initiate appropriate interventions as ordered     Problem: Neurosensory - Adult  Goal: Achieves maximal functionality and self care  Outcome: Progressing  Flowsheets  Taken 3/26/2024 1255  Achieves maximal functionality and self care: Monitor swallowing and airway patency with patient fatigue and changes in neurological status  Taken 3/26/2024 0724  Achieves maximal functionality and self care:   Monitor swallowing and airway patency with patient fatigue and changes in neurological status   Encourage and assist patient to increase activity and self care with guidance from physical therapy/occupational therapy    Care plan reviewed with patient.  Patient verbalized understanding of the plan of care and contributed to goal setting.

## 2024-03-26 NOTE — CONSULTS
hypertension. MEDIASTINUM: Prominent mediastinal nodes.     1. Masslike collapse and consolidation of the right upper lobe, concerning for malignancy. 2. Ill-defined ground-glass and patchy consolidations throughout the left lung. Pneumonia or aspiration not excluded. 3. Patent cervical vasculature. 4. Atherosclerotic disease as described. This document has been electronically signed by: Garrick Shell MD on 03/23/2024 10:15 PM All CTs at this facility use dose modulation techniques and iterative reconstructions, and/or weight-based dosing when appropriate to reduce radiation to a low as reasonably achievable. Carotid stenosis and measurements are in accordance with NASCET criteria. 3D Post-processing was performed on this study.    CTA HEAD W WO CONTRAST    Result Date: 3/23/2024   ADDENDUM #1  This report was discussed with Melissa Whitehead RN on Mar 23, 2024 22:17:00 EDT. This document has been electronically signed by: Ruchi Walter on 03/23/2024 10:17 PM  ORIGINAL REPORT  EXAM: CT Angiography Head With Intravenous Contrast MIPS post processing and 3D reconstruction was performed. COMPARISON: No relevant prior studies available. FINDINGS: RIGHT INTERNAL CAROTID ARTERY: No acute findings. Intracranial segment is patent with no significant stenosis. No aneurysm. RIGHT ANTERIOR CEREBRAL ARTERY: Unremarkable. No occlusion or significant stenosis. No aneurysm. RIGHT MIDDLE CEREBRAL ARTERY: Unremarkable. No occlusion or significant stenosis. No aneurysm. RIGHT POSTERIOR CEREBRAL ARTERY: Unremarkable. No occlusion or significant stenosis. No aneurysm. RIGHT VERTEBRAL ARTERY: Unremarkable as visualized. LEFT INTERNAL CAROTID ARTERY: No acute findings. Intracranial segment is patent with no significant stenosis. No aneurysm. LEFT ANTERIOR CEREBRAL ARTERY: Unremarkable. No occlusion or significant stenosis. No aneurysm. LEFT MIDDLE CEREBRAL ARTERY: Abrupt occlusion of an anterior branch of the left M2 segment MCA. No  as negative for acute findings.  Upon review with Dr. Chavira there is some changes already related and correlating with the stroke seen on MRI  3/23/24 - CTA of head and neck revealed occlusion of the anterior branch of the left M2 segment. No need for carotid ultrasound.  3/24/24 - MRI of brain without contrast shows acute infarct in the distribution of the left middle cerebral artery  3/26/24 - CT head revealed no acute intracranial hemorrhage. Area of recent infarct seen in the left frontal lobe and left insular region.  3/26/24 - CTA head and neck revealed no LVO or hemodynamically significant stenosis.   Upon Dr. Easton's review, no new blockages noted and correlates to same stroke. Her symptoms are waxing and waning from her previous stroke.   2D echocardiogram EF 60-65%   Stat CT head is needed if the patient develops new-onset altered mental status, a severe headache, or new-onset neurologic deficit  Risk factors and medications  Blood pressure goal: less than 130/80  Avoid hypotension   Keep well hydrated. Recommended IVFs to Dr. St   Antithrombotics: Aspirin 81 mg, Plavix 75 mg indefinitely.  Patient may need anticoagulation in future for possible hypercoagulable state.  If anticoagulation is started recommend stopping antiplatelets  Pulmonology can hold ASA and plavix for EBUS; restart as soon as able   HgbA1C 5.8  LDL 32. LDL goal of 45-70.  De-escalated atorvastatin to 10 mg daily yesterday and primary team changed to lipitor 40mg.   Given patient is well below goal, our recommendation is for patient to be on 10mg lipitor   smoking and alcohol cessation when applicable. Provide stroke eduction for individualized risk factors.  EKG/telemetry to monitor for atrial fibrillation  EKG - NSR   Core stroke metrics  Dysphagia screen prior to oral intake  PT/OT/SLP consult. IPR consult if applicable.  DVT prophylaxis: Lovenox  NIHSS every shift. Neuro checks per unit unless otherwise specified.  Pre-morbid

## 2024-03-26 NOTE — CARE COORDINATION
3/26/24, 7:19 AM EDT    DISCHARGE ON GOING EVALUATION    Kalpana DIETRICH [facility] day: 4  Location: [facility]26/026-A Reason for admit: Lung mass [R91.8]  Dyspnea [R06.00]  Acute hypoxemic respiratory failure (HCC) [J96.01]   Procedure: 3-24-34 MRI Brain  IMPRESSION:  1. Acute infarct in the distribution of the left middle cerebral artery.  2. Mild atrophy and probable ischemic changes in the white matter.  3. Mild inflammatory changes in the left maxillary sinus.     3-21-24 CTA Chest  CTA CHEST W WO CONTRAST PE Eval   Final Result   1. No pulmonary emboli.   2. Hypodense 3.9 x 5.5 cm right suprahilar mass extending to the right    mediastinum and subcarinal region, suspicious for neoplasm until proven    otherwise.   3. Complete right upper lung collapse secondary to the right suprahilar    mass.   4. Nonspecific patchy ground-glass consolidations throughout the left    lung. Pulmonary edema is      Barriers to Discharge: Transitioned last evening to NC O2. Currently at 4L/NC with sat 100%. Afebrile. Continues on Rocephin IV. Plavix, Lovenox, Duonebs. EBUS ( to investigate suprahilar lung mass) presently on hold due to recent stroke and the need for antiplatelets. ST/PT/OT. Therapy recommends IP stay for therapy at discharge. Consult to IPR coordinator placed.     1200 Pt experienced episode of being non-verbal this morning. Code Stroke called. CT Scan with no changes from prior exam. IVF at 50/hr. BP soft 105/52. Symptoms resolved and pt was able to verbalize again.     PCP: Tatiana Velasquez, APRN - CNP  Readmission Risk Score: 12.9%  Patient Goals/Plan/Treatment Preferences: From home alone. Therapy rec IPR prior to home. Consult placed.

## 2024-03-26 NOTE — SIGNIFICANT EVENT
Resident physician called to bedside by primary RN Annie Mendez at 1130 secondary to \"patient has been having difficulty finding words since her stroke, but now she isn't saying any words to me.\" Patient had been seen and evaluated by the resident physician at 0900 and patient was able to communicate at that time, albeit with stilted speech. NIH at that time was 2 for dysarthria as well as mild facial droop. Resident physician presenting to bedside with /52 and POC Glucose 134. Resident physician performing NIH again: Patient was able to follow commands but she was unable to speak and was unable to follow complex commands or identify pictures. Coupled with existing facial droop, NIH 8. Rapid response with Code Stroke called immediately. Accompanied to CT Scanner by attending physician Dr. Sulema Lagos, his team of medical students and residents, Resource RN Kristie, House Supervisor Roxana, and Primary RN. Joined by neurology service at the bedside who performed their own neuro exam. STAT CT HEAD WO Contrast as well as CTA Head and Neck performed per Dr. Easton's order. No acute findings per attending neurologist as well as interpreting radiologist Dr. Lomeli. STAT EKG also obtained and showing no changes from prior examination. Symptoms suspected secondary to previously identified CVA; recommendation for IV Fluid administration. The patient was transported back to [facility] in stable condition with resolution of inability to speak identified. She answered questions appropriately. The opportunity to ask additional questions was provided to the primary RN. She voiced none and expressed agreement to the plan of care.    Electronically signed by Reji St MD on 3/26/24 at 12:23 PM EDT

## 2024-03-26 NOTE — RT PROTOCOL NOTE
RT Inhaler-Nebulizer Bronchodilator Protocol Note    There is a bronchodilator order in the chart from a provider indicating to follow the RT Bronchodilator Protocol and there is an “Initiate RT Inhaler-Nebulizer Bronchodilator Protocol” order as well (see protocol at bottom of note).    CXR Findings:  No results found.    The findings from the last RT Protocol Assessment were as follows:   History Pulmonary Disease: Chronic pulmonary disease  Respiratory Pattern: Dyspnea on exertion or RR 21-25 bpm  Breath Sounds: Slightly diminished and/or crackles  Cough: Strong, productive  Indication for Bronchodilator Therapy: Decreased or absent breath sounds  Bronchodilator Assessment Score: 7    Aerosolized bronchodilator medication orders have been revised according to the RT Inhaler-Nebulizer Bronchodilator Protocol below.    Respiratory Therapist to perform RT Therapy Protocol Assessment initially then follow the protocol.  Repeat RT Therapy Protocol Assessment PRN for score 0-3 or on second treatment, BID, and PRN for scores above 3.    No Indications - adjust the frequency to every 6 hours PRN wheezing or bronchospasm, if no treatments needed after 48 hours then discontinue using Per Protocol order mode.     If indication present, adjust the RT bronchodilator orders based on the Bronchodilator Assessment Score as indicated below.  Use Inhaler orders unless patient has one or more of the following: on home nebulizer, not able to hold breath for 10 seconds, is not alert and oriented, cannot activate and use MDI correctly, or respiratory rate 25 breaths per minute or more, then use the equivalent nebulizer order(s) with same Frequency and PRN reasons based on the score.  If a patient is on this medication at home then do not decrease Frequency below that used at home.    0-3 - enter or revise RT bronchodilator order(s) to equivalent RT Bronchodilator order with Frequency of every 4 hours PRN for wheezing or increased work

## 2024-03-27 ENCOUNTER — APPOINTMENT (OUTPATIENT)
Dept: GENERAL RADIOLOGY | Age: 69
End: 2024-03-27
Payer: COMMERCIAL

## 2024-03-27 LAB
ANION GAP SERPL CALC-SCNC: 15 MEQ/L (ref 8–16)
BUN SERPL-MCNC: 11 MG/DL (ref 7–22)
CALCIUM SERPL-MCNC: 8.4 MG/DL (ref 8.5–10.5)
CHLORIDE SERPL-SCNC: 92 MEQ/L (ref 98–111)
CO2 SERPL-SCNC: 26 MEQ/L (ref 23–33)
CREAT SERPL-MCNC: 0.6 MG/DL (ref 0.4–1.2)
DEPRECATED RDW RBC AUTO: 46.2 FL (ref 35–45)
ERYTHROCYTE [DISTWIDTH] IN BLOOD BY AUTOMATED COUNT: 14.5 % (ref 11.5–14.5)
GFR SERPL CREATININE-BSD FRML MDRD: > 90 ML/MIN/1.73M2
GLUCOSE BLD STRIP.AUTO-MCNC: 111 MG/DL (ref 70–108)
GLUCOSE SERPL-MCNC: 103 MG/DL (ref 70–108)
HCT VFR BLD AUTO: 38.1 % (ref 37–47)
HGB BLD-MCNC: 12.2 GM/DL (ref 12–16)
MAGNESIUM SERPL-MCNC: 1.7 MG/DL (ref 1.6–2.4)
MCH RBC QN AUTO: 27.9 PG (ref 26–33)
MCHC RBC AUTO-ENTMCNC: 32 GM/DL (ref 32.2–35.5)
MCV RBC AUTO: 87.2 FL (ref 81–99)
PLATELET # BLD AUTO: 330 THOU/MM3 (ref 130–400)
PMV BLD AUTO: 10.4 FL (ref 9.4–12.4)
POTASSIUM SERPL-SCNC: 3.4 MEQ/L (ref 3.5–5.2)
RBC # BLD AUTO: 4.37 MILL/MM3 (ref 4.2–5.4)
SODIUM SERPL-SCNC: 133 MEQ/L (ref 135–145)
WBC # BLD AUTO: 14 THOU/MM3 (ref 4.8–10.8)

## 2024-03-27 PROCEDURE — 6370000000 HC RX 637 (ALT 250 FOR IP)

## 2024-03-27 PROCEDURE — 2580000003 HC RX 258

## 2024-03-27 PROCEDURE — 2060000000 HC ICU INTERMEDIATE R&B

## 2024-03-27 PROCEDURE — 6360000002 HC RX W HCPCS

## 2024-03-27 PROCEDURE — 94640 AIRWAY INHALATION TREATMENT: CPT

## 2024-03-27 PROCEDURE — 71045 X-RAY EXAM CHEST 1 VIEW: CPT

## 2024-03-27 PROCEDURE — 82948 REAGENT STRIP/BLOOD GLUCOSE: CPT

## 2024-03-27 PROCEDURE — 94761 N-INVAS EAR/PLS OXIMETRY MLT: CPT

## 2024-03-27 PROCEDURE — 97530 THERAPEUTIC ACTIVITIES: CPT

## 2024-03-27 PROCEDURE — 80048 BASIC METABOLIC PNL TOTAL CA: CPT

## 2024-03-27 PROCEDURE — 99254 IP/OBS CNSLTJ NEW/EST MOD 60: CPT | Performed by: NURSE PRACTITIONER

## 2024-03-27 PROCEDURE — 92526 ORAL FUNCTION THERAPY: CPT

## 2024-03-27 PROCEDURE — 6370000000 HC RX 637 (ALT 250 FOR IP): Performed by: HOSPITALIST

## 2024-03-27 PROCEDURE — 99233 SBSQ HOSP IP/OBS HIGH 50: CPT | Performed by: HOSPITALIST

## 2024-03-27 PROCEDURE — 36415 COLL VENOUS BLD VENIPUNCTURE: CPT

## 2024-03-27 PROCEDURE — 97110 THERAPEUTIC EXERCISES: CPT

## 2024-03-27 PROCEDURE — 6370000000 HC RX 637 (ALT 250 FOR IP): Performed by: NURSE PRACTITIONER

## 2024-03-27 PROCEDURE — 97116 GAIT TRAINING THERAPY: CPT

## 2024-03-27 PROCEDURE — 92507 TX SP LANG VOICE COMM INDIV: CPT

## 2024-03-27 PROCEDURE — 97535 SELF CARE MNGMENT TRAINING: CPT

## 2024-03-27 PROCEDURE — 2700000000 HC OXYGEN THERAPY PER DAY

## 2024-03-27 PROCEDURE — 85027 COMPLETE CBC AUTOMATED: CPT

## 2024-03-27 PROCEDURE — 83735 ASSAY OF MAGNESIUM: CPT

## 2024-03-27 PROCEDURE — 99233 SBSQ HOSP IP/OBS HIGH 50: CPT | Performed by: INTERNAL MEDICINE

## 2024-03-27 PROCEDURE — 99232 SBSQ HOSP IP/OBS MODERATE 35: CPT

## 2024-03-27 RX ORDER — SODIUM CHLORIDE 9 MG/ML
INJECTION, SOLUTION INTRAVENOUS CONTINUOUS
Status: ACTIVE | OUTPATIENT
Start: 2024-03-27 | End: 2024-03-27

## 2024-03-27 RX ORDER — IPRATROPIUM BROMIDE AND ALBUTEROL SULFATE 2.5; .5 MG/3ML; MG/3ML
1 SOLUTION RESPIRATORY (INHALATION)
Status: DISCONTINUED | OUTPATIENT
Start: 2024-03-27 | End: 2024-03-28

## 2024-03-27 RX ORDER — SODIUM CHLORIDE 9 MG/ML
INJECTION, SOLUTION INTRAVENOUS CONTINUOUS
Status: DISCONTINUED | OUTPATIENT
Start: 2024-03-27 | End: 2024-03-27

## 2024-03-27 RX ORDER — CLOPIDOGREL BISULFATE 75 MG/1
75 TABLET ORAL DAILY
Status: DISCONTINUED | OUTPATIENT
Start: 2024-03-27 | End: 2024-04-13 | Stop reason: HOSPADM

## 2024-03-27 RX ADMIN — ENOXAPARIN SODIUM 40 MG: 100 INJECTION SUBCUTANEOUS at 09:10

## 2024-03-27 RX ADMIN — IPRATROPIUM BROMIDE AND ALBUTEROL SULFATE 1 DOSE: .5; 3 SOLUTION RESPIRATORY (INHALATION) at 20:30

## 2024-03-27 RX ADMIN — SODIUM CHLORIDE: 9 INJECTION, SOLUTION INTRAVENOUS at 14:58

## 2024-03-27 RX ADMIN — GUAIFENESIN 1200 MG: 600 TABLET, EXTENDED RELEASE ORAL at 20:14

## 2024-03-27 RX ADMIN — LOSARTAN POTASSIUM 100 MG: 100 TABLET, FILM COATED ORAL at 09:10

## 2024-03-27 RX ADMIN — HYDRALAZINE HYDROCHLORIDE 10 MG: 20 INJECTION, SOLUTION INTRAMUSCULAR; INTRAVENOUS at 20:17

## 2024-03-27 RX ADMIN — POTASSIUM CHLORIDE 40 MEQ: 1500 TABLET, EXTENDED RELEASE ORAL at 06:05

## 2024-03-27 RX ADMIN — VENLAFAXINE HYDROCHLORIDE 75 MG: 75 CAPSULE, EXTENDED RELEASE ORAL at 09:10

## 2024-03-27 RX ADMIN — CEFTRIAXONE SODIUM 1000 MG: 1 INJECTION, POWDER, FOR SOLUTION INTRAMUSCULAR; INTRAVENOUS at 00:07

## 2024-03-27 RX ADMIN — SODIUM CHLORIDE, PRESERVATIVE FREE 10 ML: 5 INJECTION INTRAVENOUS at 20:15

## 2024-03-27 RX ADMIN — CLOPIDOGREL BISULFATE 75 MG: 75 TABLET ORAL at 12:17

## 2024-03-27 RX ADMIN — IPRATROPIUM BROMIDE AND ALBUTEROL SULFATE 1 DOSE: .5; 3 SOLUTION RESPIRATORY (INHALATION) at 13:25

## 2024-03-27 RX ADMIN — SODIUM CHLORIDE: 9 INJECTION, SOLUTION INTRAVENOUS at 09:20

## 2024-03-27 RX ADMIN — GUAIFENESIN 1200 MG: 600 TABLET, EXTENDED RELEASE ORAL at 09:10

## 2024-03-27 RX ADMIN — ASPIRIN 81 MG 81 MG: 81 TABLET ORAL at 10:34

## 2024-03-27 RX ADMIN — ATORVASTATIN CALCIUM 40 MG: 40 TABLET, FILM COATED ORAL at 09:10

## 2024-03-27 RX ADMIN — Medication 3 MG: at 00:25

## 2024-03-27 RX ADMIN — SODIUM CHLORIDE, PRESERVATIVE FREE 10 ML: 5 INJECTION INTRAVENOUS at 09:11

## 2024-03-27 ASSESSMENT — PAIN SCALES - GENERAL
PAINLEVEL_OUTOF10: 0

## 2024-03-27 NOTE — PLAN OF CARE
Adult  Goal: Achieves maximal functionality and self care  3/27/2024 1432 by Nan Ribeiro, RN  Outcome: Progressing  Flowsheets (Taken 3/27/2024 1432)  Achieves maximal functionality and self care:   Monitor swallowing and airway patency with patient fatigue and changes in neurological status   Encourage visually impaired, hearing impaired and aphasic patients to use assistive/communication devices   Encourage and assist patient to increase activity and self care with guidance from physical therapy/occupational therapy   Care plan reviewed with patient.  Patient verbalizes understanding of the plan of care and contribute to goal setting.

## 2024-03-27 NOTE — PLAN OF CARE
Problem: Discharge Planning  Goal: Discharge to home or other facility with appropriate resources  3/27/2024 0128 by Pati Lara RN  Outcome: Progressing  Flowsheets (Taken 3/27/2024 0128)  Discharge to home or other facility with appropriate resources:   Identify barriers to discharge with patient and caregiver   Arrange for needed discharge resources and transportation as appropriate     Problem: Safety - Adult  Goal: Free from fall injury  3/27/2024 0128 by Pati Lara RN  Outcome: Progressing  Flowsheets (Taken 3/27/2024 0128)  Free From Fall Injury: Instruct family/caregiver on patient safety     Problem: Chronic Conditions and Co-morbidities  Goal: Patient's chronic conditions and co-morbidity symptoms are monitored and maintained or improved  3/27/2024 0128 by Pati Lara RN  Outcome: Progressing  Flowsheets (Taken 3/27/2024 0128)  Care Plan - Patient's Chronic Conditions and Co-Morbidity Symptoms are Monitored and Maintained or Improved: Monitor and assess patient's chronic conditions and comorbid symptoms for stability, deterioration, or improvement     Problem: Pain  Goal: Verbalizes/displays adequate comfort level or baseline comfort level  3/27/2024 0128 by Pati Lara RN  Outcome: Progressing  Flowsheets (Taken 3/27/2024 0128)  Verbalizes/displays adequate comfort level or baseline comfort level: Encourage patient to monitor pain and request assistance     Problem: Skin/Tissue Integrity  Goal: Absence of new skin breakdown  Description: 1.  Monitor for areas of redness and/or skin breakdown  2.  Assess vascular access sites hourly  3.  Every 4-6 hours minimum:  Change oxygen saturation probe site  4.  Every 4-6 hours:  If on nasal continuous positive airway pressure, respiratory therapy assess nares and determine need for appliance change or resting period.  3/27/2024 0128 by Pati Lara, RN  Outcome: Progressing     Problem: Respiratory - Adult  Goal: Achieves  optimal ventilation and oxygenation  3/27/2024 0128 by Pati Lara RN  Outcome: Progressing  Flowsheets  Taken 3/27/2024 0128  Achieves optimal ventilation and oxygenation:   Assess for changes in respiratory status   Position to facilitate oxygenation and minimize respiratory effort  Taken 3/26/2024 1955  Achieves optimal ventilation and oxygenation:   Assess for changes in respiratory status   Assess for changes in mentation and behavior     Problem: Infection - Adult  Goal: Absence of infection at discharge  3/27/2024 0128 by Pati Lara RN  Outcome: Progressing  Flowsheets (Taken 3/27/2024 0128)  Absence of infection at discharge:   Assess and monitor for signs and symptoms of infection   Monitor lab/diagnostic results     Problem: Neurosensory - Adult  Goal: Achieves stable or improved neurological status  3/27/2024 0128 by Pati Lara RN  Outcome: Progressing  Flowsheets  Taken 3/27/2024 0128  Achieves stable or improved neurological status: Assess for and report changes in neurological status  Taken 3/26/2024 1955  Achieves stable or improved neurological status:   Assess for and report changes in neurological status   Monitor temperature, glucose, and sodium. Initiate appropriate interventions as ordered     Problem: Neurosensory - Adult  Goal: Achieves maximal functionality and self care  3/27/2024 0128 by Pati Lara RN  Outcome: Progressing  Flowsheets  Taken 3/27/2024 0128  Achieves maximal functionality and self care: Monitor swallowing and airway patency with patient fatigue and changes in neurological status  Taken 3/26/2024 1955  Achieves maximal functionality and self care: Monitor swallowing and airway patency with patient fatigue and changes in neurological status   Care plan reviewed with patient.  Patient verbalize understanding of the plan of care and contribute to goal setting.

## 2024-03-27 NOTE — PROGRESS NOTES
Report taken from nurse Nan    Head to Toe Assessment    [facility] Student Nurse  Head to Toe Assessment Performed at 0900  Skin  General skin appearance / Description: pink, warm and dry  Incisions / Wounds: none    Neuro/Head  LOC: A+O x 4  Speech: clear, appropriate, slow responses  Eyes PERRLA 4mm  Symmetry of face / tongue: yes  JVD of neck: none    Chest  Heart sounds / Apical Pulse: regular   Dyspnea: none  Lung sounds: clear  Cough: none    Abdomen/ Pelvis  Bowel sounds: X4 active  Passing flatus: yes  Palpation / Inspection: soft, non tender and round  Last BM: 3-27-24  Stool assessment: loose, green  Any GI issues: none  Urinary issues: new incontinence, was not present upon admission per pt.   Continence: both   Urine color / turbidity: clear, yellow    Extremities  Edema: none  Altered Sensation: present , denies numbness or tingling  Upper extremity push / pulls: strong and equal  Left Arm Radial Pulse: +3   Left Upper extremity Capillary Refill: <3 sec  Right Arm Radial Pulse: +3   Right Upper extremity Capillary Refill: <3 sec  Lower extremity pedal push / pulls: strong and equal  Left pedal pulse: +2 Left posterior tibialis pulse: +2  Left lower extremity capillary refill: <3 sec  Right pedal pulse: +2  Right posterior tibialis pulse: +2  Right lower extremity capillary refill: <3 sec  Drift of extremities: negative   Pain: 0/10    Other issues: Oxygen has been gradually reduced today. Pt tolerating well.     Reassessment done 1330 No changes.   Reported off to primary RN, Nan Flower, YO /

## 2024-03-27 NOTE — ADT AUTH CERT
0117(s)  03/23/24 2319(r)     IntraVENous      Whitehead, Melissa  117 New Bag 03/23/24 2130(s)  03/23/24 2153(a)  03/23/24 2153(r) 500 mL 247.9 mL/hr IntraVENous   121 Minutes  Whitehead, Melissa  118 Stopped 03/23/24 2354(s)  03/23/24 2316(a)  03/23/24 2316(r) 0 mL 0 mL/hr IntraVENous   121 Minutes  Whitehead, Melissa    Due (Stopped) 03/23/24 2354(s)  03/23/24 2153(r)     IntraVENous      Whitehead, Melissa  119 New Bag 03/26/24 1245(s)  03/26/24 1251(a)  03/26/24 1251(r)   50 mL/hr IntraVENous      Warnecke, Annie  120 Rate/Dose Change 03/26/24 2028(s)  03/26/24 2040(a)  03/26/24 2040(r)   20 mL/hr IntraVENous      Pati Lara  121 New Bag 03/23/24 2300(s)  03/23/24 2315(a)  03/23/24 2315(r)   100 mL/hr IntraVENous      Whitehead, Melissa  122 New Bag 03/24/24 1139(s)  03/24/24 1139(a)  03/24/24 1139(r)   100 mL/hr IntraVENous      Laumeyer, Billie  123 New Bag 03/24/24 2334(s)  03/24/24 2334(a)  03/24/24 2335(r)   100 mL/hr IntraVENous      Angelina, Jonalyn  124 New Bag 03/25/24 1123(s)  03/25/24 1123(a)  03/25/24 1124(r)   100 mL/hr IntraVENous      Warnecke, Annie  125 Stopped 03/25/24 1813(s)  03/25/24 1813(a)  03/25/24 1813(r) 0 mL/hr 0 mL/hr IntraVENous      Warnecke, Annie  126 New Bag 03/22/24 0330(s)  03/22/24 0350(a)  03/22/24 0350(r)   100 mL/hr IntraVENous      Whitehead, Melissa  127 Not Given 03/25/24 0400(s)  03/25/24 0810(a)  03/25/24 0810(r) 5 mg   Oral     Reason: Other Annie Mendez  128 Given 03/22/24 0900(s)  03/22/24 0923(a)  03/22/24 0923(r) 5 mg   Oral      Magana, Neyda G  129 Given 03/23/24 0900(s)  03/23/24 0744(a)  03/23/24 0744(r) 5 mg   Oral      Rascon, Bruna  130 Given 03/24/24 0900(s)  03/24/24 0909(a)  03/24/24 0926(r) 5 mg   Oral      Laumeyer, Billie  131 Given 03/22/24 0900(s)  03/22/24 0923(a)  03/22/24 0923(r) 40 mg   Oral      Magana, Neyda G  132 Given 03/23/24 0900(s)  03/23/24 0744(a)  03/23/24 0744(r) 40 mg   Oral      Rascon, Bruna  133 Given 03/24/24  Dose   Inhalation      Chantelle Abel  151 Given 03/23/24 1200(s)  03/23/24 1243(a)  03/23/24 1244(r) 1 Dose   Inhalation      Chantelle Abel  152 New Bag 03/24/24 1000(s)  03/24/24 1135(a)  03/24/24 1135(r) 500 mg 400 mL/hr IntraVENous   15 Minutes  Laumeyer, Billie  153 Stopped 03/24/24 1150(s)  03/24/24 1151(a)  03/24/24 1151(r) 0 mg 0 mL/hr IntraVENous   15 Minutes  Laumeyer, Billie    Due (Stopped) 03/24/24 1150(s)  03/24/24 1135(r)     IntraVENous      Laumeyer, Billie    (s)=scheduled time  (a)=action time  (r)=recorded time

## 2024-03-27 NOTE — PROGRESS NOTES
[facility]      Patient - Kalpana Braga   MRN -  [id number]   [facility] # - [id number]   - 1955      Date of Admission -  3/21/2024  8:53 PM  Date of evaluation -  3/27/2024  Room - --A   Hospital Day - 5  Consulting - Sulema Lagos MD Primary Care Physician - Tatiana Velasquez APRN - CNP     Problem List      Active Hospital Problems    Diagnosis Date Noted    CVA (cerebral vascular accident) (HCC) [I63.9] 2024    Dyspnea [R06.00] 2024    Acute hypoxemic respiratory failure (HCC) [J96.01] 2024    Hilar mass [R91.8] 2024    Pneumonia of right upper lobe due to infectious organism [J18.9] 2024     Reason for Consult    Right suprahilar mass   HPI   History Obtained From: Patient and electronic medical record.    Kalpana Braga is a 68 y.o. female with a past medical history of COPD, Hypertension, Hyperlipidemia, osteoarthritis, and anxiety/depression who presented to [facility] ED on 3/21/2024 with complaints of shortness of breath. Patient states she has has shortness of breath for the last 4 days which is worse with exertion. She also has had a productive cough with light yellow-green sputum. She has had chills as well without fevers. She is a current everyday smoker and smokes about a pack a day for 45 years. She does not wear any oxygen at baseline.     In the ED, patient was found to be saturating at 70% on room air. She was placed on 6L NC with saturations around 86 to 90%. Chest xray showed faint hazy opacities in the left midlung, possibly reflecting infiltrates. New consolidation versus atelectasis in right lung apex. CTA chest showed no pulmonary embolism. Hypodense 3.9 x 5.5 cm right suprahilar mass extending to the right mediastinum and subcarinal region, suspicious for neoplasm until proven otherwise. Complete right upper lung collapse secondary to the right suprahilar mass. Nonspecific patchy ground-glass  secondary to the right suprahilar   mass.  4. Nonspecific patchy ground-glass consolidations throughout the left   lung. Pulmonary edema is possible but can be followed to exclude atypical   infectious or inflammatory pneumonitis.  5. Dilatation of the central pulmonary artery suggesting a degree of   pulmonary arterial hypertension.            Assessment   -Acute hypoxic respiratory failure due to COPD exacerbation Vs right upper lobe collapse- on HFNC  -Right upper lobe collapse- ?  Endobronchial lesion Vs hilar lymphadenopathy  -Suspected COPD with acute exacerbation - No PFTs on file  -Post obstructive Vs Community-acquired pneumonia Vs atypical pneumonia  -3.9 x 5.5 cm right suprahilar mass - concerning for neoplasm  -Current tobacco use - 51 pack year history, current smoker of 1 PPD  -Suspected SDB-  -Obesity  Plan   -Monitor SpO2 wean supplemental O2 to maintain SpO2 >90%  -Discussed with patient regarding chest pain which she reports as resolved and recent stroke explained abnormal ct chest with suprahilar mass vs endobronchial lesion possible Cancer vs inflammatory vs infectious sources and testing available including EBUS watch and wait and repeat imaging risks and benefits discussed at end of discussion patient agreed to holding Plavix for 5 days accepting risk of stroke during that time period   -Will give Plavix today and hold starting tomorrow for EBUS Tuesday at 1400   OK to continue ASA and will hold Lovenox day of procedure   -Continue Mucinex 1200 mg twice daily   -Completed Zithromax  -Rocephin 1000 mg IV daily for total of 7 doses (3/22/24 to 3/28/24)  -Completed Prednisone  (3/22/24 to 3/25/24)  -Continue Duoneb every 4 hours while awake   -IS and Acapella - encouraged patient to use every hour while awake  -Neurology following.  -DVT prophylaxis Lovenox     Questions and concerns addressed.   Discussed the above with primary RN.    Electronically signed by   PAO Dominguez - CNP on

## 2024-03-27 NOTE — PROGRESS NOTES
Neurology Progress Note    Date:3/27/2024       Room:Counts include 234 beds at the [facility]26/026-A  Patient Name:Kalpana Braga     YOB: 1955     Age:68 y.o.    Requesting Physician: Sulema Lagos MD     Reason for Consult:  Evaluate for new onset expressive aphasia      Chief Complaint:   Chief Complaint   Patient presents with    Shortness of Breath       Subjective     Kalpana Braga is a 68 y.o. female with a history of hypertension, hyperlipidemia, COPD, anxiety, depression who presents to [facility] on 3/21/2024 for shortness of breath x 4 days.  She describes the shortness of breath as being worse with exertion.  Currently she is on high flow for comfort.  Yesterday around shift change she was noted to be confused, not knowing where she was at, and having difficulty getting her words out.  Her blood gas was within normal limits.  She was having difficulty naming the objects on the NIHSS.  A rapid response was called and then a code stroke was called around 2120. Her NIH was a 3 for LOC questions and best language. She did not have any focal weakness, numbness, vision changes, facial droop, or ataxia.  She was taken to CT where a CT of her head revealed no acute intracranial abnormalities.  CT angiogram head and neck read as an occlusion of the anterior branch of the left M2 segment of the MCA, which was thought to be artifact.  Her last known well was not clear therefore she was not a candidate for TNK.  There was concern for seizure and therefore she was given a loading dose of 1 g of Keppra load.  She was also started on aspirin 81 mg and Plavix 75 mg.  MRI brain without contrast ordered to further evaluate.  On exam today she is alert and oriented to person, place, time, and situation.  She is able to repeat on exam and name objects.  She does have intermittent difficulty with naming objects.  Her speech is mildly slurred but she does not have her teeth in this morning.  She does not have any focal

## 2024-03-27 NOTE — CONSULTS
Physical Medicine & Rehabilitation   Consult Note      Admitting Physician: Sulema Lagos MD    Primary Care Provider: Tatiana Velasquez, APRN - CNP     Reason for Consult:  CVA. Rehab needs    History of Present Illness:  Kalpana Braga is a 68 y.o. female admitted to [facility] on 3/21/2024. Patient  has a past medical history of COPD (chronic obstructive pulmonary disease) (HCC), Hyperlipidemia, Hypertension, Osteoarthritis, Smoker, and Urticaria.  Patient presented to [facility] ER due to shortness of breath. Patient had had progressive worsening of SOB for 4 days with some sputum.  In the ED initially saturating 70% on room air, escalated to 6 L nasal cannula saturating 86 to 90%.  Denied chest pain.  Chest x-ray showed faint hazy opacities in the left midlung, possibly reflecting infiltrates.  New consolidation versus atelectasis in right lung apex.  CTA chest shows no pulmonary emboli.  However has hypodense 3.9 x 5.5 cm right suprahilar mass extending to the right mediastinum and subcarinal region, suspicious for neoplasm until proven otherwise.  Complete right upper lung collapse secondary to right suprahilar mass. Patient reported no O2 needs at home and use of duonebs and albuterol. +active smoker. Patient was started on prednisone and IV ATB. Patient was admitted and required transition to HFNC 70%. Plan was made for EBUS in the following days.  On 3/23/34 patient was having difficulty finding her words and could no longer write. Stroke alert called, NIHSS 3. CT head and CTA head/neck ordered.  neurology recommended loading dose of Keppra 1000 mg followed by 500 mg twice daily.  Patient may start low-dose aspirin in the morning.  Patient given standard dose of aspirin and will receive SLP eval prior to renewing diet.  Neurochecks in place.  MRI completed and showed left MCA CVA. Patient was placed on minced and moist diet with 1:1 supervision. 3/26/24 patient with noted increase in    Problem Relation Age of Onset    Heart Disease Father        Review of Systems:  CONSTITUTIONAL:  positive for  fatigue  EYES:  negative for  double vision, blurred vision, blind spots, and visual disturbance  HEENT:  negative  RESPIRATORY:  positive for  cough with sputum and dyspnea  CARDIOVASCULAR:  negative for  chest pain, palpitations  GASTROINTESTINAL:  negative for nausea, vomiting, diarrhea, and constipation  GENITOURINARY:  negative  SKIN:  negative  HEMATOLOGIC/LYMPHATIC:  positive for swelling/edema  MUSCULOSKELETAL:  negative  NEUROLOGICAL:  positive for speech problems, gait problems, dysphagia, and weakness  BEHAVIOR/PSYCH:  negative  System review otherwise negative    Physical Exam:  BP (!) 133/93   Pulse 70   Temp 98.3 °F (36.8 °C) (Oral)   Resp 18   Ht 1.626 m (5' 4\")   Wt 88.5 kg (195 lb)   SpO2 96%   BMI 33.47 kg/m²   awake  Orientation:   person, place, time  Mood: within normal limits  Affect: calm  General appearance: Patient is well nourished, well developed, well groomed and in no acute distress, overweight    Memory:   normal with conversation,   Attention/Concentration: normal  Language:  abnormal - some word finding and aphasia    Cranial Nerves:  cranial nerves II-XII are grossly intact  ROM:  normal  Motor Exam:  Motor exam is symmetrical 4+ out of 5 all extremities bilaterally  Tone:  normal  Muscle bulk: within normal limits  Sensory:  Sensory intact  Coordination:   normal    Heart: normal rate, regular rhythm, normal S1, S2, no murmurs, rubs, clicks or gallops, no shortness of breath, extremities well perfused  Lungs: end expiratory wheeze in left base  Abdomen: soft, non-tender, non-distended, normal bowel sounds, no masses or organomegaly    Skin: warm and dry, no rash or erythema  Peripheral vascular: Pulses: Normal upper and lower extremity pulses; Edema: trace      Diagnostics:  Recent Results (from the past 24 hour(s))   Troponin    Collection Time: 03/26/24 11:14 AM

## 2024-03-27 NOTE — PROGRESS NOTES
[facility]  INPATIENT PHYSICAL THERAPY  DAILY NOTE  STRZ ICU STEPDOWN TELEMETRY 4K - 4K-26/026-A    Time In: 1201  Time Out: 1224  Timed Code Treatment Minutes: 23 Minutes  Minutes: 23          Date: 3/27/2024  Patient Name: Kalpana Braga,  Gender:  female        MRN: [id number]  : 1955  (68 y.o.)     Referring Practitioner: Marcus Ga MD  Diagnosis: lung mass  Additional Pertinent Hx: Per EMR \"Kalpana Braga is a 68 y.o. female active smoker with PMHx of COPD, HTN, depression, HLD, osteoarthritis who presents to [facility] with shortness of breath.  Patient reports starting  night she has been having shortness of breath worse with exertion.  Progressively worsening since .  She reports that she has been coughing up some light yellow-green sputum no fevers however she has had chills.  She denies any nausea or vomiting she denies being around anyone that is been sick.  She is a active smoker about a pack a day for 45 years.  No oxygen at baseline.  In the ED initially saturating 70% on room air, escalated to 6 L nasal cannula saturating 86 to 90%.  Denies any chest pain.\" code stroke 3/23.  CT angiogram head and neck read as an occlusion of the anterior branch of the left M2 segment of the MCA. MRI of brain without contrast shows acute infarct in the distribution of the left middle cerebral artery     Prior Level of Function:  Lives With: Spouse  Type of Home: House  Home Layout: Two level, Performs ADL's on one level  Home Access: Ramped entrance  Home Equipment: None   Bathroom Shower/Tub: Walk-in shower  Bathroom Toilet: Standard  Bathroom Equipment: Shower chair    ADL Assistance: Independent  Homemaking Assistance: Independent  Ambulation Assistance: Independent  Transfer Assistance: Independent  Active : Yes  Additional Comments: Pt reports being IND with functional tasks prior, no use of an AD. Unsure of accracy of information due to

## 2024-03-27 NOTE — PROGRESS NOTES
Internal Medicine Resident Progress Note    Name: Kalpana Braga, female, : 1955, MRN: [id number]    PCP: Tatiana Velasquez APRN - CNP    Date of Admission: 3/21/2024  Date of Service: Pt seen/examined on 24      Assessment/Plan:  Acute Hypoxic Respiratory Failure  - Secondary to right lung collapse with mass with concurrent postobstructive pneumonia  - Baseline RA  -  Escalated to HFNC; currently maintaining adequate oxygenation on supplemental oxygen via nasal cannula; titrate to maintain SpO2 90 to 96%  - Home O2 evaluation prior to discharge    Postobstructive Pneumonia  Chronic Obstructive Pulmonary Disease, with evidence of acute exacerbation  Tobacco Use Disorder  - 2024 CXR consolidation in the right lung apex  - 2024 CTA Chest W WO Contrast showing hypodense 3.9 x 5.5 cm right suprahilar mass extending to the right mediastinum and subcarinal region  - Bronchodilator regimen in place  - Azithromycin -; ceftriaxone -  - Prednisone -  - Molecular Pneumonia Panel, Respiratory Culture pending    Right Suprahilar Mass  - Identified on 2024 CTA Chest  - Pulmonology consulted: plan for EBUS  after ASA/Plavix washout with care coordination with Neurology    Acute CVA  - 2024 MRI Brain WO Contrast showing acute infarct in the distribution of the left middle cerebral artery  - Initial NIH on  3: LOC, questions, best language  - ASA/Plavix initiated   - Repeat CODE Stroke called  NIH 8 secondary to new onset aphasia, dysarthria, facial droop, unable to answer month and age  - Neurology consulted: symptoms suspected sequelae of previous stroke  - NIHSS q shift, SLP following, Neurovascular checks q4  - 2024 HbA1c 5.8%; Lipid Panel 103, Triglycerides 88, HDL 53, LDL 32    Noncardiac Chest Pain  Troponin Elevation  - Patient endorsing chest pain (substernal 5/10) without radiation that changes with pattern of breathing, not  There is antegrade flow in both distal vertebral arteries. Basilar artery: There is antegrade flow in the basilar artery. Superior cerebellar arteries: There is antegrade flow in the right and left superior cerebellar arteries.. Posterior cerebral arteries: There is antegrade flow in the right and left posterior cerebral arteries. No aneurysms, stenoses or occlusions are noted. The superior sagittal sinus, vein of Yo, internal cerebral veins, straight sinus, transverse sinuses and sigmoid sinuses are patent. Axial source data: There is an area of abnormal density in the left temporal lobe consistent a recent infarct. There is collapse in the right upper lobe anteriorly. There are areas of abnormal density in the left upper lobe of the lung. There is a possible 8.7 mm nodule in the left upper lobe.      1. There is no significant hemodynamic stenosis in the right and left common and internal carotid arteries. 2. There is antegrade flow in the right and left vertebral arteries. The left vertebral artery originates directly from the aortic arch. 3. There is atherosclerotic calcification in the aortic arch and at the origins of the left subclavian, left vertebral, left common carotid and brachiocephalic arteries from the aortic arch. 4. There is atherosclerotic calcification the cavernous segments of both internal carotid arteries. There appears no evidence of severe stenosis. 6. There is abnormal density in the left temporal lobe consistent with a recent infarct. 7. There is collapse in the right upper lobe of the lung. There are areas of abnormal density in the left upper lobe of the lung. **This report has been created using voice recognition software. It may contain minor errors which are inherent in voice recognition technology.** Final report electronically signed by DR EMMETT CHEN on 3/26/2024 12:29 PM    CT HEAD WO CONTRAST    Result Date: 3/26/2024  PROCEDURE: CT HEAD WO CONTRAST CLINICAL

## 2024-03-27 NOTE — PROGRESS NOTES
Spoke with patient explained briefly rehab philosophy. Inquired about patient medical coverage through [employer].  Patient states that she has retired in January and that she will no longer have [employer] after 3 months time.  Explained to her that it has been 3 months as it is currently 3/27/2024.  Patient states that she was told to \"keep the [employer] until the and of April\".  Patient does have a Traditional Medicare coverage per check of benefits.  Call made to insurance company for benefits check.  Phone .  Per benefits check the patient contract with insurance company coverage terms April 30, 2024.

## 2024-03-27 NOTE — PROGRESS NOTES
[facility]  INPATIENT SPEECH THERAPY  STRZ ICU STEPDOWN TELEMETRY 4K  DAILY NOTE    TIME   SLP Individual Minutes  Time In: 08  Time Out: 0857  Minutes: 35  Timed Code Treatment Minutes: 0 Minutes     Date: 3/27/2024  Patient Name: Kalpana Braga      CSN: [id number]   : 1955  (68 y.o.)  Gender: female   Referring Physician:  Chantelle Garcia MD   Diagnosis: Lung mass  Precautions: Fall risk, aspiration precautions  Current Diet: Minced and moist with thin liquids  Respiratory Status: Nasal Canula: 5LPM  Swallowing Strategies:  Full Upright Position, Small Bite/Sip, Medications Whole with Puree, Limit Distractions, and Monitor for Fatigue   Date of Last MBS/FEES:  FEES on 3/25/2024    Pain:  No pain reported.    Subjective: Visit approved by АЛЕКСАНДР [place]. Patient in bed,alert, and pleasant. Patient endorses continued word finding difficulties. Minced and moist breakfast tray present mid-way through session and utilized for skilled dietary analysis.      Short-Term Goals:  SHORT TERM GOAL #1:  Goal 1: Patient will safely consume a minced and moist diet (advanced textures as indicated) with thin liquids with direct 1:1 staff supervision to optimize safety with po intake via proper positioning and use of compensatory strategies to ensure adequate nutritional intake.   INTERVENTIONS:  Skilled dietary analysis completed utilizing minced and moist breakfast tray. Patient did not seem to enjoy the appearance of minced and moist tray and was hesitant to begin eating. ST reviewed importance for safe swallow strategies (full upright position, small bite/small sip, alternating liquids and solids. Patient still demonstrated at least mild impulsivity and needed frequent prompts to alternate liquids and solids. Overall good success with minced and moist meal tray, however, x2 coughing fit observed during meal. ?airway invasion event vs COPD exacerbation. Additionally, continue to assess need for MBS

## 2024-03-27 NOTE — PROGRESS NOTES
possibly reflecting infiltrates.  New consolidation versus atelectasis in right lung apex.  CTA chest shows no pulmonary emboli.  However has hypodense 3.9 x 5.5 cm right suprahilar mass extending to the right mediastinum and subcarinal region, suspicious for neoplasm until proven otherwise.  Complete right upper lung collapse secondary to right suprahilar mass.\"    -Update to HPI history: Patient reports onset around 2 weeks ago.  No shortness of breath on exertion prior to that.  Does also report frequent history of bronchitis.    3/24:Patient developed acute CVA as described in significant event note.  MRI does show acute infarct in subsegment of the left MCA.  Patient was still having some mild trouble with word finding this morning.  She was able to name objects I was touching.  There were no obvious focal neurological or sensory deficits.  Speech was garbled, but this is near her baseline as she wears dentures at home and does not have them.  Changes in management described above    Subjective (past 24 hours): Patient complained of chest pain yesterday with EKG negative for ischemic changes and troponins only mildly elevated and flat at 21, 29, 20, likely secondary to demand ischemia.  She denies any chest pain/pressure today.  Shortness of breath has not improved since yesterday.  She did have a recrudescence of strokelike symptoms yesterday with CT of the head showing abnormal density in the left temporal lobe consistent with recent infarct, CTA of the head showed no significant changes from previous, and CTA of the neck showing no significant changes from previous.  Patient continues to have trouble with expressive aphasia.  She is able to answer yes or no, but she is not able to provide complex answers to questions.  Per speech she is better able to communicate through writing if complex statements need to be made or questions need to be asked by the patient.      ROS: reviewed complete ROS unchanged  03/26/24  0518 03/27/24  0425    134* 133*   K 4.3 3.7 3.4*   CL 96* 94* 92*   CO2 26 31 26   BUN 8 7 11   CREATININE 0.7 0.6 0.6   CALCIUM 8.5 9.0 8.4*       No results for input(s): \"AST\", \"ALT\", \"BILIDIR\", \"BILITOT\", \"ALKPHOS\" in the last 72 hours.    No results for input(s): \"INR\" in the last 72 hours.  No results for input(s): \"TROPONINT\" in the last 72 hours.  No results for input(s): \"PROCAL\" in the last 72 hours.     Lab Results   Component Value Date/Time    NITRU NEGATIVE 03/24/2024 06:00 AM    WBCUA 15-25 03/24/2024 06:00 AM    BACTERIA NONE SEEN 03/24/2024 06:00 AM    RBCUA 0-2 03/24/2024 06:00 AM    BLOODU NEGATIVE 03/24/2024 06:00 AM    SPECGRAV 1.018 07/31/2016 03:07 PM    GLUCOSEU NEGATIVE 03/24/2024 06:00 AM       Radiology:  see assessment and plan for discussion of pertinent imaging.  (Use [id number] dot phrase for last 24 hrs)      DVT prophylaxis:    [x] Lovenox  [] SCDs  [] SQ Heparin  [] Encourage ambulation   [] Already on Anticoagulation  Diet: ADULT DIET; Dysphagia - Minced and Moist  Code Status: Full Code  PT/OT: Yes  Tele: Yes  IVF: No    Electronically signed by Marcus Ga MD PGY1 on 3/27/2024 at 1:23 PM    Case was discussed with Attending, Dr. Lagos

## 2024-03-27 NOTE — PROGRESS NOTES
[facility]  STRZ ICU STEPDOWN TELEMETRY 4K  Occupational Therapy  Daily Note  Time:   Time In: 1340  Time Out: 1415  Timed Code Treatment Minutes: 35 Minutes  Minutes: 35          Date: 3/27/2024  Patient Name: Kalpana Braga,   Gender: female      Room: [facility]26/026-A  MRN: [id number]  : 1955  (68 y.o.)  Referring Practitioner: Marcus Ga MD  Diagnosis: lung mass  Additional Pertinent Hx: Per EMR \"Kalpana Braga is a 68 y.o. female active smoker with PMHx of COPD, HTN, depression, HLD, osteoarthritis who presents to [facility] with shortness of breath.  Patient reports starting  night she has been having shortness of breath worse with exertion.  Progressively worsening since .  She reports that she has been coughing up some light yellow-green sputum no fevers however she has had chills.  She denies any nausea or vomiting she denies being around anyone that is been sick.  She is a active smoker about a pack a day for 45 years.  No oxygen at baseline.  In the ED initially saturating 70% on room air, escalated to 6 L nasal cannula saturating 86 to 90%.  Denies any chest pain.\" code stroke 3/23.  CT angiogram head and neck read as an occlusion of the anterior branch of the left M2 segment of the MCA. MRI of brain without contrast shows acute infarct in the distribution of the left middle cerebral artery    Restrictions/Precautions:  Restrictions/Precautions: General Precautions, Fall Risk  Position Activity Restriction  Other position/activity restrictions: aphasia (expressive >receptive), impulsive     Social/Functional History:  Lives With: Spouse  Type of Home: House  Home Layout: Two level, Performs ADL's on one level  Home Access: Ramped entrance  Home Equipment: None   Bathroom Shower/Tub: Walk-in shower  Bathroom Toilet: Standard  Bathroom Equipment: Shower chair       ADL Assistance: Independent  Homemaking Assistance: Independent  Ambulation Assistance:  Tolerance:  Patient tolerance of  treatment: Fair treatment tolerance      Discharge Recommendations: Inpatient Rehabilitation  Equipment Recommendations: Equipment Needed: No  Other: continue to monitor  Plan: Times Per Week: 5-6x  Current Treatment Recommendations: Strengthening, Balance training, Functional mobility training, Endurance training, Neuromuscular re-education, Safety education & training, Patient/Caregiver education & training, Equipment evaluation, education, & procurement, Self-Care / ADL    Education:  Learners: Patient  ADL's    Goals  Short Term Goals  Time Frame for Short Term Goals: until discharge  Short Term Goal 1: Patient will safely complete various functional transfers with SBA in prep for toileting and showering.  Short Term Goal 2: Patient will improve functional ambulation to household distance with SBA and LRAD and min cues for safety to improve safety in living environment.  Short Term Goal 3: Patient will complete BADL routine MOD I with use of energy conservation techniques as needed.  Short Term Goal 4: Patient will sequence 2-3 step task with supervision and min vcs for problem-solving in prep for meal prep and laundry engagement.  Long Term Goals  Time Frame for Long Term Goals : None due to ELOS    Following session, patient left in safe position with all fall risk precautions in place.      Occupational therapy session supervised and note reviewed by Tracie DE LUNA/ISAIAH

## 2024-03-27 NOTE — NURSE NAVIGATOR
Neuro Nurse Navigator Follow Up    This RN in to provide stroke education to patient. Pt laying in bed reading a book when this RN enters room. Introduced self and explained reason for visit. Pt agreeable to stroke education at this time. Education provided and all questions answered. Pt voiced understanding with education. Stroke folder left at bedside. Pt remains in bed with call light in reach.    Stroke Folder given.   What is Stroke/CVA  Signs and Symptoms of stroke (BEFAST)  Treatments for Stroke  Personal Risk Factors for Stroke discussed  Education--Call 911      Patient/family has been educated on their personal risk factors of:  HTN  HLD  Smoking-smoking cessation education provided.     They have been given hand outs on the following medications:( give handouts/attach to AVS)   asa  Plavix  Lipitor    Treatment for stroke includes:  Risk factor modifications  Following the medication regime prescribed by physician      Educated on FAST-Face-Arm-Speech-Time    A stroke is a brain attack.Stroke is a brain injury. It occurs when the brain's blood supply is interrupted. Blood carries oxygen and nutrients to the brain. Without oxygen and nutrients from blood, brain tissue starts to die rapidly. This can happen in less than 10 minutes.    A stroke occurs when blood flow to the brain is blocked (called ischemic stroke). This is caused by one of the following:   Sudden decreased blood flow   Damage to a blood vessel supplying blood to the brain can occur suddenly from either:   Injury   A clot that forms and breaks off from another part of the body (such as the heart or neck)   There are certain conditions which predispose people to form blood clots, such as:   Cancer   Pregnancy   Atrial fibrillation   Certain autoimmune diseases   Local blood clot   A build-up of fatty substances ( atherosclerotic plaque ) along the inner lining of the artery causes:   Narrowing of artery   Reduced elasticity   Local

## 2024-03-28 ENCOUNTER — APPOINTMENT (OUTPATIENT)
Dept: CT IMAGING | Age: 69
End: 2024-03-28
Payer: COMMERCIAL

## 2024-03-28 LAB
ANION GAP SERPL CALC-SCNC: 10 MEQ/L (ref 8–16)
ANION GAP SERPL CALC-SCNC: 12 MEQ/L (ref 8–16)
BUN SERPL-MCNC: 8 MG/DL (ref 7–22)
BUN SERPL-MCNC: 9 MG/DL (ref 7–22)
CALCIUM SERPL-MCNC: 8.4 MG/DL (ref 8.5–10.5)
CALCIUM SERPL-MCNC: 8.6 MG/DL (ref 8.5–10.5)
CHLORIDE SERPL-SCNC: 91 MEQ/L (ref 98–111)
CHLORIDE SERPL-SCNC: 92 MEQ/L (ref 98–111)
CO2 SERPL-SCNC: 26 MEQ/L (ref 23–33)
CO2 SERPL-SCNC: 27 MEQ/L (ref 23–33)
CREAT SERPL-MCNC: 0.6 MG/DL (ref 0.4–1.2)
CREAT SERPL-MCNC: 0.6 MG/DL (ref 0.4–1.2)
DEPRECATED RDW RBC AUTO: 44.2 FL (ref 35–45)
EKG ATRIAL RATE: 202 BPM
EKG ATRIAL RATE: 65 BPM
EKG ATRIAL RATE: 77 BPM
EKG P AXIS: 36 DEGREES
EKG P AXIS: 57 DEGREES
EKG P-R INTERVAL: 124 MS
EKG P-R INTERVAL: 126 MS
EKG Q-T INTERVAL: 400 MS
EKG Q-T INTERVAL: 418 MS
EKG Q-T INTERVAL: 448 MS
EKG QRS DURATION: 80 MS
EKG QRS DURATION: 84 MS
EKG QRS DURATION: 90 MS
EKG QTC CALCULATION (BAZETT): 452 MS
EKG QTC CALCULATION (BAZETT): 465 MS
EKG QTC CALCULATION (BAZETT): 466 MS
EKG R AXIS: -14 DEGREES
EKG R AXIS: 66 DEGREES
EKG R AXIS: 83 DEGREES
EKG T AXIS: 70 DEGREES
EKG T AXIS: 74 DEGREES
EKG T AXIS: 82 DEGREES
EKG VENTRICULAR RATE: 65 BPM
EKG VENTRICULAR RATE: 75 BPM
EKG VENTRICULAR RATE: 77 BPM
ERYTHROCYTE [DISTWIDTH] IN BLOOD BY AUTOMATED COUNT: 14.1 % (ref 11.5–14.5)
GFR SERPL CREATININE-BSD FRML MDRD: > 90 ML/MIN/1.73M2
GFR SERPL CREATININE-BSD FRML MDRD: > 90 ML/MIN/1.73M2
GLUCOSE SERPL-MCNC: 101 MG/DL (ref 70–108)
GLUCOSE SERPL-MCNC: 91 MG/DL (ref 70–108)
HCT VFR BLD AUTO: 39.8 % (ref 37–47)
HGB BLD-MCNC: 12.6 GM/DL (ref 12–16)
MAGNESIUM SERPL-MCNC: 1.6 MG/DL (ref 1.6–2.4)
MAGNESIUM SERPL-MCNC: 2.3 MG/DL (ref 1.6–2.4)
MCH RBC QN AUTO: 27.4 PG (ref 26–33)
MCHC RBC AUTO-ENTMCNC: 31.7 GM/DL (ref 32.2–35.5)
MCV RBC AUTO: 86.5 FL (ref 81–99)
OSMOLALITY SERPL: 272 MOSMOL/KG (ref 275–295)
OSMOLALITY UR: 400 MOSMOL/KG (ref 250–750)
PLATELET # BLD AUTO: 301 THOU/MM3 (ref 130–400)
PMV BLD AUTO: 10 FL (ref 9.4–12.4)
POTASSIUM SERPL-SCNC: 3.7 MEQ/L (ref 3.5–5.2)
POTASSIUM SERPL-SCNC: 3.7 MEQ/L (ref 3.5–5.2)
RBC # BLD AUTO: 4.6 MILL/MM3 (ref 4.2–5.4)
SODIUM SERPL-SCNC: 129 MEQ/L (ref 135–145)
SODIUM SERPL-SCNC: 129 MEQ/L (ref 135–145)
SODIUM UR-SCNC: 87 MEQ/L
WBC # BLD AUTO: 14.7 THOU/MM3 (ref 4.8–10.8)

## 2024-03-28 PROCEDURE — 2700000000 HC OXYGEN THERAPY PER DAY

## 2024-03-28 PROCEDURE — 97116 GAIT TRAINING THERAPY: CPT

## 2024-03-28 PROCEDURE — 99232 SBSQ HOSP IP/OBS MODERATE 35: CPT | Performed by: INTERNAL MEDICINE

## 2024-03-28 PROCEDURE — 6370000000 HC RX 637 (ALT 250 FOR IP)

## 2024-03-28 PROCEDURE — 85027 COMPLETE CBC AUTOMATED: CPT

## 2024-03-28 PROCEDURE — 6370000000 HC RX 637 (ALT 250 FOR IP): Performed by: HOSPITALIST

## 2024-03-28 PROCEDURE — 94640 AIRWAY INHALATION TREATMENT: CPT

## 2024-03-28 PROCEDURE — 6360000002 HC RX W HCPCS

## 2024-03-28 PROCEDURE — 93005 ELECTROCARDIOGRAM TRACING: CPT

## 2024-03-28 PROCEDURE — 83930 ASSAY OF BLOOD OSMOLALITY: CPT

## 2024-03-28 PROCEDURE — 2580000003 HC RX 258

## 2024-03-28 PROCEDURE — 84300 ASSAY OF URINE SODIUM: CPT

## 2024-03-28 PROCEDURE — 6360000004 HC RX CONTRAST MEDICATION: Performed by: NURSE PRACTITIONER

## 2024-03-28 PROCEDURE — 97530 THERAPEUTIC ACTIVITIES: CPT

## 2024-03-28 PROCEDURE — 94761 N-INVAS EAR/PLS OXIMETRY MLT: CPT

## 2024-03-28 PROCEDURE — 83935 ASSAY OF URINE OSMOLALITY: CPT

## 2024-03-28 PROCEDURE — 36415 COLL VENOUS BLD VENIPUNCTURE: CPT

## 2024-03-28 PROCEDURE — 99233 SBSQ HOSP IP/OBS HIGH 50: CPT | Performed by: HOSPITALIST

## 2024-03-28 PROCEDURE — 83735 ASSAY OF MAGNESIUM: CPT

## 2024-03-28 PROCEDURE — 87040 BLOOD CULTURE FOR BACTERIA: CPT

## 2024-03-28 PROCEDURE — 93010 ELECTROCARDIOGRAM REPORT: CPT | Performed by: INTERNAL MEDICINE

## 2024-03-28 PROCEDURE — 6370000000 HC RX 637 (ALT 250 FOR IP): Performed by: NURSE PRACTITIONER

## 2024-03-28 PROCEDURE — 74177 CT ABD & PELVIS W/CONTRAST: CPT

## 2024-03-28 PROCEDURE — 80048 BASIC METABOLIC PNL TOTAL CA: CPT

## 2024-03-28 PROCEDURE — 2060000000 HC ICU INTERMEDIATE R&B

## 2024-03-28 RX ORDER — MAGNESIUM SULFATE IN WATER 40 MG/ML
2000 INJECTION, SOLUTION INTRAVENOUS ONCE
Status: DISCONTINUED | OUTPATIENT
Start: 2024-03-28 | End: 2024-03-28 | Stop reason: SDUPTHER

## 2024-03-28 RX ORDER — PROCHLORPERAZINE EDISYLATE 5 MG/ML
10 INJECTION INTRAMUSCULAR; INTRAVENOUS ONCE
Status: COMPLETED | OUTPATIENT
Start: 2024-03-28 | End: 2024-03-28

## 2024-03-28 RX ORDER — ACETAMINOPHEN 325 MG/1
650 TABLET ORAL EVERY 4 HOURS PRN
Status: DISCONTINUED | OUTPATIENT
Start: 2024-03-28 | End: 2024-04-13 | Stop reason: HOSPADM

## 2024-03-28 RX ORDER — MAGNESIUM SULFATE IN WATER 40 MG/ML
2000 INJECTION, SOLUTION INTRAVENOUS ONCE
Status: COMPLETED | OUTPATIENT
Start: 2024-03-28 | End: 2024-03-28

## 2024-03-28 RX ORDER — ACETAMINOPHEN 650 MG/1
650 SUPPOSITORY RECTAL EVERY 4 HOURS PRN
Status: DISCONTINUED | OUTPATIENT
Start: 2024-03-28 | End: 2024-04-13 | Stop reason: HOSPADM

## 2024-03-28 RX ORDER — SODIUM CHLORIDE 9 MG/ML
INJECTION, SOLUTION INTRAVENOUS CONTINUOUS
Status: ACTIVE | OUTPATIENT
Start: 2024-03-28 | End: 2024-03-28

## 2024-03-28 RX ADMIN — METOPROLOL TARTRATE 25 MG: 25 TABLET, FILM COATED ORAL at 19:58

## 2024-03-28 RX ADMIN — SODIUM CHLORIDE: 9 INJECTION, SOLUTION INTRAVENOUS at 20:10

## 2024-03-28 RX ADMIN — ONDANSETRON 4 MG: 2 INJECTION INTRAMUSCULAR; INTRAVENOUS at 08:59

## 2024-03-28 RX ADMIN — CEFTRIAXONE SODIUM 1000 MG: 1 INJECTION, POWDER, FOR SOLUTION INTRAMUSCULAR; INTRAVENOUS at 00:34

## 2024-03-28 RX ADMIN — ATORVASTATIN CALCIUM 40 MG: 40 TABLET, FILM COATED ORAL at 08:51

## 2024-03-28 RX ADMIN — GUAIFENESIN 1200 MG: 600 TABLET, EXTENDED RELEASE ORAL at 08:51

## 2024-03-28 RX ADMIN — TIOTROPIUM BROMIDE AND OLODATEROL 2 PUFF: 3.124; 2.736 SPRAY, METERED RESPIRATORY (INHALATION) at 12:17

## 2024-03-28 RX ADMIN — MAGNESIUM SULFATE HEPTAHYDRATE 2000 MG: 40 INJECTION, SOLUTION INTRAVENOUS at 08:57

## 2024-03-28 RX ADMIN — SODIUM CHLORIDE, PRESERVATIVE FREE 10 ML: 5 INJECTION INTRAVENOUS at 08:51

## 2024-03-28 RX ADMIN — ENOXAPARIN SODIUM 40 MG: 100 INJECTION SUBCUTANEOUS at 08:51

## 2024-03-28 RX ADMIN — IOPAMIDOL 80 ML: 755 INJECTION, SOLUTION INTRAVENOUS at 18:17

## 2024-03-28 RX ADMIN — SODIUM CHLORIDE: 9 INJECTION, SOLUTION INTRAVENOUS at 11:49

## 2024-03-28 RX ADMIN — ACETAMINOPHEN 650 MG: 650 SUPPOSITORY RECTAL at 11:37

## 2024-03-28 RX ADMIN — ASPIRIN 81 MG 81 MG: 81 TABLET ORAL at 08:51

## 2024-03-28 RX ADMIN — GUAIFENESIN 1200 MG: 600 TABLET, EXTENDED RELEASE ORAL at 19:58

## 2024-03-28 RX ADMIN — IPRATROPIUM BROMIDE AND ALBUTEROL SULFATE 1 DOSE: .5; 3 SOLUTION RESPIRATORY (INHALATION) at 06:46

## 2024-03-28 RX ADMIN — LOSARTAN POTASSIUM 100 MG: 100 TABLET, FILM COATED ORAL at 08:51

## 2024-03-28 RX ADMIN — VENLAFAXINE HYDROCHLORIDE 75 MG: 75 CAPSULE, EXTENDED RELEASE ORAL at 08:51

## 2024-03-28 RX ADMIN — PROCHLORPERAZINE EDISYLATE 10 MG: 5 INJECTION INTRAMUSCULAR; INTRAVENOUS at 13:30

## 2024-03-28 ASSESSMENT — PAIN SCALES - GENERAL
PAINLEVEL_OUTOF10: 0

## 2024-03-28 NOTE — PLAN OF CARE
Problem: Discharge Planning  Goal: Discharge to home or other facility with appropriate resources  3/27/2024 1432 by Nan Ribeiro RN  Outcome: Progressing  Flowsheets (Taken 3/27/2024 1432)  Discharge to home or other facility with appropriate resources:   Identify barriers to discharge with patient and caregiver   Arrange for needed discharge resources and transportation as appropriate   Identify discharge learning needs (meds, wound care, etc)     Problem: Safety - Adult  Goal: Free from fall injury  3/27/2024 2204 by Stephanie Mena RN  Outcome: Progressing  3/27/2024 1432 by Nan Ribeiro RN  Outcome: Progressing  Flowsheets (Taken 3/27/2024 1432)  Free From Fall Injury: Instruct family/caregiver on patient safety     Problem: Chronic Conditions and Co-morbidities  Goal: Patient's chronic conditions and co-morbidity symptoms are monitored and maintained or improved  3/27/2024 1432 by Nan Ribeiro RN  Outcome: Progressing  Flowsheets (Taken 3/27/2024 1432)  Care Plan - Patient's Chronic Conditions and Co-Morbidity Symptoms are Monitored and Maintained or Improved:   Monitor and assess patient's chronic conditions and comorbid symptoms for stability, deterioration, or improvement   Collaborate with multidisciplinary team to address chronic and comorbid conditions and prevent exacerbation or deterioration   Update acute care plan with appropriate goals if chronic or comorbid symptoms are exacerbated and prevent overall improvement and discharge     Problem: Pain  Goal: Verbalizes/displays adequate comfort level or baseline comfort level  3/27/2024 2204 by Stephanie Mena RN  Outcome: Progressing  3/27/2024 1432 by Nan Ribeiro RN  Outcome: Progressing  Flowsheets (Taken 3/27/2024 1432)  Verbalizes/displays adequate comfort level or baseline comfort level:   Encourage patient to monitor pain and request assistance   Assess pain using appropriate pain scale     Problem: Skin/Tissue

## 2024-03-28 NOTE — PLAN OF CARE
Problem: Respiratory - Adult  Goal: Clear lung sounds  Outcome: Progressing   Receiving nebulizer treatments to improve aeration. Will continue with therapies as ordered.    Patient mutually agreed on goals.

## 2024-03-28 NOTE — PROGRESS NOTES
[facility]  OCCUPATIONAL THERAPY MISSED TREATMENT NOTE  STRZ ICU STEPDOWN TELEMETRY 4K  4K-26/026-A      Date: 3/28/2024  Patient Name: Kalpana Braga        CSN: [id number]   : 1955  (68 y.o.)  Gender: female   Referring Practitioner: Marcus Ga MD  Diagnosis: lung mass         REASON FOR MISSED TREATMENT: Hold Treatment per Nursing patient nauseated, and noted to have increased temp. Will attempt back as time allows.

## 2024-03-28 NOTE — PROGRESS NOTES
Internal Medicine Resident Progress Note    Name: Kalpana Braga, female, : 1955, MRN: [id number]    PCP: Tatiana Velasquez, PAO - CNP    Date of Admission: 3/21/2024  Date of Service: Pt seen/examined on 24      Assessment/Plan:    Nausea, vomiting, diarrhea-suspected secondary to viral illness: Patient developed new onset nausea vomiting after eating this morning.  On further questioning she had been having diarrhea and fatigue for the past day or two.  She also developed a fever of 101 during the course of the day.  Due to sudden onset of symptoms infectious illness is suspected.  Patient does not have leukocytosis increased from previous.  Will currently continue with supportive care.  Patient denies chest pain, chest pressure, shortness of breath, abdominal pain, pain with defecation, along with any other clinical changes such as described above.  ACS is unlikely at this time   -Nursing to document all stool   -Tylenol for fever   -Continue IV fluid   -Zofran for nausea vomiting   -Blood cultures collected    Moderate hyponatremia-likely secondary to increased output from diarrhea and vomiting along with poor oral intake: Sodium 129, down from 133 yesterday.  Likely secondary to increased output from diarrhea and poor oral intake.  Vomiting may also be contributing now.   -Normal saline at 100 mL/h   -Serum osmolality, urine sodium, urine osmolality labs ordered    New CVA (3/23): Patient developed expressive aphasia and difficulty with writing on 3/24.  Per reports from people who understands and she was also having trouble identifying her current location and appeared confused.  CT of the head showed no acute bleed.  CTA of the neck was negative.  CTA of the head showed a small possible occlusion of M2 segment of the left MCA.  MRI on the morning of 3/24 showed increase in brightness in the affected area on diffusion-weighted imaging along with decrease in ADC corroborating an acute infarct  SCDs  [] SQ Heparin  [] Encourage ambulation   [] Already on Anticoagulation  Diet: ADULT DIET; Dysphagia - Minced and Moist  Code Status: Full Code  PT/OT: Yes  Tele: Yes  IVF: No    Electronically signed by Marcus Ga MD PGY1 on 3/28/2024 at 7:47 AM    Case was discussed with Attending, Dr. Lagos

## 2024-03-28 NOTE — PROGRESS NOTES
[facility]  INPATIENT PHYSICAL THERAPY  DAILY NOTE  STRZ ICU STEPDOWN TELEMETRY 4K - 4K-26/026-A    Time In: 0755  Time Out: 0836  Timed Code Treatment Minutes: 41 Minutes  Minutes: 41          Date: 3/28/2024  Patient Name: Kalpana Braga,  Gender:  female        MRN: [id number]  : 1955  (68 y.o.)     Referring Practitioner: Marcus Ga MD  Diagnosis: lung mass  Additional Pertinent Hx: Per EMR \"Kalpana Braga is a 68 y.o. female active smoker with PMHx of COPD, HTN, depression, HLD, osteoarthritis who presents to [facility] with shortness of breath.  Patient reports starting  night she has been having shortness of breath worse with exertion.  Progressively worsening since .  She reports that she has been coughing up some light yellow-green sputum no fevers however she has had chills.  She denies any nausea or vomiting she denies being around anyone that is been sick.  She is a active smoker about a pack a day for 45 years.  No oxygen at baseline.  In the ED initially saturating 70% on room air, escalated to 6 L nasal cannula saturating 86 to 90%.  Denies any chest pain.\" code stroke 3/23.  CT angiogram head and neck read as an occlusion of the anterior branch of the left M2 segment of the MCA. MRI of brain without contrast shows acute infarct in the distribution of the left middle cerebral artery     Prior Level of Function:  Lives With: Spouse  Type of Home: House  Home Layout: Two level, Performs ADL's on one level  Home Access: Ramped entrance  Home Equipment: None   Bathroom Shower/Tub: Walk-in shower  Bathroom Toilet: Standard  Bathroom Equipment: Shower chair    ADL Assistance: Independent  Homemaking Assistance: Independent  Ambulation Assistance: Independent  Transfer Assistance: Independent  Active : Yes  Additional Comments: Pt reports being IND with functional tasks prior, no use of an AD. Unsure of accracy of information due to  from another person.  Education provided regarding stroke rehabilitation management.    ASSESSMENT:  Assessment: Patient progressing toward established goals. Requires cues for pursed lip breathing, energy conservation techniques and safety awareness secondary impulsive behavior at times. Patient became vomited at end of treatment, nursing notified   Activity Tolerance:  Patient tolerance of  treatment: fair.      Equipment Recommendations:Equipment Needed: Yes (RW, w/c?)  Discharge Recommendations: Continue to assess pending progress, 24 hour assistance or supervision, Patient would benefit from continued PT at discharge, and Inpatient Therapy Stay  Plan: Current Treatment Recommendations: Strengthening, Balance training, Functional mobility training, Endurance training, Transfer training, Gait training, Safety education & training, Positioning, Equipment evaluation, education, & procurement, Therapeutic activities, Patient/Caregiver education & training, Stair training, Neuromuscular re-education, Home exercise program  General Plan:  (6x N)    Education  Education:  Learners: Patient  Patient Education: Plan of Care, Bed Mobility, Transfers, Gait    Goals:  Patient Goals : return home  Short Term Goals  Time Frame for Short Term Goals: by discharge  Short Term Goal 1: Pt will demo supine to/from sit transfers with IND with bed flat to progress with mobility.  Short Term Goal 2: Pt will demo S for sit to/from stand transfers with LRAD to progress with mobility.  Short Term Goal 3: Pt will amb for 40 feet with LRAD with SBA to progress with mobility.  Short Term Goal 4: Will complete functional outcome assessment when able.  Long Term Goals  Time Frame for Long Term Goals : NA due to short ELOS    Following session, patient left in safe position with all fall risk precautions in place.

## 2024-03-28 NOTE — PROGRESS NOTES
[facility]      Patient - Kalpana Braga   MRN -  [id number]   [facility] # - [id number]   - 1955      Date of Admission -  3/21/2024  8:53 PM  Date of evaluation -  3/28/2024  Room - -026-   Hospital Day - 6  Consulting - Sulema Lagos MD Primary Care Physician - Tatiana Velasquez APRN - CNP     Problem List      Active Hospital Problems    Diagnosis Date Noted    CVA (cerebral vascular accident) (HCC) [I63.9] 2024    Dyspnea [R06.00] 2024    Acute hypoxemic respiratory failure (HCC) [J96.01] 2024    Hilar mass [R91.8] 2024    Pneumonia of right upper lobe due to infectious organism [J18.9] 2024     Reason for Consult    Right suprahilar mass   HPI   History Obtained From: Patient and electronic medical record.    Kalpana Braga is a 68 y.o. female with a past medical history of COPD, Hypertension, Hyperlipidemia, osteoarthritis, and anxiety/depression who presented to [facility] ED on 3/21/2024 with complaints of shortness of breath. Patient states she has has shortness of breath for the last 4 days which is worse with exertion. She also has had a productive cough with light yellow-green sputum. She has had chills as well without fevers. She is a current everyday smoker and smokes about a pack a day for 45 years. She does not wear any oxygen at baseline.     In the ED, patient was found to be saturating at 70% on room air. She was placed on 6L NC with saturations around 86 to 90%. Chest xray showed faint hazy opacities in the left midlung, possibly reflecting infiltrates. New consolidation versus atelectasis in right lung apex. CTA chest showed no pulmonary embolism. Hypodense 3.9 x 5.5 cm right suprahilar mass extending to the right mediastinum and subcarinal region, suspicious for neoplasm until proven otherwise. Complete right upper lung collapse secondary to the right suprahilar mass. Nonspecific patchy ground-glass  Acapella - encouraged patient to use every hour while awake  -Neurology following.  -DVT prophylaxis Lovenox     Questions and concerns addressed.   Discussed the above with primary RN.    Electronically signed by   PAO Dominguez - CNP on 3/28/2024 at 10:07 AM    Addendum by Dr. Philippe MD:  Patient seen by me independently including key components of medical care. Face to face evaluation and examination was performed. Case discussed with . Steven Santo CNP. Agree with Certified nurse practitioner's findings and plan as documented in the Certified nurse practitioner's note. Italicized font, if present,  represents changes to the note made by me. More than 50% of the encounter time involved with patient care by the Pulmonary & Critical care service team spent by me.    Please see my modifications mentioned below:  She is not in distress  On 3 L of oxygen on nasal cannula  Plavix 75milligrams p.o. daily is on hold.  Plan for EBUS procedure next week Tuesday  Kalpana Braga educated about my impression and plan. She verbalizes understanding.      Electronically signed by   Lois Paez MD on 3/28/2024 at 7:54 PM

## 2024-03-28 NOTE — PROGRESS NOTES
Patient is appropriate for rehab; intend to admit patient when medical work up complete. Patient does require precert for post acute care.  Patient does require 30 day event monitor prior to discharge.

## 2024-03-28 NOTE — PLAN OF CARE
Problem: Respiratory - Adult  Goal: Clear lung sounds  3/28/2024 0649 by Soha Lubin, P  Outcome: Progressing    Patient lung sounds are considered normal for their current lung condition. No signs of distress noted. Current treatment regimen appropriate      Patient mutually agreed on goals.

## 2024-03-28 NOTE — CARE COORDINATION
3/28/24, 4:11 PM EDT    DISCHARGE ON GOING EVALUATION    Kalpana DIETRICH [facility] day: 6  [facility]: [facility]- Reason for admit: Lung mass [R91.8]  Dyspnea [R06.00]  Acute hypoxemic respiratory failure (HCC) [J96.01]   Procedure: : 3-24-34 MRI Brain  IMPRESSION:  1. Acute infarct in the distribution of the left middle cerebral artery.  2. Mild atrophy and probable ischemic changes in the white matter.  3. Mild inflammatory changes in the left maxillary sinus.     3-21-24 CTA Chest  CTA CHEST W WO CONTRAST PE Eval   Final Result   1. No pulmonary emboli.   2. Hypodense 3.9 x 5.5 cm right suprahilar mass extending to the right    mediastinum and subcarinal region, suspicious for neoplasm until proven    otherwise.   3. Complete right upper lung collapse secondary to the right suprahilar    mass.   4. Nonspecific patchy ground-glass consolidations throughout the left    lung. Pulmonary edema is     Barriers to Discharge: Pulmonary following. Planning EBUS on 4-2. Hold Plavix as of today for washout for EBUS. Neurology has signed off. Pt to follow up in 2-4 wks. PT/OT following. IPR coordinator following for rehab readiness.   PCP: Tatiana Velasquez, APRN - CNP  Readmission Risk Score: 15.8%  Patient Goals/Plan/Treatment Preferences: From home alone. Possible IPR when medically ready.

## 2024-03-29 ENCOUNTER — APPOINTMENT (OUTPATIENT)
Dept: GENERAL RADIOLOGY | Age: 69
End: 2024-03-29
Payer: COMMERCIAL

## 2024-03-29 LAB
ANION GAP SERPL CALC-SCNC: 8 MEQ/L (ref 8–16)
BUN SERPL-MCNC: 9 MG/DL (ref 7–22)
CALCIUM SERPL-MCNC: 8 MG/DL (ref 8.5–10.5)
CHLORIDE SERPL-SCNC: 94 MEQ/L (ref 98–111)
CO2 SERPL-SCNC: 28 MEQ/L (ref 23–33)
CREAT SERPL-MCNC: 0.7 MG/DL (ref 0.4–1.2)
DEPRECATED RDW RBC AUTO: 46.3 FL (ref 35–45)
ERYTHROCYTE [DISTWIDTH] IN BLOOD BY AUTOMATED COUNT: 14.4 % (ref 11.5–14.5)
GFR SERPL CREATININE-BSD FRML MDRD: > 90 ML/MIN/1.73M2
GLUCOSE SERPL-MCNC: 84 MG/DL (ref 70–108)
HCT VFR BLD AUTO: 35.5 % (ref 37–47)
HGB BLD-MCNC: 11.1 GM/DL (ref 12–16)
MAGNESIUM SERPL-MCNC: 2 MG/DL (ref 1.6–2.4)
MCH RBC QN AUTO: 27.4 PG (ref 26–33)
MCHC RBC AUTO-ENTMCNC: 31.3 GM/DL (ref 32.2–35.5)
MCV RBC AUTO: 87.7 FL (ref 81–99)
PLATELET # BLD AUTO: 278 THOU/MM3 (ref 130–400)
PMV BLD AUTO: 10.4 FL (ref 9.4–12.4)
POTASSIUM SERPL-SCNC: 3.9 MEQ/L (ref 3.5–5.2)
RBC # BLD AUTO: 4.05 MILL/MM3 (ref 4.2–5.4)
SODIUM SERPL-SCNC: 130 MEQ/L (ref 135–145)
WBC # BLD AUTO: 8.6 THOU/MM3 (ref 4.8–10.8)

## 2024-03-29 PROCEDURE — 6370000000 HC RX 637 (ALT 250 FOR IP)

## 2024-03-29 PROCEDURE — 83735 ASSAY OF MAGNESIUM: CPT

## 2024-03-29 PROCEDURE — 99232 SBSQ HOSP IP/OBS MODERATE 35: CPT | Performed by: INTERNAL MEDICINE

## 2024-03-29 PROCEDURE — 6360000002 HC RX W HCPCS

## 2024-03-29 PROCEDURE — 97530 THERAPEUTIC ACTIVITIES: CPT

## 2024-03-29 PROCEDURE — 80048 BASIC METABOLIC PNL TOTAL CA: CPT

## 2024-03-29 PROCEDURE — 2060000000 HC ICU INTERMEDIATE R&B

## 2024-03-29 PROCEDURE — 97116 GAIT TRAINING THERAPY: CPT

## 2024-03-29 PROCEDURE — 2700000000 HC OXYGEN THERAPY PER DAY

## 2024-03-29 PROCEDURE — 36415 COLL VENOUS BLD VENIPUNCTURE: CPT

## 2024-03-29 PROCEDURE — 94761 N-INVAS EAR/PLS OXIMETRY MLT: CPT

## 2024-03-29 PROCEDURE — 2580000003 HC RX 258

## 2024-03-29 PROCEDURE — 82570 ASSAY OF URINE CREATININE: CPT

## 2024-03-29 PROCEDURE — 85027 COMPLETE CBC AUTOMATED: CPT

## 2024-03-29 PROCEDURE — 99233 SBSQ HOSP IP/OBS HIGH 50: CPT | Performed by: HOSPITALIST

## 2024-03-29 PROCEDURE — 71045 X-RAY EXAM CHEST 1 VIEW: CPT

## 2024-03-29 PROCEDURE — 94640 AIRWAY INHALATION TREATMENT: CPT

## 2024-03-29 RX ORDER — SODIUM CHLORIDE 9 MG/ML
INJECTION, SOLUTION INTRAVENOUS CONTINUOUS
Status: ACTIVE | OUTPATIENT
Start: 2024-03-29 | End: 2024-03-30

## 2024-03-29 RX ADMIN — GUAIFENESIN 1200 MG: 600 TABLET, EXTENDED RELEASE ORAL at 08:00

## 2024-03-29 RX ADMIN — LOSARTAN POTASSIUM 100 MG: 100 TABLET, FILM COATED ORAL at 08:00

## 2024-03-29 RX ADMIN — SODIUM CHLORIDE: 9 INJECTION, SOLUTION INTRAVENOUS at 19:49

## 2024-03-29 RX ADMIN — SODIUM CHLORIDE, PRESERVATIVE FREE 10 ML: 5 INJECTION INTRAVENOUS at 08:01

## 2024-03-29 RX ADMIN — TIOTROPIUM BROMIDE AND OLODATEROL 2 PUFF: 3.124; 2.736 SPRAY, METERED RESPIRATORY (INHALATION) at 10:23

## 2024-03-29 RX ADMIN — ATORVASTATIN CALCIUM 40 MG: 40 TABLET, FILM COATED ORAL at 08:00

## 2024-03-29 RX ADMIN — ASPIRIN 81 MG 81 MG: 81 TABLET ORAL at 08:00

## 2024-03-29 RX ADMIN — ENOXAPARIN SODIUM 40 MG: 100 INJECTION SUBCUTANEOUS at 08:00

## 2024-03-29 RX ADMIN — SODIUM CHLORIDE: 9 INJECTION, SOLUTION INTRAVENOUS at 18:03

## 2024-03-29 RX ADMIN — SODIUM CHLORIDE, PRESERVATIVE FREE 10 ML: 5 INJECTION INTRAVENOUS at 19:52

## 2024-03-29 RX ADMIN — GUAIFENESIN 1200 MG: 600 TABLET, EXTENDED RELEASE ORAL at 19:45

## 2024-03-29 RX ADMIN — VENLAFAXINE HYDROCHLORIDE 75 MG: 75 CAPSULE, EXTENDED RELEASE ORAL at 08:00

## 2024-03-29 ASSESSMENT — PAIN SCALES - GENERAL
PAINLEVEL_OUTOF10: 0
PAINLEVEL_OUTOF10: 2
PAINLEVEL_OUTOF10: 0

## 2024-03-29 NOTE — PLAN OF CARE
Problem: Respiratory - Adult  Goal: Achieves optimal ventilation and oxygenation  Outcome: Progressing     Problem: Neurosensory - Adult  Goal: Achieves stable or improved neurological status  Outcome: Progressing  Goal: Achieves maximal functionality and self care  Outcome: Progressing

## 2024-03-29 NOTE — PROGRESS NOTES
[facility] for Pulmonary, Sleep and Critical Care Medicine      Patient - Kalpana Braga   MRN -  [id number]   [facility] # - [id number]   - 1955      Date of Admission -  3/21/2024  8:53 PM  Date of evaluation -  3/29/2024  Room - -026-   Hospital Day - 7  Consulting - Sulema Lagos MD Primary Care Physician - Tatiana Velasquez, PAO - CNP     Problem List      Active Hospital Problems    Diagnosis Date Noted    CVA (cerebral vascular accident) (HCC) [I63.9] 2024    Dyspnea [R06.00] 2024    Acute hypoxemic respiratory failure (HCC) [J96.01] 2024    Lung mass [R91.8] 2024    Pneumonia of both lungs due to infectious organism [J18.9] 2024     Reason for Consult    Right suprahilar mass   HPI   History Obtained From: Patient and electronic medical record.    Kalpana Braga is a 68 y.o. female with a past medical history of COPD, Hypertension, Hyperlipidemia, osteoarthritis, and anxiety/depression who presented to [facility] ED on 3/21/2024 with complaints of shortness of breath. Patient states she has has shortness of breath for the last 4 days which is worse with exertion. She also has had a productive cough with light yellow-green sputum. She has had chills as well without fevers. She is a current everyday smoker and smokes about a pack a day for 45 years. She does not wear any oxygen at baseline.     In the ED, patient was found to be saturating at 70% on room air. She was placed on 6L NC with saturations around 86 to 90%. Chest xray showed faint hazy opacities in the left midlung, possibly reflecting infiltrates. New consolidation versus atelectasis in right lung apex. CTA chest showed no pulmonary embolism. Hypodense 3.9 x 5.5 cm right suprahilar mass extending to the right mediastinum and subcarinal region, suspicious for neoplasm until proven otherwise. Complete right upper lung collapse secondary to the right suprahilar mass. Nonspecific patchy ground-glass consolidations

## 2024-03-29 NOTE — PROGRESS NOTES
Will continue to follow clinical course and therapy progress.  Will communicate with CM team on Monday 4/1 regarding rehab referral.

## 2024-03-29 NOTE — PROGRESS NOTES
[facility]  INPATIENT PHYSICAL THERAPY  DAILY NOTE  STRZ ICU STEPDOWN TELEMETRY 4K - 4K-26/026-A    Time In: 0900  Time Out: 0954  Timed Code Treatment Minutes: 54 Minutes  Minutes: 54          Date: 3/29/2024  Patient Name: Kalpana Braga,  Gender:  female        MRN: [id number]  : 1955  (68 y.o.)     Referring Practitioner: Marcus Ga MD  Diagnosis: lung mass  Additional Pertinent Hx: Per EMR \"Kalpana Braga is a 68 y.o. female active smoker with PMHx of COPD, HTN, depression, HLD, osteoarthritis who presents to [facility] with shortness of breath.  Patient reports starting  night she has been having shortness of breath worse with exertion.  Progressively worsening since .  She reports that she has been coughing up some light yellow-green sputum no fevers however she has had chills.  She denies any nausea or vomiting she denies being around anyone that is been sick.  She is a active smoker about a pack a day for 45 years.  No oxygen at baseline.  In the ED initially saturating 70% on room air, escalated to 6 L nasal cannula saturating 86 to 90%.  Denies any chest pain.\" code stroke 3/23.  CT angiogram head and neck read as an occlusion of the anterior branch of the left M2 segment of the MCA. MRI of brain without contrast shows acute infarct in the distribution of the left middle cerebral artery     Prior Level of Function:  Lives With: Spouse  Type of Home: House  Home Layout: Two level, Performs ADL's on one level  Home Access: Ramped entrance  Home Equipment: None   Bathroom Shower/Tub: Walk-in shower  Bathroom Toilet: Standard  Bathroom Equipment: Shower chair    ADL Assistance: Independent  Homemaking Assistance: Independent  Ambulation Assistance: Independent  Transfer Assistance: Independent  Active : Yes  Additional Comments: Pt reports being IND with functional tasks prior, no use of an AD. Unsure of accracy of information due to  assistance (SBA/CGA).   Patient could not live alone but could walk from one room to another without physical help from another person.  Education provided regarding stroke rehabilitation management.    ASSESSMENT:  Assessment: Patient progressing toward established goals. Impulsive at times and requires frequent rest breaks secondary to SOB. Extended period of time to complete tasks   Activity Tolerance:  Patient tolerance of  treatment: fair.      Equipment Recommendations:Equipment Needed: Yes (RW, w/c?)  Discharge Recommendations: Continue to assess pending progress, 24 hour assistance or supervision, Patient would benefit from continued PT at discharge, and Inpatient Therapy Stay   Plan: Current Treatment Recommendations: Strengthening, Balance training, Functional mobility training, Endurance training, Transfer training, Gait training, Safety education & training, Positioning, Equipment evaluation, education, & procurement, Therapeutic activities, Patient/Caregiver education & training, Stair training, Neuromuscular re-education, Home exercise program  General Plan:  (6x N)    Education  Education:  Learners: Patient  Patient Education: Plan of Care, Bed Mobility, Transfers, Gait    Goals:  Patient Goals : return home  Short Term Goals  Time Frame for Short Term Goals: by discharge  Short Term Goal 1: Pt will demo supine to/from sit transfers with IND with bed flat to progress with mobility.  Short Term Goal 2: Pt will demo S for sit to/from stand transfers with LRAD to progress with mobility.  Short Term Goal 3: Pt will amb for 40 feet with LRAD with SBA to progress with mobility.  Short Term Goal 4: Will complete functional outcome assessment when able.  Long Term Goals  Time Frame for Long Term Goals : NA due to short ELOS    Following session, patient left in safe position with all fall risk precautions in place.

## 2024-03-29 NOTE — PROGRESS NOTES
[facility]  OCCUPATIONAL THERAPY MISSED TREATMENT NOTE  STRZ ICU STEPDOWN TELEMETRY 4K  4K-26/026-A      Date: 3/29/2024  Patient Name: Kalpana Braga        CSN: [id number]   : 1955  (68 y.o.)  Gender: female   Referring Practitioner: Marcus Ga MD  Diagnosis: lung mass         REASON FOR MISSED TREATMENT:  patient with PT upon OT arrival. OT to check back as able and time allows.

## 2024-03-29 NOTE — CARE COORDINATION
3/29/24, 2:27 PM EDT    DISCHARGE ON GOING EVALUATION    Kalpana DIETRICH [facility] day: 7  Location: [address] [place] [address]- Reason for admit: Lung mass [R91.8]  Dyspnea [R06.00]  Acute hypoxemic respiratory failure (HCC) [J96.01]   Procedure:  3-24-34 MRI Brain  IMPRESSION:  1. Acute infarct in the distribution of the left middle cerebral artery.  2. Mild atrophy and probable ischemic changes in the white matter.  3. Mild inflammatory changes in the left maxillary sinus.     3-21-24 CTA Chest  CTA CHEST W WO CONTRAST PE Eval   Final Result   1. No pulmonary emboli.   2. Hypodense 3.9 x 5.5 cm right suprahilar mass extending to the right    mediastinum and subcarinal region, suspicious for neoplasm until proven    otherwise.   3. Complete right upper lung collapse secondary to the right suprahilar    mass.   4. Nonspecific patchy ground-glass consolidations throughout the left    lung. Pulmonary edema is  possible but can be followed to exclude atypical   infectious or inflammatory pneumonitis.  5. Dilatation of the central pulmonary artery suggesting a degree of   pulmonary arterial hypertension.     Barriers to Discharge: Pulm following for abn CXR mass/lesion. Plavix remains on hold for EBUS planned for 4-2. IS and Acapella. PT/OT following.   PCP: Tatiana Velasquez, PAO - CNP  Readmission Risk Score: 16.6%  Patient Goals/Plan/Treatment Preferences: Possible IPR. Coordinator is following pt progression and medical readiness.

## 2024-03-29 NOTE — PROGRESS NOTES
Internal Medicine Resident Progress Note    Name: Kalpana Braga, female, : 1955, MRN: [id number]    PCP: Tatiana Velasquez, PAO - CNP    Date of Admission: 3/21/2024  Date of Service: Pt seen/examined on 24      Assessment/Plan:  New CVA (3/23): Patient developed expressive aphasia and difficulty with writing on 3/24.  Per reports from people who understands and she was also having trouble identifying her current location and appeared confused.  CT of the head showed no acute bleed.  CTA of the neck was negative.  CTA of the head showed a small possible occlusion of M2 segment of the left MCA.  MRI on the morning of 3/24 showed increase in brightness in the affected area on diffusion-weighted imaging along with decrease in ADC corroborating an acute infarct in the area.  A1c 5.8, suggesting no uncontrolled diabetes.  LDL below goal of 70 at 32.   -Continue statin and aspirin  -Holding Plavix for upcoming EBUS, resume afterward   -BP goal of less than 130/80 afterwards   -Speech Recommended minced and moist diet and thin liquids with direct staff supervision    -Make patient n.p.o. in the event of aspiration event.   -PMR following PRN, Will follow up on 4/3 after EBUS   -Echo with bubble study negative for shunt (3/25)   -30-day cardiac event monitor at discharge to evaluate for arrhythmia as possible source of emboli    -Will continue to reiterate importance of tobacco smoking cessation   -Stat head CT if there are any changes in patient mental status, severe headache, or new neurological deficit    Acute hypoxic respiratory failure-improving: multifactorial, secondary to right lung collapse with superimposing mass and likely pneumonia with possible COPD exacerbation.  Not on supplemental oxygen at baseline.  In the ED requiring 6 L nasal cannula running 87 to 89%.  Now on 2 L by nasal cannula.  -Goal SpO2 >90%  -Wean supplemental O2 as tolerated, patient likely close to baseline now  -Treatment

## 2024-03-29 NOTE — PLAN OF CARE
Problem: Discharge Planning  Goal: Discharge to home or other facility with appropriate resources  Outcome: Progressing     Problem: Safety - Adult  Goal: Free from fall injury  Outcome: Progressing     Problem: Chronic Conditions and Co-morbidities  Goal: Patient's chronic conditions and co-morbidity symptoms are monitored and maintained or improved  Outcome: Progressing     Problem: Pain  Goal: Verbalizes/displays adequate comfort level or baseline comfort level  Outcome: Progressing     Problem: Respiratory - Adult  Goal: Achieves optimal ventilation and oxygenation  3/29/2024 0422 by Page Hernandez, RN  Outcome: Progressing     Problem: Neurosensory - Adult  Goal: Achieves stable or improved neurological status  3/29/2024 0422 by Page Hernandez, RN  Outcome: Progressing  Goal: Achieves maximal functionality and self care  3/29/2024 0422 by Page Hernandez, RN  Outcome: Progressing

## 2024-03-30 LAB
ANION GAP SERPL CALC-SCNC: 9 MEQ/L (ref 8–16)
BUN SERPL-MCNC: 10 MG/DL (ref 7–22)
CALCIUM SERPL-MCNC: 8.1 MG/DL (ref 8.5–10.5)
CHLORIDE SERPL-SCNC: 97 MEQ/L (ref 98–111)
CO2 SERPL-SCNC: 28 MEQ/L (ref 23–33)
CREAT SERPL-MCNC: 0.6 MG/DL (ref 0.4–1.2)
CREAT UR-MCNC: 102.6 MG/DL
DEPRECATED RDW RBC AUTO: 45.9 FL (ref 35–45)
ERYTHROCYTE [DISTWIDTH] IN BLOOD BY AUTOMATED COUNT: 14.3 % (ref 11.5–14.5)
GFR SERPL CREATININE-BSD FRML MDRD: > 90 ML/MIN/1.73M2
GLUCOSE SERPL-MCNC: 103 MG/DL (ref 70–108)
HCT VFR BLD AUTO: 35.3 % (ref 37–47)
HGB BLD-MCNC: 11 GM/DL (ref 12–16)
MAGNESIUM SERPL-MCNC: 1.9 MG/DL (ref 1.6–2.4)
MCH RBC QN AUTO: 27.8 PG (ref 26–33)
MCHC RBC AUTO-ENTMCNC: 31.2 GM/DL (ref 32.2–35.5)
MCV RBC AUTO: 89.1 FL (ref 81–99)
PLATELET # BLD AUTO: 286 THOU/MM3 (ref 130–400)
PMV BLD AUTO: 10.5 FL (ref 9.4–12.4)
POTASSIUM SERPL-SCNC: 3.7 MEQ/L (ref 3.5–5.2)
RBC # BLD AUTO: 3.96 MILL/MM3 (ref 4.2–5.4)
SODIUM SERPL-SCNC: 134 MEQ/L (ref 135–145)
WBC # BLD AUTO: 11 THOU/MM3 (ref 4.8–10.8)

## 2024-03-30 PROCEDURE — 97110 THERAPEUTIC EXERCISES: CPT

## 2024-03-30 PROCEDURE — 2580000003 HC RX 258

## 2024-03-30 PROCEDURE — 2060000000 HC ICU INTERMEDIATE R&B

## 2024-03-30 PROCEDURE — 6370000000 HC RX 637 (ALT 250 FOR IP)

## 2024-03-30 PROCEDURE — 85027 COMPLETE CBC AUTOMATED: CPT

## 2024-03-30 PROCEDURE — 97530 THERAPEUTIC ACTIVITIES: CPT

## 2024-03-30 PROCEDURE — 36415 COLL VENOUS BLD VENIPUNCTURE: CPT

## 2024-03-30 PROCEDURE — 99233 SBSQ HOSP IP/OBS HIGH 50: CPT | Performed by: HOSPITALIST

## 2024-03-30 PROCEDURE — 97116 GAIT TRAINING THERAPY: CPT

## 2024-03-30 PROCEDURE — 83735 ASSAY OF MAGNESIUM: CPT

## 2024-03-30 PROCEDURE — 94640 AIRWAY INHALATION TREATMENT: CPT

## 2024-03-30 PROCEDURE — 80048 BASIC METABOLIC PNL TOTAL CA: CPT

## 2024-03-30 PROCEDURE — 6360000002 HC RX W HCPCS

## 2024-03-30 RX ADMIN — SODIUM CHLORIDE, PRESERVATIVE FREE 10 ML: 5 INJECTION INTRAVENOUS at 19:20

## 2024-03-30 RX ADMIN — VENLAFAXINE HYDROCHLORIDE 75 MG: 75 CAPSULE, EXTENDED RELEASE ORAL at 09:38

## 2024-03-30 RX ADMIN — GUAIFENESIN 1200 MG: 600 TABLET, EXTENDED RELEASE ORAL at 09:39

## 2024-03-30 RX ADMIN — METOPROLOL TARTRATE 25 MG: 25 TABLET, FILM COATED ORAL at 00:09

## 2024-03-30 RX ADMIN — LOSARTAN POTASSIUM 100 MG: 100 TABLET, FILM COATED ORAL at 09:38

## 2024-03-30 RX ADMIN — SODIUM CHLORIDE, PRESERVATIVE FREE 10 ML: 5 INJECTION INTRAVENOUS at 09:39

## 2024-03-30 RX ADMIN — GUAIFENESIN 1200 MG: 600 TABLET, EXTENDED RELEASE ORAL at 19:20

## 2024-03-30 RX ADMIN — ASPIRIN 81 MG 81 MG: 81 TABLET ORAL at 09:38

## 2024-03-30 RX ADMIN — ATORVASTATIN CALCIUM 40 MG: 40 TABLET, FILM COATED ORAL at 09:38

## 2024-03-30 RX ADMIN — TIOTROPIUM BROMIDE AND OLODATEROL 2 PUFF: 3.124; 2.736 SPRAY, METERED RESPIRATORY (INHALATION) at 09:29

## 2024-03-30 RX ADMIN — ENOXAPARIN SODIUM 40 MG: 100 INJECTION SUBCUTANEOUS at 09:39

## 2024-03-30 NOTE — PROGRESS NOTES
[facility]  INPATIENT PHYSICAL THERAPY  DAILY NOTE  STRZ ICU STEPDOWN TELEMETRY 4K - 4K-26/026-A    Time In: 08  Time Out: 09  Timed Code Treatment Minutes: 38 Minutes  Minutes: 38          Date: 3/30/2024  Patient Name: Kalpana Braga,  Gender:  female        MRN: [id number]  : 1955  (68 y.o.)     Referring Practitioner: Marcus Ga MD  Diagnosis: lung mass  Additional Pertinent Hx: Per EMR \"Kalpana Braga is a 68 y.o. female active smoker with PMHx of COPD, HTN, depression, HLD, osteoarthritis who presents to [facility] with shortness of breath.  Patient reports starting  night she has been having shortness of breath worse with exertion.  Progressively worsening since .  She reports that she has been coughing up some light yellow-green sputum no fevers however she has had chills.  She denies any nausea or vomiting she denies being around anyone that is been sick.  She is a active smoker about a pack a day for 45 years.  No oxygen at baseline.  In the ED initially saturating 70% on room air, escalated to 6 L nasal cannula saturating 86 to 90%.  Denies any chest pain.\" code stroke 3/23.  CT angiogram head and neck read as an occlusion of the anterior branch of the left M2 segment of the MCA. MRI of brain without contrast shows acute infarct in the distribution of the left middle cerebral artery     Prior Level of Function:  Lives With: Spouse  Type of Home: House  Home Layout: Two level, Performs ADL's on one level  Home Access: Ramped entrance  Home Equipment: None   Bathroom Shower/Tub: Walk-in shower  Bathroom Toilet: Standard  Bathroom Equipment: Shower chair    ADL Assistance: Independent  Homemaking Assistance: Independent  Ambulation Assistance: Independent  Transfer Assistance: Independent  Active : Yes  Additional Comments: Pt reports being IND with functional tasks prior, no use of an AD. Unsure of accracy of information due to  physical assistance (SBA/CGA).   Patient could not live alone but could walk from one room to another without physical help from another person.  Education provided regarding stroke rehabilitation management.  Timed up and Go Test (TUG)  16.44 seconds first trial  14.61 seconds second trial  14.83 seconds third trial  Average-15.29 seconds      Normative Reference Values  60-69 years:  8.1 seconds  70-79 years: 9.2 seconds  80-99 years: 11.3 seconds    < 10 seconds is normal, a score of > 14 seconds indicates high fall risk   ASSESSMENT:  Assessment: Patient progressing toward established goals. TUG testing demonstrating inc fall risk.  Activity Tolerance:  Patient tolerance of  treatment: good.      Equipment Recommendations:Equipment Needed: Yes (RW, w/c?)  Discharge Recommendations: Continue to assess pending progress, Inpatient Rehabilitation, and Patient would benefit from continued PT at discharge  Plan: Current Treatment Recommendations: Strengthening, Balance training, Functional mobility training, Endurance training, Transfer training, Gait training, Safety education & training, Positioning, Equipment evaluation, education, & procurement, Therapeutic activities, Patient/Caregiver education & training, Stair training, Neuromuscular re-education, Home exercise program  General Plan:  (6x N)    Education  Education:  Learners: Patient  Patient Education: Bed Mobility, Transfers, Gait, slow down for safety with mobility.    Goals:  Patient Goals : return home  Short Term Goals  Time Frame for Short Term Goals: by discharge  Short Term Goal 1: Pt will demo supine to/from sit transfers with IND with bed flat to progress with mobility.  Short Term Goal 2: Pt will demo S for sit to/from stand transfers with LRAD to progress with mobility.  Short Term Goal 3: Pt will amb for 40 feet with LRAD with SBA to progress with mobility.  Short Term Goal 4: Will complete functional outcome assessment when able.  Long Term

## 2024-03-30 NOTE — PLAN OF CARE
Problem: Discharge Planning  Goal: Discharge to home or other facility with appropriate resources  Flowsheets (Taken 3/29/2024 2326)  Discharge to home or other facility with appropriate resources:   Identify barriers to discharge with patient and caregiver   Identify discharge learning needs (meds, wound care, etc)     Problem: Safety - Adult  Goal: Free from fall injury  Flowsheets (Taken 3/29/2024 2326)  Free From Fall Injury: Instruct family/caregiver on patient safety     Problem: Pain  Goal: Verbalizes/displays adequate comfort level or baseline comfort level  Flowsheets (Taken 3/29/2024 2326)  Verbalizes/displays adequate comfort level or baseline comfort level:   Encourage patient to monitor pain and request assistance   Administer analgesics based on type and severity of pain and evaluate response   Consider cultural and social influences on pain and pain management   Assess pain using appropriate pain scale   Implement non-pharmacological measures as appropriate and evaluate response     Problem: Respiratory - Adult  Goal: Achieves optimal ventilation and oxygenation  Flowsheets (Taken 3/29/2024 2326)  Achieves optimal ventilation and oxygenation:   Assess for changes in respiratory status   Position to facilitate oxygenation and minimize respiratory effort   Assess for changes in mentation and behavior   Encourage broncho-pulmonary hygiene including cough, deep breathe, incentive spirometry   Oxygen supplementation based on oxygen saturation or arterial blood gases   Assess and instruct to report shortness of breath or any respiratory difficulty     Problem: Infection - Adult  Goal: Absence of infection at discharge  Flowsheets (Taken 3/29/2024 2326)  Absence of infection at discharge:   Assess and monitor for signs and symptoms of infection   Monitor lab/diagnostic results   Monitor all insertion sites i.e., indwelling lines, tubes and drains   Instruct and encourage patient and family to use good hand

## 2024-03-31 LAB
ANION GAP SERPL CALC-SCNC: 14 MEQ/L (ref 8–16)
BUN SERPL-MCNC: 3 MG/DL (ref 7–22)
CALCIUM SERPL-MCNC: 8.4 MG/DL (ref 8.5–10.5)
CHLORIDE SERPL-SCNC: 95 MEQ/L (ref 98–111)
CO2 SERPL-SCNC: 28 MEQ/L (ref 23–33)
CREAT SERPL-MCNC: 0.7 MG/DL (ref 0.4–1.2)
DEPRECATED RDW RBC AUTO: 45 FL (ref 35–45)
ERYTHROCYTE [DISTWIDTH] IN BLOOD BY AUTOMATED COUNT: 14.1 % (ref 11.5–14.5)
FERRITIN SERPL IA-MCNC: 431 NG/ML (ref 10–291)
FOLATE SERPL-MCNC: 17.8 NG/ML (ref 4.8–24.2)
GFR SERPL CREATININE-BSD FRML MDRD: > 90 ML/MIN/1.73M2
GLUCOSE SERPL-MCNC: 96 MG/DL (ref 70–108)
HCT VFR BLD AUTO: 35.9 % (ref 37–47)
HGB BLD-MCNC: 11.3 GM/DL (ref 12–16)
HGB RETIC QN AUTO: 28.8 PG (ref 28.2–35.7)
IMM RETICS NFR: 10.1 % (ref 3–15.9)
IRON SATN MFR SERPL: 11 % (ref 20–50)
IRON SERPL-MCNC: 19 UG/DL (ref 50–170)
MCH RBC QN AUTO: 27.4 PG (ref 26–33)
MCHC RBC AUTO-ENTMCNC: 31.5 GM/DL (ref 32.2–35.5)
MCV RBC AUTO: 86.9 FL (ref 81–99)
PLATELET # BLD AUTO: 298 THOU/MM3 (ref 130–400)
PMV BLD AUTO: 10.4 FL (ref 9.4–12.4)
POTASSIUM SERPL-SCNC: 4.1 MEQ/L (ref 3.5–5.2)
RBC # BLD AUTO: 4.13 MILL/MM3 (ref 4.2–5.4)
RETICS # AUTO: 38 THOU/MM3 (ref 20–115)
RETICS/RBC NFR AUTO: 0.9 % (ref 0.5–2)
SODIUM SERPL-SCNC: 137 MEQ/L (ref 135–145)
TIBC SERPL-MCNC: 175 UG/DL (ref 171–450)
VIT B12 SERPL-MCNC: 1027 PG/ML (ref 211–911)
WBC # BLD AUTO: 10.7 THOU/MM3 (ref 4.8–10.8)

## 2024-03-31 PROCEDURE — 80048 BASIC METABOLIC PNL TOTAL CA: CPT

## 2024-03-31 PROCEDURE — 2580000003 HC RX 258

## 2024-03-31 PROCEDURE — 6370000000 HC RX 637 (ALT 250 FOR IP)

## 2024-03-31 PROCEDURE — 2700000000 HC OXYGEN THERAPY PER DAY

## 2024-03-31 PROCEDURE — 85046 RETICYTE/HGB CONCENTRATE: CPT

## 2024-03-31 PROCEDURE — 82746 ASSAY OF FOLIC ACID SERUM: CPT

## 2024-03-31 PROCEDURE — 85027 COMPLETE CBC AUTOMATED: CPT

## 2024-03-31 PROCEDURE — 83540 ASSAY OF IRON: CPT

## 2024-03-31 PROCEDURE — 82607 VITAMIN B-12: CPT

## 2024-03-31 PROCEDURE — 83550 IRON BINDING TEST: CPT

## 2024-03-31 PROCEDURE — 82728 ASSAY OF FERRITIN: CPT

## 2024-03-31 PROCEDURE — 2060000000 HC ICU INTERMEDIATE R&B

## 2024-03-31 PROCEDURE — 94761 N-INVAS EAR/PLS OXIMETRY MLT: CPT

## 2024-03-31 PROCEDURE — 99233 SBSQ HOSP IP/OBS HIGH 50: CPT | Performed by: HOSPITALIST

## 2024-03-31 PROCEDURE — 6360000002 HC RX W HCPCS

## 2024-03-31 PROCEDURE — 36415 COLL VENOUS BLD VENIPUNCTURE: CPT

## 2024-03-31 PROCEDURE — 99232 SBSQ HOSP IP/OBS MODERATE 35: CPT

## 2024-03-31 PROCEDURE — 94640 AIRWAY INHALATION TREATMENT: CPT

## 2024-03-31 RX ORDER — POLYETHYLENE GLYCOL 3350 17 G/17G
17 POWDER, FOR SOLUTION ORAL DAILY
Status: DISCONTINUED | OUTPATIENT
Start: 2024-03-31 | End: 2024-04-13 | Stop reason: HOSPADM

## 2024-03-31 RX ORDER — HYDRALAZINE HYDROCHLORIDE 25 MG/1
25 TABLET, FILM COATED ORAL EVERY 8 HOURS
Status: DISCONTINUED | OUTPATIENT
Start: 2024-03-31 | End: 2024-04-13 | Stop reason: HOSPADM

## 2024-03-31 RX ORDER — FERROUS SULFATE 325(65) MG
325 TABLET ORAL EVERY OTHER DAY
Status: DISCONTINUED | OUTPATIENT
Start: 2024-03-31 | End: 2024-04-13 | Stop reason: HOSPADM

## 2024-03-31 RX ADMIN — GUAIFENESIN 1200 MG: 600 TABLET, EXTENDED RELEASE ORAL at 19:46

## 2024-03-31 RX ADMIN — GUAIFENESIN 1200 MG: 600 TABLET, EXTENDED RELEASE ORAL at 09:04

## 2024-03-31 RX ADMIN — SODIUM CHLORIDE, PRESERVATIVE FREE 10 ML: 5 INJECTION INTRAVENOUS at 19:42

## 2024-03-31 RX ADMIN — VENLAFAXINE HYDROCHLORIDE 75 MG: 75 CAPSULE, EXTENDED RELEASE ORAL at 09:04

## 2024-03-31 RX ADMIN — HYDRALAZINE HYDROCHLORIDE 25 MG: 25 TABLET, FILM COATED ORAL at 16:36

## 2024-03-31 RX ADMIN — LOSARTAN POTASSIUM 100 MG: 100 TABLET, FILM COATED ORAL at 09:04

## 2024-03-31 RX ADMIN — TIOTROPIUM BROMIDE AND OLODATEROL 2 PUFF: 3.124; 2.736 SPRAY, METERED RESPIRATORY (INHALATION) at 09:59

## 2024-03-31 RX ADMIN — ATORVASTATIN CALCIUM 40 MG: 40 TABLET, FILM COATED ORAL at 19:46

## 2024-03-31 RX ADMIN — HYDRALAZINE HYDROCHLORIDE 25 MG: 25 TABLET, FILM COATED ORAL at 09:03

## 2024-03-31 RX ADMIN — FERROUS SULFATE TAB 325 MG (65 MG ELEMENTAL FE) 325 MG: 325 (65 FE) TAB at 09:03

## 2024-03-31 RX ADMIN — HYDRALAZINE HYDROCHLORIDE 10 MG: 20 INJECTION, SOLUTION INTRAMUSCULAR; INTRAVENOUS at 19:40

## 2024-03-31 RX ADMIN — ASPIRIN 81 MG 81 MG: 81 TABLET ORAL at 09:03

## 2024-03-31 RX ADMIN — METOPROLOL TARTRATE 25 MG: 25 TABLET, FILM COATED ORAL at 21:19

## 2024-03-31 RX ADMIN — SODIUM CHLORIDE 300 MG: 9 INJECTION, SOLUTION INTRAVENOUS at 12:45

## 2024-03-31 RX ADMIN — ENOXAPARIN SODIUM 40 MG: 100 INJECTION SUBCUTANEOUS at 09:05

## 2024-03-31 RX ADMIN — ACETAMINOPHEN 650 MG: 325 TABLET ORAL at 01:30

## 2024-03-31 RX ADMIN — SODIUM CHLORIDE, PRESERVATIVE FREE 10 ML: 5 INJECTION INTRAVENOUS at 09:02

## 2024-03-31 RX ADMIN — ACETAMINOPHEN 650 MG: 325 TABLET ORAL at 19:46

## 2024-03-31 ASSESSMENT — PAIN SCALES - GENERAL
PAINLEVEL_OUTOF10: 0
PAINLEVEL_OUTOF10: 1
PAINLEVEL_OUTOF10: 3

## 2024-03-31 ASSESSMENT — PAIN DESCRIPTION - ORIENTATION: ORIENTATION: MID

## 2024-03-31 ASSESSMENT — PAIN DESCRIPTION - FREQUENCY: FREQUENCY: INTERMITTENT

## 2024-03-31 ASSESSMENT — PAIN DESCRIPTION - LOCATION: LOCATION: CHEST

## 2024-03-31 ASSESSMENT — PAIN - FUNCTIONAL ASSESSMENT: PAIN_FUNCTIONAL_ASSESSMENT: ACTIVITIES ARE NOT PREVENTED

## 2024-03-31 ASSESSMENT — PAIN DESCRIPTION - PAIN TYPE: TYPE: ACUTE PAIN

## 2024-03-31 ASSESSMENT — PAIN DESCRIPTION - ONSET: ONSET: ON-GOING

## 2024-03-31 ASSESSMENT — PAIN DESCRIPTION - DESCRIPTORS: DESCRIPTORS: DISCOMFORT

## 2024-03-31 NOTE — PROGRESS NOTES
1300:Patient called out, having pain at IV site where Iron infusion running. IV site is free of redness, swelling, Blood return noted. Slowed infusion down to 100 ml/hr  1315: Checking on patient, patient states that IV hurts with iron infusing. Blood returned noted when I pull back on line. Site is free of redness or swelling. Pulses ready palpated on  left arm, site of infusion. Patient refused to continue with infusion. Dr. Ga notified.

## 2024-03-31 NOTE — PLAN OF CARE
Problem: Discharge Planning  Goal: Discharge to home or other facility with appropriate resources  Flowsheets (Taken 3/30/2024 2148)  Discharge to home or other facility with appropriate resources:   Identify barriers to discharge with patient and caregiver   Identify discharge learning needs (meds, wound care, etc)     Problem: Safety - Adult  Goal: Free from fall injury  Flowsheets (Taken 3/30/2024 2148)  Free From Fall Injury: Instruct family/caregiver on patient safety     Problem: Pain  Goal: Verbalizes/displays adequate comfort level or baseline comfort level  Flowsheets (Taken 3/30/2024 2148)  Verbalizes/displays adequate comfort level or baseline comfort level:   Encourage patient to monitor pain and request assistance   Administer analgesics based on type and severity of pain and evaluate response   Consider cultural and social influences on pain and pain management   Assess pain using appropriate pain scale   Implement non-pharmacological measures as appropriate and evaluate response     Problem: Respiratory - Adult  Goal: Achieves optimal ventilation and oxygenation  Flowsheets (Taken 3/30/2024 2148)  Achieves optimal ventilation and oxygenation:   Assess for changes in respiratory status   Position to facilitate oxygenation and minimize respiratory effort   Assess for changes in mentation and behavior   Oxygen supplementation based on oxygen saturation or arterial blood gases   Assess and instruct to report shortness of breath or any respiratory difficulty   Encourage broncho-pulmonary hygiene including cough, deep breathe, incentive spirometry     Problem: Infection - Adult  Goal: Absence of infection at discharge  Flowsheets (Taken 3/30/2024 2148)  Absence of infection at discharge:   Assess and monitor for signs and symptoms of infection   Monitor lab/diagnostic results   Monitor all insertion sites i.e., indwelling lines, tubes and drains   Instruct and encourage patient and family to use good hand

## 2024-03-31 NOTE — PROGRESS NOTES
[place] [facility]      Patient - Kalpana Braga   MRN -  [id number]   [facility] # - [id number]   - 1955      Date of Admission -  3/21/2024  8:53 PM  Date of evaluation -  3/31/2024  Room - -026-   Hospital Day - 9  Consulting - Sulema Lagos MD Primary Care Physician - Tatiana Velasquez, PAO - CNP     Problem List      Active Hospital Problems    Diagnosis Date Noted    CVA (cerebral vascular accident) (HCC) [I63.9] 2024    Dyspnea [R06.00] 2024    Acute hypoxemic respiratory failure (HCC) [J96.01] 2024    Lung mass [R91.8] 2024    Pneumonia of both lungs due to infectious organism [J18.9] 2024     Reason for Consult    Right suprahilar mass   HPI   History Obtained From: Patient and electronic medical record.    Kalpana Braga is a 68 y.o. female with a past medical history of COPD, Hypertension, Hyperlipidemia, osteoarthritis, and anxiety/depression who presented to [facility] ED on 3/21/2024 with complaints of shortness of breath. Patient states she has has shortness of breath for the last 4 days which is worse with exertion. She also has had a productive cough with light yellow-green sputum. She has had chills as well without fevers. She is a current everyday smoker and smokes about a pack a day for 45 years. She does not wear any oxygen at baseline.     In the ED, patient was found to be saturating at 70% on room air. She was placed on 6L NC with saturations around 86 to 90%. Chest xray showed faint hazy opacities in the left midlung, possibly reflecting infiltrates. New consolidation versus atelectasis in right lung apex. CTA chest showed no pulmonary embolism. Hypodense 3.9 x 5.5 cm right suprahilar mass extending to the right mediastinum and subcarinal region, suspicious for neoplasm until proven otherwise. Complete right upper lung collapse secondary to the right suprahilar mass. Nonspecific patchy ground-glass consolidations  1973    Smokeless tobacco: Never   Substance and Sexual Activity    Alcohol use: Not Currently     Alcohol/week: 2.0 - 3.0 standard drinks of alcohol     Types: 2 - 3 Shots of liquor per week     Comment: 2-3  16 oz glasses radha petevet daily    Drug use: Yes     Types: Marijuana (Weed)    Sexual activity: Not Currently   Other Topics Concern    Not on file   Social History Narrative    Not on file     Social Determinants of Health     Financial Resource Strain: Low Risk  (2023)    Overall Financial Resource Strain (CARDIA)     Difficulty of Paying Living Expenses: Not hard at all   Food Insecurity: No Food Insecurity (3/22/2024)    Hunger Vital Sign     Worried About Running Out of Food in the Last Year: Never true     Ran Out of Food in the Last Year: Never true   Transportation Needs: No Transportation Needs (3/22/2024)    PRAPARE - Transportation     Lack of Transportation (Medical): No     Lack of Transportation (Non-Medical): No   Physical Activity: Not on file   Stress: Not on file   Social Connections: Not on file   Intimate Partner Violence: Not on file   Housing Stability: Low Risk  (3/22/2024)    Housing Stability Vital Sign     Unable to Pay for Housing in the Last Year: No     Number of Places Lived in the Last Year: 1     Unstable Housing in the Last Year: No     Family History          Problem Relation Age of Onset    Heart Disease Father      Sleep History    Never diagnosed with sleep apnea in the past.  Occupational history   Occupation:  History of tobacco smoking:Yes  Amount of tobacco smokin.25 PPD.  Years of tobacco smokin.                                    Quit smoking: No.              Current smoker: Yes.       Amount of current tobacco smokin.25 PPD  .  History of recreational or IV drug use in the past:NO     History of exposure to coal mines/coal dust: NO  History of exposure to foundry dust/welding: NO  History of exposure to quarry/silica/sandblasting: NO  History of

## 2024-03-31 NOTE — PROGRESS NOTES
Internal Medicine Resident Progress Note    Name: Kalpana Braga, female, : 1955, MRN: [id number]    PCP: Tatiana Velasquez, PAO - CNP    Date of Admission: 3/21/2024  Date of Service: Pt seen/examined on 24      Assessment/Plan:  New CVA (3/23): Patient developed expressive aphasia and difficulty with writing on 3/24.  Per reports from people who understands and she was also having trouble identifying her current location and appeared confused.  CT of the head showed no acute bleed.  CTA of the neck was negative.  CTA of the head showed a small possible occlusion of M2 segment of the left MCA.  MRI on the morning of 3/24 showed increase in brightness in the affected area on diffusion-weighted imaging along with decrease in ADC corroborating an acute infarct in the area.  A1c 5.8, suggesting no uncontrolled diabetes.  LDL below goal of 70 at 32.Echo with bubble study negative for shunt (3/25).   -Continue statin and aspirin  -Holding Plavix for upcoming EBUS, resume afterward   -BP goal of less than 130/80 afterwards   -Speech Recommended minced and moist diet and thin liquids with direct staff supervision    -Make patient n.p.o. in the event of aspiration event.   -PMR following PRN, Will follow up before EBUS on 4/3 to determine need for IPR after EBUS   -30-day cardiac event monitor at discharge to evaluate for arrhythmia as possible source of emboli    -Will continue to reiterate importance of tobacco smoking cessation   -Stat head CT if there are any changes in patient mental status, severe headache, or new neurological deficit    Acute hypoxic respiratory failure-improving: multifactorial, secondary to right lung collapse with superimposing mass and likely pneumonia with possible COPD exacerbation.  Not on supplemental oxygen at baseline.  In the ED requiring 6 L nasal cannula running 87 to 89%.  Now on 1 L by nasal cannula.  -Goal SpO2 >90%  -Wean supplemental O2 as tolerated, patient likely  diarrhea.  Suspected community-acquired pneumonia-resolved: Patient presented with shortness of breath, worsened cough, yellow-green sputum production. Denies any fevers however, though she did report chills.  Initial chest x-ray showed new consolidations versus atelectasis in right lung apex.  CTA chest shows nonspecific patchy groundglass consolidations throughout the left lung are consistent with an infectious process.  COVID and flu negative. Completed azithromycin for 3 days (first dose 3/22/2024). ceftriaxone 7 days 3/21-3/28.  Leukocytosis-likely secondary to pneumonia-resolved:  Moderate hyponatremia-likely secondary to increased output from diarrhea and vomiting along with poor oral intake-resolved with fluids:     Expected discharge date: To be determined    Disposition: To be determined  [] Home  [] Inpatient Rehab  [] Psychiatric Unit  [] SNF  [] Long Term Care Facility  [] Other-    ===================================================================      Chief Complaint: Shortness of breath    Hospital Course: Per HPI \"Kalpana Braga is a 68 y.o. female active smoker with PMHx of COPD, HTN, depression, HLD, osteoarthritis who presents to [facility] with shortness of breath.  Patient reports starting Sunday night she has been having shortness of breath worse with exertion.  Progressively worsening since Sunday.  She reports that she has been coughing up some light yellow-green sputum no fevers however she has had chills.  She denies any nausea or vomiting she denies being around anyone that is been sick.  She is a active smoker about a pack a day for 45 years.  No oxygen at baseline.  In the ED initially saturating 70% on room air, escalated to 6 L nasal cannula saturating 86 to 90%.  Denies any chest pain.  Chest x-ray showed faint hazy opacities in the left midlung, possibly reflecting infiltrates.  New consolidation versus atelectasis in right lung apex.  CTA chest shows no pulmonary

## 2024-03-31 NOTE — RT PROTOCOL NOTE
RT Inhaler-Nebulizer Bronchodilator Protocol Note    There is a bronchodilator order in the chart from a provider indicating to follow the RT Bronchodilator Protocol and there is an “Initiate RT Inhaler-Nebulizer Bronchodilator Protocol” order as well (see protocol at bottom of note).    CXR Findings:  XR CHEST PORTABLE    Result Date: 3/29/2024  Enlarging right parahilar opacities, likely corresponding to known mass and adjacent atelectasis. **This report has been created using voice recognition software. It may contain minor errors which are inherent in voice recognition technology.** Final report electronically signed by Dr. Tejas Velasquez on 3/29/2024 1:07 PM      The findings from the last RT Protocol Assessment were as follows:   History Pulmonary Disease: Chronic pulmonary disease  Respiratory Pattern: Regular pattern and RR 12-20 bpm  Breath Sounds: Clear breath sounds  Cough: Strong, spontaneous, non-productive  Indication for Bronchodilator Therapy: None  Bronchodilator Assessment Score: 2    Aerosolized bronchodilator medication orders have been revised according to the RT Inhaler-Nebulizer Bronchodilator Protocol below.    Respiratory Therapist to perform RT Therapy Protocol Assessment initially then follow the protocol.  Repeat RT Therapy Protocol Assessment PRN for score 0-3 or on second treatment, BID, and PRN for scores above 3.    No Indications - adjust the frequency to every 6 hours PRN wheezing or bronchospasm, if no treatments needed after 48 hours then discontinue using Per Protocol order mode.     If indication present, adjust the RT bronchodilator orders based on the Bronchodilator Assessment Score as indicated below.  Use Inhaler orders unless patient has one or more of the following: on home nebulizer, not able to hold breath for 10 seconds, is not alert and oriented, cannot activate and use MDI correctly, or respiratory rate 25 breaths per minute or more, then use the equivalent nebulizer

## 2024-03-31 NOTE — PROGRESS NOTES
Internal Medicine Resident Progress Note    Name: Kalpana Braga, female, : 1955, MRN: [id number]    PCP: Tatiana Velasquez APRN - CNP    Date of Admission: 3/21/2024  Date of Service: Pt seen/examined on 24      Assessment/Plan:  Acute Hyponatremia, Improved  - Suspected Hypovolemic, Hypo-osmolar secondary to GI losses  - Daily BMP  - Improvement with IVF and improved oral intake    Acute CVA  - 2024 MRI Brain WO Contrast showing acute infarct in the distribution of the left middle cerebral artery  - Initial NIH on  3: LOC, questions, best language  - ASA/Plavix initiated   - Repeat CODE Stroke called  NIH 8 secondary to new onset aphasia, dysarthria, facial droop, unable to answer month and age  - Neurology consulted: symptoms suspected sequelae of previous stroke  - NIHSS q shift, SLP following, Neurovascular checks q4  - 2024 HbA1c 5.8%; Lipid Panel 103, Triglycerides 88, HDL 53, LDL 32    Postobstructive Pneumonia  Chronic Obstructive Pulmonary Disease, with evidence of acute exacerbation  Tobacco Use Disorder  - 2024 CXR consolidation in the right lung apex  - 2024 CTA Chest W WO Contrast showing hypodense 3.9 x 5.5 cm right suprahilar mass extending to the right mediastinum and subcarinal region  - Bronchodilator regimen in place  - Azithromycin -; ceftriaxone -  - Prednisone -    Acute Normocytic Anemia  - Suspect secondary to synthetic dysfunction as well as chronic disease  - Iron studies, B12/Folate, Retic Count ordered for further evaluation      Right Suprahilar Mass  - Identified on 2024 CTA Chest  - Pulmonology consulted: plan for EBUS  after ASA/Plavix washout with care coordination with Neurology    Nocturnal bradycardia  - Asymptomatic  - Suspected secondary to untreated obstructive sleep apnea  - Recommendation for polysomnography in the outpatient setting, especially in the context of recent    WBC 14.7* 8.6 11.0*   HGB 12.6 11.1* 11.0*   HCT 39.8 35.5* 35.3*    278 286     Recent Labs     03/28/24  1600 03/29/24  0433 03/30/24  0409   * 130* 134*   K 3.7 3.9 3.7   CL 92* 94* 97*   CO2 27 28 28   BUN 9 9 10   CREATININE 0.6 0.7 0.6   CALCIUM 8.4* 8.0* 8.1*     No results for input(s): \"AST\", \"ALT\", \"BILIDIR\", \"BILITOT\", \"ALKPHOS\" in the last 72 hours.  No results for input(s): \"INR\" in the last 72 hours.  No results for input(s): \"TROPONINT\" in the last 72 hours.  No results for input(s): \"PROCAL\" in the last 72 hours.   Lab Results   Component Value Date/Time    NITRU NEGATIVE 03/24/2024 06:00 AM    WBCUA 15-25 03/24/2024 06:00 AM    BACTERIA NONE SEEN 03/24/2024 06:00 AM    RBCUA 0-2 03/24/2024 06:00 AM    BLOODU NEGATIVE 03/24/2024 06:00 AM    SPECGRAV 1.018 07/31/2016 03:07 PM    GLUCOSEU NEGATIVE 03/24/2024 06:00 AM       Radiology:   XR CHEST PORTABLE    Result Date: 3/29/2024  PROCEDURE: XR CHEST PORTABLE CLINICAL INFORMATION: Dysphasia TECHNIQUE: Mobile AP chest radiograph. COMPARISON: Mobile AP chest radiograph 3/27/2024 FINDINGS: Cardiomediastinal silhouette is within normal limits. Right parahilar opacities are slightly more extensive than the prior study. Degenerative changes in the thoracic spine are poorly visualized.     Enlarging right parahilar opacities, likely corresponding to known mass and adjacent atelectasis. **This report has been created using voice recognition software. It may contain minor errors which are inherent in voice recognition technology.** Final report electronically signed by Dr. Tejas Velasquez on 3/29/2024 1:07 PM      DVT prophylaxis:    [x] Lovenox  [] SCDs  [] SQ Heparin  [] Encourage ambulation   [] Already on Anticoagulation  Diet: ADULT DIET; Dysphagia - Minced and Moist  Code Status: Full Code  PT/OT: Consulted  Tele: Continuous  IVF: None    Electronically signed by Reji St MD on 3/30/2024 at 11:21 PM    Case was discussed with Attending,

## 2024-04-01 LAB
ANION GAP SERPL CALC-SCNC: 16 MEQ/L (ref 8–16)
BUN SERPL-MCNC: 5 MG/DL (ref 7–22)
CALCIUM SERPL-MCNC: 8.5 MG/DL (ref 8.5–10.5)
CHLORIDE SERPL-SCNC: 92 MEQ/L (ref 98–111)
CO2 SERPL-SCNC: 24 MEQ/L (ref 23–33)
CREAT SERPL-MCNC: 0.6 MG/DL (ref 0.4–1.2)
DEPRECATED RDW RBC AUTO: 43.1 FL (ref 35–45)
ERYTHROCYTE [DISTWIDTH] IN BLOOD BY AUTOMATED COUNT: 13.9 % (ref 11.5–14.5)
GFR SERPL CREATININE-BSD FRML MDRD: > 90 ML/MIN/1.73M2
GLUCOSE SERPL-MCNC: 117 MG/DL (ref 70–108)
HCT VFR BLD AUTO: 36.9 % (ref 37–47)
HGB BLD-MCNC: 12 GM/DL (ref 12–16)
MCH RBC QN AUTO: 27.5 PG (ref 26–33)
MCHC RBC AUTO-ENTMCNC: 32.5 GM/DL (ref 32.2–35.5)
MCV RBC AUTO: 84.4 FL (ref 81–99)
PLATELET # BLD AUTO: 283 THOU/MM3 (ref 130–400)
PMV BLD AUTO: 10.3 FL (ref 9.4–12.4)
POTASSIUM SERPL-SCNC: 4.1 MEQ/L (ref 3.5–5.2)
RBC # BLD AUTO: 4.37 MILL/MM3 (ref 4.2–5.4)
SODIUM SERPL-SCNC: 132 MEQ/L (ref 135–145)
WBC # BLD AUTO: 8.4 THOU/MM3 (ref 4.8–10.8)

## 2024-04-01 PROCEDURE — 97530 THERAPEUTIC ACTIVITIES: CPT

## 2024-04-01 PROCEDURE — 6370000000 HC RX 637 (ALT 250 FOR IP)

## 2024-04-01 PROCEDURE — 93010 ELECTROCARDIOGRAM REPORT: CPT | Performed by: INTERNAL MEDICINE

## 2024-04-01 PROCEDURE — 2580000003 HC RX 258

## 2024-04-01 PROCEDURE — 97116 GAIT TRAINING THERAPY: CPT

## 2024-04-01 PROCEDURE — 94761 N-INVAS EAR/PLS OXIMETRY MLT: CPT

## 2024-04-01 PROCEDURE — 2060000000 HC ICU INTERMEDIATE R&B

## 2024-04-01 PROCEDURE — 6360000002 HC RX W HCPCS

## 2024-04-01 PROCEDURE — 99233 SBSQ HOSP IP/OBS HIGH 50: CPT | Performed by: HOSPITALIST

## 2024-04-01 PROCEDURE — 99232 SBSQ HOSP IP/OBS MODERATE 35: CPT | Performed by: INTERNAL MEDICINE

## 2024-04-01 PROCEDURE — 97535 SELF CARE MNGMENT TRAINING: CPT

## 2024-04-01 PROCEDURE — 94640 AIRWAY INHALATION TREATMENT: CPT

## 2024-04-01 PROCEDURE — 2700000000 HC OXYGEN THERAPY PER DAY

## 2024-04-01 PROCEDURE — 93005 ELECTROCARDIOGRAM TRACING: CPT | Performed by: HOSPITALIST

## 2024-04-01 PROCEDURE — 36415 COLL VENOUS BLD VENIPUNCTURE: CPT

## 2024-04-01 PROCEDURE — 85027 COMPLETE CBC AUTOMATED: CPT

## 2024-04-01 PROCEDURE — 80048 BASIC METABOLIC PNL TOTAL CA: CPT

## 2024-04-01 RX ORDER — BENZONATATE 100 MG/1
100 CAPSULE ORAL 3 TIMES DAILY PRN
Status: DISCONTINUED | OUTPATIENT
Start: 2024-04-01 | End: 2024-04-13 | Stop reason: HOSPADM

## 2024-04-01 RX ORDER — SODIUM CHLORIDE 9 MG/ML
INJECTION, SOLUTION INTRAVENOUS CONTINUOUS
Status: DISCONTINUED | OUTPATIENT
Start: 2024-04-01 | End: 2024-04-13

## 2024-04-01 RX ADMIN — POLYETHYLENE GLYCOL 3350 17 G: 17 POWDER, FOR SOLUTION ORAL at 08:00

## 2024-04-01 RX ADMIN — HYDRALAZINE HYDROCHLORIDE 25 MG: 25 TABLET, FILM COATED ORAL at 02:23

## 2024-04-01 RX ADMIN — ENOXAPARIN SODIUM 40 MG: 100 INJECTION SUBCUTANEOUS at 08:01

## 2024-04-01 RX ADMIN — TIOTROPIUM BROMIDE AND OLODATEROL 2 PUFF: 3.124; 2.736 SPRAY, METERED RESPIRATORY (INHALATION) at 09:30

## 2024-04-01 RX ADMIN — GUAIFENESIN 1200 MG: 600 TABLET, EXTENDED RELEASE ORAL at 19:43

## 2024-04-01 RX ADMIN — METOPROLOL TARTRATE 25 MG: 25 TABLET, FILM COATED ORAL at 19:43

## 2024-04-01 RX ADMIN — HYDRALAZINE HYDROCHLORIDE 25 MG: 25 TABLET, FILM COATED ORAL at 08:01

## 2024-04-01 RX ADMIN — METOPROLOL TARTRATE 25 MG: 25 TABLET, FILM COATED ORAL at 08:01

## 2024-04-01 RX ADMIN — SODIUM CHLORIDE, PRESERVATIVE FREE 10 ML: 5 INJECTION INTRAVENOUS at 08:06

## 2024-04-01 RX ADMIN — ASPIRIN 81 MG 81 MG: 81 TABLET ORAL at 08:01

## 2024-04-01 RX ADMIN — VENLAFAXINE HYDROCHLORIDE 75 MG: 75 CAPSULE, EXTENDED RELEASE ORAL at 08:01

## 2024-04-01 RX ADMIN — SODIUM CHLORIDE: 9 INJECTION, SOLUTION INTRAVENOUS at 16:01

## 2024-04-01 RX ADMIN — GUAIFENESIN 1200 MG: 600 TABLET, EXTENDED RELEASE ORAL at 08:01

## 2024-04-01 RX ADMIN — HYDRALAZINE HYDROCHLORIDE 25 MG: 25 TABLET, FILM COATED ORAL at 16:32

## 2024-04-01 RX ADMIN — LOSARTAN POTASSIUM 100 MG: 100 TABLET, FILM COATED ORAL at 08:01

## 2024-04-01 RX ADMIN — ATORVASTATIN CALCIUM 40 MG: 40 TABLET, FILM COATED ORAL at 19:43

## 2024-04-01 ASSESSMENT — PAIN SCALES - GENERAL
PAINLEVEL_OUTOF10: 2
PAINLEVEL_OUTOF10: 5

## 2024-04-01 ASSESSMENT — PAIN DESCRIPTION - FREQUENCY: FREQUENCY: INTERMITTENT

## 2024-04-01 ASSESSMENT — PAIN DESCRIPTION - ONSET: ONSET: ON-GOING

## 2024-04-01 ASSESSMENT — PAIN - FUNCTIONAL ASSESSMENT: PAIN_FUNCTIONAL_ASSESSMENT: ACTIVITIES ARE NOT PREVENTED

## 2024-04-01 ASSESSMENT — PAIN DESCRIPTION - DESCRIPTORS: DESCRIPTORS: DISCOMFORT

## 2024-04-01 ASSESSMENT — PAIN DESCRIPTION - LOCATION: LOCATION: CHEST

## 2024-04-01 ASSESSMENT — PAIN DESCRIPTION - PAIN TYPE: TYPE: ACUTE PAIN

## 2024-04-01 ASSESSMENT — PAIN DESCRIPTION - ORIENTATION: ORIENTATION: MID

## 2024-04-01 NOTE — PROGRESS NOTES
[facility]  STRZ ICU STEPDOWN TELEMETRY 4K  Occupational Therapy  Daily Note  Time:   Time In: 1045  Time Out: 1110  Timed Code Treatment Minutes: 25 Minutes  Minutes: 25          Date: 2024  Patient Name: Kalpana Braga,   Gender: female      Room: [facility]26/026-A  MRN: [id number]  : 1955  (68 y.o.)  Referring Practitioner: Marcus Ga MD  Diagnosis: lung mass  Additional Pertinent Hx: Per EMR \"Kalpana Braga is a 68 y.o. female active smoker with PMHx of COPD, HTN, depression, HLD, osteoarthritis who presents to [facility] with shortness of breath.  Patient reports starting  night she has been having shortness of breath worse with exertion.  Progressively worsening since .  She reports that she has been coughing up some light yellow-green sputum no fevers however she has had chills.  She denies any nausea or vomiting she denies being around anyone that is been sick.  She is a active smoker about a pack a day for 45 years.  No oxygen at baseline.  In the ED initially saturating 70% on room air, escalated to 6 L nasal cannula saturating 86 to 90%.  Denies any chest pain.\" code stroke 3/23.  CT angiogram head and neck read as an occlusion of the anterior branch of the left M2 segment of the MCA. MRI of brain without contrast shows acute infarct in the distribution of the left middle cerebral artery    Restrictions/Precautions:  Restrictions/Precautions: General Precautions, Fall Risk  Position Activity Restriction  Other position/activity restrictions: aphasia (expressive >receptive), impulsive     Social/Functional History:  Lives With: Spouse  Type of Home: House  Home Layout: Two level, Performs ADL's on one level  Home Access: Ramped entrance  Home Equipment: None   Bathroom Shower/Tub: Walk-in shower  Bathroom Toilet: Standard  Bathroom Equipment: Shower chair       ADL Assistance: Independent  Homemaking Assistance: Independent  Ambulation Assistance:

## 2024-04-01 NOTE — PROGRESS NOTES
[facility]  INPATIENT PHYSICAL THERAPY  DAILY NOTE  STRZ ICU STEPDOWN TELEMETRY 4K - 4K-26/026-A    Time In: 0810  Time Out: 0833  Timed Code Treatment Minutes: 23 Minutes  Minutes: 23          Date: 2024  Patient Name: Kalpana Braga,  Gender:  female        MRN: [id number]  : 1955  (68 y.o.)     Referring Practitioner: Marcus Ga MD  Diagnosis: lung mass  Additional Pertinent Hx: Per EMR \"Kalpana Braga is a 68 y.o. female active smoker with PMHx of COPD, HTN, depression, HLD, osteoarthritis who presents to [facility] with shortness of breath.  Patient reports starting  night she has been having shortness of breath worse with exertion.  Progressively worsening since .  She reports that she has been coughing up some light yellow-green sputum no fevers however she has had chills.  She denies any nausea or vomiting she denies being around anyone that is been sick.  She is a active smoker about a pack a day for 45 years.  No oxygen at baseline.  In the ED initially saturating 70% on room air, escalated to 6 L nasal cannula saturating 86 to 90%.  Denies any chest pain.\" code stroke 3/23.  CT angiogram head and neck read as an occlusion of the anterior branch of the left M2 segment of the MCA. MRI of brain without contrast shows acute infarct in the distribution of the left middle cerebral artery     Prior Level of Function:  Lives With: Spouse  Type of Home: House  Home Layout: Two level, Performs ADL's on one level  Home Access: Ramped entrance  Home Equipment: None   Bathroom Shower/Tub: Walk-in shower  Bathroom Toilet: Standard  Bathroom Equipment: Shower chair    ADL Assistance: Independent  Homemaking Assistance: Independent  Ambulation Assistance: Independent  Transfer Assistance: Independent  Active : Yes  Additional Comments: Pt reports being IND with functional tasks prior, no use of an AD. Unsure of accracy of information due to  cognition    Restrictions/Precautions:  Restrictions/Precautions: General Precautions, Fall Risk  Position Activity Restriction  Other position/activity restrictions: aphasia (expressive >receptive), impulsive     SUBJECTIVE: pt required encouragement to participate this date and stated felt \"blah\". Discussed with physiatry with PT concerns to monitor for depression. Pt incontinent of bowel upon arrival and agreeable to go to bathroom-pt stated can't tell if she is incontinent of bowel.    PAIN: no pain per pt    Vitals: Vitals not assessed per clinical judgement, see nursing flowsheet    OBJECTIVE:  Bed Mobility:  Rolling to Left: Stand By Assistance   Supine to Sit: Contact Guard Assistance  Scooting: Stand By Assistance  HOB up 10 degrees  Transfers:  Sit to Stand: Contact Guard Assistance  Stand to Sit:Contact Guard Assistance  From EOB and toilet, initial sit to std pt very unsteady but improved with second sit to std  Ambulation:  Contact Guard Assistance, X 1  Distance: 15'x1, 10'x1  Surface: Level Tile  Device:No Device  Gait Deviations:  first trial pt impulsive with fwd flexed posture and unsteady. second trial pt more erect posture, slowed down, and steadier.    Balance:  Static Sitting Balance:  Stand By Assistance, EOB, inc time with PT discussing inc activity needs.  Static Standing Balance: Contact Guard Assistance, to SBA, with 0-1UE support, tolerated around 2 minutes, WBOS.        Functional Outcome Measures: Completed  AM-PAC Inpatient Mobility without Stair Climbing Raw Score : 15  AM-PAC Inpatient without Stair Climbing T-Scale Score : 43.03  Modified Carole Scale:  +3 - Moderate disability; requiring some help, but able to walk without physical assistance (SBA/CGA).   Patient could not live alone but could walk from one room to another without physical help from another person.  Education provided regarding stroke rehabilitation management.    ASSESSMENT:  Assessment: Patient progressing toward

## 2024-04-01 NOTE — PROGRESS NOTES
[place] for Pulmonary, Sleep and Critical Care Medicine      Patient - Kalpana Braga   MRN -  [id number]   [facility] # - [id number]   - 1955      Date of Admission -  3/21/2024  8:53 PM  Date of evaluation -  2024  Room - -026-   Hospital Day - 10  Consulting - Sulema Lagos MD Primary Care Physician - Tatiana Velasquez, PAO - CNP     Problem List      Active Hospital Problems    Diagnosis Date Noted    CVA (cerebral vascular accident) (HCC) [I63.9] 2024    Dyspnea [R06.00] 2024    Acute hypoxemic respiratory failure (HCC) [J96.01] 2024    Lung mass [R91.8] 2024    Pneumonia of both lungs due to infectious organism [J18.9] 2024     Reason for Consult    Right suprahilar Mass   HPI   History Obtained From: Patient and electronic medical record.    Kalpana Braga is a 68 y.o. female with a past medical history of COPD, Hypertension, Hyperlipidemia, osteoarthritis, and anxiety/depression who presented to [facility] ED on 3/21/2024 with complaints of shortness of breath. Patient states she has has shortness of breath for the last 4 days which is worse with exertion. She also has had a productive cough with light yellow-green sputum. She has had chills as well without fevers. She is a current everyday smoker and smokes about a pack a day for 45 years. She does not wear any oxygen at baseline.     In the ED, patient was found to be saturating at 70% on room air. She was placed on 6L NC with saturations around 86 to 90%. Chest xray showed faint hazy opacities in the left midlung, possibly reflecting infiltrates. New consolidation versus atelectasis in right lung apex. CTA chest showed no pulmonary embolism. Hypodense 3.9 x 5.5 cm right suprahilar mass extending to the right mediastinum and subcarinal region, suspicious for neoplasm until proven otherwise. Complete right upper lung collapse secondary to the right suprahilar mass. Nonspecific patchy ground-glass consolidations  extending to the right   mediastinum and subcarinal region.  This causes right upper lobe bronchial compression with complete right   upper lung atelectasis.  Compensatory hyperinflation of the right middle and right lower lungs.  Additional nonspecific patchy ground-glass consolidations scattered   throughout the left upper and lower lungs.  No pleural effusion or pneumothorax.  The heart is not enlarged. There is no pericardial effusion.  The thyroid is unremarkable.     Visualized upper abdomen is unremarkable.  No acute fractures.     IMPRESSION:  1. No pulmonary emboli.  2. Hypodense 3.9 x 5.5 cm right suprahilar mass extending to the right   mediastinum and subcarinal region, suspicious for neoplasm until proven   otherwise.  3. Complete right upper lung collapse secondary to the right suprahilar   mass.  4. Nonspecific patchy ground-glass consolidations throughout the left   lung. Pulmonary edema is possible but can be followed to exclude atypical   infectious or inflammatory pneumonitis.  5. Dilatation of the central pulmonary artery suggesting a degree of   pulmonary arterial hypertension.            Assessment   -Acute hypoxic respiratory failure due to COPD exacerbation Vs right upper lobe collapse- on HFNC  -Right upper lobe collapse- ?  Endobronchial lesion Vs hilar lymphadenopathy  -Suspected COPD with acute exacerbation - No PFTs on file  -Post obstructive Vs Community-acquired pneumonia Vs atypical pneumonia  -3.9 x 5.5 cm right suprahilar mass - concerning for neoplasm  -Current tobacco use - 51 pack year history, current smoker of 1 PPD  -Suspected SDB-  -Obesity  Plan   -Monitor SpO2 wean supplemental O2 to maintain SpO2 >90%  -Plavix on hold starting 3/28/2024 for EBUS at 1400 4/2/2024 Tuesday  -NPO at midnight   -Hold Lovenox tomorrow   -OK to continue ASA and will hold Lovenox day of procedure   -Continue Mucinex 1200 mg twice daily   -Completed Zithromax and rocpehin  -Completed Prednisone

## 2024-04-01 NOTE — PROGRESS NOTES
Hospitalist Progress Note      Patient:  Kalpana Braga    Unit/Bed:4K-26/026-A  YOB: 1955  MRN: [id number]   Acct: [id number]   PCP: Tatiana Velasquez APRN - CNP  Date of Admission: 3/21/2024    Assessment/Plan:    # Left MCA stroke: Patient developed expressive aphasia and difficulty with writing 3/24/2024 and also trouble identifying current location.  CT head showed no acute bleed, CTA of neck was negative.  CTA head showed possible occlusion of M2 segment of left MCA.  MRI morning of 3/24/2024 showing acute infarct in the left MCA area.    A1c 5.8, LDL below goal at 32, echo with bubble study negative for shunt.  Plan  -Neurology was following, recommending statin, aspirin, and Plavix.  -Plavix currently on hold 2/2 EBUS on 4/2/2024.  Will resume Plavix afterwards.  -Speech recommending minced and moist diet w/ thin liquids  -Will do 30-day cardiac event monitor at discharge to evaluate for arrhythmias as source of stroke  -Reiterate importance of tobacco cessation  -PRN stat head CT if changes in mental status, severe headache, or new neurological deficit.  -Patient may be going to IPR after EBUS    #Acute hypoxic respiratory failure multifactorial: lung mass, PNA, COPD exacerbation, improving:  In ED, requiring 6L nasal cannula.  Patient endorsing shortness of breath, cough, yellow/green sputum production.  CXR showing consolidations versus atelectasis in the right lung apex.  CTA shows patchy groundglass consolidation throughout left lung consistent with infectious process.  COVID/flu negative.  Patient has completed full course of azithromycin and ceftriaxone as well as 5 days of prednisone.    Currently on 1 LNC.  Antibiotics and prednisone courses completed.  Plan  -Continue Acapella and incentive spirometer  -Outpatient follow-up with pulmonology  -Continue Stiolto  -Plan for EBUS 4/2/24    # 3.9 x 5.5 cm right suprahilar mass concerning  results for input(s): \"AST\", \"ALT\", \"BILIDIR\", \"BILITOT\", \"ALKPHOS\" in the last 72 hours.  No results for input(s): \"INR\" in the last 72 hours.  No results for input(s): \"CKTOTAL\", \"TROPONINI\" in the last 72 hours.    Microbiology:    Blood culture #1:   Lab Results   Component Value Date/Time    BC No growth 24 hours. No growth 48 hours. 03/28/2024 12:39 PM       Blood culture #2:No results found for: \"BLOODCULT2\"    Organism:  Lab Results   Component Value Date/Time    ORG Growth of Contaminants 07/31/2016 03:07 PM       No results found for: \"LABGRAM\"    MRSA culture only:No results found for: \"MRSAC\"    Urine culture:   Lab Results   Component Value Date/Time    LABURIN  07/31/2016 03:07 PM     No growth-preliminary  Growth of Contaminants.  The mixture of organisms present  are not a common cause of urinary tract infections and  probably represent skin heidy or distal urethral heidy.         Respiratory culture: No results found for: \"CULTRESP\"    Aerobic and Anaerobic :  No results found for: \"LABAERO\"  No results found for: \"LABANAE\"    Urinalysis:      Lab Results   Component Value Date/Time    NITRU NEGATIVE 03/24/2024 06:00 AM    WBCUA 15-25 03/24/2024 06:00 AM    BACTERIA NONE SEEN 03/24/2024 06:00 AM    RBCUA 0-2 03/24/2024 06:00 AM    BLOODU NEGATIVE 03/24/2024 06:00 AM    SPECGRAV 1.018 07/31/2016 03:07 PM    GLUCOSEU NEGATIVE 03/24/2024 06:00 AM       Radiology:  XR CHEST PORTABLE   Final Result      Enlarging right parahilar opacities, likely corresponding to known mass and adjacent atelectasis.               **This report has been created using voice recognition software. It may contain minor errors which are inherent in voice recognition technology.**      Final report electronically signed by Dr. Tejas Velasquez on 3/29/2024 1:07 PM      CT ABDOMEN PELVIS W IV CONTRAST Additional Contrast? Radiologist Recommendation   Final Result   1. Liquid consistency stool be correlated for diarrhea. No

## 2024-04-01 NOTE — CARE COORDINATION
Collaborative Discharge Planning    Kalpana Braga  :  1955  MRN:  [id number]    ADMIT DATE:  3/21/2024      Discharge Planning Discharge Planning  Type of Residence: House  Living Arrangements: Alone  Support Systems: Family Members, Friends/Neighbors  Current Services Prior To Admission: Durable Medical Equipment  Current DME Prior to Arrival: Home Aerosol, Other (Comment) (Lift chair)  Potential Assistance Needed: Durable Medical Equipment, Home Care  DME Ordered?: No  Potential Assistance Purchasing Medications: No  Type of Home Care Services: None  Patient expects to be discharged to:: House  Follow Up Appointment: Best Day/Time : Monday AM  One/Two Story Residence: Two story, live on first floor  History of falls?: Yes  White Board Notes /Social Work Whiteboard Notes  /Social Work Whiteboard: 3-29-24 CM: From home alone. Possible IPR when medically ready.  Coordinator following.    Discharge Plan Rehab  Lives alone, plans IPR (precert, will need event monitor prior); ambulates 25 feet w therapy; has walker, nebulizer, lift chair, ramped entrance, may need WC    Discharge Milestones and Delays: Clinical status  CVA/Right Suprahilar Lung Mass/Suspected COPD/RUL Collapse  History: COPD, Smoker/Marijuana Use    3/23 Code Stroke    3/26 Repeat Code Stroke    4/3 EBUS   -3.9 x 5.5 cm right suprahilar mass   -Right upper lobe collapse    Oxygen 1L    Await EBUS    Await IPR Precert (when procedures completed)    SIGNED:  Constantin Raines RN   2024, 2:29 PM

## 2024-04-01 NOTE — PROGRESS NOTES
Rounded on unit, spoke with patient and PT in attendance at time of visit.  The patient states she is feeling \"blah\" today, appears to be close to tears.  Explained to the patient that we continue to follow her clinical course as well as therapy progress for rehab. Explained further that we are awaiting her to have the EBUS procedure completed tomorrow.  Did explain that a precert is required for post acute care through her insurance.  The patient verbalized understanding.

## 2024-04-02 ENCOUNTER — ANESTHESIA (OUTPATIENT)
Dept: ENDOSCOPY | Age: 69
End: 2024-04-02
Payer: COMMERCIAL

## 2024-04-02 ENCOUNTER — ANESTHESIA EVENT (OUTPATIENT)
Dept: ENDOSCOPY | Age: 69
End: 2024-04-02
Payer: COMMERCIAL

## 2024-04-02 ENCOUNTER — APPOINTMENT (OUTPATIENT)
Dept: GENERAL RADIOLOGY | Age: 69
End: 2024-04-02
Payer: COMMERCIAL

## 2024-04-02 PROBLEM — E44.0 MODERATE MALNUTRITION (HCC): Status: ACTIVE | Noted: 2024-04-02

## 2024-04-02 LAB
ANION GAP SERPL CALC-SCNC: 12 MEQ/L (ref 8–16)
BACTERIA BLD AEROBE CULT: NORMAL
BACTERIA BLD AEROBE CULT: NORMAL
BUN SERPL-MCNC: 12 MG/DL (ref 7–22)
CALCIUM SERPL-MCNC: 8.5 MG/DL (ref 8.5–10.5)
CHLORIDE SERPL-SCNC: 93 MEQ/L (ref 98–111)
CO2 SERPL-SCNC: 26 MEQ/L (ref 23–33)
CREAT SERPL-MCNC: 0.8 MG/DL (ref 0.4–1.2)
DEPRECATED RDW RBC AUTO: 43.9 FL (ref 35–45)
ERYTHROCYTE [DISTWIDTH] IN BLOOD BY AUTOMATED COUNT: 14.1 % (ref 11.5–14.5)
GFR SERPL CREATININE-BSD FRML MDRD: 79 ML/MIN/1.73M2
GLUCOSE SERPL-MCNC: 99 MG/DL (ref 70–108)
HCT VFR BLD AUTO: 34.9 % (ref 37–47)
HGB BLD-MCNC: 11.3 GM/DL (ref 12–16)
MCH RBC QN AUTO: 27.8 PG (ref 26–33)
MCHC RBC AUTO-ENTMCNC: 32.4 GM/DL (ref 32.2–35.5)
MCV RBC AUTO: 85.7 FL (ref 81–99)
PLATELET # BLD AUTO: 233 THOU/MM3 (ref 130–400)
PMV BLD AUTO: 10.7 FL (ref 9.4–12.4)
POTASSIUM SERPL-SCNC: 3.5 MEQ/L (ref 3.5–5.2)
RBC # BLD AUTO: 4.07 MILL/MM3 (ref 4.2–5.4)
SODIUM SERPL-SCNC: 131 MEQ/L (ref 135–145)
WBC # BLD AUTO: 7 THOU/MM3 (ref 4.8–10.8)

## 2024-04-02 PROCEDURE — 2060000000 HC ICU INTERMEDIATE R&B

## 2024-04-02 PROCEDURE — 94760 N-INVAS EAR/PLS OXIMETRY 1: CPT

## 2024-04-02 PROCEDURE — 2500000003 HC RX 250 WO HCPCS: Performed by: NURSE ANESTHETIST, CERTIFIED REGISTERED

## 2024-04-02 PROCEDURE — 88172 CYTP DX EVAL FNA 1ST EA SITE: CPT

## 2024-04-02 PROCEDURE — 97535 SELF CARE MNGMENT TRAINING: CPT

## 2024-04-02 PROCEDURE — 36415 COLL VENOUS BLD VENIPUNCTURE: CPT

## 2024-04-02 PROCEDURE — 3700000000 HC ANESTHESIA ATTENDED CARE: Performed by: INTERNAL MEDICINE

## 2024-04-02 PROCEDURE — 07D78ZX EXTRACTION OF THORAX LYMPHATIC, VIA NATURAL OR ARTIFICIAL OPENING ENDOSCOPIC, DIAGNOSTIC: ICD-10-PCS | Performed by: INTERNAL MEDICINE

## 2024-04-02 PROCEDURE — 94640 AIRWAY INHALATION TREATMENT: CPT

## 2024-04-02 PROCEDURE — 31653 BRONCH EBUS SAMPLNG 3/> NODE: CPT | Performed by: INTERNAL MEDICINE

## 2024-04-02 PROCEDURE — 6370000000 HC RX 637 (ALT 250 FOR IP)

## 2024-04-02 PROCEDURE — 2580000003 HC RX 258

## 2024-04-02 PROCEDURE — 3609020000 HC BRONCHOSCOPY W/EBUS FNA: Performed by: INTERNAL MEDICINE

## 2024-04-02 PROCEDURE — 71045 X-RAY EXAM CHEST 1 VIEW: CPT

## 2024-04-02 PROCEDURE — 7100000000 HC PACU RECOVERY - FIRST 15 MIN: Performed by: INTERNAL MEDICINE

## 2024-04-02 PROCEDURE — 88342 IMHCHEM/IMCYTCHM 1ST ANTB: CPT

## 2024-04-02 PROCEDURE — 80048 BASIC METABOLIC PNL TOTAL CA: CPT

## 2024-04-02 PROCEDURE — 99232 SBSQ HOSP IP/OBS MODERATE 35: CPT | Performed by: INTERNAL MEDICINE

## 2024-04-02 PROCEDURE — 88341 IMHCHEM/IMCYTCHM EA ADD ANTB: CPT

## 2024-04-02 PROCEDURE — 92507 TX SP LANG VOICE COMM INDIV: CPT

## 2024-04-02 PROCEDURE — 6360000002 HC RX W HCPCS: Performed by: NURSE ANESTHETIST, CERTIFIED REGISTERED

## 2024-04-02 PROCEDURE — 88173 CYTOPATH EVAL FNA REPORT: CPT

## 2024-04-02 PROCEDURE — 3700000001 HC ADD 15 MINUTES (ANESTHESIA): Performed by: INTERNAL MEDICINE

## 2024-04-02 PROCEDURE — 2700000000 HC OXYGEN THERAPY PER DAY

## 2024-04-02 PROCEDURE — 7100000001 HC PACU RECOVERY - ADDTL 15 MIN: Performed by: INTERNAL MEDICINE

## 2024-04-02 PROCEDURE — 99233 SBSQ HOSP IP/OBS HIGH 50: CPT | Performed by: HOSPITALIST

## 2024-04-02 PROCEDURE — 88305 TISSUE EXAM BY PATHOLOGIST: CPT

## 2024-04-02 PROCEDURE — 88177 CYTP FNA EVAL EA ADDL: CPT

## 2024-04-02 PROCEDURE — 97530 THERAPEUTIC ACTIVITIES: CPT

## 2024-04-02 PROCEDURE — 2720000010 HC SURG SUPPLY STERILE: Performed by: INTERNAL MEDICINE

## 2024-04-02 PROCEDURE — 85027 COMPLETE CBC AUTOMATED: CPT

## 2024-04-02 RX ORDER — GLYCOPYRROLATE 0.2 MG/ML
INJECTION INTRAMUSCULAR; INTRAVENOUS PRN
Status: DISCONTINUED | OUTPATIENT
Start: 2024-04-02 | End: 2024-04-02 | Stop reason: SDUPTHER

## 2024-04-02 RX ORDER — SUCCINYLCHOLINE/SOD CL,ISO/PF 200MG/10ML
SYRINGE (ML) INTRAVENOUS PRN
Status: DISCONTINUED | OUTPATIENT
Start: 2024-04-02 | End: 2024-04-02 | Stop reason: SDUPTHER

## 2024-04-02 RX ORDER — PROPOFOL 10 MG/ML
INJECTION, EMULSION INTRAVENOUS PRN
Status: DISCONTINUED | OUTPATIENT
Start: 2024-04-02 | End: 2024-04-02 | Stop reason: SDUPTHER

## 2024-04-02 RX ORDER — LIDOCAINE HYDROCHLORIDE 20 MG/ML
INJECTION, SOLUTION EPIDURAL; INFILTRATION; INTRACAUDAL; PERINEURAL PRN
Status: DISCONTINUED | OUTPATIENT
Start: 2024-04-02 | End: 2024-04-02 | Stop reason: SDUPTHER

## 2024-04-02 RX ORDER — EPHEDRINE SULFATE/0.9% NACL/PF 25 MG/5 ML
SYRINGE (ML) INTRAVENOUS PRN
Status: DISCONTINUED | OUTPATIENT
Start: 2024-04-02 | End: 2024-04-02 | Stop reason: SDUPTHER

## 2024-04-02 RX ORDER — DEXAMETHASONE SODIUM PHOSPHATE 4 MG/ML
INJECTION, SOLUTION INTRA-ARTICULAR; INTRALESIONAL; INTRAMUSCULAR; INTRAVENOUS; SOFT TISSUE PRN
Status: DISCONTINUED | OUTPATIENT
Start: 2024-04-02 | End: 2024-04-02 | Stop reason: SDUPTHER

## 2024-04-02 RX ORDER — ROCURONIUM BROMIDE 10 MG/ML
INJECTION, SOLUTION INTRAVENOUS PRN
Status: DISCONTINUED | OUTPATIENT
Start: 2024-04-02 | End: 2024-04-02 | Stop reason: SDUPTHER

## 2024-04-02 RX ORDER — ONDANSETRON 2 MG/ML
INJECTION INTRAMUSCULAR; INTRAVENOUS PRN
Status: DISCONTINUED | OUTPATIENT
Start: 2024-04-02 | End: 2024-04-02 | Stop reason: SDUPTHER

## 2024-04-02 RX ADMIN — PHENYLEPHRINE HYDROCHLORIDE 200 MCG: 10 INJECTION INTRAVENOUS at 15:06

## 2024-04-02 RX ADMIN — ONDANSETRON 4 MG: 2 INJECTION INTRAMUSCULAR; INTRAVENOUS at 14:55

## 2024-04-02 RX ADMIN — PHENYLEPHRINE HYDROCHLORIDE 200 MCG: 10 INJECTION INTRAVENOUS at 14:52

## 2024-04-02 RX ADMIN — POLYETHYLENE GLYCOL 3350 17 G: 17 POWDER, FOR SOLUTION ORAL at 09:34

## 2024-04-02 RX ADMIN — GUAIFENESIN 1200 MG: 600 TABLET, EXTENDED RELEASE ORAL at 09:35

## 2024-04-02 RX ADMIN — HYDRALAZINE HYDROCHLORIDE 25 MG: 25 TABLET, FILM COATED ORAL at 09:35

## 2024-04-02 RX ADMIN — SODIUM CHLORIDE: 9 INJECTION, SOLUTION INTRAVENOUS at 12:53

## 2024-04-02 RX ADMIN — SODIUM CHLORIDE, PRESERVATIVE FREE 10 ML: 5 INJECTION INTRAVENOUS at 09:35

## 2024-04-02 RX ADMIN — GUAIFENESIN 1200 MG: 600 TABLET, EXTENDED RELEASE ORAL at 19:47

## 2024-04-02 RX ADMIN — Medication 100 MG: at 14:37

## 2024-04-02 RX ADMIN — PHENYLEPHRINE HYDROCHLORIDE 200 MCG: 10 INJECTION INTRAVENOUS at 15:34

## 2024-04-02 RX ADMIN — PHENYLEPHRINE HYDROCHLORIDE 200 MCG: 10 INJECTION INTRAVENOUS at 14:56

## 2024-04-02 RX ADMIN — PROPOFOL 130 MG: 10 INJECTION, EMULSION INTRAVENOUS at 14:37

## 2024-04-02 RX ADMIN — PHENYLEPHRINE HYDROCHLORIDE 200 MCG: 10 INJECTION INTRAVENOUS at 15:21

## 2024-04-02 RX ADMIN — FERROUS SULFATE TAB 325 MG (65 MG ELEMENTAL FE) 325 MG: 325 (65 FE) TAB at 09:35

## 2024-04-02 RX ADMIN — EPHEDRINE SULFATE 10 MG: 5 INJECTION INTRAVENOUS at 14:48

## 2024-04-02 RX ADMIN — VENLAFAXINE HYDROCHLORIDE 75 MG: 75 CAPSULE, EXTENDED RELEASE ORAL at 09:35

## 2024-04-02 RX ADMIN — PHENYLEPHRINE HYDROCHLORIDE 200 MCG: 10 INJECTION INTRAVENOUS at 15:14

## 2024-04-02 RX ADMIN — HYDRALAZINE HYDROCHLORIDE 25 MG: 25 TABLET, FILM COATED ORAL at 01:19

## 2024-04-02 RX ADMIN — PHENYLEPHRINE HYDROCHLORIDE 200 MCG: 10 INJECTION INTRAVENOUS at 15:42

## 2024-04-02 RX ADMIN — GLYCOPYRROLATE 0.2 MG: 0.2 INJECTION INTRAMUSCULAR; INTRAVENOUS at 15:08

## 2024-04-02 RX ADMIN — PHENYLEPHRINE HYDROCHLORIDE 200 MCG: 10 INJECTION INTRAVENOUS at 15:48

## 2024-04-02 RX ADMIN — LIDOCAINE HYDROCHLORIDE 100 MG: 20 INJECTION, SOLUTION EPIDURAL; INFILTRATION; INTRACAUDAL; PERINEURAL at 14:37

## 2024-04-02 RX ADMIN — PHENYLEPHRINE HYDROCHLORIDE 200 MCG: 10 INJECTION INTRAVENOUS at 15:27

## 2024-04-02 RX ADMIN — EPHEDRINE SULFATE 5 MG: 5 INJECTION INTRAVENOUS at 14:54

## 2024-04-02 RX ADMIN — EPHEDRINE SULFATE 10 MG: 5 INJECTION INTRAVENOUS at 15:01

## 2024-04-02 RX ADMIN — METOPROLOL TARTRATE 25 MG: 25 TABLET, FILM COATED ORAL at 19:47

## 2024-04-02 RX ADMIN — METOPROLOL TARTRATE 25 MG: 25 TABLET, FILM COATED ORAL at 09:35

## 2024-04-02 RX ADMIN — ASPIRIN 81 MG 81 MG: 81 TABLET ORAL at 09:35

## 2024-04-02 RX ADMIN — SUGAMMADEX 200 MG: 100 INJECTION, SOLUTION INTRAVENOUS at 15:53

## 2024-04-02 RX ADMIN — LOSARTAN POTASSIUM 100 MG: 100 TABLET, FILM COATED ORAL at 09:35

## 2024-04-02 RX ADMIN — ROCURONIUM BROMIDE 20 MG: 10 INJECTION INTRAVENOUS at 14:48

## 2024-04-02 RX ADMIN — ACETAMINOPHEN 650 MG: 325 TABLET ORAL at 12:55

## 2024-04-02 RX ADMIN — PHENYLEPHRINE HYDROCHLORIDE 200 MCG: 10 INJECTION INTRAVENOUS at 15:01

## 2024-04-02 RX ADMIN — TIOTROPIUM BROMIDE AND OLODATEROL 2 PUFF: 3.124; 2.736 SPRAY, METERED RESPIRATORY (INHALATION) at 09:57

## 2024-04-02 RX ADMIN — ATORVASTATIN CALCIUM 40 MG: 40 TABLET, FILM COATED ORAL at 19:47

## 2024-04-02 RX ADMIN — DEXAMETHASONE SODIUM PHOSPHATE 8 MG: 4 INJECTION, SOLUTION INTRAMUSCULAR; INTRAVENOUS at 14:52

## 2024-04-02 RX ADMIN — POTASSIUM CHLORIDE 40 MEQ: 1500 TABLET, EXTENDED RELEASE ORAL at 09:35

## 2024-04-02 ASSESSMENT — PAIN DESCRIPTION - LOCATION: LOCATION: CHEST

## 2024-04-02 ASSESSMENT — PAIN DESCRIPTION - DESCRIPTORS: DESCRIPTORS: ACHING

## 2024-04-02 ASSESSMENT — PAIN SCALES - GENERAL
PAINLEVEL_OUTOF10: 0
PAINLEVEL_OUTOF10: 5
PAINLEVEL_OUTOF10: 0

## 2024-04-02 ASSESSMENT — PAIN - FUNCTIONAL ASSESSMENT
PAIN_FUNCTIONAL_ASSESSMENT: ACTIVITIES ARE NOT PREVENTED
PAIN_FUNCTIONAL_ASSESSMENT: NONE - DENIES PAIN

## 2024-04-02 ASSESSMENT — PAIN DESCRIPTION - ORIENTATION: ORIENTATION: RIGHT

## 2024-04-02 NOTE — PROGRESS NOTES
[place] for Pulmonary, Sleep and Critical Care Medicine      Patient - Kalpana Braga   MRN -  [id number]   [facility] # - [id number]   - 1955      Date of Admission -  3/21/2024  8:53 PM  Date of evaluation -  2024  Room - -026-   Hospital Day - 11  Consulting - Sulema Lagos MD Primary Care Physician - Tatiana Velasquez APRN - CNP     Problem List      Active Hospital Problems    Diagnosis Date Noted    CVA (cerebral vascular accident) (HCC) [I63.9] 2024    Dyspnea [R06.00] 2024    Acute hypoxemic respiratory failure (HCC) [J96.01] 2024    Hilar mass [R91.8] 2024    Pneumonia of right upper lobe due to infectious organism [J18.9] 2024     Reason for Consult    Right suprahilar mass   HPI   History Obtained From: Patient and electronic medical record.    Kalpana Braga is a 68 y.o. female with a past medical history of COPD, Hypertension, Hyperlipidemia, osteoarthritis, and anxiety/depression who presented to [facility] ED on 3/21/2024 with complaints of shortness of breath. Patient states she has has shortness of breath for the last 4 days which is worse with exertion. She also has had a productive cough with light yellow-green sputum. She has had chills as well without fevers. She is a current everyday smoker and smokes about a pack a day for 45 years. She does not wear any oxygen at baseline.     In the ED, patient was found to be saturating at 70% on room air. She was placed on 6L NC with saturations around 86 to 90%. Chest xray showed faint hazy opacities in the left midlung, possibly reflecting infiltrates. New consolidation versus atelectasis in right lung apex. CTA chest showed no pulmonary embolism. Hypodense 3.9 x 5.5 cm right suprahilar mass extending to the right mediastinum and subcarinal region, suspicious for neoplasm until proven otherwise. Complete right upper lung collapse secondary to the right suprahilar mass. Nonspecific patchy ground-glass  Old records and notes have been reviewed in [facility]   ABG  Lab Results   Component Value Date/Time    PH 7.44 03/26/2024 12:49 PM    PO2 78 03/26/2024 12:49 PM    PCO2 42 03/26/2024 12:49 PM    HCO3 28 03/26/2024 12:49 PM    O2SAT 96 03/26/2024 12:49 PM     Lab Results   Component Value Date/Time    IFIO2 3 03/26/2024 12:49 PM     CBC  Recent Labs     03/31/24 0348 04/01/24 0412 04/02/24 0344   WBC 10.7 8.4 7.0   RBC 4.13* 4.37 4.07*   HGB 11.3* 12.0 11.3*   HCT 35.9* 36.9* 34.9*   MCV 86.9 84.4 85.7   MCH 27.4 27.5 27.8   MCHC 31.5* 32.5 32.4    283 233   MPV 10.4 10.3 10.7        BMP  Recent Labs     03/31/24 0348 04/01/24 0412 04/02/24 0344    132* 131*   K 4.1 4.1 3.5   CL 95* 92* 93*   CO2 28 24 26   BUN 3* 5* 12   CREATININE 0.7 0.6 0.8   GLUCOSE 96 117* 99   CALCIUM 8.4* 8.5 8.5       LFT  No results for input(s): \"AST\", \"ALT\", \"ALB\", \"BILITOT\", \"ALKPHOS\", \"AMYLASE\", \"LIPASE\" in the last 72 hours.    TROP  No results found for: \"TROPONINT\"  BNP  No results for input(s): \"BNP\" in the last 72 hours.  Lactic Acid  No results for input(s): \"LACTA\" in the last 72 hours.    INR  No results for input(s): \"INR\", \"PROTIME\" in the last 72 hours.  PTT  No results for input(s): \"APTT\" in the last 72 hours.  Glucose  No results for input(s): \"POCGLU\" in the last 72 hours.    UA No results for input(s): \"SPECGRAV\", \"PHUR\", \"COLORU\", \"CLARITYU\", \"MUCUS\", \"PROTEINU\", \"BLOODU\", \"RBCUA\", \"WBCUA\", \"BACTERIA\", \"NITRU\", \"GLUCOSEU\", \"BILIRUBINUR\", \"UROBILINOGEN\", \"KETUA\", \"LABCAST\", \"LABCASTTY\", \"AMORPHOS\" in the last 72 hours.    Invalid input(s): \"CRYSTALS\"  .    PFTs   No PFTs noted on EPIC.     Sleep studies   No sleep study noted on EPIC.     Cultures    COVID-19 and Influenza not detected  Respiratory culture pending sample  Molecular pneumonia panel pending sample  MRSA by PCR negative.   Blood culture x2-No prelim growth   EKG     Echocardiogram     TTE   3/25/2024     Left Ventricle: Normal left

## 2024-04-02 NOTE — PROGRESS NOTES
1610: pt arrives to pacu, respirations unlabored on non rebreather. Pt alert and oriented. Weaned to 2L NC  1620: Dr Paez at bedside.   1627: Xray at bedside  1631: report called to Clemencia FOUNTAIN on 4k. Pt placed for transport.   1640: pt meets criteria for discharge from pacu at this time. pt transported to [facility] stable condition

## 2024-04-02 NOTE — PLAN OF CARE
Problem: Discharge Planning  Goal: Discharge to home or other facility with appropriate resources  Outcome: Progressing     Problem: Safety - Adult  Goal: Free from fall injury  Outcome: Progressing  Flowsheets (Taken 4/2/2024 1400)  Free From Fall Injury: Instruct family/caregiver on patient safety     Problem: Chronic Conditions and Co-morbidities  Goal: Patient's chronic conditions and co-morbidity symptoms are monitored and maintained or improved  Outcome: Progressing     Problem: Pain  Goal: Verbalizes/displays adequate comfort level or baseline comfort level  Outcome: Progressing     Problem: Skin/Tissue Integrity  Goal: Absence of new skin breakdown  Description: 1.  Monitor for areas of redness and/or skin breakdown  2.  Assess vascular access sites hourly  3.  Every 4-6 hours minimum:  Change oxygen saturation probe site  4.  Every 4-6 hours:  If on nasal continuous positive airway pressure, respiratory therapy assess nares and determine need for appliance change or resting period.  Outcome: Progressing     Problem: Respiratory - Adult  Goal: Achieves optimal ventilation and oxygenation  4/2/2024 1523 by Clemencia Ambrocio RN  Outcome: Progressing  4/2/2024 0430 by Page Hernandez RN  Outcome: Progressing  Goal: Clear lung sounds  4/2/2024 0959 by Nora Flores JOSE  Outcome: Progressing  Note: Mdi to maintain open airways. Patient mutually agreed on goals.       Problem: Infection - Adult  Goal: Absence of infection at discharge  Outcome: Progressing     Problem: Neurosensory - Adult  Goal: Achieves stable or improved neurological status  4/2/2024 1523 by Clemencia Ambrocio RN  Outcome: Progressing  4/2/2024 0430 by Page Hernandez RN  Outcome: Progressing  Goal: Achieves maximal functionality and self care  4/2/2024 1523 by Clemencia Ambrocio RN  Outcome: Progressing  4/2/2024 0430 by Page Hernandez RN  Outcome: Progressing     Problem: Nutrition Deficit:  Goal: Optimize nutritional status  4/2/2024 1523

## 2024-04-02 NOTE — PROGRESS NOTES
[facility]  INPATIENT SPEECH THERAPY  STRZ ICU STEPDOWN TELEMETRY 4K  DAILY NOTE    TIME   SLP Individual Minutes  Time In: 930  Time Out: 944  Minutes: 14  Timed Code Treatment Minutes: 0 Minutes     Date: 2024  Patient Name: Kalpana Braga      CSN: [id number]   : 1955  (68 y.o.)  Gender: female   Referring Physician:  Chantelle Garcia MD   Diagnosis: Lung mass  Precautions: Fall risk, aspiration precautions  Current Diet: Minced and moist with thin liquids  Respiratory Status: Nasal Canula: 5LPM  Swallowing Strategies:  Full Upright Position, Small Bite/Sip, Medications Whole with Puree, Limit Distractions, and Monitor for Fatigue   Date of Last MBS/FEES:  FEES on 3/25/2024    Pain:  No pain reported.    Subjective: Visit approved by АЛЕКСАНДР Khanna, АЛЕКСАНДР Khanna did report \"patient does have a fever but ok for speech.\" Student nurse intermittently present obtaining vitals at time of ST session. Patient agreeable to short speech therapy session. Shortened therapy session also due to patient fatigue.       Short-Term Goals:  SHORT TERM GOAL #1:  Goal 1: Patient will safely consume a minced and moist diet (advanced textures as indicated) with thin liquids with direct 1:1 staff supervision to optimize safety with po intake via proper positioning and use of compensatory strategies to ensure adequate nutritional intake.   INTERVENTIONS: Patient currently NPO per АЛЕКСАНДР Khanna report.     SHORT TERM GOAL #2:  Goal 2: PAtient will complete pharyngeal strenghtening program x15 with adequate success to improve airway protection measures  INTERVENTIONS: DNT given focus on other STGs.    SHORT TERM GOAL #3:  Goal 3: Patient will complete expression (basic naming,verbal descriptions, basic writing) and receptive (moderately complex auditory/reading comprehension) langauge tasks with 70% accuracy, mod cues to improve efficacy for contributions within immediate context.  INTERVENTIONS:  Stating name:

## 2024-04-02 NOTE — PROGRESS NOTES
Pt had a EBSU with pulmology. Brothers were told that pt would have a oncology consult with patient d/t findings. Kristopher Guevara [phone] & Dario Guevara [phone] are asking to be called when oncology meets with patient

## 2024-04-02 NOTE — PROGRESS NOTES
EBUS completed, tolerated well. Photos taken. FNA to 11L  lymph node X 3, 4R x 6, 7 x 4, 4L x 3. 4 specimen jars and slides taken to lab per pathologist. Bronchoscopy done-washings obtained. Scope .

## 2024-04-02 NOTE — PRE SEDATION
[facility] Endoscopy  Sedation Pre-Procedure Note    Patient Name: Kalpana Braga    YOB: 1955   Room/Bed: [facility]/NONE  Medical Record Number: [id number]  Date: 4/2/2024  Time: 4:04 PM     Indication:  Pain control for Flexible Fibrooptic Bronchoscopy.    Consent: I have discussed with the patient and/or the patient representative the indication, alternatives, and the possible risks and/or complications of the planned procedure and the anesthesia methods. The patient and/or patient representative appear to understand and agree to proceed.    Vital Signs: BP (!) 100/56   Pulse 55   Temp 98.2 °F (36.8 °C) (Oral)   Resp 18   Ht 1.626 m (5' 4\")   Wt 85.9 kg (189 lb 6.4 oz)   SpO2 96%   BMI 32.51 kg/m²       Past Medical History:  Past Medical History:   Diagnosis Date    COPD (chronic obstructive pulmonary disease) (HCC)     Hyperlipidemia     Hypertension     Osteoarthritis     Smoker     Urticaria          Allergy:  Allergies   Allergen Reactions    Apresoline [Hydralazine]      Patient had a bronchopneumonia and blames the apresoline    Enalapril      Cough.    Hctz [Hydrochlorothiazide]      Cough    Sulfa Antibiotics Hives    Tenex [Guanfacine Hcl]      Cough         Past Surgical History:  Past Surgical History:   Procedure Laterality Date    DILATION AND CURETTAGE OF UTERUS      KNEE ARTHROSCOPY         Medications:   Current Facility-Administered Medications   Medication Dose Route Frequency Provider Last Rate Last Admin    0.9 % sodium chloride infusion   IntraVENous Continuous Kike York  mL/hr at 04/02/24 1253 Restarted at 04/02/24 1436    benzonatate (TESSALON) capsule 100 mg  100 mg Oral TID PRN Kike York DO        hydrALAZINE (APRESOLINE) tablet 25 mg  25 mg Oral q8h Kike York DO   25 mg at 04/02/24 0935    ferrous sulfate (IRON  chewable tablet 81 mg  81 mg Oral Daily Chantelle Garcia MD   81 mg at 04/02/24 0935    sodium chloride flush 0.9 % injection 5-40 mL  5-40 mL IntraVENous 2 times per day Jensen Washington DO   10 mL at 04/02/24 0935    sodium chloride flush 0.9 % injection 5-40 mL  5-40 mL IntraVENous PRN Jensen Washington DO        0.9 % sodium chloride infusion   IntraVENous PRN Jensen Washington  mL/hr at 03/22/24 1810 New Bag at 03/22/24 1810    ondansetron (ZOFRAN-ODT) disintegrating tablet 4 mg  4 mg Oral Q8H PRN Jensen Washington DO        Or    ondansetron (ZOFRAN) injection 4 mg  4 mg IntraVENous Q6H PRN Jensen Washington DO   4 mg at 03/28/24 0859    losartan (COZAAR) tablet 100 mg  100 mg Oral Daily Jensen Washington DO   100 mg at 04/02/24 0935    venlafaxine (EFFEXOR XR) extended release capsule 75 mg  75 mg Oral Daily Jensen Washington DO   75 mg at 04/02/24 0935     Facility-Administered Medications Ordered in Other Encounters   Medication Dose Route Frequency Provider Last Rate Last Admin    ePHEDrine injection   IntraVENous PRN Sushant Live APRN - CRNA   10 mg at 04/02/24 1501    phenylephrine (MALAIKA-SYNEPHRINE) injection   IntraVENous PRN Sushant Live, APRN - CRNA   200 mcg at 04/02/24 1548    ondansetron (ZOFRAN) injection   IntraVENous PRN Sushant Live, APRN - CRNA   4 mg at 04/02/24 1455    dexAMETHasone Sodium Phosphate injection   IntraVENous PRN Sushant Live, APRN - CRNA   8 mg at 04/02/24 1452    succinylcholine (ANECTINE) injection   IntraVENous PRN Sushant Live, APRN - CRNA   100 mg at 04/02/24 1437    rocuronium (ZEMURON) injection   IntraVENous PRN Sushant Live, APRN - CRNA   20 mg at 04/02/24 1448    propofol infusion   IntraVENous PRN Sushant Live, APRN - CRNA   130 mg at 04/02/24 1437    lidocaine PF 2 % injection   IntraVENous PRN Sushant Live APRN - CRNA   100 mg at 04/02/24 1437    glycopyrrolate (ROBINUL) injection   IntraVENous PRN Sushant Live APRN - CRNA   0.2 mg at

## 2024-04-02 NOTE — PROGRESS NOTES
0700 Report taken from nurse Khanna.    Head to Toe Assessment    [facility] Student Nurse  Head to Toe Assessment Performed at 0800  Skin  General skin appearance / Description: warm, dry, pink  Incisions / Wounds: None    Neuro/Head  LOC: A+O x x3  Speech: Appropriate, clear, slow  Eyes PERRLA 4 mm  Symmetry of face / tongue: Face symmetrical  JVD of neck: None present    Chest  Heart sounds / Apical Pulse: Regular, apical pulse74  Dyspnea: With exertion  Lung sounds: Clear  Cough: Non-productive cough present    Abdomen/ Pelvis  Bowel sounds: Active in all 4 quadrants, heard every 5-15 seconds  Passing flatus: Yes  Palpation / Inspection: Soft, non-tender, non-distended  Last BM: 4/2/24  Stool assessment: Soft, brown  Any GI issues: None  Urinary issues: Incontinence present, external catheter in place  Continence: Incontinent of urine  Urine color / turbidity: Straw colored, clear    Extremities  Edema: None present  Altered Sensation: Sensation present in all extremities  Upper extremity push / pulls: Strong, equal  Left Arm Radial Pulse: Present, strong   Left Upper extremity Capillary Refill: <3 seconds  Right Arm Radial Pulse: Present, strong   Right Upper extremity Capillary Refill: <3 seconds  Lower extremity pedal push / pulls: Equal, strong  Left pedal pulse: Present, strong   Left posterior tibialis pulse: Present, strong   Left lower extremity capillary refill: <3 seconds  Right pedal pulse: Present, strong   Right posterior tibialis pulse: Present, strong   Right lower extremity capillary refill: <3 seconds  Drift of extremities: Negavtive  Pain: 0/10    Other issues: None reported    11:00 AM Reassessment done  1:45 PM Reported off to primary RNClemencia  .    Electronically signed by GALINA PARRA on 4/2/2024 at 1:48 PM   Signature  RSC /

## 2024-04-02 NOTE — PROGRESS NOTES
[facility]  PHYSICAL THERAPY MISSED TREATMENT NOTE  [facility] ENDOSCOPY    Date: 2024  Patient Name: Kalpana Braga        MRN: [id number]   : 1955  (68 y.o.)  Gender: female   Referring Practitioner: Marcus Ga MD  Diagnosis: lung mass         REASON FOR MISSED TREATMENT:  Missed Treat.      Attempted in am and pt declined PT with c/o fatigue and wanting to concentrate on procedure today. Will check back as able.

## 2024-04-02 NOTE — PROGRESS NOTES
Comprehensive Nutrition Assessment    Type and Reason for Visit:  Initial, RD Nutrition Re-Screen/LOS    Nutrition Recommendations/Plan:   When diet allows, will send Magic Cup TID.  Consider MVI.  Consider appetite stimulant as appropriate.     Malnutrition Assessment:  Malnutrition Status:  Moderate malnutrition (04/02/24 1213)    Context:  Chronic Illness     Findings of the 6 clinical characteristics of malnutrition:  Energy Intake:   (50% or less day 7 per graphics)  Weight Loss:  10% over 6 months (19.6% unplanned wt loss in 6 months per EMR)     Body Fat Loss:  No significant body fat loss     Muscle Mass Loss:  Mild muscle mass loss Temples (temporalis)  Fluid Accumulation:  No significant fluid accumulation     Strength:  Not Performed    Nutrition Assessment:     Pt. moderately malnourished AEB criteria as listed above.  At risk for further nutrition compromise r/t admit with acute hypoxic respiratory failure, COPD, Hilar Mass, CVA, Pneumonia of Both Lungs due to Infectious Organism and underlying medical condition (HTN, HLD, Anxiety, Depression, smoker).        Nutrition Related Findings:    Pt. Report/Treatments/Miscellaneous: Pt seen, NPO for EBUS, mentions fair appetite , lives at home alone & reports \" I don't cook\". Pt currently NPO, mentions not fond of dysphagia minced & moist diet textures ( diet she was on 4/1). Doesn't take any ONS at home & mentions wt loss has been unintentional. Not fond of protein drinks but amenable to trying magic cup ONS when diet allows. Pt s/p SLP swallow eval (3/24/24) Dysphagia Minced & Moist  Direct 1: 1 staff supervision. I notice pt had been eating better % several meal  here prior to  ~ 3/27/24 per documentation then it seems her po decreased to 0-50%since then.    GI Status: BM x 2 (4/1)  Pertinent Labs: (4/2) Na 131, Hemoglobin 11.3,  (3/31) Iron 19,   Pertinent Meds: Iron 325, Glycolax, Effexor XR     Wound Type: None       Current Nutrition Intake &

## 2024-04-02 NOTE — OP NOTE
Bronchoscopy with EBUS (Endobronchial ultrasound guided) Procedure Note under general anesthesia    Patient: Kalpana Braga  : 1955      Operation: EBUS and flexible diagnostic fiberoptic bronchoscopy with out fluoroscopy.    EBUS: Endobronchial ultrasound guided transbronchial (endobronchial ultrasound guided) biopsy of lymph node/s with out fluoroscopy.       Date of Operation: 2024    Indication for procedure: Hypodense 3.9 x 5.5 cm right suprahilar mass extending to the right   mediastinum and subcarinal region, suspicious for neoplasm-need tissue diagnosis    Pre operative diagnoses:   -3.9 x 5.5 cm right suprahilar mass   -Right upper lobe collapse  -Hypoxic respiratory failure  -Chronic history of tobacco smoking for 45 years with 1.25 packs of cigarettes per day    Post operative diagnoses:   -3.9 x 5.5 cm right suprahilar mass   -Right upper lobe collapse  -Hypoxic respiratory failure  -Chronic history of tobacco smoking for 45 years with 1.25 packs of cigarettes per day      Surgeon: Lois Paez MD    Consent: Kalpana Braga is a 68 y.o. female . The risks, benefits, complications, treatment options and expected outcomes were discussed with the patient. Consent obtained from the patient after explaining the risks and complications of the procedure. The possibilities of reaction to medication, pulmonary aspiration, perforation of a viscus, development of pneumothorax with requirement of chest tube placement,air way bleeding, failure to diagnose a condition and creating a complication leading to respiratory failure requiring mechanical ventilation, transfusion or operation were discussed with the patient who freely signed the consent.    The patient was counseled at length about the risks of nahed Covid-19 during their perioperative period and any recovery window from their procedure.  The patient was made aware that nahed Covid-19  may worsen their prognosis for  recovering from their procedure  and lend to a higher morbidity and/or mortality risk.  All material risks, benefits, and reasonable alternatives including postponing the procedure were discussed. The patient does wish to proceed with the procedure at this time.      Anesthesia: Patient was given general anesthesia by CRNA  from anesthesiology dept. Please see anesthesiologist's note for details.    Description of Procedure:    The patient was taken to endoscopy suite, identified as Kalpana Braga and the procedure verified as Flexible Fiberoptic Bronchoscopy. A Time Out was held and the above information confirmed.     After the induction of general anesthesia by anesthetist, the patient was placed in appropriate position. The EBUS Flexible Fibrooptic bronchoscope was passed through the Swivel adopter which was placed over the Endotracheal tube.    The scope was then passed through the endotracheal tube into the trachea. The lower end of endotracheal tube was found to be in appropriate position.       EBUS (Endobronchial ultrasound):    Direct visualization of the lymph nodes was obtained using endobronchial ultrasound (EBUS) guidance. A complete ultrasound lymph node exam was performed for lymph node stations mentioned below.  The following lymph nodes were subjected to EBUS guided biopsy using standard technique and in the following order.     Specimen/s:    2R-  Not visualized due to the presence of Endotracheal tube.     2L-  Not visualized due to the presence of Endotracheal tube.      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 No EBUS sampling was attempted.    11R-S- Not able to visualize

## 2024-04-02 NOTE — PROGRESS NOTES
2. Hypodense 3.9 x 5.5 cm right suprahilar mass extending to the right    mediastinum and subcarinal region, suspicious for neoplasm until proven    otherwise.   3. Complete right upper lung collapse secondary to the right suprahilar    mass.   4. Nonspecific patchy ground-glass consolidations throughout the left    lung. Pulmonary edema is possible but can be followed to exclude atypical    infectious or inflammatory pneumonitis.   5. Dilatation of the central pulmonary artery suggesting a degree of    pulmonary arterial hypertension.      This document has been electronically signed by: Fernando Preciado MD on    03/21/2024 11:14 PM      All CTs at this facility use dose modulation techniques and iterative    reconstructions, and/or weight-based dosing   when appropriate to reduce radiation to a low as reasonably achievable.      3D Post-processing was performed on this study.      XR CHEST PORTABLE   Final Result   1. Faint hazy opacities in the left mid lung may reflect infiltrates.   2. New consolidation versus atelectasis in the right lung apex. CT can be    helpful for further evaluation.      This document has been electronically signed by: Fernando Preciado MD on    03/21/2024 10:22 PM      XR CHEST PORTABLE    (Results Pending)     CTA CHEST W WO CONTRAST PE Eval    Result Date: 3/21/2024  CT angiography chest with contrast. 3D Postprocessing. Comparison: CR/SR - XR CHEST PORTABLE - 03/21/2024 09:45 PM EDT Findings: Dilatation of the central pulmonary artery at 3.8 cm. Extrinsic compression of a few right upper lobe pulmonary arterial branches. Pulmonary arteries are otherwise well opacified. Scattered plaque is seen in the thoracic aorta without aneurysm or dissection. Hypodense 3.9 x 5.5 cm right suprahilar mass extending to the right mediastinum and subcarinal region. This causes right upper lobe bronchial compression with complete right upper lung atelectasis. Compensatory hyperinflation of the right middle

## 2024-04-02 NOTE — PROGRESS NOTES
[facility]  STRZ ICU STEPDOWN TELEMETRY 4K  Occupational Therapy  Daily Note  Time:   Time In: 1001  Time Out: 1119  Timed Code Treatment Minutes: 78 Minutes  Minutes: 78          Date: 2024  Patient Name: Kalpana Braga,   Gender: female      Room: [facility]26/026-A  MRN: [id number]  : 1955  (68 y.o.)  Referring Practitioner: Marcus Ga MD  Diagnosis: lung mass  Additional Pertinent Hx: Per EMR \"Kalpana Braga is a 68 y.o. female active smoker with PMHx of COPD, HTN, depression, HLD, osteoarthritis who presents to [facility] with shortness of breath.  Patient reports starting  night she has been having shortness of breath worse with exertion.  Progressively worsening since .  She reports that she has been coughing up some light yellow-green sputum no fevers however she has had chills.  She denies any nausea or vomiting she denies being around anyone that is been sick.  She is a active smoker about a pack a day for 45 years.  No oxygen at baseline.  In the ED initially saturating 70% on room air, escalated to 6 L nasal cannula saturating 86 to 90%.  Denies any chest pain.\" code stroke 3/23.  CT angiogram head and neck read as an occlusion of the anterior branch of the left M2 segment of the MCA. MRI of brain without contrast shows acute infarct in the distribution of the left middle cerebral artery    Restrictions/Precautions:  Restrictions/Precautions: General Precautions, Fall Risk  Position Activity Restriction  Other position/activity restrictions: aphasia (expressive >receptive), impulsive     Social/Functional History:  Lives With: Spouse  Type of Home: House  Home Layout: Two level, Performs ADL's on one level  Home Access: Ramped entrance  Home Equipment: None   Bathroom Shower/Tub: Walk-in shower  Bathroom Toilet: Standard  Bathroom Equipment: Shower chair       ADL Assistance: Independent  Homemaking Assistance: Independent  Ambulation Assistance:

## 2024-04-03 LAB
ANION GAP SERPL CALC-SCNC: 13 MEQ/L (ref 8–16)
BUN SERPL-MCNC: 14 MG/DL (ref 7–22)
CALCIUM SERPL-MCNC: 8.3 MG/DL (ref 8.5–10.5)
CHLORIDE SERPL-SCNC: 93 MEQ/L (ref 98–111)
CO2 SERPL-SCNC: 23 MEQ/L (ref 23–33)
CREAT SERPL-MCNC: 0.8 MG/DL (ref 0.4–1.2)
DEPRECATED RDW RBC AUTO: 46.4 FL (ref 35–45)
ERYTHROCYTE [DISTWIDTH] IN BLOOD BY AUTOMATED COUNT: 14.4 % (ref 11.5–14.5)
GFR SERPL CREATININE-BSD FRML MDRD: 79 ML/MIN/1.73M2
GLUCOSE SERPL-MCNC: 135 MG/DL (ref 70–108)
HCT VFR BLD AUTO: 33.4 % (ref 37–47)
HGB BLD-MCNC: 10.6 GM/DL (ref 12–16)
MCH RBC QN AUTO: 27.8 PG (ref 26–33)
MCHC RBC AUTO-ENTMCNC: 31.7 GM/DL (ref 32.2–35.5)
MCV RBC AUTO: 87.7 FL (ref 81–99)
PLATELET # BLD AUTO: 239 THOU/MM3 (ref 130–400)
PMV BLD AUTO: 11.2 FL (ref 9.4–12.4)
POTASSIUM SERPL-SCNC: 4.1 MEQ/L (ref 3.5–5.2)
RBC # BLD AUTO: 3.81 MILL/MM3 (ref 4.2–5.4)
SODIUM SERPL-SCNC: 129 MEQ/L (ref 135–145)
SODIUM SERPL-SCNC: 132 MEQ/L (ref 135–145)
WBC # BLD AUTO: 6.4 THOU/MM3 (ref 4.8–10.8)

## 2024-04-03 PROCEDURE — 2580000003 HC RX 258

## 2024-04-03 PROCEDURE — 2060000000 HC ICU INTERMEDIATE R&B

## 2024-04-03 PROCEDURE — 92526 ORAL FUNCTION THERAPY: CPT

## 2024-04-03 PROCEDURE — 84295 ASSAY OF SERUM SODIUM: CPT

## 2024-04-03 PROCEDURE — 6370000000 HC RX 637 (ALT 250 FOR IP): Performed by: NURSE PRACTITIONER

## 2024-04-03 PROCEDURE — 6370000000 HC RX 637 (ALT 250 FOR IP)

## 2024-04-03 PROCEDURE — 97129 THER IVNTJ 1ST 15 MIN: CPT

## 2024-04-03 PROCEDURE — 80048 BASIC METABOLIC PNL TOTAL CA: CPT

## 2024-04-03 PROCEDURE — 97110 THERAPEUTIC EXERCISES: CPT

## 2024-04-03 PROCEDURE — 85027 COMPLETE CBC AUTOMATED: CPT

## 2024-04-03 PROCEDURE — 99239 HOSP IP/OBS DSCHRG MGMT >30: CPT | Performed by: HOSPITALIST

## 2024-04-03 PROCEDURE — 97535 SELF CARE MNGMENT TRAINING: CPT

## 2024-04-03 PROCEDURE — 99232 SBSQ HOSP IP/OBS MODERATE 35: CPT | Performed by: INTERNAL MEDICINE

## 2024-04-03 PROCEDURE — 6360000002 HC RX W HCPCS

## 2024-04-03 PROCEDURE — 94640 AIRWAY INHALATION TREATMENT: CPT

## 2024-04-03 PROCEDURE — 97530 THERAPEUTIC ACTIVITIES: CPT

## 2024-04-03 PROCEDURE — 97116 GAIT TRAINING THERAPY: CPT

## 2024-04-03 PROCEDURE — 36415 COLL VENOUS BLD VENIPUNCTURE: CPT

## 2024-04-03 RX ADMIN — HYDRALAZINE HYDROCHLORIDE 25 MG: 25 TABLET, FILM COATED ORAL at 17:21

## 2024-04-03 RX ADMIN — TIOTROPIUM BROMIDE AND OLODATEROL 2 PUFF: 3.124; 2.736 SPRAY, METERED RESPIRATORY (INHALATION) at 09:57

## 2024-04-03 RX ADMIN — CLOPIDOGREL BISULFATE 75 MG: 75 TABLET ORAL at 08:44

## 2024-04-03 RX ADMIN — GUAIFENESIN 1200 MG: 600 TABLET, EXTENDED RELEASE ORAL at 20:39

## 2024-04-03 RX ADMIN — ACETAMINOPHEN 650 MG: 325 TABLET ORAL at 08:45

## 2024-04-03 RX ADMIN — BENZONATATE 100 MG: 100 CAPSULE ORAL at 20:39

## 2024-04-03 RX ADMIN — SODIUM CHLORIDE: 9 INJECTION, SOLUTION INTRAVENOUS at 15:02

## 2024-04-03 RX ADMIN — SODIUM CHLORIDE, PRESERVATIVE FREE 10 ML: 5 INJECTION INTRAVENOUS at 20:39

## 2024-04-03 RX ADMIN — LOSARTAN POTASSIUM 100 MG: 100 TABLET, FILM COATED ORAL at 08:45

## 2024-04-03 RX ADMIN — HYDRALAZINE HYDROCHLORIDE 25 MG: 25 TABLET, FILM COATED ORAL at 08:45

## 2024-04-03 RX ADMIN — Medication 3 MG: at 20:39

## 2024-04-03 RX ADMIN — HYDRALAZINE HYDROCHLORIDE 25 MG: 25 TABLET, FILM COATED ORAL at 01:46

## 2024-04-03 RX ADMIN — ASPIRIN 81 MG 81 MG: 81 TABLET ORAL at 08:44

## 2024-04-03 RX ADMIN — ATORVASTATIN CALCIUM 40 MG: 40 TABLET, FILM COATED ORAL at 20:39

## 2024-04-03 RX ADMIN — ACETAMINOPHEN 650 MG: 325 TABLET ORAL at 22:24

## 2024-04-03 RX ADMIN — SODIUM CHLORIDE: 9 INJECTION, SOLUTION INTRAVENOUS at 05:10

## 2024-04-03 RX ADMIN — ENOXAPARIN SODIUM 40 MG: 100 INJECTION SUBCUTANEOUS at 08:44

## 2024-04-03 RX ADMIN — METOPROLOL TARTRATE 25 MG: 25 TABLET, FILM COATED ORAL at 20:39

## 2024-04-03 RX ADMIN — PHENOL 1 SPRAY: 1.5 LIQUID ORAL at 18:58

## 2024-04-03 RX ADMIN — GUAIFENESIN 1200 MG: 600 TABLET, EXTENDED RELEASE ORAL at 08:44

## 2024-04-03 RX ADMIN — VENLAFAXINE HYDROCHLORIDE 75 MG: 75 CAPSULE, EXTENDED RELEASE ORAL at 08:44

## 2024-04-03 ASSESSMENT — PAIN DESCRIPTION - ORIENTATION
ORIENTATION: MID
ORIENTATION: RIGHT
ORIENTATION: RIGHT
ORIENTATION: MID
ORIENTATION: MID

## 2024-04-03 ASSESSMENT — PAIN DESCRIPTION - PAIN TYPE
TYPE: ACUTE PAIN

## 2024-04-03 ASSESSMENT — PAIN SCALES - GENERAL
PAINLEVEL_OUTOF10: 3
PAINLEVEL_OUTOF10: 2
PAINLEVEL_OUTOF10: 4
PAINLEVEL_OUTOF10: 4
PAINLEVEL_OUTOF10: 2

## 2024-04-03 ASSESSMENT — PAIN - FUNCTIONAL ASSESSMENT
PAIN_FUNCTIONAL_ASSESSMENT: ACTIVITIES ARE NOT PREVENTED

## 2024-04-03 ASSESSMENT — PAIN DESCRIPTION - LOCATION
LOCATION: CHEST

## 2024-04-03 ASSESSMENT — PAIN DESCRIPTION - ONSET
ONSET: ON-GOING

## 2024-04-03 ASSESSMENT — PAIN DESCRIPTION - FREQUENCY
FREQUENCY: INTERMITTENT
FREQUENCY: INTERMITTENT

## 2024-04-03 ASSESSMENT — PAIN DESCRIPTION - DESCRIPTORS
DESCRIPTORS: DISCOMFORT
DESCRIPTORS: DISCOMFORT
DESCRIPTORS: ACHING
DESCRIPTORS: DISCOMFORT
DESCRIPTORS: ACHING

## 2024-04-03 NOTE — PROGRESS NOTES
[facility]  STRZ ICU STEPDOWN TELEMETRY 4K  Occupational Therapy  Daily Note  Time:   Time In: 0750  Time Out: 0823  Timed Code Treatment Minutes: 33 Minutes  Minutes: 33          Date: 4/3/2024  Patient Name: Kalpana Braga,   Gender: female      Room: [facility]26/026-A  MRN: [id number]  : 1955  (68 y.o.)  Referring Practitioner: Marcus Ga MD  Diagnosis: lung mass  Additional Pertinent Hx: Per EMR \"Kalpana Braga is a 68 y.o. female active smoker with PMHx of COPD, HTN, depression, HLD, osteoarthritis who presents to [facility] with shortness of breath.  Patient reports starting  night she has been having shortness of breath worse with exertion.  Progressively worsening since .  She reports that she has been coughing up some light yellow-green sputum no fevers however she has had chills.  She denies any nausea or vomiting she denies being around anyone that is been sick.  She is a active smoker about a pack a day for 45 years.  No oxygen at baseline.  In the ED initially saturating 70% on room air, escalated to 6 L nasal cannula saturating 86 to 90%.  Denies any chest pain.\" code stroke 3/23.  CT angiogram head and neck read as an occlusion of the anterior branch of the left M2 segment of the MCA. MRI of brain without contrast shows acute infarct in the distribution of the left middle cerebral artery    Restrictions/Precautions:  Restrictions/Precautions: General Precautions, Fall Risk  Position Activity Restriction  Other position/activity restrictions: aphasia (expressive >receptive), impulsive     Social/Functional History:  Lives With: Spouse  Type of Home: House  Home Layout: Two level, Performs ADL's on one level  Home Access: Ramped entrance  Home Equipment: None   Bathroom Shower/Tub: Walk-in shower  Bathroom Toilet: Standard  Bathroom Equipment: Shower chair       ADL Assistance: Independent  Homemaking Assistance: Independent  Ambulation Assistance:  with SBA in prep for toileting and showering.  Short Term Goal 2: Patient will improve functional ambulation to household distance with SBA and LRAD and min cues for safety to improve safety in living environment.  Short Term Goal 3: Patient will complete BADL routine MOD I with use of energy conservation techniques as needed.  Short Term Goal 4: Patient will sequence 2-3 step task with supervision and min vcs for problem-solving in prep for meal prep and laundry engagement.  Long Term Goals  Time Frame for Long Term Goals : None due to ELOS    Following session, patient left in safe position with all fall risk precautions in place.

## 2024-04-03 NOTE — PROGRESS NOTES
[place] [facility]      Patient - Kalpana Braga   MRN -  [id number]   [facility] # - [id number]   - 1955      Date of Admission -  3/21/2024  8:53 PM  Date of evaluation -  4/3/2024  Room - -026   Hospital Day - 12  Consulting - Sulema Lagos MD Primary Care Physician - Tatiana Velasquez APRN - MIKHAIL     Problem List      Active Hospital Problems    Diagnosis Date Noted    Moderate malnutrition (HCC) [E44.0] 2024     Class: Chronic    CVA (cerebral vascular accident) (HCC) [I63.9] 2024    Dyspnea [R06.00] 2024    Acute hypoxemic respiratory failure (HCC) [J96.01] 2024    Lung mass [R91.8] 2024    Pneumonia of both lungs due to infectious organism [J18.9] 2024     Reason for Consult    Right suprahilar mass   HPI   History Obtained From: Patient and electronic medical record.    Kalpana Braga is a 68 y.o. female with a past medical history of COPD, Hypertension, Hyperlipidemia, osteoarthritis, and anxiety/depression who presented to [facility] ED on 3/21/2024 with complaints of shortness of breath. Patient states she has has shortness of breath for the last 4 days which is worse with exertion. She also has had a productive cough with light yellow-green sputum. She has had chills as well without fevers. She is a current everyday smoker and smokes about a pack a day for 45 years. She does not wear any oxygen at baseline.     In the ED, patient was found to be saturating at 70% on room air. She was placed on 6L NC with saturations around 86 to 90%. Chest xray showed faint hazy opacities in the left midlung, possibly reflecting infiltrates. New consolidation versus atelectasis in right lung apex. CTA chest showed no pulmonary embolism. Hypodense 3.9 x 5.5 cm right suprahilar mass extending to the right mediastinum and subcarinal region, suspicious for neoplasm until proven otherwise. Complete right upper lung collapse secondary to the

## 2024-04-03 NOTE — PROGRESS NOTES
Called pt's brother Kristopher to get a time for family meeting with pt & Pulmology per Steven Santo. Reached out to TAWANA Santo to see if today @ 430pm would work. Waiting to here back

## 2024-04-03 NOTE — CARE COORDINATION
4/3/24, 7:28 AM EDT    DISCHARGE ON GOING EVALUATION    [facility] day: 12  Location: [facility]26/[facility] Reason for admit: Lung mass [R91.8]  Dyspnea [R06.00]  Acute hypoxemic respiratory failure (HCC) [J96.01]   Procedure: 4-3-24 EBUS.   Barriers to Discharge: EBUS yesterday confirms almost total occlusion of rt mainstem bronchus. Possible non-small cell ca.  IVF cont at 100/hr. Stiolto. Mucinex. Nebulizers. Await plan.   PCP: Tatiana Velasquez, APRN - CNP  Readmission Risk Score: 15%  Patient Goals/Plan/Treatment Preferences: IPR is following.  Await medical plan.

## 2024-04-03 NOTE — PROGRESS NOTES
[facility]   PROGRESS NOTE      Patient: Kalpana Braga  Room #: 4K-26/026-A            YOB: 1955  Age: 68 y.o.  Gender: female            Admit Date & Time: 3/21/2024  8:53 PM    Assessment:    The patient was encountered in her room. The patient shared her frustration at finding the word she wished to share. This difficulty with words was exacerbated by the patient's typical verbosity.     Interventions:  The patient shared how she struggled with speaking and felt after becoming ill moments after retiring. The patient also shared her love of her seven cats and this love of cats being a reason she is reticent to change floors to PT. The  attempted to encourage the patient of the afore mentioned issues.     Outcomes:  The patient shard thanks for the visit and was encouraged by our encounter.     Plan:  1.Spiritual care will continue to follow the patient according to [facility] spiritual care SOP.       Electronically signed by Chaplain Lucie, on 4/3/2024 at 3:05 PM.  Spiritual Care Department  [facility]  [phone]     04/03/24 1502   Encounter Summary   Encounter Overview/Reason  Follow-up   Service Provided For: Patient   [facility]   Support System Family members;Children;Friends/neighbors;Worship/jesus community   Last Encounter  04/03/24   Complexity of Encounter High   Begin Time 1435   End Time  1500   Total Time Calculated 25 min   Spiritual/Emotional needs   Type Spiritual Support;Emotional Distress   Assessment/Intervention/Outcome   Assessment Anxious;Coping   Intervention Active listening;Empowerment;Explored/Affirmed feelings, thoughts, concerns;Nurtured Hope;Prayer (assurance of)/[place];Discussed illness injury and it’s impact   Outcome Comfort;Expressed Gratitude;Optimistic

## 2024-04-03 NOTE — PLAN OF CARE
Problem: Safety - Adult  Goal: Free from fall injury  4/3/2024 0257 by Page Hernandez RN  Outcome: Progressing     Problem: Chronic Conditions and Co-morbidities  Goal: Patient's chronic conditions and co-morbidity symptoms are monitored and maintained or improved  4/3/2024 0257 by Page Hernandez, RN  Outcome: Progressing

## 2024-04-03 NOTE — PROGRESS NOTES
[facility]  INPATIENT SPEECH THERAPY  STRZ ICU STEPDOWN TELEMETRY 4K  DAILY NOTE    TIME   SLP Individual Minutes  Time In: 1404  Time Out: 1420  Minutes: 16  Timed Code Treatment Minutes: 8 Minutes   Dysphagia therapy: 8 minutes   Cognitive Therapy: 8 minutes     Date: 4/3/2024  Patient Name: Kalpana Braga      CSN: [id number]   : 1955  (68 y.o.)  Gender: female   Referring Physician:  Chantelle Garcia MD   Diagnosis: Lung mass  Precautions: Fall risk  Current Diet: Regular, thin liquids   Respiratory Status: Nasal Canula: 1 LPM  Swallowing Strategies:  Full Upright Position, Small Bite/Sip, Medications Whole with Puree, Limit Distractions, and Monitor for Fatigue   Date of Last MBS/FEES:  FEES on 3/25/2024    Pain:  No pain reported.    Subjective: Visit approved by АЛЕКСАНДР Khanna. Patient sitting upright in recliner. Alert and cooperative throughout session. Patient reports being depressed d/t recent lung cancer diagnosis. Emotional support provided.       Short-Term Goals:  SHORT TERM GOAL #1:  Goal 1: Patient will safely consume a minced and moist diet (advanced textures as indicated) with thin liquids with direct 1:1 staff supervision to optimize safety with po intake via proper positioning and use of compensatory strategies to ensure adequate nutritional intake. GOAL MET, NEW GOAL 1: Patient will consume regular diet, thin liquids with stable pulmonary status and use of compensatory strategies to assist with nutrition/hydration.   INTERVENTIONS: Patient with significantly improved oral phase. Adequate mastication, bolus control, and manipulation across all trials. Complete oral clearance achieved. Independent utilization of liquid assist. No overt s/s of aspiration present at bedside, certainly cannot rule out pharyngeal dysfunction at bedside alone. Recommend continuation of regular diet, thin liquids. Skilled ST services recommended for follow up dysphagia management

## 2024-04-03 NOTE — PROGRESS NOTES
recognition software. It may contain minor errors which are inherent in voice recognition technology.**      Final report electronically signed by Dr. Tejas Velasquez on 4/2/2024 4:32 PM      XR CHEST PORTABLE   Final Result      Enlarging right parahilar opacities, likely corresponding to known mass and adjacent atelectasis.               **This report has been created using voice recognition software. It may contain minor errors which are inherent in voice recognition technology.**      Final report electronically signed by Dr. Tejas Velasquez on 3/29/2024 1:07 PM      CT ABDOMEN PELVIS W IV CONTRAST Additional Contrast? Radiologist Recommendation   Final Result   1. Liquid consistency stool be correlated for diarrhea. No inflammatory    bowel wall thickening.   2. No CT findings to suggest metastatic disease in the abdomen or pelvis.      This document has been electronically signed by: Fernando Preciado MD on    03/29/2024 03:01 AM      All CTs at this facility use dose modulation techniques and iterative    reconstructions, and/or weight-based dosing   when appropriate to reduce radiation to a low as reasonably achievable.      XR CHEST PORTABLE   Final Result   1. New enlargement of the right hilum with volume loss of the right hemithorax. The patient has a known right hilar mass. This may represent right upper lobe collapse.   2. Patchy infiltrates throughout the left lung.               **This report has been created using voice recognition software. It may contain minor errors which are inherent in voice recognition technology.**      Final report electronically signed by Dr. Nora Templeton on 3/27/2024 2:16 PM      CTA HEAD W WO CONTRAST (CODE STROKE)   Final Result       1. There is no significant hemodynamic stenosis in the right and left common and internal carotid arteries.   2. There is antegrade flow in the right and left vertebral arteries. The left vertebral artery originates directly from the aortic arch.    Final Result   1. No acute intracranial hemorrhage   2. Area of recent infarct seen in the left frontal lobe and left insular region.   The pertinent finding(s) was called to Dr. Reji St at 1204 hours on 3/26/2024 by Dr. Lomeli. Verbal acknowledgment and readback was given.            **This report has been created using voice recognition software.  It may contain minor errors which are inherent in voice recognition technology.**      Final report electronically signed by Dr Tanna Lomeli on 3/26/2024 12:06 PM      MRI BRAIN WO CONTRAST   Final Result       1. Acute infarct in the distribution of the left middle cerebral artery.   2. Mild atrophy and probable ischemic changes in the white matter.   3. Mild inflammatory changes in the left maxillary sinus.               **This report has been created using voice recognition software. It may contain minor errors which are inherent in voice recognition technology.**      Final report electronically signed by DR EMMETT CHEN on 3/24/2024 10:49 AM      CT HEAD WO CONTRAST   Final Result   1. No acute intracranial hemorrhage or large territorial infarction.   2. Additional findings as described.      This document has been electronically signed by: Garrick Shell MD on    03/23/2024 09:51 PM      All CTs at this facility use dose modulation techniques and iterative    reconstructions, and/or weight-based dosing   when appropriate to reduce radiation to a low as reasonably achievable.      CTA HEAD W WO CONTRAST   Final Result   Abrupt occlusion of an anterior branch of the left M2 segment MCA.      This document has been electronically signed by: Garrick Shell MD on    03/23/2024 10:08 PM      All CTs at this facility use dose modulation techniques and iterative    reconstructions, and/or weight-based dosing   when appropriate to reduce radiation to a low as reasonably achievable.      3D Post-processing was performed on this study.      CTA NECK W WO CONTRAST

## 2024-04-03 NOTE — PLAN OF CARE
Problem: Discharge Planning  Goal: Discharge to home or other facility with appropriate resources  Outcome: Progressing     Problem: Safety - Adult  Goal: Free from fall injury  4/3/2024 1349 by Clemencia Ambrocio RN  Outcome: Progressing  Flowsheets (Taken 4/3/2024 1348)  Free From Fall Injury: Instruct family/caregiver on patient safety  4/3/2024 0257 by Page Hernandez RN  Outcome: Progressing     Problem: Chronic Conditions and Co-morbidities  Goal: Patient's chronic conditions and co-morbidity symptoms are monitored and maintained or improved  4/3/2024 1349 by Clemencia Ambrocio RN  Outcome: Progressing  4/3/2024 0257 by Page Hernandez RN  Outcome: Progressing     Problem: Pain  Goal: Verbalizes/displays adequate comfort level or baseline comfort level  Outcome: Progressing  Flowsheets (Taken 4/3/2024 0815)  Verbalizes/displays adequate comfort level or baseline comfort level: Encourage patient to monitor pain and request assistance     Problem: Skin/Tissue Integrity  Goal: Absence of new skin breakdown  Description: 1.  Monitor for areas of redness and/or skin breakdown  2.  Assess vascular access sites hourly  3.  Every 4-6 hours minimum:  Change oxygen saturation probe site  4.  Every 4-6 hours:  If on nasal continuous positive airway pressure, respiratory therapy assess nares and determine need for appliance change or resting period.  Outcome: Progressing     Problem: Respiratory - Adult  Goal: Achieves optimal ventilation and oxygenation  4/3/2024 1349 by Clemencia Ambrocio RN  Outcome: Progressing  4/3/2024 1000 by Maryjane King JOSE  Outcome: Progressing     Problem: Infection - Adult  Goal: Absence of infection at discharge  Outcome: Progressing     Problem: Neurosensory - Adult  Goal: Achieves stable or improved neurological status  Outcome: Progressing  Goal: Achieves maximal functionality and self care  Outcome: Progressing     Problem: Nutrition Deficit:  Goal: Optimize nutritional status  Outcome:

## 2024-04-03 NOTE — PROGRESS NOTES
Rounded on unit spoke with patient and Primary RN present during conversation.  Patient is being considered for IPR pending medical work up and further treatment plans.

## 2024-04-03 NOTE — PROGRESS NOTES
[facility]  INPATIENT PHYSICAL THERAPY  DAILY NOTE  STRZ ICU STEPDOWN TELEMETRY 4K - 4K-26/026-A    Time In: 1015  Time Out: 1053  Timed Code Treatment Minutes: 38 Minutes  Minutes: 38          Date: 4/3/2024  Patient Name: Kalpana Braga,  Gender:  female        MRN: [id number]  : 1955  (68 y.o.)     Referring Practitioner: Marcus Ga MD  Diagnosis: lung mass  Additional Pertinent Hx: Per EMR \"Kalpana Braga is a 68 y.o. female active smoker with PMHx of COPD, HTN, depression, HLD, osteoarthritis who presents to [facility] with shortness of breath.  Patient reports starting  night she has been having shortness of breath worse with exertion.  Progressively worsening since .  She reports that she has been coughing up some light yellow-green sputum no fevers however she has had chills.  She denies any nausea or vomiting she denies being around anyone that is been sick.  She is a active smoker about a pack a day for 45 years.  No oxygen at baseline.  In the ED initially saturating 70% on room air, escalated to 6 L nasal cannula saturating 86 to 90%.  Denies any chest pain.\" code stroke 3/23.  CT angiogram head and neck read as an occlusion of the anterior branch of the left M2 segment of the MCA. MRI of brain without contrast shows acute infarct in the distribution of the left middle cerebral artery     Prior Level of Function:  Lives With: Spouse  Type of Home: House  Home Layout: Two level, Performs ADL's on one level  Home Access: Ramped entrance  Home Equipment: None   Bathroom Shower/Tub: Walk-in shower  Bathroom Toilet: Standard  Bathroom Equipment: Shower chair    ADL Assistance: Independent  Homemaking Assistance: Independent  Ambulation Assistance: Independent  Transfer Assistance: Independent  Active : Yes  Additional Comments: Pt reports being IND with functional tasks prior, no use of an AD. Unsure of accracy of information due to      ASSESSMENT:  Assessment: Patient progressing toward established goals. Requires occasional rest break secondary to fatigue. Impulsive at times and requires cues for pace/safety awareness   Activity Tolerance:  Patient tolerance of  treatment: good.      Equipment Recommendations:Equipment Needed: Yes (RW, w/c?)  Discharge Recommendations: Continue to assess pending progress, Inpatient Rehabilitation, and Patient would benefit from continued PT at discharge  Plan: Current Treatment Recommendations: Strengthening, Balance training, Functional mobility training, Endurance training, Transfer training, Gait training, Safety education & training, Positioning, Equipment evaluation, education, & procurement, Therapeutic activities, Patient/Caregiver education & training, Stair training, Neuromuscular re-education, Home exercise program  General Plan:  (6x N)    Education  Education:  Learners: Patient  Patient Education: Plan of Care, Transfers, Gait    Goals:  Patient Goals : return home  Short Term Goals  Time Frame for Short Term Goals: by discharge  Short Term Goal 1: Pt will demo supine to/from sit transfers with IND with bed flat to progress with mobility.  Short Term Goal 2: Pt will demo S for sit to/from stand transfers with LRAD to progress with mobility.  Short Term Goal 3: Pt will amb for 40 feet with LRAD with SBA to progress with mobility.  Short Term Goal 4: Will complete functional outcome assessment when able.  Long Term Goals  Time Frame for Long Term Goals : NA due to short ELOS    Following session, patient left in safe position with all fall risk precautions in place.

## 2024-04-04 ENCOUNTER — HOSPITAL ENCOUNTER (OUTPATIENT)
Dept: RADIATION ONCOLOGY | Age: 69
Discharge: HOME | End: 2024-04-04

## 2024-04-04 LAB
ANION GAP SERPL CALC-SCNC: 11 MEQ/L (ref 8–16)
BUN SERPL-MCNC: 11 MG/DL (ref 7–22)
CALCIUM SERPL-MCNC: 8.6 MG/DL (ref 8.5–10.5)
CHLORIDE SERPL-SCNC: 99 MEQ/L (ref 98–111)
CO2 SERPL-SCNC: 25 MEQ/L (ref 23–33)
CREAT SERPL-MCNC: 0.6 MG/DL (ref 0.4–1.2)
DEPRECATED RDW RBC AUTO: 46.1 FL (ref 35–45)
EKG Q-T INTERVAL: 406 MS
EKG Q-T INTERVAL: 418 MS
EKG Q-T INTERVAL: 428 MS
EKG Q-T INTERVAL: 484 MS
EKG QRS DURATION: 82 MS
EKG QRS DURATION: 88 MS
EKG QRS DURATION: 90 MS
EKG QRS DURATION: 96 MS
EKG QTC CALCULATION (BAZETT): 463 MS
EKG QTC CALCULATION (BAZETT): 474 MS
EKG QTC CALCULATION (BAZETT): 475 MS
EKG QTC CALCULATION (BAZETT): 511 MS
EKG R AXIS: -12 DEGREES
EKG R AXIS: -24 DEGREES
EKG R AXIS: 14 DEGREES
EKG R AXIS: 34 DEGREES
EKG T AXIS: 62 DEGREES
EKG T AXIS: 68 DEGREES
EKG T AXIS: 70 DEGREES
EKG T AXIS: 71 DEGREES
EKG VENTRICULAR RATE: 67 BPM
EKG VENTRICULAR RATE: 74 BPM
EKG VENTRICULAR RATE: 74 BPM
EKG VENTRICULAR RATE: 82 BPM
ERYTHROCYTE [DISTWIDTH] IN BLOOD BY AUTOMATED COUNT: 14.3 % (ref 11.5–14.5)
GFR SERPL CREATININE-BSD FRML MDRD: > 90 ML/MIN/1.73M2
GLUCOSE SERPL-MCNC: 70 MG/DL (ref 70–108)
HCT VFR BLD AUTO: 35.5 % (ref 37–47)
HGB BLD-MCNC: 11 GM/DL (ref 12–16)
MCH RBC QN AUTO: 27.5 PG (ref 26–33)
MCHC RBC AUTO-ENTMCNC: 31 GM/DL (ref 32.2–35.5)
MCV RBC AUTO: 88.8 FL (ref 81–99)
PLATELET # BLD AUTO: 247 THOU/MM3 (ref 130–400)
PMV BLD AUTO: 11.1 FL (ref 9.4–12.4)
POTASSIUM SERPL-SCNC: 3.4 MEQ/L (ref 3.5–5.2)
RBC # BLD AUTO: 4 MILL/MM3 (ref 4.2–5.4)
SODIUM SERPL-SCNC: 135 MEQ/L (ref 135–145)
WBC # BLD AUTO: 7.1 THOU/MM3 (ref 4.8–10.8)

## 2024-04-04 PROCEDURE — 97535 SELF CARE MNGMENT TRAINING: CPT

## 2024-04-04 PROCEDURE — 6370000000 HC RX 637 (ALT 250 FOR IP): Performed by: NURSE PRACTITIONER

## 2024-04-04 PROCEDURE — 36415 COLL VENOUS BLD VENIPUNCTURE: CPT

## 2024-04-04 PROCEDURE — 99232 SBSQ HOSP IP/OBS MODERATE 35: CPT | Performed by: STUDENT IN AN ORGANIZED HEALTH CARE EDUCATION/TRAINING PROGRAM

## 2024-04-04 PROCEDURE — 85027 COMPLETE CBC AUTOMATED: CPT

## 2024-04-04 PROCEDURE — 1200000000 HC SEMI PRIVATE

## 2024-04-04 PROCEDURE — 6370000000 HC RX 637 (ALT 250 FOR IP)

## 2024-04-04 PROCEDURE — 99233 SBSQ HOSP IP/OBS HIGH 50: CPT | Performed by: HOSPITALIST

## 2024-04-04 PROCEDURE — 97530 THERAPEUTIC ACTIVITIES: CPT

## 2024-04-04 PROCEDURE — 80048 BASIC METABOLIC PNL TOTAL CA: CPT

## 2024-04-04 PROCEDURE — 94640 AIRWAY INHALATION TREATMENT: CPT

## 2024-04-04 PROCEDURE — 6360000002 HC RX W HCPCS

## 2024-04-04 PROCEDURE — 2700000000 HC OXYGEN THERAPY PER DAY

## 2024-04-04 PROCEDURE — 97110 THERAPEUTIC EXERCISES: CPT

## 2024-04-04 PROCEDURE — 94761 N-INVAS EAR/PLS OXIMETRY MLT: CPT

## 2024-04-04 PROCEDURE — 2580000003 HC RX 258

## 2024-04-04 PROCEDURE — 99232 SBSQ HOSP IP/OBS MODERATE 35: CPT | Performed by: INTERNAL MEDICINE

## 2024-04-04 RX ADMIN — ATORVASTATIN CALCIUM 40 MG: 40 TABLET, FILM COATED ORAL at 19:41

## 2024-04-04 RX ADMIN — TIOTROPIUM BROMIDE AND OLODATEROL 2 PUFF: 3.124; 2.736 SPRAY, METERED RESPIRATORY (INHALATION) at 08:34

## 2024-04-04 RX ADMIN — HYDRALAZINE HYDROCHLORIDE 25 MG: 25 TABLET, FILM COATED ORAL at 00:13

## 2024-04-04 RX ADMIN — ACETAMINOPHEN 650 MG: 325 TABLET ORAL at 06:15

## 2024-04-04 RX ADMIN — POTASSIUM CHLORIDE 40 MEQ: 1500 TABLET, EXTENDED RELEASE ORAL at 06:15

## 2024-04-04 RX ADMIN — HYDRALAZINE HYDROCHLORIDE 10 MG: 20 INJECTION, SOLUTION INTRAMUSCULAR; INTRAVENOUS at 19:43

## 2024-04-04 RX ADMIN — HYDRALAZINE HYDROCHLORIDE 25 MG: 25 TABLET, FILM COATED ORAL at 17:58

## 2024-04-04 RX ADMIN — ACETAMINOPHEN 650 MG: 325 TABLET ORAL at 12:41

## 2024-04-04 RX ADMIN — FERROUS SULFATE TAB 325 MG (65 MG ELEMENTAL FE) 325 MG: 325 (65 FE) TAB at 09:25

## 2024-04-04 RX ADMIN — SODIUM CHLORIDE, PRESERVATIVE FREE 10 ML: 5 INJECTION INTRAVENOUS at 19:42

## 2024-04-04 RX ADMIN — LOSARTAN POTASSIUM 100 MG: 100 TABLET, FILM COATED ORAL at 09:25

## 2024-04-04 RX ADMIN — VENLAFAXINE HYDROCHLORIDE 75 MG: 75 CAPSULE, EXTENDED RELEASE ORAL at 09:25

## 2024-04-04 RX ADMIN — CLOPIDOGREL BISULFATE 75 MG: 75 TABLET ORAL at 09:23

## 2024-04-04 RX ADMIN — SODIUM CHLORIDE, PRESERVATIVE FREE 10 ML: 5 INJECTION INTRAVENOUS at 09:23

## 2024-04-04 RX ADMIN — GUAIFENESIN 1200 MG: 600 TABLET, EXTENDED RELEASE ORAL at 09:25

## 2024-04-04 RX ADMIN — ASPIRIN 81 MG 81 MG: 81 TABLET ORAL at 09:23

## 2024-04-04 RX ADMIN — GUAIFENESIN 1200 MG: 600 TABLET, EXTENDED RELEASE ORAL at 19:41

## 2024-04-04 RX ADMIN — ENOXAPARIN SODIUM 40 MG: 100 INJECTION SUBCUTANEOUS at 09:23

## 2024-04-04 RX ADMIN — METOPROLOL TARTRATE 25 MG: 25 TABLET, FILM COATED ORAL at 09:25

## 2024-04-04 RX ADMIN — HYDRALAZINE HYDROCHLORIDE 25 MG: 25 TABLET, FILM COATED ORAL at 09:25

## 2024-04-04 ASSESSMENT — PAIN DESCRIPTION - DESCRIPTORS
DESCRIPTORS: ACHING
DESCRIPTORS: ACHING

## 2024-04-04 ASSESSMENT — PAIN DESCRIPTION - FREQUENCY: FREQUENCY: INTERMITTENT

## 2024-04-04 ASSESSMENT — PAIN DESCRIPTION - LOCATION
LOCATION: ABDOMEN
LOCATION: CHEST
LOCATION: CHEST

## 2024-04-04 ASSESSMENT — PAIN - FUNCTIONAL ASSESSMENT: PAIN_FUNCTIONAL_ASSESSMENT: ACTIVITIES ARE NOT PREVENTED

## 2024-04-04 ASSESSMENT — PAIN SCALES - GENERAL
PAINLEVEL_OUTOF10: 6
PAINLEVEL_OUTOF10: 2
PAINLEVEL_OUTOF10: 3

## 2024-04-04 ASSESSMENT — PAIN DESCRIPTION - ORIENTATION: ORIENTATION: UPPER

## 2024-04-04 ASSESSMENT — PAIN DESCRIPTION - ONSET: ONSET: ON-GOING

## 2024-04-04 ASSESSMENT — PAIN DESCRIPTION - PAIN TYPE: TYPE: ACUTE PAIN

## 2024-04-04 NOTE — CARE COORDINATION
4/4/24, 2:36 PM EDT    DISCHARGE ON GOING EVALUATION    Kalpana DIETRICH [facility] day: 13  Location: [facility]26/[facility] Reason for admit: Lung mass [R91.8]  Dyspnea [R06.00]  Acute hypoxemic respiratory failure (HCC) [J96.01]   Procedure:   4/2 EBUS  Barriers to Discharge: Hospitalist and Pulmonology following. Radiation Oncology and Oncology to see as prelim suspicious for non-small cell cancer. PT/OT/SLP. Stiolto. KENDALL 3.4- replacement protocols.   PCP: Tatiana Velasquez, APRN - CNP  Readmission Risk Score: 14.9%  Patient Goals/Plan/Treatment Preferences: From home alone. Dispo pending oncology consult/treatment plan. IPR coordinator following.

## 2024-04-04 NOTE — ADT AUTH CERT
arch and at the origins of the left subclavian, left vertebral, left common carotid and brachiocephalic arteries from the aortic arch.  4. There is atherosclerotic calcification the cavernous segments of both internal carotid arteries. There appears no evidence of severe stenosis.  6. There is abnormal density in the left temporal lobe consistent with a recent infarct.  7. There is collapse in the right upper lobe of the lung. There are areas of abnormal density in the left upper lobe of the lung.              **This report has been created using voice recognition software. It may contain minor errors which are inherent in voice recognition technology.**     Final report electronically signed by DR EMMETT CHEN on 3/26/2024 12:29 PM     CTA NECK W WO CONTRAST (CODE STROKE)  Final Result     1. There is no significant hemodynamic stenosis in the right and left common and internal carotid arteries.  2. There is antegrade flow in the right and left vertebral arteries. The left vertebral artery originates directly from the aortic arch.  3. There is atherosclerotic calcification in the aortic arch and at the origins of the left subclavian, left vertebral, left common carotid and brachiocephalic arteries from the aortic arch.  4. There is atherosclerotic calcification the cavernous segments of both internal carotid arteries. There appears no evidence of severe stenosis.  6. There is abnormal density in the left temporal lobe consistent with a recent infarct.  7. There is collapse in the right upper lobe of the lung. There are areas of abnormal density in the left upper lobe of the lung.              **This report has been created using voice recognition software. It may contain minor errors which are inherent in voice recognition technology.**     Final report electronically signed by DR EMMETT CHEN on 3/26/2024 12:29 PM     CT HEAD WO CONTRAST  Final Result  1. No acute intracranial hemorrhage  2. Area of recent infarct    Kalpana Braga is a 68 y.o. female with a history of hypertension, hyperlipidemia, COPD, anxiety, depression who presents to [facility] on 3/21/2024 for shortness of breath x 4 days.  She describes the shortness of breath as being worse with exertion.  Currently she is on high flow for comfort.  Yesterday around shift change she was noted to be confused, not knowing where she was at, and having difficulty getting her words out.  Her blood gas was within normal limits.  She was having difficulty naming the objects on the NIHSS.  A rapid response was called and then a code stroke was called around 2120. Her NIH was a 3 for LOC questions and best language. She did not have any focal weakness, numbness, vision changes, facial droop, or ataxia.  She was taken to CT where a CT of her head revealed no acute intracranial abnormalities.  CT angiogram head and neck read as an occlusion of the anterior branch of the left M2 segment of the MCA, which was thought to be artifact.  Her last known well was not clear therefore she was not a candidate for TNK.  There was concern for seizure and therefore she was given a loading dose of 1 g of Keppra load.  She was also started on aspirin 81 mg and Plavix 75 mg.  MRI brain without contrast ordered to further evaluate.  On exam today she is alert and oriented to person, place, time, and situation.  She is able to repeat on exam and name objects.  She does have intermittent difficulty with naming objects.  Her speech is mildly slurred but she does not have her teeth in this morning.  She does not have any focal neurologic findings.  Of note yesterday her white blood cell count was trending up at 22 and she was also with a low-grade fever of 99.5. She also is found to have a  3.9 x 5.5 cm right suprahilar mass extending to the right mediastinum and subcarinal region, suspicious for neoplasm until proven otherwise. Prior to this admission she was not on any

## 2024-04-04 NOTE — PROGRESS NOTES
[facility]  INPATIENT PHYSICAL THERAPY  DAILY NOTE  STRZ ICU STEPDOWN TELEMETRY 4K - 4K-26/026-A    Time In: 0945  Time Out: 1023  Timed Code Treatment Minutes: 38 Minutes  Minutes: 38          Date: 2024  Patient Name: Kalpana Braga,  Gender:  female        MRN: [id number]  : 1955  (68 y.o.)     Referring Practitioner: Marcus Ga MD  Diagnosis: lung mass  Additional Pertinent Hx: Per EMR \"Kalpana Braga is a 68 y.o. female active smoker with PMHx of COPD, HTN, depression, HLD, osteoarthritis who presents to [facility] with shortness of breath.  Patient reports starting  night she has been having shortness of breath worse with exertion.  Progressively worsening since .  She reports that she has been coughing up some light yellow-green sputum no fevers however she has had chills.  She denies any nausea or vomiting she denies being around anyone that is been sick.  She is a active smoker about a pack a day for 45 years.  No oxygen at baseline.  In the ED initially saturating 70% on room air, escalated to 6 L nasal cannula saturating 86 to 90%.  Denies any chest pain.\" code stroke 3/23.  CT angiogram head and neck read as an occlusion of the anterior branch of the left M2 segment of the MCA. MRI of brain without contrast shows acute infarct in the distribution of the left middle cerebral artery     Prior Level of Function:  Lives With: Spouse  Type of Home: House  Home Layout: Two level, Performs ADL's on one level  Home Access: Ramped entrance  Home Equipment: None   Bathroom Shower/Tub: Walk-in shower  Bathroom Toilet: Standard  Bathroom Equipment: Shower chair    ADL Assistance: Independent  Homemaking Assistance: Independent  Ambulation Assistance: Independent  Transfer Assistance: Independent  Active : Yes  Additional Comments: Pt reports being IND with functional tasks prior, no use of an AD. Unsure of accracy of information due to

## 2024-04-04 NOTE — PROGRESS NOTES
[facility]  STRZ ICU STEPDOWN TELEMETRY 4K  Occupational Therapy  Daily Note  Time:   Time In: 742  Time Out: 0820  Timed Code Treatment Minutes: 38 Minutes  Minutes: 38          Date: 2024  Patient Name: Kalpana Braga,   Gender: female      Room: [facility]26/026-A  MRN: [id number]  : 1955  (68 y.o.)  Referring Practitioner: Marcus Ga MD  Diagnosis: lung mass  Additional Pertinent Hx: Per EMR \"Kalpana Braga is a 68 y.o. female active smoker with PMHx of COPD, HTN, depression, HLD, osteoarthritis who presents to [facility] with shortness of breath.  Patient reports starting  night she has been having shortness of breath worse with exertion.  Progressively worsening since .  She reports that she has been coughing up some light yellow-green sputum no fevers however she has had chills.  She denies any nausea or vomiting she denies being around anyone that is been sick.  She is a active smoker about a pack a day for 45 years.  No oxygen at baseline.  In the ED initially saturating 70% on room air, escalated to 6 L nasal cannula saturating 86 to 90%.  Denies any chest pain.\" code stroke 3/23.  CT angiogram head and neck read as an occlusion of the anterior branch of the left M2 segment of the MCA. MRI of brain without contrast shows acute infarct in the distribution of the left middle cerebral artery    Restrictions/Precautions:  Restrictions/Precautions: General Precautions, Fall Risk  Position Activity Restriction  Other position/activity restrictions: aphasia (expressive >receptive), impulsive     Social/Functional History:  Lives With: Spouse  Type of Home: House  Home Layout: Two level, Performs ADL's on one level  Home Access: Ramped entrance  Home Equipment: None   Bathroom Shower/Tub: Walk-in shower  Bathroom Toilet: Standard  Bathroom Equipment: Shower chair       ADL Assistance: Independent  Homemaking Assistance: Independent  Ambulation Assistance:  Activity Raw Score: 24  AM-PAC Inpatient ADL T-Scale Score : 57.54  ADL Inpatient CMS 0-100% Score: 0    Modified Carole Scale:  +3 - Moderate disability; requiring some help, but able to walk without physical assistance (SBA/CGA).   Patient could not live alone but could walk from one room to another without physical help from another person.  Education provided regarding stroke rehabilitation management.    ASSESSMENT:     Activity Tolerance:  Patient tolerance of  treatment: Fair treatment tolerance      Discharge Recommendations: Inpatient Rehabilitation  Equipment Recommendations: Equipment Needed: No  Other: continue to monitor  Plan: Times Per Week: 5-6x  Current Treatment Recommendations: Strengthening, Balance training, Functional mobility training, Endurance training, Neuromuscular re-education, Safety education & training, Patient/Caregiver education & training, Equipment evaluation, education, & procurement, Self-Care / ADL    Education:  Learners: Patient  ADL's, Home Exercise Program, Importance of Increasing Activity, Fall Prevention and safety with functional mobility     Goals  Short Term Goals  Time Frame for Short Term Goals: until discharge  Short Term Goal 1: Patient will safely complete various functional transfers with SBA in prep for toileting and showering.  Short Term Goal 2: Patient will improve functional ambulation to household distance with SBA and LRAD and min cues for safety to improve safety in living environment.  Short Term Goal 3: Patient will complete BADL routine MOD I with use of energy conservation techniques as needed.  Short Term Goal 4: Patient will sequence 2-3 step task with supervision and min vcs for problem-solving in prep for meal prep and laundry engagement.  Long Term Goals  Time Frame for Long Term Goals : None due to ELOS    Following session, patient left in safe position with all fall risk precautions in place.

## 2024-04-04 NOTE — PROGRESS NOTES
on file   Housing Stability: Low Risk  (3/22/2024)    Housing Stability Vital Sign     Unable to Pay for Housing in the Last Year: No     Number of Places Lived in the Last Year: 1     Unstable Housing in the Last Year: No     Lives With: Spouse  Type of Home: House  Home Layout: Two level, Performs ADL's on one level  Home Access: Ramped entrance  Home Equipment: None      Bathroom Shower/Tub: Walk-in shower  Bathroom Toilet: Standard  Bathroom Equipment: Shower chair     ADL Assistance: Independent  Homemaking Assistance: Independent  Ambulation Assistance: Independent  Transfer Assistance: Independent     Active : Yes  Occupation: Retired  Additional Comments: Pt reports being IND with functional tasks prior, no use of an AD. Unsure of accracy of information due to cognition    Family History:       Problem Relation Age of Onset    Heart Disease Father        Review of Systems:  CONSTITUTIONAL:  positive for  fatigue, negative for fever or chills  EYES:  negative for vision changes  HEENT:  negative  RESPIRATORY:  positive for  cough with sputum and dyspnea  CARDIOVASCULAR:  negative for  chest pain, palpitations  GASTROINTESTINAL:  negative for nausea, vomiting, diarrhea, and constipation  GENITOURINARY:  negative  SKIN:  negative  MUSCULOSKELETAL:  negative  NEUROLOGICAL:  positive for speech problems, gait problems, dysphagia, and weakness  BEHAVIOR/PSYCH:  negative  System review otherwise negative    Physical Exam:  /61   Pulse 61   Temp 97 °F (36.1 °C) (Oral)   Resp 18   Ht 1.626 m (5' 4\")   Wt 85.9 kg (189 lb 6.4 oz)   SpO2 98%   BMI 32.51 kg/m²   Patient is awake and alert, sitting up in bed; patient's brother and sister-in-law are at bedside  Orientation: She is oriented within conversation  Mood: within normal limits  Affect: calm  General appearance: Patient is well nourished, well developed, well groomed and in no acute distress, overweight    Memory: Not formally assessed, however  patient is able to provide basic history.  Family members assist with details  Attention/Concentration: normal  Language:  abnormal - some word finding difficulty    Cranial Nerves:  cranial nerves II-XII are grossly intact  ROM:  normal  Motor Exam:  Motor exam is symmetrical 4+ out of 5 all extremities bilaterally  Tone:  normal  Muscle bulk: within normal limits  Sensory:  Sensory intact  Coordination:   normal    Heart: Well-perfused extremities  Lungs: Breathing comfortably on supplemental O2 via nasal cannula  Skin: warm and dry, no rash or erythema on exposed skin      Diagnostics:  Recent Results (from the past 24 hour(s))   Basic Metabolic Panel    Collection Time: 04/04/24  4:51 AM   Result Value Ref Range    Sodium 135 135 - 145 meq/L    Potassium 3.4 (L) 3.5 - 5.2 meq/L    Chloride 99 98 - 111 meq/L    CO2 25 23 - 33 meq/L    Glucose 70 70 - 108 mg/dL    BUN 11 7 - 22 mg/dL    Creatinine 0.6 0.4 - 1.2 mg/dL    Calcium 8.6 8.5 - 10.5 mg/dL   CBC    Collection Time: 04/04/24  4:51 AM   Result Value Ref Range    WBC 7.1 4.8 - 10.8 thou/mm3    RBC 4.00 (L) 4.20 - 5.40 mill/mm3    Hemoglobin 11.0 (L) 12.0 - 16.0 gm/dl    Hematocrit 35.5 (L) 37.0 - 47.0 %    MCV 88.8 81.0 - 99.0 fL    MCH 27.5 26.0 - 33.0 pg    MCHC 31.0 (L) 32.2 - 35.5 gm/dl    RDW-CV 14.3 11.5 - 14.5 %    RDW-SD 46.1 (H) 35.0 - 45.0 fL    Platelets 247 130 - 400 thou/mm3    MPV 11.1 9.4 - 12.4 fL   Anion Gap    Collection Time: 04/04/24  4:51 AM   Result Value Ref Range    Anion Gap 11.0 8.0 - 16.0 meq/L   Glomerular Filtration Rate, Estimated    Collection Time: 04/04/24  4:51 AM   Result Value Ref Range    Est, Glom Filt Rate >90 >60 ml/min/1.73m2         Impression:  Left MCA CVA  Dysphagia  Aphasia, receptive<expressive  Moderate cognitive deficits   COPD with acute exacerbation  Newly diagnosed non-small cell carcinoma  Current tobacco use  Pneumonia  HLD  Anxiety    Recommendations:  Functional Impairments: Transfers, ambulation, ADLs,

## 2024-04-04 NOTE — PROGRESS NOTES
06:00 AM    BLOODU NEGATIVE 03/24/2024 06:00 AM    SPECGRAV 1.018 07/31/2016 03:07 PM    GLUCOSEU NEGATIVE 03/24/2024 06:00 AM       Radiology:  XR CHEST PORTABLE   Final Result      Right parahilar opacities, likely corresponding to known mass and adjacent atelectasis.               **This report has been created using voice recognition software. It may contain minor errors which are inherent in voice recognition technology.**      Final report electronically signed by Dr. Tejas Velasquez on 4/2/2024 4:32 PM      XR CHEST PORTABLE   Final Result      Enlarging right parahilar opacities, likely corresponding to known mass and adjacent atelectasis.               **This report has been created using voice recognition software. It may contain minor errors which are inherent in voice recognition technology.**      Final report electronically signed by Dr. Tejas Velasquez on 3/29/2024 1:07 PM      CT ABDOMEN PELVIS W IV CONTRAST Additional Contrast? Radiologist Recommendation   Final Result   1. Liquid consistency stool be correlated for diarrhea. No inflammatory    bowel wall thickening.   2. No CT findings to suggest metastatic disease in the abdomen or pelvis.      This document has been electronically signed by: Fernando Preciado MD on    03/29/2024 03:01 AM      All CTs at this facility use dose modulation techniques and iterative    reconstructions, and/or weight-based dosing   when appropriate to reduce radiation to a low as reasonably achievable.      XR CHEST PORTABLE   Final Result   1. New enlargement of the right hilum with volume loss of the right hemithorax. The patient has a known right hilar mass. This may represent right upper lobe collapse.   2. Patchy infiltrates throughout the left lung.               **This report has been created using voice recognition software. It may contain minor errors which are inherent in voice recognition technology.**      Final report electronically signed by Dr. Nora Templeton on  artery suggesting a degree of pulmonary arterial hypertension. This document has been electronically signed by: Fernando Preciado MD on 03/21/2024 11:14 PM All CTs at this facility use dose modulation techniques and iterative reconstructions, and/or weight-based dosing when appropriate to reduce radiation to a low as reasonably achievable. 3D Post-processing was performed on this study.    XR CHEST PORTABLE    Result Date: 3/21/2024  1 view chest x-ray Comparison: CR - CHEST PA /T/ LAT - 07/31/2016 02:10 PM EDT Findings: Faint hazy left mid lung opacities. New consolidation versus atelectasis in the right upper lung. No pleural effusion or pneumothorax. Normal size heart. No pulmonary vascular congestion. Old traumatic deformity of the right humeral neck.     1. Faint hazy opacities in the left mid lung may reflect infiltrates. 2. New consolidation versus atelectasis in the right lung apex. CT can be helpful for further evaluation. This document has been electronically signed by: Fernando Preciado MD on 03/21/2024 10:22 PM      Electronically Signed by  Kike York DO, MBA  PGY-1 Internal Medicine Resident  [facility]  On 4/4/2024 at 4:58 PM    This report has been created using voice recognition software. It may contain minor errors which are inherent in voice recognition technology

## 2024-04-04 NOTE — PLAN OF CARE
normal parameters  Cough and deep breathe promotion.  Encourage turning to improve airway clearance       Problem: Infection - Adult  Goal: Absence of infection at discharge  4/4/2024 0119 by Reji Lucia RN  Outcome: Progressing  Flowsheets (Taken 4/4/2024 0119)  Absence of infection at discharge:   Monitor lab/diagnostic results   Assess and monitor for signs and symptoms of infection   Monitor all insertion sites i.e., indwelling lines, tubes and drains     Problem: Neurosensory - Adult  Goal: Achieves stable or improved neurological status  4/4/2024 0119 by Reji Lucia, RN  Outcome: Progressing  Flowsheets (Taken 4/4/2024 0119)  Achieves stable or improved neurological status: Assess for and report changes in neurological status     Problem: Neurosensory - Adult  Goal: Achieves maximal functionality and self care  4/4/2024 0119 by Reji Lucia, RN  Outcome: Progressing  Flowsheets (Taken 4/4/2024 0119)  Achieves maximal functionality and self care: Monitor swallowing and airway patency with patient fatigue and changes in neurological status     Problem: Nutrition Deficit:  Goal: Optimize nutritional status  4/4/2024 0119 by Reji Lucia, RN  Outcome: Progressing  Flowsheets (Taken 4/4/2024 0119)  Nutrient intake appropriate for improving, restoring, or maintaining nutritional needs:   Assess nutritional status and recommend course of action   Monitor oral intake, labs, and treatment plans

## 2024-04-04 NOTE — ANESTHESIA POSTPROCEDURE EVALUATION
Department of Anesthesiology  Postprocedure Note    Patient: Kalpana Braga  MRN: [id number]  YOB: 1955  Date of evaluation: 4/4/2024    Procedure Summary       Date: 04/02/24 Room / Location: [place] [address] / [facility]    Anesthesia Start: 1436 Anesthesia Stop: 1615    Procedure: EBUS Diagnosis:       Dyspnea, unspecified type      (Dyspnea, unspecified type [R06.00])    Surgeons: Lois Paez MD Responsible Provider: Elvis Arias MD    Anesthesia Type: general ASA Status: 3            Anesthesia Type: No value filed.    Ruthie Phase I: Ruthie Score: 9    Ruthie Phase II:      Anesthesia Post Evaluation    Patient location during evaluation: PACU  Patient participation: complete - patient participated  Level of consciousness: awake  Airway patency: patent  Nausea & Vomiting: no vomiting and no nausea  Cardiovascular status: hemodynamically stable  Respiratory status: acceptable  Hydration status: stable  Pain management: adequate    No notable events documented.

## 2024-04-04 NOTE — PROGRESS NOTES
[facility]      Patient - Kalpana Braga   MRN -  [id number]   [facility] # - [id number]   - 1955      Date of Admission -  3/21/2024  8:53 PM  Date of evaluation -  2024  Room - -026   Hospital Day - 13  Consulting - Sulema Lagos MD Primary Care Physician - Tatiana Velasquez APRN - [id number]     Problem List      Active Hospital Problems    Diagnosis Date Noted    Moderate malnutrition (HCC) [E44.0] 2024     Class: Chronic    CVA (cerebral vascular accident) (HCC) [I63.9] 2024    Dyspnea [R06.00] 2024    Acute hypoxemic respiratory failure (HCC) [J96.01] 2024    Lung mass [R91.8] 2024    Pneumonia of both lungs due to infectious organism [J18.9] 2024     Reason for Consult    Right suprahilar mass   HPI   History Obtained From: Patient and electronic medical record.    Kalpana Braga is a 68 y.o. female with a past medical history of COPD, Hypertension, Hyperlipidemia, osteoarthritis, and anxiety/depression who presented to [facility] ED on 3/21/2024 with complaints of shortness of breath. Patient states she has has shortness of breath for the last 4 days which is worse with exertion. She also has had a productive cough with light yellow-green sputum. She has had chills as well without fevers. She is a current everyday smoker and smokes about a pack a day for 45 years. She does not wear any oxygen at baseline.     In the ED, patient was found to be saturating at 70% on room air. She was placed on 6L NC with saturations around 86 to 90%. Chest xray showed faint hazy opacities in the left midlung, possibly reflecting infiltrates. New consolidation versus atelectasis in right lung apex. CTA chest showed no pulmonary embolism. Hypodense 3.9 x 5.5 cm right suprahilar mass extending to the right mediastinum and subcarinal region, suspicious for neoplasm until proven otherwise. Complete right upper lung collapse secondary to the    -Dispo eval pending IPR vs SNF   -DVT prophylaxis SCD's     -Patient with Mallampati IV, neck circumference 17 inches, history of snoring with recent stroke and daytime sleepiness will send for split night sleep study with follow-up in sleep clinic in 6-8 weeks post sleep study  -Will schedule for follow-up in Pulmonary clinic with repeat CT chest and Full PFT in 3 months to follow right upper lobe collapse and eval Pulmonary Function         Questions and concerns addressed.   Discussed the above with primary RN.    Electronically signed by   PAO Dominguez - CNP on 4/4/2024 at 12:11 PM    Addendum by Dr. Philippe MD:  Patient seen by me independently including key components of medical care. Face to face evaluation and examination was performed. Case discussed with Mr. Steven Santo CNP. Agree with Certified nurse practitioner's findings and plan as documented in the Certified nurse practitioner's note. Italicized font, if present,  represents changes to the note made by me. More than 50% of the encounter time involved with patient care by the Pulmonary & Critical care service team spent by me.    Please see my modifications mentioned below:  She is not in distress  She is on 3 L of oxygen on nasal cannula  Follow final EBUS cytology report  Patient educated about my impression and plan.  All questions were answered.    Electronically signed by   Lois Paez MD on 4/4/2024 at 8:21 PM

## 2024-04-04 NOTE — CONSULTS
Radiation Oncology Inpatient Consultation  Encounter Date: 2024     Ms. Kalpana Braga is a 68 y.o. female  : 1955  MRN: [id number]  Acct Number: [id number]  Requesting Provider: SHANTA Sanot/ DAMIR Paez    Reason for request: lung cancer      CONSULTANT: Susan Murray PhD MD      CHIEF COMPLAINT:   DIAGNOSIS: C34.11 -- Malignant neoplasm of right upper lobe lung; carcinoma, non-small cell (verbal communication with Dr. Barnes); clinical stage appears to be at least T3 N3, Stage IIIC (awaiting completion of staging workup)  Date of diagnosis: 2024      ASSESSMENT:  Newly diagnosed non-small cell carcinoma of the right upper lobe lung, clinically stage III based on current information but staging workup incomplete.  Complete atelectasis of right upper lobe lung, impending right mainstem bronchus obstruction.  The diagnosis so far as it is currently understood was reviewed.  As reviewed representative imaging studies with Kalpana.  She told me that someone had informed her she had stage IV malignancy but at this juncture I do not see any definite evidence of systemic spread based on the information available to me.  PET scan is required to complete the staging workup.  Treatment options and recommendations for stage IIIb/C non-small cell lung cancer including current clinical practice guidelines (NCCN) were reviewed.  The recommendation is for combined systemic therapy and external beam radiation therapy.  If stage IV disease is confirmed, the mainstay of treatment would be chemotherapy with radiation therapy for specific problem sites.  The area of bronchial obstruction is one such site and should receive treatment sooner rather than later.  I am recommending urgent initiation of palliative radiation therapy to the right hilar and mediastinal mass with 2 initial large fractions in hopes of preventing complete atelectasis of the right lung.  This can be followed by a treatment rest during which  atelectasis.            3/29/2024: CT abdomen/pelvis with contrast:  IMPRESSION:  1. Liquid consistency stool be correlated for diarrhea. No inflammatory   bowel wall thickening.  2. No CT findings to suggest metastatic disease in the abdomen or pelvis.        3/24/2024: MRI brain without contrast:  IMPRESSION:  1. Acute infarct in the distribution of the left middle cerebral artery.  2. Mild atrophy and probable ischemic changes in the white matter.  3. Mild inflammatory changes in the left maxillary sinus.        3/21/2024: CTA chest with/without contrast:  IMPRESSION:  1. No pulmonary emboli.  2. Hypodense 3.9 x 5.5 cm right suprahilar mass extending to the right   mediastinum and subcarinal region, suspicious for neoplasm until proven   otherwise.  3. Complete right upper lung collapse secondary to the right suprahilar   mass.  4. Nonspecific patchy ground-glass consolidations throughout the left   lung. Pulmonary edema is possible but can be followed to exclude atypical   infectious or inflammatory pneumonitis.  5. Dilatation of the central pulmonary artery suggesting a degree of   pulmonary arterial hypertension.    [NOTE: Right perihilar mass is confluent with right hilar and right mediastinal lymphadenopathy with CT evidence of additional left mediastinal and AP window lymphadenopathy.]        3/21/2024: X-ray chest portable:  MPRESSION:  1. Faint hazy opacities in the left mid lung may reflect infiltrates.  2. New consolidation versus atelectasis in the right lung apex. CT can be   helpful for further evaluation.                  Review of Systems   Constitutional:  Positive for activity change and fatigue.   HENT: Negative.     Eyes: Negative.    Respiratory:  Positive for shortness of breath. Negative for stridor.         Significant dyspnea on hospital admission with acute respiratory failure, now significantly improved.   Cardiovascular: Negative.    Gastrointestinal: Negative.    Endocrine: Negative.

## 2024-04-04 NOTE — PROGRESS NOTES
Called patient brother Kristopher, discussed the family meeting that took place yesterday with Pulmonary team.  Kristopher states that he understood that the patient has lung cancer however the source of the cancer is unknown.  Kristopher informs me that Steven Santo discussed with them the need for further testing.  And the possible treatment options.      I did explain to Kristopher the rehab referral and the differences in rehab levels of care including subacute rehab. Discussed the potential need for continued care after rehab discharge and who may be able to assist the patient at home given her current level of cognition.  Kristopher states that the patient would be assisted by himself or his brother or they may look into an Assisted Living for Kalpana.      Kristopher states he will discuss rehab options with his brother and get back with me today.      Patient does require precert for post acute care.    DICKSON Gonzalez updated on the above via telephone conversation.  Primary RN Zahra updated via telephone conversation.

## 2024-04-04 NOTE — ANESTHESIA PRE PROCEDURE
release capsule 75 mg  75 mg Oral Daily Jensen Washington DO   75 mg at 04/04/24 7997       Allergies:    Allergies   Allergen Reactions   • Apresoline [Hydralazine]      Patient had a bronchopneumonia and blames the apresoline   • Enalapril      Cough.   • Hctz [Hydrochlorothiazide]      Cough   • Sulfa Antibiotics Hives   • Tenex [Guanfacine Hcl]      Cough       Problem List:    Patient Active Problem List   Diagnosis Code   • COPD (chronic obstructive pulmonary disease) ([facility]) J44.9   • Hyperlipidemia E78.5   • Smoker F17.200   • Osteoarthritis M19.90   • Essential hypertension I10   • Current moderate episode of major depressive disorder without prior episode ([facility]) F32.1   • Prediabetes R73.03   • Class 2 severe obesity due to excess calories with serious comorbidity and body mass index (BMI) of 39.0 to 39.9 in adult ([facility]) E66.01, Z68.39   • Dyspnea R06.00   • Acute hypoxemic respiratory failure ([facility]) J96.01   • Lung mass R91.8   • Pneumonia of both lungs due to infectious organism J18.9   • CVA (cerebral vascular accident) ([facility]) I63.9   • Moderate malnutrition ([facility]) E44.0       Past Medical History:        Diagnosis Date   • COPD (chronic obstructive pulmonary disease) ([facility])    • Hyperlipidemia    • Hypertension    • Osteoarthritis    • Smoker    • Urticaria        Past Surgical History:        Procedure Laterality Date   • BRONCHOSCOPY N/A 4/2/2024    EBUS performed by Lois Paez MD at [facility] ENDOSCOPY   • DILATION AND CURETTAGE OF UTERUS     • KNEE ARTHROSCOPY         Social History:    Social History     Tobacco Use   • Smoking status: Every Day     Current packs/day: 1.00     Average packs/day: 1 pack/day for 51.3 years (51.3 ttl pk-yrs)     Types: Cigarettes     Start date: 1973   • Smokeless tobacco: Never   Substance Use Topics   • Alcohol use: Not Currently     Alcohol/week: 2.0 - 3.0 standard drinks of alcohol     Types: 2 - 3 Shots of liquor per week     Comment: 2-3  16 oz glasses radha

## 2024-04-05 ENCOUNTER — HOSPITAL ENCOUNTER (INPATIENT)
Dept: CT IMAGING | Age: 69
Discharge: HOME | End: 2024-04-05
Payer: COMMERCIAL

## 2024-04-05 ENCOUNTER — HOSPITAL ENCOUNTER (OUTPATIENT)
Dept: RADIATION ONCOLOGY | Age: 69
Discharge: HOME | End: 2024-04-05

## 2024-04-05 PROBLEM — C34.11 MALIGNANT NEOPLASM OF UPPER LOBE OF RIGHT LUNG (HCC): Status: ACTIVE | Noted: 2024-04-05

## 2024-04-05 LAB
ANION GAP SERPL CALC-SCNC: 13 MEQ/L (ref 8–16)
BUN SERPL-MCNC: 6 MG/DL (ref 7–22)
CALCIUM SERPL-MCNC: 8.4 MG/DL (ref 8.5–10.5)
CHLORIDE SERPL-SCNC: 96 MEQ/L (ref 98–111)
CO2 SERPL-SCNC: 26 MEQ/L (ref 23–33)
CREAT SERPL-MCNC: 0.6 MG/DL (ref 0.4–1.2)
GFR SERPL CREATININE-BSD FRML MDRD: > 90 ML/MIN/1.73M2
GLUCOSE SERPL-MCNC: 93 MG/DL (ref 70–108)
POTASSIUM SERPL-SCNC: 3.4 MEQ/L (ref 3.5–5.2)
SODIUM SERPL-SCNC: 135 MEQ/L (ref 135–145)

## 2024-04-05 PROCEDURE — 6370000000 HC RX 637 (ALT 250 FOR IP)

## 2024-04-05 PROCEDURE — 2700000000 HC OXYGEN THERAPY PER DAY

## 2024-04-05 PROCEDURE — 77334 RADIATION TREATMENT AID(S): CPT | Performed by: RADIOLOGY

## 2024-04-05 PROCEDURE — 77300 RADIATION THERAPY DOSE PLAN: CPT | Performed by: RADIOLOGY

## 2024-04-05 PROCEDURE — 80048 BASIC METABOLIC PNL TOTAL CA: CPT

## 2024-04-05 PROCEDURE — 2580000003 HC RX 258

## 2024-04-05 PROCEDURE — 99232 SBSQ HOSP IP/OBS MODERATE 35: CPT | Performed by: INTERNAL MEDICINE

## 2024-04-05 PROCEDURE — 77412 RADIATION TX DELIVERY LVL 3: CPT | Performed by: RADIOLOGY

## 2024-04-05 PROCEDURE — 1200000000 HC SEMI PRIVATE

## 2024-04-05 PROCEDURE — 6370000000 HC RX 637 (ALT 250 FOR IP): Performed by: NURSE PRACTITIONER

## 2024-04-05 PROCEDURE — 77295 3-D RADIOTHERAPY PLAN: CPT | Performed by: RADIOLOGY

## 2024-04-05 PROCEDURE — 99254 IP/OBS CNSLTJ NEW/EST MOD 60: CPT | Performed by: NURSE PRACTITIONER

## 2024-04-05 PROCEDURE — 94669 MECHANICAL CHEST WALL OSCILL: CPT

## 2024-04-05 PROCEDURE — 36415 COLL VENOUS BLD VENIPUNCTURE: CPT

## 2024-04-05 PROCEDURE — 3209999900 CT GUIDE RADIATION THERAPY NO CHARGE

## 2024-04-05 PROCEDURE — 99233 SBSQ HOSP IP/OBS HIGH 50: CPT | Performed by: HOSPITALIST

## 2024-04-05 PROCEDURE — 6360000002 HC RX W HCPCS

## 2024-04-05 PROCEDURE — 77387 GUIDANCE FOR RADJ TX DLVR: CPT | Performed by: RADIOLOGY

## 2024-04-05 RX ORDER — POTASSIUM CHLORIDE 20 MEQ/1
40 TABLET, EXTENDED RELEASE ORAL ONCE
Status: COMPLETED | OUTPATIENT
Start: 2024-04-05 | End: 2024-04-05

## 2024-04-05 RX ADMIN — VENLAFAXINE HYDROCHLORIDE 75 MG: 75 CAPSULE, EXTENDED RELEASE ORAL at 13:40

## 2024-04-05 RX ADMIN — HYDRALAZINE HYDROCHLORIDE 25 MG: 25 TABLET, FILM COATED ORAL at 01:54

## 2024-04-05 RX ADMIN — SODIUM CHLORIDE: 9 INJECTION, SOLUTION INTRAVENOUS at 13:37

## 2024-04-05 RX ADMIN — METOPROLOL TARTRATE 25 MG: 25 TABLET, FILM COATED ORAL at 13:40

## 2024-04-05 RX ADMIN — LOSARTAN POTASSIUM 100 MG: 100 TABLET, FILM COATED ORAL at 13:41

## 2024-04-05 RX ADMIN — HYDRALAZINE HYDROCHLORIDE 25 MG: 25 TABLET, FILM COATED ORAL at 09:22

## 2024-04-05 RX ADMIN — ASPIRIN 81 MG 81 MG: 81 TABLET ORAL at 09:22

## 2024-04-05 RX ADMIN — GUAIFENESIN 1200 MG: 600 TABLET, EXTENDED RELEASE ORAL at 20:58

## 2024-04-05 RX ADMIN — ATORVASTATIN CALCIUM 40 MG: 40 TABLET, FILM COATED ORAL at 22:22

## 2024-04-05 RX ADMIN — SODIUM CHLORIDE, PRESERVATIVE FREE 10 ML: 5 INJECTION INTRAVENOUS at 09:22

## 2024-04-05 RX ADMIN — Medication 3 MG: at 22:22

## 2024-04-05 RX ADMIN — POTASSIUM CHLORIDE 40 MEQ: 1500 TABLET, EXTENDED RELEASE ORAL at 09:22

## 2024-04-05 RX ADMIN — GUAIFENESIN 1200 MG: 600 TABLET, EXTENDED RELEASE ORAL at 13:46

## 2024-04-05 RX ADMIN — CLOPIDOGREL BISULFATE 75 MG: 75 TABLET ORAL at 09:22

## 2024-04-05 RX ADMIN — ACETAMINOPHEN 650 MG: 325 TABLET ORAL at 18:40

## 2024-04-05 RX ADMIN — HYDRALAZINE HYDROCHLORIDE 25 MG: 25 TABLET, FILM COATED ORAL at 18:38

## 2024-04-05 RX ADMIN — BENZONATATE 100 MG: 100 CAPSULE ORAL at 01:54

## 2024-04-05 RX ADMIN — ENOXAPARIN SODIUM 40 MG: 100 INJECTION SUBCUTANEOUS at 09:22

## 2024-04-05 RX ADMIN — ACETAMINOPHEN 650 MG: 325 TABLET ORAL at 01:54

## 2024-04-05 ASSESSMENT — PAIN - FUNCTIONAL ASSESSMENT: PAIN_FUNCTIONAL_ASSESSMENT: ACTIVITIES ARE NOT PREVENTED

## 2024-04-05 ASSESSMENT — PAIN DESCRIPTION - ONSET: ONSET: ON-GOING

## 2024-04-05 ASSESSMENT — PAIN DESCRIPTION - ORIENTATION: ORIENTATION: MID

## 2024-04-05 ASSESSMENT — PAIN DESCRIPTION - PAIN TYPE: TYPE: ACUTE PAIN

## 2024-04-05 ASSESSMENT — PAIN DESCRIPTION - LOCATION: LOCATION: HEAD

## 2024-04-05 ASSESSMENT — PAIN SCALES - GENERAL: PAINLEVEL_OUTOF10: 5

## 2024-04-05 ASSESSMENT — PAIN DESCRIPTION - DESCRIPTORS: DESCRIPTORS: ACHING

## 2024-04-05 ASSESSMENT — PAIN DESCRIPTION - FREQUENCY: FREQUENCY: INTERMITTENT

## 2024-04-05 NOTE — PROGRESS NOTES
[facility]      Patient - Kalpana Braga   MRN -  [id number]   [facility] # - [id number]   - 1955      Date of Admission -  3/21/2024  8:53 PM  Date of evaluation -  2024  Room - --A   Hospital Day - 14  Consulting - Sulema Lagos MD Primary Care Physician - Tatiana Velasquez APRN - CNP     Problem List      Active Hospital Problems    Diagnosis Date Noted    Moderate malnutrition (HCC) [E44.0] 2024     Class: Chronic    CVA (cerebral vascular accident) (HCC) [I63.9] 2024    Dyspnea [R06.00] 2024    Acute hypoxemic respiratory failure (HCC) [J96.01] 2024    Hilar mass [R91.8] 2024    Pneumonia of right upper lobe due to infectious organism [J18.9] 2024     Reason for Consult    Right suprahilar mass   HPI   History Obtained From: Patient and electronic medical record.    Kalpana Braga is a 68 y.o. female with a past medical history of COPD, Hypertension, Hyperlipidemia, osteoarthritis, and anxiety/depression who presented to [facility] ED on 3/21/2024 with complaints of shortness of breath. Patient states she has has shortness of breath for the last 4 days which is worse with exertion. She also has had a productive cough with light yellow-green sputum. She has had chills as well without fevers. She is a current everyday smoker and smokes about a pack a day for 45 years. She does not wear any oxygen at baseline.     In the ED, patient was found to be saturating at 70% on room air. She was placed on 6L NC with saturations around 86 to 90%. Chest xray showed faint hazy opacities in the left midlung, possibly reflecting infiltrates. New consolidation versus atelectasis in right lung apex. CTA chest showed no pulmonary embolism. Hypodense 3.9 x 5.5 cm right suprahilar mass extending to the right mediastinum and subcarinal region, suspicious for neoplasm until proven otherwise. Complete right upper lung collapse secondary to  rocephin-Completed Prednisone  (3/22/24 to 3/25/24)  -Post obstructive Vs Community-acquired pneumonia Vs atypical pneumonia  -3.9 x 5.5 cm right suprahilar mass - concerning for neoplasm  -Current tobacco use - 51 pack year history, current smoker of 1 PPD  -Suspected SDB-  -Obesity  Plan   -Monitor SpO2 wean supplemental O2 to maintain SpO2 >90%  -Medical Oncology consult pending  -Radiation Oncology consult appreciated completed targeting today   -Continue Mucinex 1200 mg twice daily   -Stiolto 2 puffs daily  -Pulmonary hygiene with IS and Acapella   -Dispo eval pending IPR vs SNF  -DVT prophylaxis SCD's     -Patient with Mallampati IV, neck circumference 17 inches, history of snoring with recent stroke and daytime sleepiness will send for split night sleep study with follow-up in sleep clinic in 6-8 weeks post sleep study  -Will schedule for follow-up in Pulmonary clinic with repeat CT chest and Full PFT in 3 months to follow right upper lobe collapse and eval Pulmonary Function     Questions and concerns addressed.   Discussed the above with primary RN.    Electronically signed by   PAO Dominguez CNP on 4/5/2024     Addendum by Dr. Philippe MD:  Patient seen by me independently including key components of medical care. Face to face evaluation and examination was performed. Case discussed with Mr. Steven Santo CNP. Agree with Certified nurse practitioner's findings and plan as documented in the Certified nurse practitioner's note. Italicized font, if present,  represents changes to the note made by me. More than 50% of the encounter time involved with patient care by the Pulmonary & Critical care service team spent by me.    Please see my modifications mentioned below:    EBUS final cytology report:  RAPID ONSITE EVALUATION:   A: 11L lymph node, pass 1: Blood.   Pass 2: Blood.  Pass 3: Blood.   B: 4R lymph node, pass 1: Positive.   C: Station 7 lymph node, pass 1: Limited lymphoid.   Pass 2:

## 2024-04-05 NOTE — CONSULTS
[facility]    Patient - Kalpana Braga   MRN -  [id number]   [facility] [id number]   - 1955      Date of Admission -  3/21/2024  8:53 PM  Date of evaluation -  2024  Room - --A   Hospital Day - 14  Consulting - Sulema Lagos MD Primary Care Physician - Tatiana Velasquez, APRN - CNP     Inpatient consult to Oncology  Consult performed by: Georgette Reynaga APRN - CNP  Consult ordered by: Steven Santo APRN - CNP           Reason for Consult    RUL collapse, S/P EBUS OLIVER suspicious for NSCLC, final cytology pending  Active Hospital Problem List      Active Hospital Problems    Diagnosis Date Noted    Moderate malnutrition (HCC) [E44.0] 2024     Class: Chronic    CVA (cerebral vascular accident) (HCC) [I63.9] 2024    Dyspnea [R06.00] 2024    Acute hypoxemic respiratory failure (HCC) [J96.01] 2024    Lung mass [R91.8] 2024    Pneumonia of both lungs due to infectious organism [J18.9] 2024     HPI   Kalpana Braga is a 68 y.o. female admitted 3/21/2024 for evaluation of shortness of breath progressively worsening x 2 days.  She was recently given antibiotics and steroids.  She has been using home nebulizer treatments.  She reports cough.  Chest x-ray positive for faint hazy opacities in left midlung may reflect infiltrates; new consolidation versus atelectasis in the right lung base.  CTA chest negative for pulmonary emboli; positive for hypodense 3.9 x 5.5 cm right suprahilar mass extending to the right mediastinum and subcarinal region suspicious for neoplasm; complete right upper lobe collapse secondary to right suprahilar mass; patchy groundglass consolidations throughout the left lung; pulmonary edema versus atypical infectious or inflammatory pneumonitis; dilatation central pulmonary artery suggesting a degree of pulmonary artery hypertension.  She was started on IV antibiotics pulmonology consulted.  Pulmonology  suspicious for neoplasm until proven otherwise. 3. Complete right upper lung collapse secondary to the right suprahilar mass. 4. Nonspecific patchy ground-glass consolidations throughout the left lung. Pulmonary edema is possible but can be followed to exclude atypical infectious or inflammatory pneumonitis. 5. Dilatation of the central pulmonary artery suggesting a degree of pulmonary arterial hypertension. This document has been electronically signed by: Fernando Preciado MD on 03/21/2024 11:14 PM All CTs at this facility use dose modulation techniques and iterative reconstructions, and/or weight-based dosing when appropriate to reduce radiation to a low as reasonably achievable. 3D Post-processing was performed on this study.    XR CHEST PORTABLE    Result Date: 3/21/2024  1 view chest x-ray Comparison: CR - CHEST PA /T/ LAT - 07/31/2016 02:10 PM EDT Findings: Faint hazy left mid lung opacities. New consolidation versus atelectasis in the right upper lung. No pleural effusion or pneumothorax. Normal size heart. No pulmonary vascular congestion. Old traumatic deformity of the right humeral neck.     1. Faint hazy opacities in the left mid lung may reflect infiltrates. 2. New consolidation versus atelectasis in the right lung apex. CT can be helpful for further evaluation. This document has been electronically signed by: Fernando Preciado MD on 03/21/2024 10:22 PM      Assessment/Recommendations       NSCLC  S/p EBUS 4/2/2024 with prelim LOIVER suspicious for NSCLC.  Radiation oncology gave first dose of urgent radiation treatment today with second treatment planned tomorrow, total of 2 doses planned.  Pt will need PET scan as outpatient.  Pt with expressive aphasia and word finding difficulty.  She states she \"doesn't know\" if she would be interested in chemotherapy and wants time to think about it.  Will call family to provide update.    2.    CVA  Significant event noted 3/23/2024 with patient having difficulty with word

## 2024-04-05 NOTE — PROGRESS NOTES
Pt admitted to  5K4 by cart/stretcher from .  IV forearm left, condition patent, no redness. IV site free of s/s of infection or infiltration. Vital signs obtained. Assessment complete. Oriented to room. All questions answered with no further questions at this time.   Two nurse skin assessment performed by Pily FOUNTAIN and Dona RN. Oriented to room. Policies and procedures for  explained. A Fall prevention and safety brochure discussed with patient.  Bed alarm on. Call light in reach.

## 2024-04-05 NOTE — PROGRESS NOTES
Rounded on unit, patient not in room as she went to Radiology per report from primary RN Juana.  No family present at this time.  АЛЕКСАНДР Arias reports that her understanding is that the patient family is interested in SNF at discharge for care.

## 2024-04-05 NOTE — PLAN OF CARE
Problem: Discharge Planning  Goal: Discharge to home or other facility with appropriate resources  4/5/2024 1036 by Juana Patel RN  Outcome: Progressing  Flowsheets (Taken 4/4/2024 1937 by Zahra Torres, RN)  Discharge to home or other facility with appropriate resources:   Identify barriers to discharge with patient and caregiver   Arrange for needed discharge resources and transportation as appropriate   Identify discharge learning needs (meds, wound care, etc)   Refer to discharge planning if patient needs post-hospital services based on physician order or complex needs related to functional status, cognitive ability or social support system     Problem: Safety - Adult  Goal: Free from fall injury  4/5/2024 1036 by Juana Patel RN  Outcome: Progressing  Flowsheets (Taken 4/4/2024 0119 by Reji Lucia, RN)  Free From Fall Injury: Instruct family/caregiver on patient safety     Problem: Pain  Goal: Verbalizes/displays adequate comfort level or baseline comfort level  4/5/2024 1036 by Juana Patel RN  Outcome: Progressing     Problem: Skin/Tissue Integrity  Goal: Absence of new skin breakdown  Description: 1.  Monitor for areas of redness and/or skin breakdown  2.  Assess vascular access sites hourly  3.  Every 4-6 hours minimum:  Change oxygen saturation probe site  4.  Every 4-6 hours:  If on nasal continuous positive airway pressure, respiratory therapy assess nares and determine need for appliance change or resting period.  4/5/2024 1036 by Juana Patel RN  Outcome: Progressing     Problem: Respiratory - Adult  Goal: Achieves optimal ventilation and oxygenation  4/5/2024 1036 by Juana Patel RN  Outcome: Progressing  Flowsheets (Taken 4/4/2024 1937 by Zahra Torres, RN)  Achieves optimal ventilation and oxygenation:   Assess for changes in respiratory status   Assess for changes in mentation and behavior   Position to facilitate oxygenation and minimize respiratory effort   Oxygen  supplementation based on oxygen saturation or arterial blood gases   Initiate smoking cessation protocol as indicated     Problem: Neurosensory - Adult  Goal: Achieves stable or improved neurological status  4/5/2024 1036 by Juana Patel, RN  Outcome: Progressing  Flowsheets (Taken 4/4/2024 1937 by Zahra Torres, RN)  Achieves stable or improved neurological status:   Assess for and report changes in neurological status   Initiate measures to prevent increased intracranial pressure   Maintain blood pressure and fluid volume within ordered parameters to optimize cerebral perfusion and minimize risk of hemorrhage     Problem: Nutrition Deficit:  Goal: Optimize nutritional status  4/5/2024 1036 by Juana Patel, RN  Outcome: Progressing  Flowsheets (Taken 4/4/2024 0119 by Reji Lucia, RN)  Nutrient intake appropriate for improving, restoring, or maintaining nutritional needs:   Assess nutritional status and recommend course of action   Monitor oral intake, labs, and treatment plans   Care plan reviewed with patient .  Patient  verbalize understanding of the plan of care and contribute to goal setting.

## 2024-04-05 NOTE — PROGRESS NOTES
Hospitalist Progress Note      Patient:  Kalpana Braga    Unit/Bed:5K-04/004-A  YOB: 1955  MRN: [id number]   Acct: [id number]   PCP: Tatiana Velasquez, PAO - CNP  Date of Admission: 3/21/2024    Assessment/Plan:    Passive management of problem list below at this time. IPR vs SNF placement to be decided between patient and family. Will need oncology outpatient follow up or can see patient in IPR. Does not need inpatient oncology workup at this time.    #3.9 x 5.5 cm right suprahilar mass, diagnosis of non small cell carcinoma: CT chest shows hypodense 3.9 x 5.5 cm right suprahilar mass extending to right mediastinum and subcarinal region, suspicious for neoplasm.  Groundglass interstitial opacities with complete right upper lung collapse 2/2 mass. Patient does have extensive smoking history.    EBUS 4/2/24 showed evidence of non small cell carcinoma. Oncology and radiation oncology consulted per pulmonology team.  Patient received radiation treatment per radiation oncology recommendations given mass obstructing right mainstem bronchus. Tolerated well.  Plan  -Pulmonology consulted, cleared for discharge from their standpoint  -Will need outpatient staging workup such at PET scan, CTAP, etc.  -Oncology and radiation oncology consult, appreciate recommendations  -Outpatient oncology follow up on discharge.  -Plavix and Lovenox restarted.    #Acute hypoxic respiratory failure multifactorial: newly diagnosed non-small cell carcinoma, PNA, COPD exacerbation, stable:  In ED, requiring 6L nasal cannula.  Patient endorsing shortness of breath, cough, yellow/green sputum production.  CXR showing consolidations versus atelectasis in the right lung apex.  CTA shows patchy groundglass consolidation throughout left lung consistent with infectious process.  COVID/flu negative.  Patient has completed full course of azithromycin and ceftriaxone as well as 5 days

## 2024-04-05 NOTE — PROGRESS NOTES
Treatment # 1 of 2 given today.  Total dose to date 400 cGy of planned dose 800 cGy.  Patient tolerated treatment fine.  No other issues.

## 2024-04-05 NOTE — PLAN OF CARE
Problem: Discharge Planning  Goal: Discharge to home or other facility with appropriate resources  Outcome: Progressing    Problem: Safety - Adult  Goal: Free from fall injury  Outcome: Progressing   All fall precautions in place. Bed in low position, alarm activated and appropriate use of call light.     Problem: Chronic Conditions and Co-morbidities  Goal: Patient's chronic conditions and co-morbidity symptoms are monitored and maintained or improved  Outcome: Progressing    Problem: Pain  Goal: Verbalizes/displays adequate comfort level or baseline comfort level  Outcome: Progressing   Pain Assessment: 0-10  Pain Level: 6   Patient's Stated Pain Goal: 0 - No pain   Is pain goal met at this time?  Yes     Non-Pharmaceutical Pain Intervention(s): Rest    Problem: Skin/Tissue Integrity  Goal: Absence of new skin breakdown  Description: 1.  Monitor for areas of redness and/or skin breakdown  2.  Assess vascular access sites hourly  3.  Every 4-6 hours minimum:  Change oxygen saturation probe site  4.  Every 4-6 hours:  If on nasal continuous positive airway pressure, respiratory therapy assess nares and determine need for appliance change or resting period.  Outcome: Progressing     Problem: Respiratory - Adult  Goal: Achieves optimal ventilation and oxygenation  Outcome: Progressing    Achieves optimal ventilation and oxygenation:   Assess for changes in respiratory status   Assess for changes in mentation and behavior   Position to facilitate oxygenation and minimize respiratory effort   Oxygen supplementation based on oxygen saturation or arterial blood gases   Initiate smoking cessation protocol as indicated     Problem: Infection - Adult  Goal: Absence of infection at discharge  Outcome: Progressing  Absence of infection at discharge:   Assess and monitor for signs and symptoms of infection   Monitor lab/diagnostic results   Monitor all insertion sites i.e., indwelling lines, tubes and drains     Problem:  Neurosensory - Adult  Goal: Achieves stable or improved neurological status  Outcome: Progressing  Achieves stable or improved neurological status:   Assess for and report changes in neurological status   Initiate measures to prevent increased intracranial pressure   Maintain blood pressure and fluid volume within ordered parameters to optimize cerebral perfusion and minimize risk of hemorrhage     Problem: Neurosensory - Adult  Goal: Achieves maximal functionality and self care  Outcome: Progressing  Achieves maximal functionality and self care:   Monitor swallowing and airway patency with patient fatigue and changes in neurological status   Encourage and assist patient to increase activity and self care with guidance from physical therapy/occupational therapy     Problem: Nutrition Deficit:  Goal: Optimize nutritional status  Outcome: Progressing   Care Plan - Patient's Chronic Conditions and Co-Morbidity Symptoms are Monitored and Maintained or Improved:   Monitor and assess patient's chronic conditions and comorbid symptoms for stability, deterioration, or improvement   Collaborate with multidisciplinary team to address chronic and comorbid conditions and prevent exacerbation or deterioration   Discharge to home or other facility with appropriate resources:   Identify barriers to discharge with patient and caregiver   Arrange for needed discharge resources and transportation as appropriate   Identify discharge learning needs (meds, wound care, etc)   Refer to discharge planning if patient needs post-hospital services based on physician order or complex needs related to functional status, cognitive ability or social support system     Patient educated on how to use incentive spirometer. Patient verbalized understanding and demonstrated proper use. Emphasized importance and usage of device, with coughing and deep breathing every 4 hours while awake.      Care plan reviewed with patient.  Patient verbalize

## 2024-04-05 NOTE — PROGRESS NOTES
[facility]  PHYSICAL THERAPY MISSED TREATMENT NOTE  STRZ ONC MED 5K    Date: 2024  Patient Name: Kalpana Braga        MRN: [id number]   : 1955  (68 y.o.)  Gender: female   Referring Practitioner: Marcus Ga MD  Diagnosis: lung mass         REASON FOR MISSED TREATMENT:  Patient at testing and/or off unit.      Attempted skilled therapy. Pt OOR per nurse for radiation therapy. POC ongoing.

## 2024-04-05 NOTE — CARE COORDINATION
4/5/24, 7:06 AM EDT    DISCHARGE ON GOING EVALUATION    Transferred to . Report given to KVNG Zamarripa .     Electronically signed by Lisa Velasquez RN on 4/5/2024 at 7:07 AM

## 2024-04-05 NOTE — PROGRESS NOTES
[facility]  OCCUPATIONAL THERAPY MISSED TREATMENT NOTE  STRZ ONC MED 5K  5K-04/004-A      Date: 2024  Patient Name: Kalpana Braga        CSN: [id number]   : 1955  (68 y.o.)  Gender: female   Referring Practitioner: Marcus Ga MD  Diagnosis: lung mass         REASON FOR MISSED TREATMENT: Patient Off Floor for Testing.  OT staff to check back as time allows/appropriate.

## 2024-04-05 NOTE — CARE COORDINATION
DISCHARGE PLANNING EVALUATION  4/5/24, 3:07 PM EDT    Reason for Referral: Dispo  Mental Status: Answered questions approprietly   Decision Making: Makes own decisions  Family/Social/Home Environment: Patient resides at home alone.  She remains on one floor at home.   Current Services including food security, transportation and housekeeping: Self or family  Current Equipment:NA  Payment Source:Allied benefits  Concerns or Barriers to Discharge:  Patient is a pre-cert.   Post-acute (PAC) provider list was provided to patient. Patient was informed of their freedom to choose PAC provider. Discussed and offered to show the patient the relevant PAC Providers quality and resource use measures on Medicare Compare web site via computer based on patient's goals of care and treatment preferences. Questions regarding selection process were answered.      Teach Back Method used with Kalpana regarding care plan and options for discharge.   Patient verbalized understanding of the plan of care and contribute to goal setting.       Patient goals, treatment preferences and discharge plan: Spoke with patient and family at the bedside.  They prefer patient go to an ECF.  Patient is in agreement.  Prefers a facility in [place].  Referral made to [facility]. Patient has her commercial insurance until end of April and then will switch to Medicare, she only has part A at this time.  Family going to social security office to try and sign up for part B.  Spoke with brother Dario.     Electronically signed by RAAD Guerrero on 4/5/2024 at 3:07 PM

## 2024-04-05 NOTE — PROGRESS NOTES
[facility]  SPEECH THERAPY MISSED TREATMENT NOTE  STRZ ONC MED 5K      Date: 2024  Patient Name: Kalpana Braga        MRN: [id number]    : 1955  (68 y.o.)    REASON FOR MISSED TREATMENT:  ST attempted to see patient this AM for completion of dysphagia tx and cognitive tx. Upon arrival to room and discussion with RN, patient currently AYANA for radiation. ST to re-attempt at a later date/time as patient is medically appropriate and available.     Radha Fragoso M.A., CCC-SLP [id number]

## 2024-04-06 ENCOUNTER — HOSPITAL ENCOUNTER (OUTPATIENT)
Dept: RADIATION ONCOLOGY | Age: 69
Discharge: HOME | End: 2024-04-06

## 2024-04-06 LAB
ANION GAP SERPL CALC-SCNC: 12 MEQ/L (ref 8–16)
BUN SERPL-MCNC: 7 MG/DL (ref 7–22)
CALCIUM SERPL-MCNC: 8.9 MG/DL (ref 8.5–10.5)
CHLORIDE SERPL-SCNC: 97 MEQ/L (ref 98–111)
CO2 SERPL-SCNC: 26 MEQ/L (ref 23–33)
CREAT SERPL-MCNC: 0.5 MG/DL (ref 0.4–1.2)
DEPRECATED RDW RBC AUTO: 46.4 FL (ref 35–45)
ERYTHROCYTE [DISTWIDTH] IN BLOOD BY AUTOMATED COUNT: 14.6 % (ref 11.5–14.5)
GFR SERPL CREATININE-BSD FRML MDRD: > 90 ML/MIN/1.73M2
GLUCOSE SERPL-MCNC: 100 MG/DL (ref 70–108)
HCT VFR BLD AUTO: 34 % (ref 37–47)
HGB BLD-MCNC: 10.7 GM/DL (ref 12–16)
MCH RBC QN AUTO: 27.3 PG (ref 26–33)
MCHC RBC AUTO-ENTMCNC: 31.5 GM/DL (ref 32.2–35.5)
MCV RBC AUTO: 86.7 FL (ref 81–99)
PLATELET # BLD AUTO: 291 THOU/MM3 (ref 130–400)
PMV BLD AUTO: 11.2 FL (ref 9.4–12.4)
POTASSIUM SERPL-SCNC: 3.7 MEQ/L (ref 3.5–5.2)
RBC # BLD AUTO: 3.92 MILL/MM3 (ref 4.2–5.4)
SODIUM SERPL-SCNC: 135 MEQ/L (ref 135–145)
WBC # BLD AUTO: 8.8 THOU/MM3 (ref 4.8–10.8)

## 2024-04-06 PROCEDURE — 36415 COLL VENOUS BLD VENIPUNCTURE: CPT

## 2024-04-06 PROCEDURE — 6360000002 HC RX W HCPCS

## 2024-04-06 PROCEDURE — 6370000000 HC RX 637 (ALT 250 FOR IP): Performed by: NURSE PRACTITIONER

## 2024-04-06 PROCEDURE — 77387 GUIDANCE FOR RADJ TX DLVR: CPT | Performed by: RADIOLOGY

## 2024-04-06 PROCEDURE — 6370000000 HC RX 637 (ALT 250 FOR IP)

## 2024-04-06 PROCEDURE — 77412 RADIATION TX DELIVERY LVL 3: CPT | Performed by: RADIOLOGY

## 2024-04-06 PROCEDURE — 2580000003 HC RX 258

## 2024-04-06 PROCEDURE — 85027 COMPLETE CBC AUTOMATED: CPT

## 2024-04-06 PROCEDURE — 94640 AIRWAY INHALATION TREATMENT: CPT

## 2024-04-06 PROCEDURE — 80048 BASIC METABOLIC PNL TOTAL CA: CPT

## 2024-04-06 PROCEDURE — 2700000000 HC OXYGEN THERAPY PER DAY

## 2024-04-06 PROCEDURE — 99233 SBSQ HOSP IP/OBS HIGH 50: CPT | Performed by: HOSPITALIST

## 2024-04-06 PROCEDURE — 1200000000 HC SEMI PRIVATE

## 2024-04-06 PROCEDURE — 94761 N-INVAS EAR/PLS OXIMETRY MLT: CPT

## 2024-04-06 RX ADMIN — METOPROLOL TARTRATE 25 MG: 25 TABLET, FILM COATED ORAL at 07:46

## 2024-04-06 RX ADMIN — GUAIFENESIN 1200 MG: 600 TABLET, EXTENDED RELEASE ORAL at 07:46

## 2024-04-06 RX ADMIN — CLOPIDOGREL BISULFATE 75 MG: 75 TABLET ORAL at 07:46

## 2024-04-06 RX ADMIN — ENOXAPARIN SODIUM 40 MG: 100 INJECTION SUBCUTANEOUS at 07:46

## 2024-04-06 RX ADMIN — LOSARTAN POTASSIUM 100 MG: 100 TABLET, FILM COATED ORAL at 07:46

## 2024-04-06 RX ADMIN — HYDRALAZINE HYDROCHLORIDE 25 MG: 25 TABLET, FILM COATED ORAL at 01:11

## 2024-04-06 RX ADMIN — HYDRALAZINE HYDROCHLORIDE 25 MG: 25 TABLET, FILM COATED ORAL at 07:46

## 2024-04-06 RX ADMIN — SODIUM CHLORIDE, PRESERVATIVE FREE 10 ML: 5 INJECTION INTRAVENOUS at 20:30

## 2024-04-06 RX ADMIN — ATORVASTATIN CALCIUM 40 MG: 40 TABLET, FILM COATED ORAL at 20:30

## 2024-04-06 RX ADMIN — GUAIFENESIN 1200 MG: 600 TABLET, EXTENDED RELEASE ORAL at 20:30

## 2024-04-06 RX ADMIN — FERROUS SULFATE TAB 325 MG (65 MG ELEMENTAL FE) 325 MG: 325 (65 FE) TAB at 07:46

## 2024-04-06 RX ADMIN — ASPIRIN 81 MG 81 MG: 81 TABLET ORAL at 07:46

## 2024-04-06 RX ADMIN — HYDRALAZINE HYDROCHLORIDE 25 MG: 25 TABLET, FILM COATED ORAL at 16:31

## 2024-04-06 RX ADMIN — TIOTROPIUM BROMIDE AND OLODATEROL 2 PUFF: 3.124; 2.736 SPRAY, METERED RESPIRATORY (INHALATION) at 09:32

## 2024-04-06 RX ADMIN — VENLAFAXINE HYDROCHLORIDE 75 MG: 75 CAPSULE, EXTENDED RELEASE ORAL at 07:46

## 2024-04-06 NOTE — PROGRESS NOTES
Hospitalist Progress Note      Patient:  Kalpana Braga    Unit/Bed:5K-04/004-A  YOB: 1955  MRN: [id number]   Acct: [id number]   PCP: Tatiana Velasquez, PAO - CNP  Date of Admission: 3/21/2024    Assessment/Plan:    Passive management of problem list below at this time. ECFconfirmed at this time, waiting for precert. Will need oncology outpatient. Does not need inpatient oncology workup at this time.    #3.9 x 5.5 cm right suprahilar mass, diagnosis of non small cell carcinoma: CT chest shows hypodense 3.9 x 5.5 cm right suprahilar mass extending to right mediastinum and subcarinal region, suspicious for neoplasm.  Groundglass interstitial opacities with complete right upper lung collapse 2/2 mass. Patient does have extensive smoking history.    EBUS 4/2/24 showed evidence of non small cell carcinoma. Oncology and radiation oncology consulted per pulmonology team.  Patient received radiation treatment per radiation oncology recommendations given mass obstructing right mainstem bronchus. Tolerated well.  Plan  -Pulmonology consulted, cleared for discharge from their standpoint  -Will need outpatient staging workup such at PET scan, CTAP, etc.  -Oncology and radiation oncology consult, appreciate recommendations   -pt received radiation inpatient per radiation oncology, tolerated well.  -Outpatient oncology follow up on discharge.  -Plavix and Lovenox restarted.    #Acute hypoxic respiratory failure multifactorial: newly diagnosed non-small cell carcinoma, PNA, COPD exacerbation, stable:  In ED, requiring 6L nasal cannula.  Patient endorsing shortness of breath, cough, yellow/green sputum production.  CXR showing consolidations versus atelectasis in the right lung apex.  CTA shows patchy groundglass consolidation throughout left lung consistent with infectious process.  COVID/flu negative.  Patient has completed full course of azithromycin and  ceftriaxone as well as 5 days of prednisone.    Currently on 1 LNC.  Antibiotics and prednisone courses completed.  Plan  -Continue Acapella and incentive spirometer  -Outpatient follow-up with pulmonology, oncology, radiation oncology  -Continue Stiolto  -EBUS completed    #Left MCA stroke: Patient developed expressive aphasia and difficulty with writing 3/24/2024 and also trouble identifying current location.  CT head showed no acute bleed, CTA of neck was negative.  CTA head showed possible occlusion of M2 segment of left MCA.  MRI morning of 3/24/2024 showing acute infarct in the left MCA area.    A1c 5.8, LDL below goal at 32, echo with bubble study negative for shunt. Patient stable for IPR admission at this time.  Plan  -Neurology was following, recommending statin, aspirin, and Plavix.  -Restarted Plavix and lovenox s/p EBUS 4/2/24  -Speech recommending minced and moist diet w/ thin liquids  -NIH scale every shift  -Recommend 30-day cardiac event monitor at discharge to evaluate for arrhythmias as source of stroke  -Reiterate importance of tobacco cessation  -PRN stat head CT if changes in mental status, severe headache, or new neurological deficit.  -Can go to IPR at this time if appropriate, seems family is deciding to pursue ECF at this time    #Mild hyponatremia, resolved: Likely 2/2 poor oral intake and increased output from diarrhea and vomiting. Improved inititially w/ fluids. However, patient has not been eating for a few days now and it has been slowly declining. Recommend pushing increased oral intake (both food and liquids). Daily BMP.     #Suspected CAP, resolved: Antibiotic course completed  #Suspected COPD exacerbation, resolved: completed antibiotic and steroid course  #Leukocytosis, resolved  #HAGMA, resolved  #Elevated troponin, resolved:  Likely 2/2 demand ischemia.  Trend troponin 18, 12, 12.  EKG negative.  #Nausea, vomiting, diarrhea likely 2/2 viral illness (3/28),

## 2024-04-06 NOTE — PLAN OF CARE
Problem: Discharge Planning  Goal: Discharge to home or other facility with appropriate resources  Outcome: Progressing  Flowsheets (Taken 4/6/2024 0046)  Discharge to home or other facility with appropriate resources:   Identify barriers to discharge with patient and caregiver   Identify discharge learning needs (meds, wound care, etc)     Problem: Safety - Adult  Goal: Free from fall injury  Outcome: Progressing  Note: Patient bed alarm on at this time.     Problem: Chronic Conditions and Co-morbidities  Goal: Patient's chronic conditions and co-morbidity symptoms are monitored and maintained or improved  Outcome: Progressing  Flowsheets (Taken 4/6/2024 0046)  Care Plan - Patient's Chronic Conditions and Co-Morbidity Symptoms are Monitored and Maintained or Improved: Monitor and assess patient's chronic conditions and comorbid symptoms for stability, deterioration, or improvement     Problem: Pain  Goal: Verbalizes/displays adequate comfort level or baseline comfort level  Outcome: Progressing  Flowsheets (Taken 4/6/2024 0046)  Verbalizes/displays adequate comfort level or baseline comfort level:   Encourage patient to monitor pain and request assistance   Administer analgesics based on type and severity of pain and evaluate response     Problem: Skin/Tissue Integrity  Goal: Absence of new skin breakdown  Description: 1.  Monitor for areas of redness and/or skin breakdown  2.  Assess vascular access sites hourly  3.  Every 4-6 hours minimum:  Change oxygen saturation probe site  4.  Every 4-6 hours:  If on nasal continuous positive airway pressure, respiratory therapy assess nares and determine need for appliance change or resting period.  Outcome: Progressing  Note: No new skin integrity issues at this time.     Problem: Infection - Adult  Goal: Absence of infection at discharge  Outcome: Progressing  Flowsheets (Taken 4/6/2024 0046)  Absence of infection at discharge:   Assess and monitor for signs and

## 2024-04-06 NOTE — PLAN OF CARE
Problem: Respiratory - Adult  Goal: Clear lung sounds  Outcome: Progressing   Continue inhaler to keep airways open. Patient mutually agreed on goals.

## 2024-04-06 NOTE — PROGRESS NOTES
[facility]  PHYSICAL THERAPY MISSED TREATMENT NOTE  [facility] ONC MED 5K    Date: 2024  Patient Name: Kalpana Braga        MRN: [id number]   : 1955  (68 y.o.)  Gender: female   Referring Practitioner: Marcus Ga MD  Diagnosis: lung mass         REASON FOR MISSED TREATMENT:  Patient refused treatment stating that she is too tired. Encouragement and education on benefits of therapy but patient still refuses

## 2024-04-06 NOTE — PROGRESS NOTES
.[facility]  OCCUPATIONAL THERAPY MISSED TREATMENT NOTE  [facility] ONC MED 5K  5K-04/004-A      Date: 2024  Patient Name: Kalpana Braga        CSN: [id number]   : 1955  (68 y.o.)  Gender: female   Referring Practitioner: Marcus Ga MD  Diagnosis: lung mass         REASON FOR MISSED TREATMENT: Patient Refused.  Attempted to see pt this am and she pleasantly refused she was too tired to participate in tehrapy.   Attempted again this pm and pt again stated she was too tired to get out of bed and maybe do tomorrow.   WIll attempt again as time allows

## 2024-04-06 NOTE — PROGRESS NOTES
questions answered.    Past medical history, family history, social history and allergies reviewed again and is unchanged since admission.    ROS (12 point review of systems completed.  Pertinent positives noted. Otherwise ROS is negative)     Medications:  Reviewed    Infusion Medications    sodium chloride 50 mL/hr at 04/05/24 1337    sodium chloride 100 mL/hr at 03/22/24 1810     Scheduled Medications    hydrALAZINE  25 mg Oral q8h    ferrous sulfate  325 mg Oral Every Other Day    polyethylene glycol  17 g Oral Daily    tiotropium-olodaterol  2 puff Inhalation Daily    metoprolol tartrate  25 mg Oral BID    clopidogrel  75 mg Oral Daily    atorvastatin  40 mg Oral Daily    enoxaparin  40 mg SubCUTAneous Daily    guaiFENesin  1,200 mg Oral BID    aspirin  81 mg Oral Daily    sodium chloride flush  5-40 mL IntraVENous 2 times per day    losartan  100 mg Oral Daily    venlafaxine  75 mg Oral Daily     PRN Meds: phenol, benzonatate, acetaminophen **OR** acetaminophen, melatonin, hydrALAZINE, potassium chloride **OR** potassium alternative oral replacement **OR** potassium chloride, albuterol, sodium chloride flush, sodium chloride, ondansetron **OR** ondansetron      Intake/Output Summary (Last 24 hours) at 4/6/2024 0745  Last data filed at 4/6/2024 0258  Gross per 24 hour   Intake 950 ml   Output --   Net 950 ml         Diet:  ADULT DIET; Regular  ADULT ORAL NUTRITION SUPPLEMENT; Lunch, Dinner; Frozen Oral Supplement    Exam:    /72   Pulse 63   Temp 98.4 °F (36.9 °C) (Oral)   Resp 18   Ht 1.626 m (5' 4\")   Wt 85.9 kg (189 lb 6.4 oz)   SpO2 91%   BMI 32.51 kg/m²     General appearance: No apparent distress, appears stated age and cooperative.  No family bedside.  HEENT: Pupils equal, round, and reactive to light. Conjunctivae/corneas clear.  Neck: Supple, with full range of motion. No jugular venous distention. Trachea midline.  Respiratory:  Normal respiratory effort.  No wheezes rales or rhonchi  proven otherwise. 3. Complete right upper lung collapse secondary to the right suprahilar mass. 4. Nonspecific patchy ground-glass consolidations throughout the left lung. Pulmonary edema is possible but can be followed to exclude atypical infectious or inflammatory pneumonitis. 5. Dilatation of the central pulmonary artery suggesting a degree of pulmonary arterial hypertension. This document has been electronically signed by: Fernando Preciado MD on 03/21/2024 11:14 PM All CTs at this facility use dose modulation techniques and iterative reconstructions, and/or weight-based dosing when appropriate to reduce radiation to a low as reasonably achievable. 3D Post-processing was performed on this study.    XR CHEST PORTABLE    Result Date: 3/21/2024  1 view chest x-ray Comparison: CR - CHEST PA /T/ LAT - 07/31/2016 02:10 PM EDT Findings: Faint hazy left mid lung opacities. New consolidation versus atelectasis in the right upper lung. No pleural effusion or pneumothorax. Normal size heart. No pulmonary vascular congestion. Old traumatic deformity of the right humeral neck.     1. Faint hazy opacities in the left mid lung may reflect infiltrates. 2. New consolidation versus atelectasis in the right lung apex. CT can be helpful for further evaluation. This document has been electronically signed by: Fernando Preciado MD on 03/21/2024 10:22 PM      Electronically Signed by  Kike York DO, MBA  PGY-1 Internal Medicine Resident  [facility]  On 4/6/2024 at 7:38 AM    This report has been created using voice recognition software. It may contain minor errors which are inherent in voice recognition technology

## 2024-04-06 NOTE — PROGRESS NOTES
Treatment # 2 of 2 given today.  Total dose to date 800 cGy of planned dose 800 cGy.  Patient tolerated treatment fine.  No other issues.

## 2024-04-07 LAB
ANION GAP SERPL CALC-SCNC: 11 MEQ/L (ref 8–16)
BUN SERPL-MCNC: 9 MG/DL (ref 7–22)
CALCIUM SERPL-MCNC: 8.5 MG/DL (ref 8.5–10.5)
CHLORIDE SERPL-SCNC: 92 MEQ/L (ref 98–111)
CO2 SERPL-SCNC: 25 MEQ/L (ref 23–33)
CREAT SERPL-MCNC: 0.5 MG/DL (ref 0.4–1.2)
DEPRECATED RDW RBC AUTO: 46.9 FL (ref 35–45)
ERYTHROCYTE [DISTWIDTH] IN BLOOD BY AUTOMATED COUNT: 14.7 % (ref 11.5–14.5)
GFR SERPL CREATININE-BSD FRML MDRD: > 90 ML/MIN/1.73M2
GLUCOSE SERPL-MCNC: 102 MG/DL (ref 70–108)
HCT VFR BLD AUTO: 33.6 % (ref 37–47)
HGB BLD-MCNC: 10.4 GM/DL (ref 12–16)
MCH RBC QN AUTO: 27.2 PG (ref 26–33)
MCHC RBC AUTO-ENTMCNC: 31 GM/DL (ref 32.2–35.5)
MCV RBC AUTO: 88 FL (ref 81–99)
PLATELET # BLD AUTO: 282 THOU/MM3 (ref 130–400)
PMV BLD AUTO: 11.3 FL (ref 9.4–12.4)
POTASSIUM SERPL-SCNC: 3.3 MEQ/L (ref 3.5–5.2)
RBC # BLD AUTO: 3.82 MILL/MM3 (ref 4.2–5.4)
SODIUM SERPL-SCNC: 128 MEQ/L (ref 135–145)
WBC # BLD AUTO: 8.6 THOU/MM3 (ref 4.8–10.8)

## 2024-04-07 PROCEDURE — 6370000000 HC RX 637 (ALT 250 FOR IP): Performed by: NURSE PRACTITIONER

## 2024-04-07 PROCEDURE — 99231 SBSQ HOSP IP/OBS SF/LOW 25: CPT | Performed by: NURSE PRACTITIONER

## 2024-04-07 PROCEDURE — 94761 N-INVAS EAR/PLS OXIMETRY MLT: CPT

## 2024-04-07 PROCEDURE — 6370000000 HC RX 637 (ALT 250 FOR IP)

## 2024-04-07 PROCEDURE — 2700000000 HC OXYGEN THERAPY PER DAY

## 2024-04-07 PROCEDURE — 2580000003 HC RX 258

## 2024-04-07 PROCEDURE — 94640 AIRWAY INHALATION TREATMENT: CPT

## 2024-04-07 PROCEDURE — 99233 SBSQ HOSP IP/OBS HIGH 50: CPT | Performed by: HOSPITALIST

## 2024-04-07 PROCEDURE — 1200000000 HC SEMI PRIVATE

## 2024-04-07 PROCEDURE — 80048 BASIC METABOLIC PNL TOTAL CA: CPT

## 2024-04-07 PROCEDURE — 36415 COLL VENOUS BLD VENIPUNCTURE: CPT

## 2024-04-07 PROCEDURE — 6360000002 HC RX W HCPCS

## 2024-04-07 PROCEDURE — 85027 COMPLETE CBC AUTOMATED: CPT

## 2024-04-07 RX ADMIN — ENOXAPARIN SODIUM 40 MG: 100 INJECTION SUBCUTANEOUS at 08:36

## 2024-04-07 RX ADMIN — METOPROLOL TARTRATE 25 MG: 25 TABLET, FILM COATED ORAL at 21:25

## 2024-04-07 RX ADMIN — HYDRALAZINE HYDROCHLORIDE 25 MG: 25 TABLET, FILM COATED ORAL at 17:48

## 2024-04-07 RX ADMIN — CLOPIDOGREL BISULFATE 75 MG: 75 TABLET ORAL at 08:36

## 2024-04-07 RX ADMIN — SODIUM CHLORIDE: 9 INJECTION, SOLUTION INTRAVENOUS at 00:12

## 2024-04-07 RX ADMIN — GUAIFENESIN 1200 MG: 600 TABLET, EXTENDED RELEASE ORAL at 21:25

## 2024-04-07 RX ADMIN — HYDRALAZINE HYDROCHLORIDE 25 MG: 25 TABLET, FILM COATED ORAL at 08:35

## 2024-04-07 RX ADMIN — SODIUM CHLORIDE: 9 INJECTION, SOLUTION INTRAVENOUS at 17:49

## 2024-04-07 RX ADMIN — HYDRALAZINE HYDROCHLORIDE 25 MG: 25 TABLET, FILM COATED ORAL at 00:25

## 2024-04-07 RX ADMIN — VENLAFAXINE HYDROCHLORIDE 75 MG: 75 CAPSULE, EXTENDED RELEASE ORAL at 08:36

## 2024-04-07 RX ADMIN — ATORVASTATIN CALCIUM 40 MG: 40 TABLET, FILM COATED ORAL at 21:24

## 2024-04-07 RX ADMIN — LOSARTAN POTASSIUM 100 MG: 100 TABLET, FILM COATED ORAL at 08:35

## 2024-04-07 RX ADMIN — TIOTROPIUM BROMIDE AND OLODATEROL 2 PUFF: 3.124; 2.736 SPRAY, METERED RESPIRATORY (INHALATION) at 09:31

## 2024-04-07 RX ADMIN — ASPIRIN 81 MG 81 MG: 81 TABLET ORAL at 08:36

## 2024-04-07 RX ADMIN — GUAIFENESIN 1200 MG: 600 TABLET, EXTENDED RELEASE ORAL at 08:35

## 2024-04-07 RX ADMIN — POTASSIUM CHLORIDE 40 MEQ: 1500 TABLET, EXTENDED RELEASE ORAL at 05:44

## 2024-04-07 NOTE — PLAN OF CARE
Problem: Discharge Planning  Goal: Discharge to home or other facility with appropriate resources  Outcome: Progressing  Flowsheets (Taken 4/7/2024 0052)  Discharge to home or other facility with appropriate resources:   Identify barriers to discharge with patient and caregiver   Identify discharge learning needs (meds, wound care, etc)     Problem: Safety - Adult  Goal: Free from fall injury  Outcome: Progressing  Note: Chair alarm on at this time.     Problem: Chronic Conditions and Co-morbidities  Goal: Patient's chronic conditions and co-morbidity symptoms are monitored and maintained or improved  Outcome: Progressing  Flowsheets (Taken 4/7/2024 0052)  Care Plan - Patient's Chronic Conditions and Co-Morbidity Symptoms are Monitored and Maintained or Improved: Monitor and assess patient's chronic conditions and comorbid symptoms for stability, deterioration, or improvement     Problem: Pain  Goal: Verbalizes/displays adequate comfort level or baseline comfort level  Outcome: Progressing  Flowsheets (Taken 4/7/2024 0052)  Verbalizes/displays adequate comfort level or baseline comfort level:   Encourage patient to monitor pain and request assistance   Administer analgesics based on type and severity of pain and evaluate response     Problem: Skin/Tissue Integrity  Goal: Absence of new skin breakdown  Description: 1.  Monitor for areas of redness and/or skin breakdown  2.  Assess vascular access sites hourly  3.  Every 4-6 hours minimum:  Change oxygen saturation probe site  4.  Every 4-6 hours:  If on nasal continuous positive airway pressure, respiratory therapy assess nares and determine need for appliance change or resting period.  Outcome: Progressing  Note: No new skin integrity issues at this time     Problem: Infection - Adult  Goal: Absence of infection at discharge  Outcome: Progressing  Flowsheets (Taken 4/7/2024 0052)  Absence of infection at discharge:   Assess and monitor for signs and symptoms of  infection   Monitor lab/diagnostic results     Problem: Nutrition Deficit:  Goal: Optimize nutritional status  Outcome: Progressing  Flowsheets (Taken 4/7/2024 0052)  Nutrient intake appropriate for improving, restoring, or maintaining nutritional needs:   Monitor oral intake, labs, and treatment plans   Assess nutritional status and recommend course of action    Care plan reviewed with patient.  Patient verbalizes understanding of the plan of care and contributes to goal setting.

## 2024-04-07 NOTE — PROGRESS NOTES
Pt is A&O x4, speech is clear and appropriate.    Pain is at a 5, in chest, RN aware.   Pupils are equal, round and react to light, and accommodate with consensual response bilaterally.     Oral cavities are pink and moist.   Oral temp: 98.4     Pt is independent with movement.   Intake of breakfast was 50%, fluids: 260     Upper extremities are warm, pink, dry, bilaterally. No restricted movement, no numbness or tingling present bilaterally. Sensation is present, bilaterally. Hand grasp is strong, bilaterally. Arm drift is negative. Cap refill and skin turgor bounce back within 4 seconds. No pressure areas observed.     Pt has an IV in the left forearm, site is clean, dry, intact and infusing.   BP: 156/77  MAP: 107   Pulse: 82   O2: 97% on 2L of oxygen via nasal cannula. Pt states she's having SOB, RN aware.     Heart sounds: strong amplitude, regular rhythm  Pulse is strong.   Listened to lungs; no wheezes heard.   Chest expansion, and movement is symmetrical. Respirations: 18. Pt is in tripoding position.    Bowel sounds are present in all 4 quadrants, heard every 8 seconds. Abdomen is round, no masses palpated, no tenderness noted.     Lower extremities are warm, pink, dry, and sensation is present bilaterally. Skin is intact, no redness observed over bony prominences. No restricted movement. Push/Pull is appropriate and strong, bilaterally. No edema, numbness or tingling present. Pulses in feet are present and strong bilaterally.     Turned down lights, pt requested to rest.

## 2024-04-07 NOTE — PROGRESS NOTES
Hospitalist Progress Note      Patient:  Kalpana Braga    Unit/Bed:5K-04/004-A  YOB: 1955  MRN: [id number]   Acct: [id number]   PCP: Tatiana Velasquez, PAO - CNP  Date of Admission: 3/21/2024    Assessment/Plan:    Passive management of problem list below at this time. ECF confirmed at this time, waiting for precert. Will need oncology outpatient.  Inpatient oncology not indicated at this time.    # Non-small cell carcinoma of the lung, 3.9 x 5.5 cm right suprahilar mass: CT chest shows hypodense 3.9 x 5.5 cm right suprahilar mass extending to right mediastinum and subcarinal region.  Groundglass interstitial opacities with complete right upper lung collapse 2/2 mass. Patient does have extensive smoking history.    EBUS 4/2/24 showed evidence of non small cell carcinoma. Oncology and radiation oncology consulted per pulmonology team.  Patient received radiation treatment per radiation oncology recommendations given mass obstructing right mainstem bronchus. Tolerated well.  Plan  -Pulmonology consulted, cleared for discharge from their standpoint  -Will need outpatient oncology follow-up with staging workup such at PET scan, CTAP, etc.  -Oncology and radiation oncology consult, appreciate recommendations, received radiation inpatient per radiation oncology, tolerated well.    #Acute hypoxic respiratory failure, multifactorial due to newly diagnosed non-small cell carcinoma, PNA, COPD exacerbation, stable: Required 6L nasal cannula in ED.  Endorsed shortness of breath, cough, yellow/green sputum production.  CXR showing consolidations versus atelectasis in the right lung apex.  CTA shows patchy groundglass consolidation throughout left lung consistent with infectious process.  COVID/flu negative.  Completed full course of azithromycin and ceftriaxone as well as 5 days of prednisone.  EBUS completed as noted above.     Remain on 1-2 LNC.  Antibiotics  consolidations throughout the left lung. Pulmonary edema is possible but can be followed to exclude atypical infectious or inflammatory pneumonitis. 5. Dilatation of the central pulmonary artery suggesting a degree of pulmonary arterial hypertension. This document has been electronically signed by: Fernando Preciado MD on 03/21/2024 11:14 PM All CTs at this facility use dose modulation techniques and iterative reconstructions, and/or weight-based dosing when appropriate to reduce radiation to a low as reasonably achievable. 3D Post-processing was performed on this study.    XR CHEST PORTABLE    Result Date: 3/21/2024  1 view chest x-ray Comparison: CR - CHEST PA /T/ LAT - 07/31/2016 02:10 PM EDT Findings: Faint hazy left mid lung opacities. New consolidation versus atelectasis in the right upper lung. No pleural effusion or pneumothorax. Normal size heart. No pulmonary vascular congestion. Old traumatic deformity of the right humeral neck.     1. Faint hazy opacities in the left mid lung may reflect infiltrates. 2. New consolidation versus atelectasis in the right lung apex. CT can be helpful for further evaluation. This document has been electronically signed by: Fernando Preciado MD on 03/21/2024 10:22 PM      Chantelle Garcia MD, IM-PGY2  04/07/24 at 7:35 AM      This report has been created using voice recognition software. It may contain minor errors which are inherent in voice recognition technology

## 2024-04-07 NOTE — PLAN OF CARE
Problem: Discharge Planning  Goal: Discharge to home or other facility with appropriate resources  4/7/2024 1213 by Angelica Kelly RN  Outcome: Progressing  Flowsheets  Taken 4/7/2024 1213 by Angelica Kelly RN  Discharge to home or other facility with appropriate resources:   Identify barriers to discharge with patient and caregiver   Arrange for needed discharge resources and transportation as appropriate  Taken 4/7/2024 0052 by Kalyn Mclaughlin, RN  Discharge to home or other facility with appropriate resources:   Identify barriers to discharge with patient and caregiver   Identify discharge learning needs (meds, wound care, etc)     Problem: Safety - Adult  Goal: Free from fall injury  4/7/2024 1213 by Angelica Kelly RN  Outcome: Progressing  Flowsheets (Taken 4/7/2024 1213)  Free From Fall Injury:   Instruct family/caregiver on patient safety   Based on caregiver fall risk screen, instruct family/caregiver to ask for assistance with transferring infant if caregiver noted to have fall risk factors     Problem: Chronic Conditions and Co-morbidities  Goal: Patient's chronic conditions and co-morbidity symptoms are monitored and maintained or improved  4/7/2024 1213 by Angelica Kelly RN  Outcome: Progressing  Flowsheets  Taken 4/7/2024 1213 by Angelica Kelly RN  Care Plan - Patient's Chronic Conditions and Co-Morbidity Symptoms are Monitored and Maintained or Improved: Monitor and assess patient's chronic conditions and comorbid symptoms for stability, deterioration, or improvement  Taken 4/7/2024 0052 by Kalyn Mclaughlin RN  Care Plan - Patient's Chronic Conditions and Co-Morbidity Symptoms are Monitored and Maintained or Improved: Monitor and assess patient's chronic conditions and comorbid symptoms for stability, deterioration, or improvement     Problem: Pain  Goal: Verbalizes/displays adequate comfort level or baseline comfort level  4/7/2024 1213 by Angelica Kelly RN  Outcome:

## 2024-04-07 NOTE — PROGRESS NOTES
[facility]  PHYSICAL THERAPY MISSED TREATMENT NOTE  [facility] ONC MED 5K    Date: 2024  Patient Name: Kalpana Braga        MRN: [id number]   : 1955  (68 y.o.)  Gender: female   Referring Practitioner: Marcus Ga MD  Diagnosis: lung mass         REASON FOR MISSED TREATMENT:  RN approves therapy session. Upon arrival patient refused. Educated on the benefits of participating in treatment, patient reports she is to tired and kept stating \"No\"

## 2024-04-07 NOTE — PROGRESS NOTES
[place] [facility]      Patient - Kalpana Braga   MRN -  [id number]   [facility] # - [id number]   - 1955      Date of Admission -  3/21/2024  8:53 PM  Date of evaluation -  2024  Room - --A   Hospital Day - 16  Consulting - Sulema Lagos MD Primary Care Physician - Tatiana Velasquez APRN - CNP     Problem List      Active Hospital Problems    Diagnosis Date Noted    Malignant neoplasm of upper lobe of right lung (HCC) [C34.11] 2024    Moderate malnutrition (HCC) [E44.0] 2024     Class: Chronic    CVA (cerebral vascular accident) (HCC) [I63.9] 2024    Dyspnea [R06.00] 2024    Acute hypoxemic respiratory failure (HCC) [J96.01] 2024    Lung mass [R91.8] 2024    Pneumonia of both lungs due to infectious organism [J18.9] 2024     Reason for Consult    Right suprahilar mass   HPI   History Obtained From: Patient and electronic medical record.    Kalpana Braga is a 68 y.o. female with a past medical history of COPD, Hypertension, Hyperlipidemia, osteoarthritis, and anxiety/depression who presented to [facility] ED on 3/21/2024 with complaints of shortness of breath. Patient states she has has shortness of breath for the last 4 days which is worse with exertion. She also has had a productive cough with light yellow-green sputum. She has had chills as well without fevers. She is a current everyday smoker and smokes about a pack a day for 45 years. She does not wear any oxygen at baseline.     In the ED, patient was found to be saturating at 70% on room air. She was placed on 6L NC with saturations around 86 to 90%. Chest xray showed faint hazy opacities in the left midlung, possibly reflecting infiltrates. New consolidation versus atelectasis in right lung apex. CTA chest showed no pulmonary embolism. Hypodense 3.9 x 5.5 cm right suprahilar mass extending to the right mediastinum and subcarinal region, suspicious for neoplasm

## 2024-04-07 NOTE — PROGRESS NOTES
Received report from primary RN.     Went in to greet pt, she's eating breakfast. I tidied up the room, retrieved fresh linens, bath supplies, attends, etc.     Fresh ice water given, pt drank 200 ml.

## 2024-04-07 NOTE — CARE COORDINATION
4/7/24, 10:30 AM EDT    DISCHARGE PLANNING EVALUATION    Call from Abby with [facility], they are still reviewing pt.

## 2024-04-08 LAB
ANION GAP SERPL CALC-SCNC: 11 MEQ/L (ref 8–16)
BUN SERPL-MCNC: 11 MG/DL (ref 7–22)
CALCIUM SERPL-MCNC: 8.2 MG/DL (ref 8.5–10.5)
CHLORIDE SERPL-SCNC: 95 MEQ/L (ref 98–111)
CO2 SERPL-SCNC: 24 MEQ/L (ref 23–33)
CREAT SERPL-MCNC: 0.5 MG/DL (ref 0.4–1.2)
DEPRECATED RDW RBC AUTO: 45.1 FL (ref 35–45)
ERYTHROCYTE [DISTWIDTH] IN BLOOD BY AUTOMATED COUNT: 14.6 % (ref 11.5–14.5)
GFR SERPL CREATININE-BSD FRML MDRD: > 90 ML/MIN/1.73M2
GLUCOSE SERPL-MCNC: 111 MG/DL (ref 70–108)
HCT VFR BLD AUTO: 29.8 % (ref 37–47)
HGB BLD-MCNC: 9.5 GM/DL (ref 12–16)
MCH RBC QN AUTO: 27.5 PG (ref 26–33)
MCHC RBC AUTO-ENTMCNC: 31.9 GM/DL (ref 32.2–35.5)
MCV RBC AUTO: 86.1 FL (ref 81–99)
PLATELET # BLD AUTO: 306 THOU/MM3 (ref 130–400)
PMV BLD AUTO: 11.1 FL (ref 9.4–12.4)
POTASSIUM SERPL-SCNC: 3.5 MEQ/L (ref 3.5–5.2)
RBC # BLD AUTO: 3.46 MILL/MM3 (ref 4.2–5.4)
SODIUM SERPL-SCNC: 130 MEQ/L (ref 135–145)
WBC # BLD AUTO: 7.5 THOU/MM3 (ref 4.8–10.8)

## 2024-04-08 PROCEDURE — 97530 THERAPEUTIC ACTIVITIES: CPT

## 2024-04-08 PROCEDURE — 6370000000 HC RX 637 (ALT 250 FOR IP)

## 2024-04-08 PROCEDURE — 99233 SBSQ HOSP IP/OBS HIGH 50: CPT | Performed by: HOSPITALIST

## 2024-04-08 PROCEDURE — 94640 AIRWAY INHALATION TREATMENT: CPT

## 2024-04-08 PROCEDURE — 6370000000 HC RX 637 (ALT 250 FOR IP): Performed by: STUDENT IN AN ORGANIZED HEALTH CARE EDUCATION/TRAINING PROGRAM

## 2024-04-08 PROCEDURE — 2580000003 HC RX 258

## 2024-04-08 PROCEDURE — 6360000002 HC RX W HCPCS

## 2024-04-08 PROCEDURE — 6370000000 HC RX 637 (ALT 250 FOR IP): Performed by: NURSE PRACTITIONER

## 2024-04-08 PROCEDURE — 94761 N-INVAS EAR/PLS OXIMETRY MLT: CPT

## 2024-04-08 PROCEDURE — 36415 COLL VENOUS BLD VENIPUNCTURE: CPT

## 2024-04-08 PROCEDURE — 85027 COMPLETE CBC AUTOMATED: CPT

## 2024-04-08 PROCEDURE — 99232 SBSQ HOSP IP/OBS MODERATE 35: CPT | Performed by: INTERNAL MEDICINE

## 2024-04-08 PROCEDURE — 1200000000 HC SEMI PRIVATE

## 2024-04-08 PROCEDURE — 80048 BASIC METABOLIC PNL TOTAL CA: CPT

## 2024-04-08 PROCEDURE — 97110 THERAPEUTIC EXERCISES: CPT

## 2024-04-08 RX ORDER — TRAZODONE HYDROCHLORIDE 50 MG/1
50 TABLET ORAL NIGHTLY
Status: DISCONTINUED | OUTPATIENT
Start: 2024-04-08 | End: 2024-04-13 | Stop reason: HOSPADM

## 2024-04-08 RX ADMIN — GUAIFENESIN 1200 MG: 600 TABLET, EXTENDED RELEASE ORAL at 10:09

## 2024-04-08 RX ADMIN — LOSARTAN POTASSIUM 100 MG: 100 TABLET, FILM COATED ORAL at 10:10

## 2024-04-08 RX ADMIN — ACETAMINOPHEN 650 MG: 325 TABLET ORAL at 22:42

## 2024-04-08 RX ADMIN — CLOPIDOGREL BISULFATE 75 MG: 75 TABLET ORAL at 10:08

## 2024-04-08 RX ADMIN — HYDRALAZINE HYDROCHLORIDE 25 MG: 25 TABLET, FILM COATED ORAL at 01:12

## 2024-04-08 RX ADMIN — METOPROLOL TARTRATE 25 MG: 25 TABLET, FILM COATED ORAL at 10:08

## 2024-04-08 RX ADMIN — VENLAFAXINE HYDROCHLORIDE 75 MG: 75 CAPSULE, EXTENDED RELEASE ORAL at 10:08

## 2024-04-08 RX ADMIN — TIOTROPIUM BROMIDE AND OLODATEROL 2 PUFF: 3.124; 2.736 SPRAY, METERED RESPIRATORY (INHALATION) at 10:49

## 2024-04-08 RX ADMIN — SODIUM CHLORIDE: 9 INJECTION, SOLUTION INTRAVENOUS at 15:29

## 2024-04-08 RX ADMIN — ENOXAPARIN SODIUM 40 MG: 100 INJECTION SUBCUTANEOUS at 10:07

## 2024-04-08 RX ADMIN — ASPIRIN 81 MG 81 MG: 81 TABLET ORAL at 10:08

## 2024-04-08 RX ADMIN — ATORVASTATIN CALCIUM 40 MG: 40 TABLET, FILM COATED ORAL at 20:22

## 2024-04-08 RX ADMIN — FERROUS SULFATE TAB 325 MG (65 MG ELEMENTAL FE) 325 MG: 325 (65 FE) TAB at 10:09

## 2024-04-08 RX ADMIN — ACETAMINOPHEN 650 MG: 325 TABLET ORAL at 03:48

## 2024-04-08 RX ADMIN — TRAZODONE HYDROCHLORIDE 50 MG: 50 TABLET ORAL at 23:47

## 2024-04-08 RX ADMIN — METOPROLOL TARTRATE 25 MG: 25 TABLET, FILM COATED ORAL at 20:22

## 2024-04-08 RX ADMIN — GUAIFENESIN 1200 MG: 600 TABLET, EXTENDED RELEASE ORAL at 20:22

## 2024-04-08 RX ADMIN — HYDRALAZINE HYDROCHLORIDE 25 MG: 25 TABLET, FILM COATED ORAL at 10:09

## 2024-04-08 ASSESSMENT — PAIN DESCRIPTION - LOCATION
LOCATION: CHEST
LOCATION: ABDOMEN

## 2024-04-08 ASSESSMENT — PAIN DESCRIPTION - DESCRIPTORS
DESCRIPTORS: ACHING
DESCRIPTORS: ACHING

## 2024-04-08 ASSESSMENT — PAIN DESCRIPTION - ORIENTATION
ORIENTATION: MID
ORIENTATION: MID

## 2024-04-08 ASSESSMENT — PAIN SCALES - GENERAL
PAINLEVEL_OUTOF10: 6
PAINLEVEL_OUTOF10: 3

## 2024-04-08 NOTE — PROGRESS NOTES
Hospitalist Progress Note      Patient:  Kalpana Braga    Unit/Bed:5K-04/004-A  YOB: 1955  MRN: [id number]   Acct: [id number]   PCP: Tatiana Velasquez, PAO - CNP  Date of Admission: 3/21/2024    Assessment/Plan:    Passive management of problem list below at this time. ECF confirmed at this time, waiting for precert. Will need oncology outpatient.  Inpatient oncology not indicated at this time.  Patient denied from [facility] 2/2 refusing therapy over the weekend.  Will be evaluated by PT in the morning and decision for discharge will be made at that time.  May have to go home with home health if cannot be excepted to another facility.    # Non-small cell carcinoma of the lung, 3.9 x 5.5 cm right suprahilar mass: CT chest shows hypodense 3.9 x 5.5 cm right suprahilar mass extending to right mediastinum and subcarinal region.  Groundglass interstitial opacities with complete right upper lung collapse 2/2 mass. Patient does have extensive smoking history.    EBUS 4/2/24 showed evidence of non small cell carcinoma. Oncology and radiation oncology consulted per pulmonology team.  Patient received radiation treatment per radiation oncology recommendations given mass obstructing right mainstem bronchus. Tolerated well.  Plan  -Pulmonology consulted, cleared for discharge from their standpoint  -Will need outpatient oncology follow-up with staging workup such at PET scan, CTAP, etc.  -Oncology and radiation oncology consult, appreciate recommendations, received radiation inpatient per radiation oncology, tolerated well.    #Acute hypoxic respiratory failure, multifactorial due to newly diagnosed non-small cell carcinoma, PNA, COPD exacerbation, stable: Required 6L nasal cannula in ED.  Endorsed shortness of breath, cough, yellow/green sputum production.  CXR showing consolidations versus atelectasis in the right lung apex.  CTA shows patchy    infectious or inflammatory pneumonitis.   5. Dilatation of the central pulmonary artery suggesting a degree of    pulmonary arterial hypertension.      This document has been electronically signed by: Fernando Preciado MD on    03/21/2024 11:14 PM      All CTs at this facility use dose modulation techniques and iterative    reconstructions, and/or weight-based dosing   when appropriate to reduce radiation to a low as reasonably achievable.      3D Post-processing was performed on this study.      XR CHEST PORTABLE   Final Result   1. Faint hazy opacities in the left mid lung may reflect infiltrates.   2. New consolidation versus atelectasis in the right lung apex. CT can be    helpful for further evaluation.      This document has been electronically signed by: Fernando Preciado MD on    03/21/2024 10:22 PM      CT CHEST WO CONTRAST    (Results Pending)   CT GUIDE RADIATION THERAPY NO CHARGE    (Results Pending)     CTA CHEST W WO CONTRAST PE Eval    Result Date: 3/21/2024  CT angiography chest with contrast. 3D Postprocessing. Comparison: CR/SR - XR CHEST PORTABLE - 03/21/2024 09:45 PM EDT Findings: Dilatation of the central pulmonary artery at 3.8 cm. Extrinsic compression of a few right upper lobe pulmonary arterial branches. Pulmonary arteries are otherwise well opacified. Scattered plaque is seen in the thoracic aorta without aneurysm or dissection. Hypodense 3.9 x 5.5 cm right suprahilar mass extending to the right mediastinum and subcarinal region. This causes right upper lobe bronchial compression with complete right upper lung atelectasis. Compensatory hyperinflation of the right middle and right lower lungs. Additional nonspecific patchy ground-glass consolidations scattered throughout the left upper and lower lungs. No pleural effusion or pneumothorax. The heart is not enlarged. There is no pericardial effusion. The thyroid is unremarkable. Visualized upper abdomen is unremarkable. No acute fractures.     1. No

## 2024-04-08 NOTE — PLAN OF CARE
determine need for appliance change or resting period.  Outcome: Progressing   No skin breakdown this shift. Patient independent with turning and encouraged to turn. Patients states understanding of repositioning every two hours.    Problem: Respiratory - Adult  Goal: Achieves optimal ventilation and oxygenation  Outcome: Progressing  Flowsheets (Taken 4/8/2024 1008)  Achieves optimal ventilation and oxygenation:   Assess for changes in respiratory status   Assess for changes in mentation and behavior   Position to facilitate oxygenation and minimize respiratory effort   Oxygen supplementation based on oxygen saturation or arterial blood gases   Patient remains on 2L NC supplemental O2. Will continue to monitor and wean as appropriate.    Problem: Infection - Adult  Goal: Absence of infection at discharge  Outcome: Progressing  Flowsheets (Taken 4/8/2024 1008)  Absence of infection at discharge:   Monitor lab/diagnostic results   Assess and monitor for signs and symptoms of infection   Monitor all insertion sites i.e., indwelling lines, tubes and drains   Administer medications as ordered   Instruct and encourage patient and family to use good hand hygiene technique   Patient remains afebrile.    Problem: Neurosensory - Adult  Goal: Achieves stable or improved neurological status  Outcome: Progressing  Flowsheets (Taken 4/8/2024 1008)  Achieves stable or improved neurological status: Assess for and report changes in neurological status  Patient alert & oriented x 4. Patient able to follow commands. Hand grasps equal bilaterally.     Problem: Neurosensory - Adult  Goal: Achieves maximal functionality and self care  Outcome: Progressing  Flowsheets (Taken 4/8/2024 1008)  Achieves maximal functionality and self care: Monitor swallowing and airway patency with patient fatigue and changes in neurological status     Problem: Nutrition Deficit:  Goal: Optimize nutritional status  Outcome: Progressing  Patients appetite  good. Continuing to encourage fluids.    Care plan reviewed with patient. Patient verbalize understanding of the plan of care and contribute to goal setting.

## 2024-04-08 NOTE — ADT AUTH CERT
Steven Santo, APRN - CNP (Nurse Practitioner) filed at 2024  3:19 PM  Expand All Collapse All  untitled image  [place] [facility]        Patient - Kalpana Braga   MRN -  [id number]   Acct # - [id number]   - 1955      Date of Admission -  3/21/2024  8:53 PM  Date of evaluation -  2024  Room - --A   Hospital Day - 14  Consulting - Sulema Lagos MD Primary Care Physician - Tatiana Velasquez APRN - CNP      Problem List      Active Hospital Problems     Diagnosis  Date Noted    Moderate malnutrition (HCC) [E44.0]  2024        Class: Chronic    CVA (cerebral vascular accident) (HCC) [I63.9]  2024    Dyspnea [R06.00]  2024    Acute hypoxemic respiratory failure (HCC) [J96.01]  2024    Hilar mass [R91.8]  2024    Pneumonia of right upper lobe due to infectious organism [J18.9]  2024     Reason for Consult   Right suprahilar mass   HPI  History Obtained From: Patient and electronic medical record.     Kalpana Braga is a 68 y.o. female with a past medical history of COPD, Hypertension, Hyperlipidemia, osteoarthritis, and anxiety/depression who presented to [facility] ED on 3/21/2024 with complaints of shortness of breath. Patient states she has has shortness of breath for the last 4 days which is worse with exertion. She also has had a productive cough with light yellow-green sputum. She has had chills as well without fevers. She is a current everyday smoker and smokes about a pack a day for 45 years. She does not wear any oxygen at baseline.      In the ED, patient was found to be saturating at 70% on room air. She was placed on 6L NC with saturations around 86 to 90%. Chest xray showed faint hazy opacities in the left midlung, possibly reflecting infiltrates. New consolidation versus atelectasis in right lung apex. CTA chest showed no pulmonary embolism. Hypodense 3.9 x 5.5 cm right suprahilar mass extending to the right  0926(r) 75 mg   Oral      MackJnn KENDALL  220 Given 04/05/24 0900(s)  04/05/24 1340(a)  04/05/24 1341(r) 75 mg   Oral      Juana Patel A  221 Given 04/06/24 0900(s)  04/06/24 0746(a)  04/06/24 0746(r) 75 mg   Oral      Henrietta, Gracy S  222 Given 04/07/24 0900(s)  04/07/24 0836(a)  04/07/24 0836(r) 75 mg   Oral      Webrodolfo Angelica  223 Given 04/08/24 0900(s)  04/08/24 1008(a)  04/08/24 1009(r) 75 mg   Oral      Beegle, Danna  224 New Bag 03/31/24 1200(s)  03/31/24 1245(a)  03/31/24 1245(r) 300 mg 176.7 mL/hr IntraVENous   90 Minutes  Guerline Bailey  225 Stopped 03/31/24 1415(s)  03/31/24 1451(a)  03/31/24 1452(r) 0 mg 0 mL/hr IntraVENous   90 Minutes Stopped medication alf thru infusion, patient complained of pain, Dr. Monreal. IV has blood return Guerline Bailey    Due (Stopped) 03/31/24 1415(s)  03/31/24 1245(r)     IntraVENous      Guerline Bailey  226 Given 04/05/24 0800(s)  04/05/24 0922(a)  04/05/24 0922(r) 40 mEq   Oral      Juana Patel    (s)=scheduled time  (a)=action time  (r)=recorded time       Medication Administration Report  for Kalpana Braga KAUR as of 03/20/24 through 03/29/24  Legend:     Medications 03/20 03/21 03/22 03/23 03/24 03/25 03/26 03/27 03/28 03/29  0.9 % sodium chloride infusion  Freq: PRN Route: IV  PRN Comment: if patient receiving piggyback infusions and maintenance fluids are not ordered OR KVO fluids to protect IV site / prevent frequent line interruptions/ long duration  Start: 03/22/24 0015  Admin Instructions:  For piggyback infusion, administer at same rate as piggyback for a total of 25 mL. Enter 25 mL into dose field and piggyback rate into rate field of order.  If piggyback is infusing at a rate less than 100 mL/hr, enter 25 mL into dose field and 100 mL/hr into rate field of order.  For KVO fluids, enter rate of 20 mL/hr or less into rate field of order.  [id number]     acetaminophen (TYLENOL) tablet 650 mg  Dose: 650 mg  Freq: EVERY 4 HOURS PRN Route: PO  PRN Reasons: Pain

## 2024-04-08 NOTE — PROGRESS NOTES
[place] [facility]      Patient - Kalpana Braga   MRN -  [id number]   [facility] # - [id number]   - 1955      Date of Admission -  3/21/2024  8:53 PM  Date of evaluation -  2024  Room - --A   Hospital Day - 17  Consulting - Sulema Lagos MD Primary Care Physician - Tatiana Velasquez APRN - CNP     Problem List      Active Hospital Problems    Diagnosis Date Noted    Malignant neoplasm of upper lobe of right lung (HCC) [C34.11] 2024    Moderate malnutrition (HCC) [E44.0] 2024     Class: Chronic    CVA (cerebral vascular accident) (HCC) [I63.9] 2024    Dyspnea [R06.00] 2024    Acute hypoxemic respiratory failure (HCC) [J96.01] 2024    Lung mass [R91.8] 2024    Pneumonia of both lungs due to infectious organism [J18.9] 2024     Reason for Consult    Right subhilar mass  HPI   History Obtained From: Patient and electronic medical record.    Kalpana Braga is a 68 y.o. female Kalpana Braga is a 68 y.o. female with a past medical history of COPD, Hypertension, Hyperlipidemia, osteoarthritis, and anxiety/depression who presented to [facility] ED on 3/21/2024 with complaints of shortness of breath. Patient states she has has shortness of breath for the last 4 days which is worse with exertion. She also has had a productive cough with light yellow-green sputum. She has had chills as well without fevers. She is a current everyday smoker and smokes about a pack a day for 45 years. She does not wear any oxygen at baseline.      In the ED, patient was found to be saturating at 70% on room air. She was placed on 6L NC with saturations around 86 to 90%. Chest xray showed faint hazy opacities in the left midlung, possibly reflecting infiltrates. New consolidation versus atelectasis in right lung apex. CTA chest showed no pulmonary embolism. Hypodense 3.9 x 5.5 cm right suprahilar mass extending to the right mediastinum and subcarinal  (5' 4\") and weight is 85.9 kg (189 lb 6.4 oz). Her axillary temperature is 97.5 °F (36.4 °C). Her blood pressure is 133/55 (abnormal) and her pulse is 52. Her respiration is 22 and oxygen saturation is 92%.   Body mass index is 32.51 kg/m².    SUPPLEMENTAL O2: O2 Flow Rate (L/min): 2 L/min     I/O      Intake/Output Summary (Last 24 hours) at 4/8/2024 0958  Last data filed at 4/8/2024 0535  Gross per 24 hour   Intake 1400 ml   Output --   Net 1400 ml     I/O last 3 completed shifts:  In: 1600 [P.O.:1350; I.V.:250]  Out: -    No data found.    Exam   Nursing note and vitals reviewed.  Physical Exam  Constitutional:       General: She is not in acute distress.     Appearance: She is obese.   HENT:      Head: Normocephalic and atraumatic.   Eyes:      Extraocular Movements: Extraocular movements intact.      Pupils: Pupils are equal, round, and reactive to light.   Cardiovascular:      Rate and Rhythm: Normal rate and regular rhythm.      Heart sounds: No murmur heard.     No gallop.   Abdominal:      General: Bowel sounds are normal. There is no distension.      Palpations: Abdomen is soft.      Tenderness: There is no abdominal tenderness.   Musculoskeletal:         General: Normal range of motion.   Skin:     General: Skin is warm and dry.   Neurological:      General: No focal deficit present.      Mental Status: She is oriented to person, place, and time.   Psychiatric:         Mood and Affect: Mood normal.         Behavior: Behavior normal.        Labs  - Old records and notes have been reviewed in [facility]   ABG  Lab Results   Component Value Date/Time    PH 7.44 03/26/2024 12:49 PM    PO2 78 03/26/2024 12:49 PM    PCO2 42 03/26/2024 12:49 PM    HCO3 28 03/26/2024 12:49 PM    O2SAT 96 03/26/2024 12:49 PM     Lab Results   Component Value Date/Time    IFIO2 3 03/26/2024 12:49 PM     CBC  Recent Labs     04/06/24  0424 04/07/24  0442 04/08/24  0312   WBC 8.8 8.6 7.5   RBC 3.92* 3.82* 3.46*   HGB 10.7* 10.4* 9.5*

## 2024-04-08 NOTE — CARE COORDINATION
4/8/24, 9:39 AM EDT    DISCHARGE PLANNING EVALUATION    Received message from [facility], they have declined admission.  Patient has refused therapies all weekend.

## 2024-04-08 NOTE — PROGRESS NOTES
[facility]  INPATIENT PHYSICAL THERAPY  DAILY NOTE  [facility] ONC MED 5K - 5K-04/004-A    Time In: 1357  Time Out: 1420  Timed Code Treatment Minutes: 23 Minutes  Minutes: 23          Date: 2024  Patient Name: Kalpana Braga,  Gender:  female        MRN: [id number]  : 1955  (68 y.o.)     Referring Practitioner: Marcus Ga MD  Diagnosis: lung mass  Additional Pertinent Hx: Per EMR \"Kalpana Braga is a 68 y.o. female active smoker with PMHx of COPD, HTN, depression, HLD, osteoarthritis who presents to [facility] with shortness of breath.  Patient reports starting  night she has been having shortness of breath worse with exertion.  Progressively worsening since .  She reports that she has been coughing up some light yellow-green sputum no fevers however she has had chills.  She denies any nausea or vomiting she denies being around anyone that is been sick.  She is a active smoker about a pack a day for 45 years.  No oxygen at baseline.  In the ED initially saturating 70% on room air, escalated to 6 L nasal cannula saturating 86 to 90%.  Denies any chest pain.\" code stroke 3/23.  CT angiogram head and neck read as an occlusion of the anterior branch of the left M2 segment of the MCA. MRI of brain without contrast shows acute infarct in the distribution of the left middle cerebral artery. -- s/p EBUS 4/4     Prior Level of Function:  Lives With: Spouse  Type of Home: House  Home Layout: Two level, Performs ADL's on one level  Home Access: Ramped entrance  Home Equipment: None   Bathroom Shower/Tub: Walk-in shower  Bathroom Toilet: Standard  Bathroom Equipment: Shower chair    ADL Assistance: Independent  Homemaking Assistance: Independent  Ambulation Assistance: Independent  Transfer Assistance: Independent  Active : Yes  Additional Comments: Pt reports being IND with functional tasks prior, no use of an AD. Unsure of accracy of information due to  cognition    Restrictions/Precautions:  Restrictions/Precautions: General Precautions, Fall Risk  Position Activity Restriction  Other position/activity restrictions: aphasia (expressive >receptive), impulsive     SUBJECTIVE: АЛЕКСАНДР [place] approved therapy session. Patient seated in BS chair upon PTA arrival and agreeable to therapy session. Patient impulsive and frequently attempting to rest her head or lean onto BS tray. Therapist provided patient education on resting back onto chair for increased safety with patient demonstrating poor carry over. PTA removed BS tray at that time with patient resting back with increased ease. Patient's nasal cannula in her lap at that time, O2 stats taken as documented below. Therapist provided patient education on the importance of maintaining nasal cannula in proper positioning, patient verbalizing understanding however continued education may be required. Patient with request to return to bed.    PAIN: 5/10: chest    Vitals: Oxygen: Nasal cannula in patients lap upon PTA arrival. SpO2 taken at 82% initially. PTA assist for proper positioning of nasal cannula with increased stats taken at 92%     OBJECTIVE:  Bed Mobility:  Sit to Supine: Stand By Assistance, with head of bed flat, with verbal cues    Scooting: Stand By Assistance, with head of bed flat, with rail, with verbal cues     Transfers:  Sit to Stand: Contact Guard Assistance  Stand to Sit:Contact Guard Assistance  To/From Bed and Chair: Contact Guard Assistance  - Patient impulsive and requires cues for slower movements.    Ambulation:  Contact Guard Assistance  Distance: 4' to chair  Surface: Level Tile  Device:No Device  Gait Deviations:  Forward Flexed Posture, Slow Stephanie, Decreased Step Length Bilaterally, Decreased Heel Strike Bilaterally, Mild Path Deviations, and cues for increased safety with O2 tubing and IV line managment    Balance:  Static Sitting Balance:  Supervision  Dynamic Sitting Balance: Stand By

## 2024-04-08 NOTE — PROGRESS NOTES
"      [facility]         Dr Crawford Sayied   LOS: 7 days   Patient Care Team:  Yaron Ca Jr., MD as PCP - General (Family Medicine)  Yaron Ca Jr., MD as PCP - Claims Attributed    Chief Complaint: Intracerebral hemorrhage A. fib coagulopathy UTI    Interval History: Resting comfortably.  Daughter at bedside.  She tells me that she feels mom is more confused.  She also tells me that she has been leaning towards the right and feels her neuro status is worse today.    REVIEW OF SYSTEMS:   CARDIOVASCULAR: No chest pain, chest pressure or chest discomfort. No palpitations or edema.   RESPIRATORY: No shortness of breath, cough or sputum.   GASTROINTESTINAL: No anorexia, nausea, vomiting or diarrhea. No abdominal pain or blood.   HEMATOLOGIC: No bleeding or bruising.     Ventilator/Non-Invasive Ventilation Settings (From admission, onward)    None            Vital Signs  Temp:  [97.9 °F (36.6 °C)-98.5 °F (36.9 °C)] 98.5 °F (36.9 °C)  Heart Rate:  [61-81] 61  Resp:  [16-18] 16  BP: (133-205)/(64-94) 141/71    Intake/Output Summary (Last 24 hours) at 8/10/2020 1442  Last data filed at 8/10/2020 1239  Gross per 24 hour   Intake 480 ml   Output 250 ml   Net 230 ml     Flowsheet Rows      First Filed Value   Admission Height  152.4 cm (60\") Documented at 08/03/2020 1133   Admission Weight  68 kg (149 lb 14.4 oz) Documented at 08/03/2020 0857          Physical Exam:  Patient is examined using the personal protective equipment as per guidelines from infection control for this particular patient as enacted.  Hand hygiene was performed before and after patient interaction.   General Appearance:    Alert, cooperative, in no acute distress.  Following simple commands  Neck midline trachea no thyromegaly   Lungs:     Clear to auscultation,respirations regular, even and                  unlabored    Heart:    Regular rhythm and normal rate, normal S1 and S2, no            murmur, no gallop, no rub, no click   Chest " [facility]  STRZ ONC MED 5K  Occupational Therapy  Daily Note  Time:   Time In: 921  Time Out: 945  Timed Code Treatment Minutes: 24 Minutes  Minutes: 24          Date: 2024  Patient Name: Kalpana Braga,   Gender: female      Room: [facility]04/004-A  MRN: [id number]  : 1955  (68 y.o.)  Referring Practitioner: Marcus Ga MD  Diagnosis: lung mass  Additional Pertinent Hx: Per EMR \"Kalpana Braga is a 68 y.o. female active smoker with PMHx of COPD, HTN, depression, HLD, osteoarthritis who presents to [facility] with shortness of breath.  Patient reports starting  night she has been having shortness of breath worse with exertion.  Progressively worsening since .  She reports that she has been coughing up some light yellow-green sputum no fevers however she has had chills.  She denies any nausea or vomiting she denies being around anyone that is been sick.  She is a active smoker about a pack a day for 45 years.  No oxygen at baseline.  In the ED initially saturating 70% on room air, escalated to 6 L nasal cannula saturating 86 to 90%.  Denies any chest pain.\" code stroke 3/23.  CT angiogram head and neck read as an occlusion of the anterior branch of the left M2 segment of the MCA. MRI of brain without contrast shows acute infarct in the distribution of the left middle cerebral artery    Restrictions/Precautions:  Restrictions/Precautions: General Precautions, Fall Risk  Position Activity Restriction  Other position/activity restrictions: aphasia (expressive >receptive), impulsive     Social/Functional History:  Lives With: Spouse  Type of Home: House  Home Layout: Two level, Performs ADL's on one level  Home Access: Ramped entrance  Home Equipment: None   Bathroom Shower/Tub: Walk-in shower  Bathroom Toilet: Standard  Bathroom Equipment: Shower chair       ADL Assistance: Independent  Homemaking Assistance: Independent  Ambulation Assistance:  Wall:    No abnormalities observed   Abdomen:     Normal bowel sounds, no masses, no organomegaly, soft        non-tender, non-distended, no guarding, no rebound                tenderness   Extremities:   Moves all extremities well, no edema, no cyanosis, no             redness  CNS no focal neurological deficits normal sensory exam  Skin no rashes no nodules  Musculoskeletal no cyanosis no clubbing normal range of motion     Results Review:        Results from last 7 days   Lab Units 08/10/20  0333 08/09/20  0652 08/08/20  0418   SODIUM mmol/L 137 139 142   POTASSIUM mmol/L 3.8 3.8 4.1   CHLORIDE mmol/L 106 110* 112*   CO2 mmol/L 20.9* 22.2 21.7*   BUN mg/dL 8 9 8   CREATININE mg/dL 0.71 0.71 0.70   GLUCOSE mg/dL 113* 111* 94   CALCIUM mg/dL 8.7 8.6 9.0     Results from last 7 days   Lab Units 08/04/20  0934   TROPONIN T ng/mL <0.010     Results from last 7 days   Lab Units 08/10/20  0333 08/09/20  0652 08/08/20  0418   WBC 10*3/mm3 9.10 9.30 10.19   HEMOGLOBIN g/dL 11.2* 11.1* 11.6*   HEMATOCRIT % 34.4 34.3 33.9*   PLATELETS 10*3/mm3 147 161 179     Results from last 7 days   Lab Units 08/04/20  0934 08/03/20  2342 08/03/20  1541   INR  1.24* 1.21* 1.26*         Results from last 7 days   Lab Units 08/04/20  0820   MAGNESIUM mg/dL 2.5*               I reviewed the patient's new clinical results.  I personally viewed and interpreted the patient's CXR        Medication Review:     cephalexin 500 mg Oral Q8H   isosorbide mononitrate 60 mg Oral Daily   levothyroxine 75 mcg Oral QAM   metoprolol tartrate 25 mg Oral Q12H            ASSESSMENT:   Intracerebral hemorrhage  Coagulopathy reversed  A. fib  UTI E. coli  Diabetes mellitus  Hypothyroidism    PLAN:  Per daughter's concern worsening neuro status and mental status.  She wants neurology to be reconsulted.  We will need neurology clearance prior to discharge again.  Cerebral angiogram results reviewed.  Last CT head repeat showed stable ICH  Blood pressure  management keep systolic less than 140  A. fib currently on rate control.  Cardiology currently following.  Currently holding anticoagulation.  Once cleared by neurology will restart Coumadin again..  Oral antibiotics for E. coli in the urine.  Repeat UA no evidence of infection.  Due to daughter's concern we will repeat urine again  Blood glucose management per ICU protocol  Diet advanced.  PT OT  Discussed with daughter multiple times  Likely discharge in the morning once cleared by neurology and restart Coumadin    Yvette Evans MD  08/10/20  14:42           rehabilitation management.    ASSESSMENT:     Activity Tolerance:  Patient tolerance of  treatment: Good treatment tolerance      Discharge Recommendations: Continue to assess pending progress and Patient would benefit from continued OT at discharge  Equipment Recommendations: Equipment Needed: No  Other: continue to monitor  Plan: Times Per Week: 5-6x  Current Treatment Recommendations: Strengthening, Balance training, Functional mobility training, Endurance training, Neuromuscular re-education, Safety education & training, Patient/Caregiver education & training, Equipment evaluation, education, & procurement, Self-Care / ADL    Education:  Learners: Patient  Role of OT, Plan of Care, ADL's, Home Exercise Program, Precautions, Reviewed Prior Education, Importance of Increasing Activity, Fall Prevention, Assistive Device Safety, and pursed lip breathing    Goals  Short Term Goals  Time Frame for Short Term Goals: until discharge  Short Term Goal 1: Patient will safely complete various functional transfers with SBA in prep for toileting and showering.  Short Term Goal 2: Patient will improve functional ambulation to household distance with SBA and LRAD and min cues for safety to improve safety in living environment.  Short Term Goal 3: Patient will complete BADL routine MOD I with use of energy conservation techniques as needed.  Short Term Goal 4: Patient will sequence 2-3 step task with supervision and min vcs for problem-solving in prep for meal prep and laundry engagement.  Long Term Goals  Time Frame for Long Term Goals : None due to ELOS    Following session, patient left in safe position with all fall risk precautions in place.

## 2024-04-09 ENCOUNTER — APPOINTMENT (OUTPATIENT)
Dept: CT IMAGING | Age: 69
End: 2024-04-09
Payer: COMMERCIAL

## 2024-04-09 DIAGNOSIS — C34.11 MALIGNANT NEOPLASM OF UPPER LOBE OF RIGHT LUNG (HCC): Primary | ICD-10-CM

## 2024-04-09 PROBLEM — Z51.5 PALLIATIVE CARE PATIENT: Status: ACTIVE | Noted: 2024-04-09

## 2024-04-09 LAB
ANION GAP SERPL CALC-SCNC: 9 MEQ/L (ref 8–16)
BUN SERPL-MCNC: 9 MG/DL (ref 7–22)
CALCIUM SERPL-MCNC: 8.6 MG/DL (ref 8.5–10.5)
CHLORIDE SERPL-SCNC: 99 MEQ/L (ref 98–111)
CO2 SERPL-SCNC: 29 MEQ/L (ref 23–33)
CREAT SERPL-MCNC: 0.6 MG/DL (ref 0.4–1.2)
GFR SERPL CREATININE-BSD FRML MDRD: > 90 ML/MIN/1.73M2
GLUCOSE SERPL-MCNC: 101 MG/DL (ref 70–108)
POTASSIUM SERPL-SCNC: 3.7 MEQ/L (ref 3.5–5.2)
SODIUM SERPL-SCNC: 137 MEQ/L (ref 135–145)

## 2024-04-09 PROCEDURE — 97110 THERAPEUTIC EXERCISES: CPT

## 2024-04-09 PROCEDURE — 97535 SELF CARE MNGMENT TRAINING: CPT

## 2024-04-09 PROCEDURE — 72125 CT NECK SPINE W/O DYE: CPT

## 2024-04-09 PROCEDURE — 99233 SBSQ HOSP IP/OBS HIGH 50: CPT | Performed by: HOSPITALIST

## 2024-04-09 PROCEDURE — 36415 COLL VENOUS BLD VENIPUNCTURE: CPT

## 2024-04-09 PROCEDURE — 6370000000 HC RX 637 (ALT 250 FOR IP)

## 2024-04-09 PROCEDURE — 80048 BASIC METABOLIC PNL TOTAL CA: CPT

## 2024-04-09 PROCEDURE — 99233 SBSQ HOSP IP/OBS HIGH 50: CPT | Performed by: NURSE PRACTITIONER

## 2024-04-09 PROCEDURE — 2580000003 HC RX 258

## 2024-04-09 PROCEDURE — 70450 CT HEAD/BRAIN W/O DYE: CPT

## 2024-04-09 PROCEDURE — 94640 AIRWAY INHALATION TREATMENT: CPT

## 2024-04-09 PROCEDURE — 6370000000 HC RX 637 (ALT 250 FOR IP): Performed by: NURSE PRACTITIONER

## 2024-04-09 PROCEDURE — 97530 THERAPEUTIC ACTIVITIES: CPT

## 2024-04-09 PROCEDURE — 6370000000 HC RX 637 (ALT 250 FOR IP): Performed by: STUDENT IN AN ORGANIZED HEALTH CARE EDUCATION/TRAINING PROGRAM

## 2024-04-09 PROCEDURE — 99223 1ST HOSP IP/OBS HIGH 75: CPT | Performed by: STUDENT IN AN ORGANIZED HEALTH CARE EDUCATION/TRAINING PROGRAM

## 2024-04-09 PROCEDURE — 6360000002 HC RX W HCPCS

## 2024-04-09 PROCEDURE — 1200000000 HC SEMI PRIVATE

## 2024-04-09 PROCEDURE — 92526 ORAL FUNCTION THERAPY: CPT | Performed by: SPEECH-LANGUAGE PATHOLOGIST

## 2024-04-09 RX ORDER — OXYCODONE HYDROCHLORIDE 5 MG/1
2.5 TABLET ORAL EVERY 6 HOURS PRN
Status: DISCONTINUED | OUTPATIENT
Start: 2024-04-09 | End: 2024-04-13 | Stop reason: HOSPADM

## 2024-04-09 RX ORDER — KETOROLAC TROMETHAMINE 30 MG/ML
15 INJECTION, SOLUTION INTRAMUSCULAR; INTRAVENOUS ONCE
Status: COMPLETED | OUTPATIENT
Start: 2024-04-09 | End: 2024-04-09

## 2024-04-09 RX ADMIN — HYDRALAZINE HYDROCHLORIDE 25 MG: 25 TABLET, FILM COATED ORAL at 18:49

## 2024-04-09 RX ADMIN — METOPROLOL TARTRATE 25 MG: 25 TABLET, FILM COATED ORAL at 09:57

## 2024-04-09 RX ADMIN — VENLAFAXINE HYDROCHLORIDE 75 MG: 75 CAPSULE, EXTENDED RELEASE ORAL at 09:58

## 2024-04-09 RX ADMIN — ACETAMINOPHEN 650 MG: 325 TABLET ORAL at 05:03

## 2024-04-09 RX ADMIN — TIOTROPIUM BROMIDE AND OLODATEROL 2 PUFF: 3.124; 2.736 SPRAY, METERED RESPIRATORY (INHALATION) at 09:47

## 2024-04-09 RX ADMIN — ENOXAPARIN SODIUM 40 MG: 100 INJECTION SUBCUTANEOUS at 09:56

## 2024-04-09 RX ADMIN — ASPIRIN 81 MG 81 MG: 81 TABLET ORAL at 09:56

## 2024-04-09 RX ADMIN — HYDRALAZINE HYDROCHLORIDE 25 MG: 25 TABLET, FILM COATED ORAL at 00:48

## 2024-04-09 RX ADMIN — CLOPIDOGREL BISULFATE 75 MG: 75 TABLET ORAL at 09:56

## 2024-04-09 RX ADMIN — ATORVASTATIN CALCIUM 40 MG: 40 TABLET, FILM COATED ORAL at 20:52

## 2024-04-09 RX ADMIN — GUAIFENESIN 1200 MG: 600 TABLET, EXTENDED RELEASE ORAL at 09:57

## 2024-04-09 RX ADMIN — SODIUM CHLORIDE, PRESERVATIVE FREE 10 ML: 5 INJECTION INTRAVENOUS at 20:54

## 2024-04-09 RX ADMIN — LOSARTAN POTASSIUM 100 MG: 100 TABLET, FILM COATED ORAL at 09:57

## 2024-04-09 RX ADMIN — METOPROLOL TARTRATE 25 MG: 25 TABLET, FILM COATED ORAL at 20:52

## 2024-04-09 RX ADMIN — KETOROLAC TROMETHAMINE 15 MG: 30 INJECTION, SOLUTION INTRAMUSCULAR at 14:45

## 2024-04-09 RX ADMIN — SODIUM CHLORIDE: 9 INJECTION, SOLUTION INTRAVENOUS at 20:55

## 2024-04-09 RX ADMIN — TRAZODONE HYDROCHLORIDE 50 MG: 50 TABLET ORAL at 20:52

## 2024-04-09 RX ADMIN — GUAIFENESIN 1200 MG: 600 TABLET, EXTENDED RELEASE ORAL at 20:52

## 2024-04-09 RX ADMIN — HYDRALAZINE HYDROCHLORIDE 25 MG: 25 TABLET, FILM COATED ORAL at 09:57

## 2024-04-09 RX ADMIN — ACETAMINOPHEN 650 MG: 325 TABLET ORAL at 13:35

## 2024-04-09 ASSESSMENT — PAIN SCALES - GENERAL
PAINLEVEL_OUTOF10: 3
PAINLEVEL_OUTOF10: 4
PAINLEVEL_OUTOF10: 4

## 2024-04-09 ASSESSMENT — PAIN DESCRIPTION - DESCRIPTORS: DESCRIPTORS: ACHING

## 2024-04-09 ASSESSMENT — PAIN DESCRIPTION - LOCATION
LOCATION: HEAD
LOCATION: CHEST
LOCATION: HEAD

## 2024-04-09 ASSESSMENT — PAIN DESCRIPTION - ORIENTATION: ORIENTATION: MID

## 2024-04-09 NOTE — PROGRESS NOTES
Hospitalist Progress Note      Patient:  Kalpana Braga    Unit/Bed:5K-04/004-A  YOB: 1955  MRN: [id number]   Acct: [id number]   PCP: Tatiana Velasquez, PAO - CNP  Date of Admission: 3/21/2024    Assessment/Plan:    Passive management of problem list below at this time. ECF confirmed at this time, waiting for precert. Will need oncology outpatient.  Inpatient oncology not indicated at this time.  Patient denied from [facility]a 2/2 refusing therapy over the weekend.  Evaluated by PT this morning, referral made to [facility].    #Non-small cell carcinoma of the lung, 3.9 x 5.5 cm right suprahilar mass: CT chest shows hypodense 3.9 x 5.5 cm right suprahilar mass extending to right mediastinum and subcarinal region.  Groundglass interstitial opacities with complete right upper lung collapse 2/2 mass. Patient does have extensive smoking history.    EBUS 4/2/24 showed evidence of non small cell carcinoma. Oncology and radiation oncology consulted per pulmonology team.  Patient received radiation treatment per radiation oncology recommendations given mass obstructing right mainstem bronchus. Tolerated well.  Plan  -Pulmonology consulted, cleared for discharge from their standpoint  -Will need outpatient oncology follow-up with staging workup such at PET scan, CTAP, etc.  -Oncology and radiation oncology consult, appreciate recommendations, received radiation inpatient per radiation oncology, tolerated well.    #Acute hypoxic respiratory failure, multifactorial due to newly diagnosed non-small cell carcinoma, PNA, COPD exacerbation, stable: Required 6L nasal cannula in ED.  Endorsed shortness of breath, cough, yellow/green sputum production.  CXR showing consolidations versus atelectasis in the right lung apex.  CTA shows patchy groundglass consolidation throughout left lung consistent with infectious process.  COVID/flu negative.  Completed full  and  probably represent skin heidy or distal urethral heidy.         Respiratory culture: No results found for: \"CULTRESP\"    Aerobic and Anaerobic :  No results found for: \"LABAERO\"  No results found for: \"LABANAE\"    Urinalysis:      Lab Results   Component Value Date/Time    NITRU NEGATIVE 03/24/2024 06:00 AM    WBCUA 15-25 03/24/2024 06:00 AM    BACTERIA NONE SEEN 03/24/2024 06:00 AM    RBCUA 0-2 03/24/2024 06:00 AM    BLOODU NEGATIVE 03/24/2024 06:00 AM    SPECGRAV 1.018 07/31/2016 03:07 PM    GLUCOSEU NEGATIVE 03/24/2024 06:00 AM       Radiology:  CT HEAD WO CONTRAST   Final Result   Impression:   No acute appearing intracranial process, see above for chronic changes    evolving since comparison.      This document has been electronically signed by: Joe Saleem III, MD on    04/09/2024 03:43 AM      All CTs at this facility use dose modulation techniques and iterative    reconstructions, and/or weight-based dosing   when appropriate to reduce radiation to a low as reasonably achievable.      CT CERVICAL SPINE WO CONTRAST   Final Result   Impression:   1. Mild-to-moderate degenerative changes in the cervical spine as above.   2. Extremely large right pleural effusion with interstitial edema noted in    the left apex, correlate with chest radiography.      This document has been electronically signed by: Joe Saleem III, MD on    04/09/2024 05:20 AM      All CTs at this facility use dose modulation techniques and iterative    reconstructions, and/or weight-based dosing   when appropriate to reduce radiation to a low as reasonably achievable.      CT GUIDE RADIATION THERAPY NO CHARGE   Final Result      XR CHEST PORTABLE   Final Result      Right parahilar opacities, likely corresponding to known mass and adjacent atelectasis.               **This report has been created using voice recognition software. It may contain minor errors which are inherent in voice recognition technology.**      Final report

## 2024-04-09 NOTE — CARE COORDINATION
4/9/24, 8:39 AM EDT    DISCHARGE PLANNING EVALUATION    Call to Christine with [facility], referral made. NANCY met with pt, pt with working with therapy. SW updated on referral to [facility].

## 2024-04-09 NOTE — PROGRESS NOTES
Post-Fall Assessment  Date of Fall:   4/9/2024  Time of Fall:   0237    Yes No N/A Comment   Was Patient on Falling Star Program? [x] [] []     Was the Fall Witnessed? [x] [] []     Were Clothes a Factor? [] [x] []     Was Patient Wearing Corrective Footwear? [] [x] []     Other Environmental Factors Involved? [x] [] []        Description of Fall  Who found the patient: Ruchi FOUNTAIN  Where was the patient at the time of the fall:  In her room  Brief description of fall: Patient called out for assistance to the bathroom. Upon entering the room this RN noticed the patient sitting on the side of the bed attempting to stand. This RN directed the patient to have a seat while this RN unplugs the IV pole. Patient states she is wet and stands at bedside. This RN steps towards patient and asks the patient to have a seat. Patient side steps towards the bathroom and slips on urine falling and hitting her head on the wall.   Patient comments regarding fall:   Patient states \"I'm sorry for falling\"   Medications potentially contributing to fall risk (such as Sedatives, Hypnotics, Antihypertensives, Narcotics, Psychotropics, Anticonvulsants):   Trazodone, Hydralazine, losartan, metoprolol,      Patient Assessment of Injury     (Please document Vital Signs in Doc Flowsheet)       Yes No   Patient hit his/her head [x] []   Patient is taking an anticoagulant [x] []   CT of Head requested [x] []      Neurological Assessment Protocol:     If the patient has hit his/her head during this fall,   a Neurological Assessment must be completed every 2 hours for 12 hours;   every 3 hours for 24 hours;   then every 4 hours for 24 hours.  Document in Doc Flowsheets.     Neurological Assessment Protocol initiated  yes     If the patient did not hit his/her head during this fall, monitor vital signs every 8 hours.  Notify the physician within 24 hours and document.        Physician Notification  Please document under “Provider Notification” group  within the Assessment (Complex Assessment) template of Doc Flowsheets.      Physician notified yes     Pharmacy notified yes            Time Notified:    0343  House Supervisor/Clinical Manager Notified:   Dee Dee FOUNTAIN  Date:   4/9/24        Time:   0247  Family Member Notified:    Kristopher  Date:   4/9/24        Time:   0333

## 2024-04-09 NOTE — PROGRESS NOTES
Comprehensive Nutrition Assessment    Type and Reason for Visit: Reassess    Nutrition Recommendations/Plan:   Continue diet as ordered.   Continue Magic Cups and continue at discharge.   Recommend MVI and continue at discharge.      Malnutrition Assessment:  Malnutrition Status: Moderate malnutrition  Context: Chronic Illness       Findings of the 6 clinical characteristics of malnutrition:  Energy Intake:   (50% or less day 7 per graphics)  Weight Loss:  10% over 6 months (19.6% unplanned wt loss in 6 months per EMR)     Body Fat Loss:  No significant body fat loss     Muscle Mass Loss:  Mild muscle mass loss Temples (temporalis)  Fluid Accumulation:  No significant fluid accumulation     Strength:  Not Performed    Nutrition Assessment:    Pt improving from a nutritional standpoint AEB diet was advance, tolerating a regular diet and ONS.  At risk for further nutrition compromise r/t admit with acute hypoxic respiratory failure, COPD, Hilar Mass, CVA, Pneumonia of Both Lungs due to Infectious Organism. Working on discharge plans to ECF, patient was refusing therapy over the weekend. Underlying medical condition (HTN, HLD, Anxiety, Depression, smoker).        Nutrition Related Findings:    Patient/ Family Comments: Per patient her appetite is ok, it is still very small. Per patient she likes the Magic Cups and is eating them. Patient reports that she is liking the food here.   Edema: none,per flowsheet  Wound: None     GI Function: LBM 04/07/24 per flowsheet    Labs:   Recent Labs     04/09/24  0805      K 3.7   CL 99   GLUCOSE 101   BUN 9   CREATININE 0.6     Medications: lipitor, plavix, lovenox, iron, cozaar, metoprolol, glycolax    Current Nutrition Intake & Therapies:    Recent PO intake: 26-50%  Recent Supplement Intake: 1-25%    - Current intake likely does not meet estimated needs.   ADULT DIET; Regular  ADULT ORAL NUTRITION SUPPLEMENT; Lunch, Dinner; Frozen Oral Supplement    Anthropometric

## 2024-04-09 NOTE — PROGRESS NOTES
[facility]  INPATIENT PHYSICAL THERAPY  DAILY NOTE  STRZ ONC MED 5K - 5K-04/004-A    Time In: 0830  Time Out: 0848  Timed Code Treatment Minutes: 18 Minutes  Minutes: 18          Date: 2024  Patient Name: Kalpana Braga,  Gender:  female        MRN: [id number]  : 1955  (68 y.o.)     Referring Practitioner: Marcus Ga MD  Diagnosis: lung mass  Additional Pertinent Hx: Per EMR \"Kalpana Braga is a 68 y.o. female active smoker with PMHx of COPD, HTN, depression, HLD, osteoarthritis who presents to [facility] with shortness of breath.  Patient reports starting  night she has been having shortness of breath worse with exertion.  Progressively worsening since .  She reports that she has been coughing up some light yellow-green sputum no fevers however she has had chills.  She denies any nausea or vomiting she denies being around anyone that is been sick.  She is a active smoker about a pack a day for 45 years.  No oxygen at baseline.  In the ED initially saturating 70% on room air, escalated to 6 L nasal cannula saturating 86 to 90%.  Denies any chest pain.\" code stroke 3/23.  CT angiogram head and neck read as an occlusion of the anterior branch of the left M2 segment of the MCA. MRI of brain without contrast shows acute infarct in the distribution of the left middle cerebral artery. -- s/p EBUS 4/4     Prior Level of Function:  Lives With: Spouse  Type of Home: House  Home Layout: Two level, Performs ADL's on one level  Home Access: Ramped entrance  Home Equipment: None   Bathroom Shower/Tub: Walk-in shower  Bathroom Toilet: Standard  Bathroom Equipment: Shower chair    ADL Assistance: Independent  Homemaking Assistance: Independent  Ambulation Assistance: Independent  Transfer Assistance: Independent  Active : Yes  Additional Comments: Pt reports being IND with functional tasks prior, no use of an AD. Unsure of accracy of information due to  cognition    Restrictions/Precautions:  Restrictions/Precautions: General Precautions, Fall Risk  Position Activity Restriction  Other position/activity restrictions: aphasia (expressive >receptive), impulsive     SUBJECTIVE: RN Danna approved therapy session. Patient pleasant however reports she did not want to get out of bed at this time. RN and patient reporting she had a fall early this morning. Therapist educated patient on increased mobility and benefits of getting up to chair. She reports she will get up later.     PAIN: 2/10: chest. Patient also reporting \"stiffness\" from recent fall. Pain/stiffness not quantified.    Vitals: Oxygen: 93% on 2L  Heart Rate: 56 bpm    OBJECTIVE:  Bed Mobility:  Not Tested, patient declined    Transfers:  Not Tested, patient declined    Ambulation:  Not Tested, patient declined    Exercise:  Patient was guided in 1 set(s) 15 reps of exercise to both lower extremities.  Ankle pumps, Glut sets, Quad sets, Heelslides, Short arc quads, Hip abduction/adduction, and Straight leg raises.  Exercises were completed for increased independence with functional mobility.  -Patient requiring rest breaks to complete therex    Functional Outcome Measures: Completed     Modified Coke Scale:  +3 - Moderate disability; requiring some help, but able to walk without physical assistance (SBA/CGA).   Patient could not live alone but could walk from one room to another without physical help from another person.  Education provided regarding stroke rehabilitation management.    ASSESSMENT:  Assessment: Patient progressing toward established goals.  Activity Tolerance:  Patient tolerance of  treatment: good.      Equipment Recommendations:Equipment Needed: Yes (RW, w/c?)  Discharge Recommendations: Continue to assess pending progress, Subacte/Skilled Nursing Facility, and Patient would benefit from continued PT at discharge  Plan: Current Treatment Recommendations: Strengthening, Balance training,

## 2024-04-09 NOTE — PROGRESS NOTES
reduce radiation to a low as reasonably achievable. 3D Post-processing was performed on this study.    XR CHEST PORTABLE    Result Date: 3/21/2024  1 view chest x-ray Comparison: CR - CHEST PA /T/ LAT - 07/31/2016 02:10 PM EDT Findings: Faint hazy left mid lung opacities. New consolidation versus atelectasis in the right upper lung. No pleural effusion or pneumothorax. Normal size heart. No pulmonary vascular congestion. Old traumatic deformity of the right humeral neck.     1. Faint hazy opacities in the left mid lung may reflect infiltrates. 2. New consolidation versus atelectasis in the right lung apex. CT can be helpful for further evaluation. This document has been electronically signed by: Fernando Preciado MD on 03/21/2024 10:22 PM      Assessment/Recommendations       NSCLC  S/p EBUS 4/2/2024 with prelim OLIVER suspicious for NSCLC.  Radiation oncology gave first dose of urgent radiation treatment today with second treatment planned tomorrow, total of 2 doses planned.  Pt will need PET scan as outpatient.  Pt with expressive aphasia and word finding difficulty.  She states she \"doesn't know\" if she would be interested in chemotherapy and wants time to think about it.  Called brother Kristopher who states he has not discussed pursing chemotherapy with pt yet as of 4/5/2024.  Called Kristopher again today and he states he will discuss with her tonight.  Kristopher states ok to schedule PET scan and f/u appnt to keep process moving.  PET scan ordered, to be scheduled by our office staff.  F/U appnt to be made once we have date for PET.     2.    CVA  Significant event noted 3/23/2024 with patient having difficulty with word finding.  Code stroke called.  CT head negative.  CTA neck positive for masslike collapse and consolidation of right upper lobe concerning for malignancy.  CTA head positive for abrupt occlusion of an anterior branch of the left M2 segment MCA.  MRI brain positive for acute infarct in distribution of left middle  cerebral artery.  Patient given aspirin.  Keppra on board.  Neurology consulted with recommendations of aspirin and Plavix indefinitely.      Case discussed with nurse and patient/family.  Questions and concerns addressed.  Plan made in collaboration with Dr. Manley.    Electronically signed by   PAO Chinchilla CNP on 4/9/2024 at 4:46 PM      NOTE:  This report was transcribed using voice recognition software. Every effort was made to ensure accuracy; however, inadvertent computerized transcription errors may be present.

## 2024-04-09 NOTE — PLAN OF CARE
Problem: Discharge Planning  Goal: Discharge to home or other facility with appropriate resources  Outcome: Progressing  Flowsheets (Taken 4/8/2024 1008 by Danna Chong, RN)  Discharge to home or other facility with appropriate resources:   Identify barriers to discharge with patient and caregiver   Arrange for needed discharge resources and transportation as appropriate   Identify discharge learning needs (meds, wound care, etc)     Problem: Safety - Adult  Goal: Free from fall injury  Outcome: Progressing  Flowsheets (Taken 4/7/2024 1213 by Angelica Kelly, RN)  Free From Fall Injury:   Instruct family/caregiver on patient safety   Based on caregiver fall risk screen, instruct family/caregiver to ask for assistance with transferring infant if caregiver noted to have fall risk factors     Problem: Chronic Conditions and Co-morbidities  Goal: Patient's chronic conditions and co-morbidity symptoms are monitored and maintained or improved  Outcome: Progressing  Flowsheets (Taken 4/8/2024 1008 by Danna Chong, RN)  Care Plan - Patient's Chronic Conditions and Co-Morbidity Symptoms are Monitored and Maintained or Improved:   Monitor and assess patient's chronic conditions and comorbid symptoms for stability, deterioration, or improvement   Collaborate with multidisciplinary team to address chronic and comorbid conditions and prevent exacerbation or deterioration     Problem: Pain  Goal: Verbalizes/displays adequate comfort level or baseline comfort level  Outcome: Progressing  Flowsheets (Taken 4/7/2024 1213 by Angelica Klely, RN)  Verbalizes/displays adequate comfort level or baseline comfort level: Encourage patient to monitor pain and request assistance     Problem: Skin/Tissue Integrity  Goal: Absence of new skin breakdown  Description: 1.  Monitor for areas of redness and/or skin breakdown  2.  Assess vascular access sites hourly  3.  Every 4-6 hours minimum:  Change oxygen saturation probe site  4.  Every 4-6  treatment plans   Care plan reviewed with patient.  Patient verbalizes understanding of the plan of care and contributes to goal setting.

## 2024-04-09 NOTE — PROGRESS NOTES
04/09/24 1209   Encounter Summary   Encounter Overview/Reason  Follow-up   Service Provided For: Patient   [facility]   Support System Family members   Last Encounter  04/09/24   Complexity of Encounter Moderate   Begin Time 1201   End Time  1209   Total Time Calculated 8 min   Spiritual/Emotional needs   Type Spiritual Support   Assessment/Intervention/Outcome   Assessment Coping   Intervention Nurtured Hope;Prayer (assurance of)/[place];Sustaining Presence/Ministry of presence   Outcome Comfort     Assessment:  In my encounter with the 68 yr old patient, while rounding  the unit 5K,  I provided spiritual care to patient through conversation, I also came to assess the patient's spiritual needs present. The pt was admitted due to dyspnea.     Interventions:  I provided prayer, emotional support and words of comfort.  provided a listening presence and encouraged pt to share their beliefs and how I can support them during their hospitalization.     Outcomes:  The patient was encouraged and didn't share any further spiritual needs at this time.     Plan:  Chaplains will follow-up at a later time for assessment of any spiritual care needs present.

## 2024-04-09 NOTE — PALLIATIVE CARE
Initial Evaluation        Patient:   Kalpana Braga  YOB: 1955  Age:  68 y.o.  Room:  [address] [place] [address]  MRN:  [id number]   Acct: [id number]    Date of Admission:  3/21/2024  8:53 PM  Date of Service:  4/9/2024  Completed By:  Shereen Rosas RN                 Reason for Palliative Care Evaluation:-               [] Code Status Discussion              [] Goals of Care              [] Pain/Symptom Management               [] Emotional Support              [x] Other:  new lung cancer                  Current Issues:-   [x]  Pain: right chest pain, headache  []  Fatigue  []  Nausea  []  Anxiety  []  Depression  [x]  Shortness of Breath: short of breath at rest  []  Constipation  [x]  Appetite  []  Other:              Advance Directives:-  none on file  [] [place] DNR Form  [] Living Will  [] Medical POA              Current Code Status:-     [x] Full Resuscitation  [] DNR-Comfort Care-Arrest  [] DNR-Comfort Care       [] Limited Resuscitation             [] No CPR            [] No shock            [] No ET intubation/reintubation            [] No resuscitative medications            [] Other limitation:               Palliative Performance Status:-      [] 100%  Full ambulation; normal activity and work; no evidence of disease; able to do own self care; normal intake; fully conscious     [] 90%   Full ambulation; normal activity and work; some evidence of disease; able to do own self care; normal intake; fully conscious    [] 80%   Full ambulation; normal activity with effort; some evidence of disease; able to do own self care; normal or reduced intake; fully conscious    [] 70%  Ambulation reduced; unable to perform normal job/work; significant  disease; able to do own self care; normal or reduced intake; fully conscious    [] 60%  Ambulation reduced; Significant disease;Can't do hobbies/housework; intake normal or reduced; occasional assist; LOC full/confusion    [x] 50%  Mainly sit/lie; Extensive

## 2024-04-09 NOTE — CONSULTS
Physician Consult Note        Patient:   Kalpana Braga  YOB: 1955  Age:  68 y.o.  Room:  [facility]04/[facility]  MRN:  [id number]   Acct: [id number]  PCP: Tatiana Velasquez APRN - CNP    Date of Admission:  3/21/2024  8:53 PM  Date of Service:  4/9/2024    Reason for Consult: Goals of Care and Symptom Management             Subjective   Chief Complaint:-    Chief Complaint   Patient presents with    Shortness of Breath        History Obtained From:-  Patient, Electronic Medical Record    History of Present Illness:-                   Kalpana Braga is a 68 y.o. female who  has a past medical history of COPD (chronic obstructive pulmonary disease) (HCC), Hyperlipidemia, Hypertension, Osteoarthritis, Smoker, and Urticaria. They present to the hospital with Shortness of Breath   and are admitted for Dyspnea.  Briefly, patient presented 3/21/24 with shortness of breath over the past couple of weeks.  She was initially saturating 70% on room air in the ED, requiring 6 L nasal cannula.  CTA chest showed no evidence of PE, however there was hypodense 3.9 x 5.5 cm right suprahilar mass extending to the right mediastinum and subcarinal region suspicious for neoplasm.  EBUS was completed 4/2/2024 with evidence of non-small cell carcinoma, which is a new diagnosis for patient.  Oncology and radiation oncology were consulted.  She received initial radiation treatment due to mass obstructing right mainstem bronchus on 4/6/2024 and tolerated this well.  She will continue to have further outpatient staging workup with oncology after discharge.  After admission, patient developed expressive aphasia on 3/24/2024, and was subsequently found to have acute left MCA infarct. Therapy has been following, but patient has not been actively participating. At this time, she is awaiting on line placement in ECF. Palliative care was consulted for Goals of Care and Symptom Management.    At time of  from the aortic arch.   4. There is atherosclerotic calcification the cavernous segments of both internal carotid arteries. There appears no evidence of severe stenosis.   6. There is abnormal density in the left temporal lobe consistent with a recent infarct.   7. There is collapse in the right upper lobe of the lung. There are areas of abnormal density in the left upper lobe of the lung.               **This report has been created using voice recognition software. It may contain minor errors which are inherent in voice recognition technology.**      Final report electronically signed by DR EMMETT CHEN on 3/26/2024 12:29 PM      CT HEAD WO CONTRAST   Final Result   1. No acute intracranial hemorrhage   2. Area of recent infarct seen in the left frontal lobe and left insular region.   The pertinent finding(s) was called to Dr. Reji St at 1204 hours on 3/26/2024 by Dr. Lomeli. Verbal acknowledgment and readback was given.            **This report has been created using voice recognition software.  It may contain minor errors which are inherent in voice recognition technology.**      Final report electronically signed by Dr Tanna Lomeli on 3/26/2024 12:06 PM      MRI BRAIN WO CONTRAST   Final Result       1. Acute infarct in the distribution of the left middle cerebral artery.   2. Mild atrophy and probable ischemic changes in the white matter.   3. Mild inflammatory changes in the left maxillary sinus.               **This report has been created using voice recognition software. It may contain minor errors which are inherent in voice recognition technology.**      Final report electronically signed by DR EMMETT CHEN on 3/24/2024 10:49 AM      CT HEAD WO CONTRAST   Final Result   1. No acute intracranial hemorrhage or large territorial infarction.   2. Additional findings as described.      This document has been electronically signed by: Garrick Shell MD on    03/23/2024 09:51 PM      All CTs at this  Posterior Auricular Interpolation Flap Text: A decision was made to reconstruct the defect utilizing an interpolation axial flap and a staged reconstruction.  A telfa template was made of the defect.  This telfa template was then used to outline the posterior auricular interpolation flap.  The donor area for the pedicle flap was then injected with anesthesia.  The flap was excised through the skin and subcutaneous tissue down to the layer of the underlying musculature.  The pedicle flap was carefully excised within this deep plane to maintain its blood supply.  The edges of the donor site were undermined.   The donor site was closed in a primary fashion.  The pedicle was then rotated into position and sutured.  Once the tube was sutured into place, adequate blood supply was confirmed with blanching and refill.  The pedicle was then wrapped with xeroform gauze and dressed appropriately with a telfa and gauze bandage to ensure continued blood supply and protect the attached pedicle.

## 2024-04-09 NOTE — PROGRESS NOTES
[facility]  STRZ ONC MED 5K  Occupational Therapy  Daily Note  Time:   Time In: 1045  Time Out: 1109  Timed Code Treatment Minutes: 24 Minutes  Minutes: 24          Date: 2024  Patient Name: Kalpana Braga,   Gender: female      Room: [facility]04/004-A  MRN: [id number]  : 1955  (68 y.o.)  Referring Practitioner: Marcus Ga MD  Diagnosis: lung mass  Additional Pertinent Hx: Per EMR \"Kalpana Braga is a 68 y.o. female active smoker with PMHx of COPD, HTN, depression, HLD, osteoarthritis who presents to [facility] with shortness of breath.  Patient reports starting  night she has been having shortness of breath worse with exertion.  Progressively worsening since .  She reports that she has been coughing up some light yellow-green sputum no fevers however she has had chills.  She denies any nausea or vomiting she denies being around anyone that is been sick.  She is a active smoker about a pack a day for 45 years.  No oxygen at baseline.  In the ED initially saturating 70% on room air, escalated to 6 L nasal cannula saturating 86 to 90%.  Denies any chest pain.\" code stroke 3/23.  CT angiogram head and neck read as an occlusion of the anterior branch of the left M2 segment of the MCA. MRI of brain without contrast shows acute infarct in the distribution of the left middle cerebral artery    Restrictions/Precautions:  Restrictions/Precautions: General Precautions, Fall Risk  Position Activity Restriction  Other position/activity restrictions: aphasia (expressive >receptive), impulsive     Social/Functional History:  Lives With: Spouse  Type of Home: House  Home Layout: Two level, Performs ADL's on one level  Home Access: Ramped entrance  Home Equipment: None   Bathroom Shower/Tub: Walk-in shower  Bathroom Toilet: Standard  Bathroom Equipment: Shower chair       ADL Assistance: Independent  Homemaking Assistance: Independent  Ambulation Assistance:

## 2024-04-09 NOTE — PROGRESS NOTES
[facility]  INPATIENT SPEECH THERAPY  STRZ ICU STEPDOWN TELEMETRY 4K  DAILY NOTE    TIME   SLP Individual Minutes  Time In: 1527  Time Out: 1538  Minutes: 11  Timed Code Treatment Minutes: 0 Minutes       Date: 2024  Patient Name: Kalpana Braga      CSN: [id number]   : 1955  (68 y.o.)  Gender: female   Referring Physician:  Chantelle Garcia MD   Diagnosis: Lung mass  Precautions: Fall risk  Current Diet: Regular, thin liquids   Respiratory Status: Nasal Canula: 1 LPM  Swallowing Strategies:  Full Upright Position, Small Bite/Sip, Medications Whole with Puree, Limit Distractions, and Monitor for Fatigue   Date of Last MBS/FEES:  FEES on 3/25/2024    Pain:  No pain reported.    Subjective: АЛЕКСАНДР [place] approved this therapy session. Reports the patient has not been wanting to participate in therapy because of her increased shortness of breath.  Live sitter present throughout.      Short-Term Goals:  SHORT TERM GOAL #1:  Goal 1: Patient will consume regular diet, thin liquids with stable pulmonary status and use of compensatory strategies to assist with nutrition/hydration.   INTERVENTIONS: Patient reports she did a lot of coughing during her meals earlier this date.  Patient and sitter present report the patient has had limited PO intake as her appetite has been decreased.  Patient reports she had biscuits with sausage gravy and some scrambled eggs for breakfast this date and she did quite a bit of coughing with it.  Patient took multiple consecutive drinks of thin liquids via straw this session and had immediate, significant coughing.  When cued to take 1 small sip at a time, the patient then tolerated x 3 sips of thin liquids via straw without overt s/s of aspiration.  Completed education on the importance of taking only 1 small sip at a time and making sure that she sits upright throughout all PO trials.  Patient then trialed x 1 grape that she had left over from lunch earlier  intelligibility to 75% in known contexts for optimal speech production skills for listener comprehension.  INTERVENTIONS: Did not target due to focus on other goals     Long-Term Goals:  No LTG established given short ELOS       Functional Oral Intake Scale: Total Oral Intake: 7.  Total oral intake with no restrictions    EDUCATION:  Learner: Patient  Education:  Reviewed diet and strategies, Reviewed signs, symptoms and risks of aspiration, Reviewed ST goals and Plan of Care, Reviewed recommendations for follow-up, Education Related to Potential Risks and Complications Due to Impairment/Illness/Injury, and s/s of aspiration to monitor for  Evaluation of Education: Verbalizes understanding    ASSESSMENT/PLAN:  Activity Tolerance:  Patient tolerance of  treatment: good.      Assessment/Plan: Patient progressing toward established goals.  Continues to require skilled care of licensed speech pathologist to progress toward achievement of established goals and plan of care..     Plan for Next Session: language therapy and dysphagia therapy  Discharge Recommendations: Continue to Assess Pending Progress       Tracy Winkler M.S. CCC-SLP [id number]

## 2024-04-09 NOTE — CARE COORDINATION
4/9/24, 1:24 PM EDT    DISCHARGE ON GOING EVALUATION    Kalpana DIETRICH [facility] day: 18  Location: -04/004-A Reason for admit: Lung mass [R91.8]  Dyspnea [R06.00]  Acute hypoxemic respiratory failure (HCC) [J96.01]   Procedure:   4/3 EBUS   -3.9 x 5.5 cm right suprahilar mass   -Right upper lobe collapse  Barriers to Discharge: Patient transferred to  from . Pulmonology and Oncology have signed off, IV fluids, Lovenox, prn Tylenol and Zofran, daily BMP, regular diet, incentive spirometry, NIHSS, SCD's, strict I & O, up with assistance as tolerated.   PCP: Tatiana Velasquez, PAO - CNP  Readmission Risk Score: 16.9%  Patient Goals/Plan/Treatment Preferences: Kalpana was from home alone. New SNF planned at discharge.

## 2024-04-09 NOTE — PLAN OF CARE
Problem: Respiratory - Adult  Goal: Clear lung sounds  Outcome: Progressing   Continue inhaler to improve breath sounds.  Pt agrees with plan of care

## 2024-04-10 LAB
ANION GAP SERPL CALC-SCNC: 8 MEQ/L (ref 8–16)
BUN SERPL-MCNC: 12 MG/DL (ref 7–22)
CALCIUM SERPL-MCNC: 8.2 MG/DL (ref 8.5–10.5)
CHLORIDE SERPL-SCNC: 99 MEQ/L (ref 98–111)
CO2 SERPL-SCNC: 29 MEQ/L (ref 23–33)
CREAT SERPL-MCNC: 0.6 MG/DL (ref 0.4–1.2)
GFR SERPL CREATININE-BSD FRML MDRD: > 90 ML/MIN/1.73M2
GLUCOSE SERPL-MCNC: 126 MG/DL (ref 70–108)
POTASSIUM SERPL-SCNC: 3.7 MEQ/L (ref 3.5–5.2)
SODIUM SERPL-SCNC: 136 MEQ/L (ref 135–145)

## 2024-04-10 PROCEDURE — 80048 BASIC METABOLIC PNL TOTAL CA: CPT

## 2024-04-10 PROCEDURE — 36415 COLL VENOUS BLD VENIPUNCTURE: CPT

## 2024-04-10 PROCEDURE — 99233 SBSQ HOSP IP/OBS HIGH 50: CPT | Performed by: STUDENT IN AN ORGANIZED HEALTH CARE EDUCATION/TRAINING PROGRAM

## 2024-04-10 PROCEDURE — 6370000000 HC RX 637 (ALT 250 FOR IP)

## 2024-04-10 PROCEDURE — 97530 THERAPEUTIC ACTIVITIES: CPT

## 2024-04-10 PROCEDURE — 2580000003 HC RX 258

## 2024-04-10 PROCEDURE — 6370000000 HC RX 637 (ALT 250 FOR IP): Performed by: NURSE PRACTITIONER

## 2024-04-10 PROCEDURE — 6360000002 HC RX W HCPCS

## 2024-04-10 PROCEDURE — 94640 AIRWAY INHALATION TREATMENT: CPT

## 2024-04-10 PROCEDURE — 97110 THERAPEUTIC EXERCISES: CPT

## 2024-04-10 PROCEDURE — 1200000000 HC SEMI PRIVATE

## 2024-04-10 PROCEDURE — 6370000000 HC RX 637 (ALT 250 FOR IP): Performed by: STUDENT IN AN ORGANIZED HEALTH CARE EDUCATION/TRAINING PROGRAM

## 2024-04-10 PROCEDURE — 2700000000 HC OXYGEN THERAPY PER DAY

## 2024-04-10 PROCEDURE — 99232 SBSQ HOSP IP/OBS MODERATE 35: CPT | Performed by: HOSPITALIST

## 2024-04-10 PROCEDURE — 97116 GAIT TRAINING THERAPY: CPT

## 2024-04-10 RX ADMIN — SODIUM CHLORIDE: 9 INJECTION, SOLUTION INTRAVENOUS at 18:08

## 2024-04-10 RX ADMIN — ATORVASTATIN CALCIUM 40 MG: 40 TABLET, FILM COATED ORAL at 21:11

## 2024-04-10 RX ADMIN — METOPROLOL TARTRATE 25 MG: 25 TABLET, FILM COATED ORAL at 21:07

## 2024-04-10 RX ADMIN — VENLAFAXINE HYDROCHLORIDE 75 MG: 75 CAPSULE, EXTENDED RELEASE ORAL at 08:37

## 2024-04-10 RX ADMIN — HYDRALAZINE HYDROCHLORIDE 25 MG: 25 TABLET, FILM COATED ORAL at 08:37

## 2024-04-10 RX ADMIN — GUAIFENESIN 1200 MG: 600 TABLET, EXTENDED RELEASE ORAL at 21:07

## 2024-04-10 RX ADMIN — GUAIFENESIN 1200 MG: 600 TABLET, EXTENDED RELEASE ORAL at 08:37

## 2024-04-10 RX ADMIN — ENOXAPARIN SODIUM 40 MG: 100 INJECTION SUBCUTANEOUS at 08:36

## 2024-04-10 RX ADMIN — Medication 3 MG: at 21:07

## 2024-04-10 RX ADMIN — ASPIRIN 81 MG 81 MG: 81 TABLET ORAL at 08:37

## 2024-04-10 RX ADMIN — FERROUS SULFATE TAB 325 MG (65 MG ELEMENTAL FE) 325 MG: 325 (65 FE) TAB at 08:37

## 2024-04-10 RX ADMIN — HYDRALAZINE HYDROCHLORIDE 25 MG: 25 TABLET, FILM COATED ORAL at 16:52

## 2024-04-10 RX ADMIN — TRAZODONE HYDROCHLORIDE 50 MG: 50 TABLET ORAL at 21:07

## 2024-04-10 RX ADMIN — HYDRALAZINE HYDROCHLORIDE 25 MG: 25 TABLET, FILM COATED ORAL at 01:18

## 2024-04-10 RX ADMIN — TIOTROPIUM BROMIDE AND OLODATEROL 2 PUFF: 3.124; 2.736 SPRAY, METERED RESPIRATORY (INHALATION) at 11:06

## 2024-04-10 RX ADMIN — OXYCODONE 2.5 MG: 5 TABLET ORAL at 01:18

## 2024-04-10 RX ADMIN — CLOPIDOGREL BISULFATE 75 MG: 75 TABLET ORAL at 08:37

## 2024-04-10 RX ADMIN — ACETAMINOPHEN 650 MG: 325 TABLET ORAL at 21:15

## 2024-04-10 RX ADMIN — LOSARTAN POTASSIUM 100 MG: 100 TABLET, FILM COATED ORAL at 08:36

## 2024-04-10 ASSESSMENT — PAIN DESCRIPTION - LOCATION
LOCATION: HEAD
LOCATION: HEAD

## 2024-04-10 ASSESSMENT — PAIN DESCRIPTION - DESCRIPTORS
DESCRIPTORS: ACHING
DESCRIPTORS: POUNDING

## 2024-04-10 ASSESSMENT — PAIN SCALES - GENERAL
PAINLEVEL_OUTOF10: 0
PAINLEVEL_OUTOF10: 7
PAINLEVEL_OUTOF10: 0
PAINLEVEL_OUTOF10: 4

## 2024-04-10 ASSESSMENT — PAIN DESCRIPTION - ORIENTATION
ORIENTATION: MID;POSTERIOR
ORIENTATION: INNER

## 2024-04-10 NOTE — PALLIATIVE CARE
Follow Up / Progress Note        Patient:   Kalpana Braga  YOB: 1955  Age:  68 y.o.  Room:  [id number]  MRN:  [id number]         Family/Patient Discussion:  Received a phone call from patient's brother, Kristopher.  Kristopher indicates that he is the medical POA and he is asked to bring in the paperwork the next time that he comes to the hospital.  Discussed with Kristopher the code status discussion that this RN had with the patient.  Kristopher indicates that the patient would NOT want resuscitative measures if her heart or breathing should stop.  Discussed intubation with respiratory failure and Kristopher indicates that the patient would not want intubated at any point.  Discussed with Kristopher that the patient has had difficulty with working with therapy at times as she has shared that it really takes a lot out of her.  Discussed that if the patient does not do therapy here or at the F, the \"skilled\" reason for insurance payment will not be there.  Kristopher affirms that the medicaid application is in progress.  Discussed with Kristopher DNR CC and hospice and when this would be appropriate.  Discussed [place] DNR form and that this would follow her to the [facility].  Informed Kristopher that this RN will confirm with patient her wishes regarding code status to verify alignment with DNR CCA with no intubation.  Kristopher needed to end the conversation as he was on his lunch hour at work which was ending.        Plan/Follow-Up:  1211  Met with patient in her room.  Patient indicates that she recalls this RN from yesterday and our conversation.  This RN did let patient know that her brother Kristopher called and that he expressed that he did not believe that the patient would want resuscitated or placed on a ventilator.  Patient says \"right\" but then states that she doesn't know.  Patient denies any c/o discomfort at present but she does appear dyspneic at rest as she purse lip exhales.      At this time, patient's code

## 2024-04-10 NOTE — PLAN OF CARE
Problem: Discharge Planning  Goal: Discharge to home or other facility with appropriate resources  Outcome: Progressing  Flowsheets (Taken 4/10/2024 0951)  Discharge to home or other facility with appropriate resources:   Identify barriers to discharge with patient and caregiver   Arrange for needed discharge resources and transportation as appropriate   Identify discharge learning needs (meds, wound care, etc)     Problem: Safety - Adult  Goal: Free from fall injury  Outcome: Progressing  Flowsheets (Taken 4/10/2024 0951)  Free From Fall Injury:   Instruct family/caregiver on patient safety   Based on caregiver fall risk screen, instruct family/caregiver to ask for assistance with transferring infant if caregiver noted to have fall risk factors     Problem: Chronic Conditions and Co-morbidities  Goal: Patient's chronic conditions and co-morbidity symptoms are monitored and maintained or improved  Outcome: Progressing  Flowsheets (Taken 4/10/2024 0951)  Care Plan - Patient's Chronic Conditions and Co-Morbidity Symptoms are Monitored and Maintained or Improved:   Monitor and assess patient's chronic conditions and comorbid symptoms for stability, deterioration, or improvement   Collaborate with multidisciplinary team to address chronic and comorbid conditions and prevent exacerbation or deterioration   Update acute care plan with appropriate goals if chronic or comorbid symptoms are exacerbated and prevent overall improvement and discharge     Problem: Pain  Goal: Verbalizes/displays adequate comfort level or baseline comfort level  Outcome: Progressing  Flowsheets (Taken 4/10/2024 0951)  Verbalizes/displays adequate comfort level or baseline comfort level:   Encourage patient to monitor pain and request assistance   Assess pain using appropriate pain scale   Administer analgesics based on type and severity of pain and evaluate response   Implement non-pharmacological measures as appropriate and evaluate response      Problem: Skin/Tissue Integrity  Goal: Absence of new skin breakdown  Description: 1.  Monitor for areas of redness and/or skin breakdown  2.  Assess vascular access sites hourly  3.  Every 4-6 hours minimum:  Change oxygen saturation probe site  4.  Every 4-6 hours:  If on nasal continuous positive airway pressure, respiratory therapy assess nares and determine need for appliance change or resting period.  Outcome: Progressing     Problem: Respiratory - Adult  Goal: Achieves optimal ventilation and oxygenation  Outcome: Progressing  Flowsheets (Taken 4/10/2024 0951)  Achieves optimal ventilation and oxygenation:   Assess for changes in respiratory status   Assess for changes in mentation and behavior   Position to facilitate oxygenation and minimize respiratory effort   Oxygen supplementation based on oxygen saturation or arterial blood gases   Initiate smoking cessation protocol as indicated   Encourage broncho-pulmonary hygiene including cough, deep breathe, incentive spirometry   Assess the need for suctioning and aspirate as needed   Assess and instruct to report shortness of breath or any respiratory difficulty   Respiratory therapy support as indicated     Problem: Infection - Adult  Goal: Absence of infection at discharge  Outcome: Progressing  Flowsheets (Taken 4/10/2024 0951)  Absence of infection at discharge:   Assess and monitor for signs and symptoms of infection   Monitor lab/diagnostic results   Monitor all insertion sites i.e., indwelling lines, tubes and drains   Monitor endotracheal (as able) and nasal secretions for changes in amount and color   [place] appropriate cooling/warming therapies per order   Administer medications as ordered   Instruct and encourage patient and family to use good hand hygiene technique   Identify and instruct in appropriate isolation precautions for identified infection/condition     Problem: Neurosensory - Adult  Goal: Achieves stable or improved neurological

## 2024-04-10 NOTE — ADT AUTH CERT
weight-based dosing  when appropriate to reduce radiation to a low as reasonably achievable.     3D Post-processing was performed on this study.     CTA NECK W WO CONTRAST  Final Result  1. Masslike collapse and consolidation of the right upper lobe, concerning   for malignancy.  2. Ill-defined ground-glass and patchy consolidations throughout the left   lung. Pneumonia or aspiration not excluded.  3. Patent cervical vasculature.  4. Atherosclerotic disease as described.     This document has been electronically signed by: Garrick Shell MD on   03/23/2024 10:15 PM     All CTs at this facility use dose modulation techniques and iterative   reconstructions, and/or weight-based dosing  when appropriate to reduce radiation to a low as reasonably achievable.     Carotid stenosis and measurements are in accordance with NASCET criteria.     3D Post-processing was performed on this study.     CTA CHEST W WO CONTRAST PE Eval  Final Result  1. No pulmonary emboli.  2. Hypodense 3.9 x 5.5 cm right suprahilar mass extending to the right   mediastinum and subcarinal region, suspicious for neoplasm until proven   otherwise.  3. Complete right upper lung collapse secondary to the right suprahilar   mass.  4. Nonspecific patchy ground-glass consolidations throughout the left   lung. Pulmonary edema is possible but can be followed to exclude atypical   infectious or inflammatory pneumonitis.  5. Dilatation of the central pulmonary artery suggesting a degree of   pulmonary arterial hypertension.     This document has been electronically signed by: Fernando Preciado MD on   03/21/2024 11:14 PM     All CTs at this facility use dose modulation techniques and iterative   reconstructions, and/or weight-based dosing  when appropriate to reduce radiation to a low as reasonably achievable.     3D Post-processing was performed on this study.     XR CHEST PORTABLE  Final Result  1. Faint hazy opacities in the left mid lung may reflect   SubCUTAneous Abdomen LLQ (Left Lower Quadrant)    Clemencia Ambrocio    Due 04/03/24 0900(s)  04/03/24 0829(r)     SubCUTAneous          Automatically Held 04/03/24 0900(a)  04/01/24 1125(r)     SubCUTAneous      Neumeier, Steven T  79 Given 04/04/24 0900(s)  04/04/24 0923(a)  04/04/24 0923(r) 40 mg   SubCUTAneous Abdomen RLQ (Right Lower Quadrant)    Zahra Giron K    Due 04/04/24 0900(s)  04/03/24 0829(r)     SubCUTAneous          Automatically Held 04/04/24 0900(a)  04/01/24 1125(r)     SubCUTAneous      Neumeier, Steven T  80 Given 04/05/24 0900(s)  04/05/24 0922(a)  04/05/24 0922(r) 40 mg   SubCUTAneous Abdomen LLQ (Left Lower Quadrant)    Juana Patel A    Due 04/05/24 0900(s)  04/03/24 0829(r)     SubCUTAneous          Automatically Held 04/05/24 0900(a)  04/01/24 1125(r)     SubCUTAneous      Neumeier, Steven T  81 Given 04/06/24 0900(s)  04/06/24 0746(a)  04/06/24 0746(r) 40 mg   SubCUTAneous Abdomen LLQ (Left Lower Quadrant)    Gracy Nguyen S    Due 04/06/24 0900(s)  04/03/24 0829(r)     SubCUTAneous          Automatically Held 04/06/24 0900(a)  04/03/24 0011(r)     SubCUTAneous        82 Given 04/07/24 0900(s)  04/07/24 0836(a)  04/07/24 0836(r) 40 mg   SubCUTAneous Abdomen RUQ (Right Upper Quadrant)    Angelica Kelly    Due 04/07/24 0900(s)  04/03/24 0829(r)     SubCUTAneous          Automatically Held 04/07/24 0900(a)  04/03/24 0011(r)     SubCUTAneous        83 Given 04/08/24 0900(s)  04/08/24 1007(a)  04/08/24 1007(r) 40 mg   SubCUTAneous Abdomen LLQ (Left Lower Quadrant)    Danna Chong    Due 04/08/24 0900(s)  04/03/24 0829(r)     SubCUTAneous          Automatically Held 04/08/24 0900(a)  04/03/24 0011(r)     SubCUTAneous        84 Given 04/09/24 0900(s)  04/09/24 0956(a)  04/09/24 1000(r) 40 mg   SubCUTAneous Abdomen RLQ (Right Lower Quadrant)    Pat Plunkett  85 Given 04/10/24 0900(s)  04/10/24 0836(a)  04/10/24 0838(r) 40 mg   SubCUTAneous Abdomen LLQ (Left Lower Quadrant)    Lonnie

## 2024-04-10 NOTE — PROGRESS NOTES
Physician Progress Note        Patient:   Kalpana Braga  YOB: 1955  Age:  68 y.o.  Room:  [facility]04/004-  MRN:  [id number]   Acct: [id number]  PCP: Tatiana Velasquez APRN - CNP    Date of Admission:  3/21/2024  8:53 PM  Date of Service:  4/10/2024    Reason for Consult: Goals of Care.    History Obtained From:-  Patient, Electronic Medical Record, Patient's RN             Subjective   Kalpana Braga was seen in their room today for follow up with the palliative care team. There was not family present at the bedside. She is doing well this afternoon.  She is complaining of some pain and some baseline shortness of breath. They have  Effexor 75 mg daily  for scheduled symptom relief. From 8 AM yesterday to 8 AM today, they have used Oxycodone IR 2.5 mg once  for as needed symptom relief. Their medications are working well to control their symptoms. They did get a good night sleep. They are eating their meals. The patient did have a bowel movement in the last 24 hours. She had no  other symptoms or concerns for me this afternoon.  We had a brief discussion regarding the plan of care.  She continues to be agreeable to outpatient follow-up with oncology.     Objective   Vitals:-    /63   Pulse 57   Temp 98.4 °F (36.9 °C) (Oral)   Resp 20   Ht 1.626 m (5' 4\")   Wt 89.2 kg (196 lb 10.4 oz)   SpO2 97%   BMI 33.76 kg/m²     Physical Exam  Vitals and nursing note reviewed.   Constitutional:       General: She is awake. She is not in acute distress.     Appearance: She is ill-appearing.   HENT:      Head: Normocephalic and atraumatic.      Right Ear: External ear normal.      Left Ear: External ear normal.      Nose: No rhinorrhea.   Eyes:      General:         Right eye: No discharge.         Left eye: No discharge.   Cardiovascular:      Rate and Rhythm: Normal rate.   Pulmonary:      Effort: Pulmonary effort is normal. No respiratory distress.   Musculoskeletal:       are hospitalized   Please [employer] or call the Palliative Care team with any questions or concerns    CURRENT CODE STATUS:  Full Code No additional code details         Parts of this note may have been dictated by use of voice recognition software and electronically transcribed. The note may contain errors not detected in proofreading    Electronically signed by Carmine Maradiaga MD on 4/10/2024 at 7:53 PM           Palliative Care Office: [phone]

## 2024-04-10 NOTE — CARE COORDINATION
4/10/24, 2:24 PM EDT    DISCHARGE ON GOING EVALUATION    [facility] day: 19  Location: -04/004-A Reason for admit: Lung mass [R91.8]  Dyspnea [R06.00]  Acute hypoxemic respiratory failure (HCC) [J96.01]     Barriers to Discharge: Awaiting placement.     PCP: Tatiana Velasquez APRN - CNP  Readmission Risk Score: 16.3%    Patient Goals/Plan/Treatment Preferences: Precert to [facility] was started today. SW managing transition of care.

## 2024-04-10 NOTE — PROGRESS NOTES
[facility]  INPATIENT PHYSICAL THERAPY  DAILY NOTE  STRZ ONC MED 5K - 5K-04/004-A      Time In: 1405  Time Out: 1443  Timed Code Treatment Minutes: 38 Minutes  Minutes: 38          Date: 4/10/2024  Patient Name: Kalpana Braga,  Gender:  female        MRN: [id number]  : 1955  (68 y.o.)     Referring Practitioner: Marcus Ga MD  Diagnosis: lung mass  Additional Pertinent Hx: Per EMR \"Kalpana Braga is a 68 y.o. female active smoker with PMHx of COPD, HTN, depression, HLD, osteoarthritis who presents to [facility] with shortness of breath.  Patient reports starting  night she has been having shortness of breath worse with exertion.  Progressively worsening since .  She reports that she has been coughing up some light yellow-green sputum no fevers however she has had chills.  She denies any nausea or vomiting she denies being around anyone that is been sick.  She is a active smoker about a pack a day for 45 years.  No oxygen at baseline.  In the ED initially saturating 70% on room air, escalated to 6 L nasal cannula saturating 86 to 90%.  Denies any chest pain.\" code stroke 3/23.  CT angiogram head and neck read as an occlusion of the anterior branch of the left M2 segment of the MCA. MRI of brain without contrast shows acute infarct in the distribution of the left middle cerebral artery. -- s/p EBUS 4/4     Prior Level of Function:  Lives With: Spouse  Type of Home: House  Home Layout: Two level, Performs ADL's on one level  Home Access: Ramped entrance  Home Equipment: None   Bathroom Shower/Tub: Walk-in shower  Bathroom Toilet: Standard  Bathroom Equipment: Shower chair    ADL Assistance: Independent  Homemaking Assistance: Independent  Ambulation Assistance: Independent  Transfer Assistance: Independent  Active : Yes  Additional Comments: Pt reports being IND with functional tasks prior, no use of an AD. Unsure of accracy of information due to

## 2024-04-10 NOTE — DISCHARGE INSTR - COC
Continuity of Care Form    Patient Name: Kalpana Braga   :  1955  MRN:  [id number]    Admit date:  3/21/2024  Discharge date:  24    Code Status Order: Full Code   Advance Directives:     Admitting Physician:  Sulema Lagos MD  PCP: Tatiana Velasquez, APRN - CNP    Discharging Nurse: [facility] Unit/Room#: 5K-04/004-A  Discharging Unit Phone Number: [phone]    Emergency Contact:   Extended Emergency Contact Information  Primary Emergency Contact: Kristopher Guevara   [facility]  Home Phone: [phone]  Relation: Brother/Sister  Secondary Emergency Contact: Dario Guevara   [facility]  Home Phone: [phone]  Relation: Brother/Sister    Past Surgical History:  Past Surgical History:   Procedure Laterality Date    BRONCHOSCOPY N/A 2024    EBUS performed by Lois Paez MD at [facility] ENDOSCOPY    DILATION AND CURETTAGE OF UTERUS      KNEE ARTHROSCOPY         Immunization History:   Immunization History   Administered Date(s) Administered    COVID-19, MODERNA BLUE border, Primary or Immunocompromised, (age 12y+), IM, 100 mcg/0.5mL 2021, 2021    Influenza Virus Vaccine 10/01/2011       Active Problems:  Patient Active Problem List   Diagnosis Code    COPD (chronic obstructive pulmonary disease) ([facility]) J44.9    Hyperlipidemia E78.5    Smoker F17.200    Osteoarthritis M19.90    Essential hypertension I10    Current moderate episode of major depressive disorder without prior episode ([facility]) F32.1    Prediabetes R73.03    Class 2 severe obesity due to excess calories with serious comorbidity and body mass index (BMI) of 39.0 to 39.9 in adult ([facility]) E66.01, Z68.39    Dyspnea R06.00    Acute hypoxemic respiratory failure ([facility]) J96.01    Lung mass R91.8    Pneumonia of both lungs due to infectious organism J18.9    CVA (cerebral vascular accident) ([facility]) I63.9    Moderate malnutrition ([facility]) E44.0    Malignant neoplasm of upper lobe of right

## 2024-04-10 NOTE — PROGRESS NOTES
[facility]  OCCUPATIONAL THERAPY MISSED TREATMENT NOTE  STRZ ONC MED 5K  5K-04/004-A      Date: 4/10/2024  Patient Name: Kalpana Braga        CSN: [id number]   : 1955  (68 y.o.)  Gender: female   Referring Practitioner: Marcus Ga MD  Diagnosis: lung mass         REASON FOR MISSED TREATMENT: Patient Refused.    and Patient Unavailable  Pt working with PT upon first attempt. This writer spoke to Pt after PT and pt refusing to any OT at this time. Will try again next available date.

## 2024-04-10 NOTE — PROGRESS NOTES
Hospitalist Progress Note      Patient:  Kalpana Braga    Unit/Bed:5K-04/004-A  YOB: 1955  MRN: [id number]   Acct: [id number]   PCP: Tatiana Velasquez APRN - CNP  Date of Admission: 3/21/2024    Assessment/Plan:    Passive management of problem list below at this time. ECF confirmed at this time, waiting for precert. Will need oncology outpatient.  Inpatient oncology not indicated at this time.  Patient denied from [facility] 2/2 refusing therapy over the weekend.  Evaluated by PT this morning, referral made to [facility] in process.    #Non-small cell carcinoma of the lung, 3.9 x 5.5 cm right suprahilar mass: CT chest shows hypodense 3.9 x 5.5 cm right suprahilar mass extending to right mediastinum and subcarinal region.  Groundglass interstitial opacities with complete right upper lung collapse 2/2 mass. Patient does have extensive smoking history.    EBUS 4/2/24 showed evidence of non small cell carcinoma. Oncology and radiation oncology consulted per pulmonology team.  Patient received radiation treatment per radiation oncology recommendations given mass obstructing right mainstem bronchus. Tolerated well.  Plan  -Pulmonology consulted, cleared for discharge from their standpoint  -Will need outpatient oncology follow-up with staging workup such at PET scan, CTAP, etc.  -Oncology and radiation oncology consult, appreciate recommendations, received radiation inpatient per radiation oncology, tolerated well.  -Palliative care consulted at request of patient and family, plan to start oxycodone IR 2.5 mg q6h as needed for shortness of breath.  Will follow-up with palliative care clinic on discharge.    #Acute hypoxic respiratory failure, multifactorial due to newly diagnosed non-small cell carcinoma, PNA, COPD exacerbation, stable: Required 6L nasal cannula in ED.  Endorsed shortness of breath, cough, yellow/green sputum production.     lung. Pulmonary edema is possible but can be followed to exclude atypical    infectious or inflammatory pneumonitis.   5. Dilatation of the central pulmonary artery suggesting a degree of    pulmonary arterial hypertension.      This document has been electronically signed by: Fernando Preciado MD on    03/21/2024 11:14 PM      All CTs at this facility use dose modulation techniques and iterative    reconstructions, and/or weight-based dosing   when appropriate to reduce radiation to a low as reasonably achievable.      3D Post-processing was performed on this study.      XR CHEST PORTABLE   Final Result   1. Faint hazy opacities in the left mid lung may reflect infiltrates.   2. New consolidation versus atelectasis in the right lung apex. CT can be    helpful for further evaluation.      This document has been electronically signed by: Fernando Preciado MD on    03/21/2024 10:22 PM      CT CHEST WO CONTRAST    (Results Pending)     CTA CHEST W WO CONTRAST PE Eval    Result Date: 3/21/2024  CT angiography chest with contrast. 3D Postprocessing. Comparison: CR/SR - XR CHEST PORTABLE - 03/21/2024 09:45 PM EDT Findings: Dilatation of the central pulmonary artery at 3.8 cm. Extrinsic compression of a few right upper lobe pulmonary arterial branches. Pulmonary arteries are otherwise well opacified. Scattered plaque is seen in the thoracic aorta without aneurysm or dissection. Hypodense 3.9 x 5.5 cm right suprahilar mass extending to the right mediastinum and subcarinal region. This causes right upper lobe bronchial compression with complete right upper lung atelectasis. Compensatory hyperinflation of the right middle and right lower lungs. Additional nonspecific patchy ground-glass consolidations scattered throughout the left upper and lower lungs. No pleural effusion or pneumothorax. The heart is not enlarged. There is no pericardial effusion. The thyroid is unremarkable. Visualized upper abdomen is unremarkable. No acute

## 2024-04-10 NOTE — CARE COORDINATION
4/10/24, 8:25 AM EDT    DISCHARGE PLANNING EVALUATION    Message from Christine with [facility], they can accept pt and start precert when we are ready.     SW did call Christine back this morning, left a voicemail that precert can be started.     NANCY met with pt this morning, updates on acceptance to [facility] and will start precert.     NANCY did call  Kristopher to update, left a voicemail for a call back.     3:17 PM EDT  Call to pt's brother Kristopher, updated on acceptance to [facility] and precert starting today.

## 2024-04-11 LAB
ANION GAP SERPL CALC-SCNC: 9 MEQ/L (ref 8–16)
BUN SERPL-MCNC: 12 MG/DL (ref 7–22)
CALCIUM SERPL-MCNC: 8.2 MG/DL (ref 8.5–10.5)
CHLORIDE SERPL-SCNC: 97 MEQ/L (ref 98–111)
CO2 SERPL-SCNC: 29 MEQ/L (ref 23–33)
CREAT SERPL-MCNC: 0.6 MG/DL (ref 0.4–1.2)
GFR SERPL CREATININE-BSD FRML MDRD: > 90 ML/MIN/1.73M2
GLUCOSE SERPL-MCNC: 103 MG/DL (ref 70–108)
POTASSIUM SERPL-SCNC: 3.3 MEQ/L (ref 3.5–5.2)
SODIUM SERPL-SCNC: 135 MEQ/L (ref 135–145)

## 2024-04-11 PROCEDURE — 6370000000 HC RX 637 (ALT 250 FOR IP)

## 2024-04-11 PROCEDURE — 2580000003 HC RX 258

## 2024-04-11 PROCEDURE — 6370000000 HC RX 637 (ALT 250 FOR IP): Performed by: NURSE PRACTITIONER

## 2024-04-11 PROCEDURE — 80048 BASIC METABOLIC PNL TOTAL CA: CPT

## 2024-04-11 PROCEDURE — 6360000002 HC RX W HCPCS

## 2024-04-11 PROCEDURE — 36415 COLL VENOUS BLD VENIPUNCTURE: CPT

## 2024-04-11 PROCEDURE — 1200000000 HC SEMI PRIVATE

## 2024-04-11 PROCEDURE — 97116 GAIT TRAINING THERAPY: CPT

## 2024-04-11 PROCEDURE — 2700000000 HC OXYGEN THERAPY PER DAY

## 2024-04-11 PROCEDURE — 99232 SBSQ HOSP IP/OBS MODERATE 35: CPT | Performed by: HOSPITALIST

## 2024-04-11 PROCEDURE — 97530 THERAPEUTIC ACTIVITIES: CPT

## 2024-04-11 PROCEDURE — 94640 AIRWAY INHALATION TREATMENT: CPT

## 2024-04-11 PROCEDURE — 6370000000 HC RX 637 (ALT 250 FOR IP): Performed by: STUDENT IN AN ORGANIZED HEALTH CARE EDUCATION/TRAINING PROGRAM

## 2024-04-11 PROCEDURE — 97110 THERAPEUTIC EXERCISES: CPT

## 2024-04-11 PROCEDURE — 94761 N-INVAS EAR/PLS OXIMETRY MLT: CPT

## 2024-04-11 PROCEDURE — 99233 SBSQ HOSP IP/OBS HIGH 50: CPT | Performed by: STUDENT IN AN ORGANIZED HEALTH CARE EDUCATION/TRAINING PROGRAM

## 2024-04-11 RX ORDER — HYDROXYZINE HYDROCHLORIDE 10 MG/1
10 TABLET, FILM COATED ORAL 3 TIMES DAILY PRN
Status: DISCONTINUED | OUTPATIENT
Start: 2024-04-11 | End: 2024-04-13 | Stop reason: HOSPADM

## 2024-04-11 RX ORDER — POTASSIUM CHLORIDE 20 MEQ/1
40 TABLET, EXTENDED RELEASE ORAL ONCE
Status: COMPLETED | OUTPATIENT
Start: 2024-04-11 | End: 2024-04-11

## 2024-04-11 RX ADMIN — HYDRALAZINE HYDROCHLORIDE 25 MG: 25 TABLET, FILM COATED ORAL at 15:37

## 2024-04-11 RX ADMIN — GUAIFENESIN 1200 MG: 600 TABLET, EXTENDED RELEASE ORAL at 20:08

## 2024-04-11 RX ADMIN — POTASSIUM CHLORIDE 40 MEQ: 1500 TABLET, EXTENDED RELEASE ORAL at 09:12

## 2024-04-11 RX ADMIN — HYDROXYZINE HYDROCHLORIDE 10 MG: 10 TABLET ORAL at 09:15

## 2024-04-11 RX ADMIN — TRAZODONE HYDROCHLORIDE 50 MG: 50 TABLET ORAL at 20:08

## 2024-04-11 RX ADMIN — ASPIRIN 81 MG 81 MG: 81 TABLET ORAL at 09:12

## 2024-04-11 RX ADMIN — ENOXAPARIN SODIUM 40 MG: 100 INJECTION SUBCUTANEOUS at 09:12

## 2024-04-11 RX ADMIN — METOPROLOL TARTRATE 25 MG: 25 TABLET, FILM COATED ORAL at 20:08

## 2024-04-11 RX ADMIN — HYDRALAZINE HYDROCHLORIDE 25 MG: 25 TABLET, FILM COATED ORAL at 23:17

## 2024-04-11 RX ADMIN — OXYCODONE 2.5 MG: 5 TABLET ORAL at 11:16

## 2024-04-11 RX ADMIN — VENLAFAXINE HYDROCHLORIDE 75 MG: 75 CAPSULE, EXTENDED RELEASE ORAL at 09:12

## 2024-04-11 RX ADMIN — CLOPIDOGREL BISULFATE 75 MG: 75 TABLET ORAL at 09:12

## 2024-04-11 RX ADMIN — LOSARTAN POTASSIUM 100 MG: 100 TABLET, FILM COATED ORAL at 09:12

## 2024-04-11 RX ADMIN — GUAIFENESIN 1200 MG: 600 TABLET, EXTENDED RELEASE ORAL at 09:12

## 2024-04-11 RX ADMIN — SODIUM CHLORIDE: 9 INJECTION, SOLUTION INTRAVENOUS at 14:22

## 2024-04-11 RX ADMIN — ATORVASTATIN CALCIUM 40 MG: 40 TABLET, FILM COATED ORAL at 20:08

## 2024-04-11 RX ADMIN — HYDRALAZINE HYDROCHLORIDE 25 MG: 25 TABLET, FILM COATED ORAL at 00:28

## 2024-04-11 RX ADMIN — Medication 3 MG: at 20:08

## 2024-04-11 RX ADMIN — HYDRALAZINE HYDROCHLORIDE 25 MG: 25 TABLET, FILM COATED ORAL at 09:12

## 2024-04-11 RX ADMIN — TIOTROPIUM BROMIDE AND OLODATEROL 2 PUFF: 3.124; 2.736 SPRAY, METERED RESPIRATORY (INHALATION) at 08:18

## 2024-04-11 RX ADMIN — HYDROXYZINE HYDROCHLORIDE 10 MG: 10 TABLET ORAL at 18:16

## 2024-04-11 ASSESSMENT — PAIN DESCRIPTION - ORIENTATION: ORIENTATION: MID

## 2024-04-11 ASSESSMENT — PAIN SCALES - GENERAL
PAINLEVEL_OUTOF10: 0
PAINLEVEL_OUTOF10: 5
PAINLEVEL_OUTOF10: 0

## 2024-04-11 ASSESSMENT — PAIN DESCRIPTION - LOCATION: LOCATION: ABDOMEN;CHEST

## 2024-04-11 ASSESSMENT — PAIN DESCRIPTION - DESCRIPTORS: DESCRIPTORS: ACHING;DISCOMFORT

## 2024-04-11 NOTE — PROGRESS NOTES
CXR showing consolidations versus atelectasis in the right lung apex.  CTA shows patchy groundglass consolidation throughout left lung consistent with infectious process.  COVID/flu negative.  Completed full course of azithromycin and ceftriaxone as well as 5 days of prednisone.  EBUS completed as noted above.     Remain on 1-2 LNC.  Antibiotics and prednisone courses completed.  Plan  -Continue Acapella and incentive spirometer  -Outpatient follow-up with pulmonology, oncology, radiation oncology  -Continue Stiolto  -Palliative care consulted at request of patient and family, plan to start oxycodone IR 2.5 mg q6h as needed for shortness of breath.  Will follow-up with palliative care clinic on discharge.    #Left MCA stroke: Patient developed expressive aphasia and difficulty with writing 3/24/2024 and also trouble identifying current location.  CT head showed no acute bleed, CTA of neck was negative.  CTA head showed possible occlusion of M2 segment of left MCA.  MRI morning of 3/24/2024 showing acute infarct in the left MCA area.    A1c 5.8, LDL below goal at 32, echo with bubble study negative for shunt. Patient stable for IPR admission at this time.  Plan  -Neurology was following, continue Plavix and lovenox  -Speech eval recommends minced and moist diet w/ thin liquids  -NIH scale every shift  -30-day event monitor at discharge  -Reiterate importance of tobacco cessation  -PRN stat head CT if changes in mental status, severe headache, or new neurological deficit.  -Stable for discharge to SNF/ECF/IPR.    Chronic  #Hypertension: Norvasc and losartan.  Hydralazine added.  #Hyperlipidemia: Continue Lipitor  #Anxiety/depression: Continue Effexor    Resolved  #Mild hyponatremia, resolved: Likely 2/2 poor oral intake and increased output from diarrhea and vomiting. Improved inititially w/ fluids. However, patient has not been eating for a few days now and it has been slowly declining. Recommend pushing increased  apex, correlate with chest radiography.      This document has been electronically signed by: Joe Saleem III, MD on    04/09/2024 05:20 AM      All CTs at this facility use dose modulation techniques and iterative    reconstructions, and/or weight-based dosing   when appropriate to reduce radiation to a low as reasonably achievable.      CT GUIDE RADIATION THERAPY NO CHARGE   Final Result      XR CHEST PORTABLE   Final Result      Right parahilar opacities, likely corresponding to known mass and adjacent atelectasis.               **This report has been created using voice recognition software. It may contain minor errors which are inherent in voice recognition technology.**      Final report electronically signed by Dr. Tejas Velasquez on 4/2/2024 4:32 PM      XR CHEST PORTABLE   Final Result      Enlarging right parahilar opacities, likely corresponding to known mass and adjacent atelectasis.               **This report has been created using voice recognition software. It may contain minor errors which are inherent in voice recognition technology.**      Final report electronically signed by Dr. Tejas Velasquez on 3/29/2024 1:07 PM      CT ABDOMEN PELVIS W IV CONTRAST Additional Contrast? Radiologist Recommendation   Final Result   1. Liquid consistency stool be correlated for diarrhea. No inflammatory    bowel wall thickening.   2. No CT findings to suggest metastatic disease in the abdomen or pelvis.      This document has been electronically signed by: Fernando Preciado MD on    03/29/2024 03:01 AM      All CTs at this facility use dose modulation techniques and iterative    reconstructions, and/or weight-based dosing   when appropriate to reduce radiation to a low as reasonably achievable.      XR CHEST PORTABLE   Final Result   1. New enlargement of the right hilum with volume loss of the right hemithorax. The patient has a known right hilar mass. This may represent right upper lobe collapse.   2. Patchy

## 2024-04-11 NOTE — PROGRESS NOTES
[facility]  STRZ ONC MED 5K  Occupational Therapy  Daily Note  Time:   Time In: 1058  Time Out: 1121  Timed Code Treatment Minutes: 23 Minutes  Minutes: 23          Date: 2024  Patient Name: Kalapna Braga,   Gender: female      Room: [facility]04/004-A  MRN: [id number]  : 1955  (68 y.o.)  Referring Practitioner: Marcus Ga MD  Diagnosis: lung mass  Additional Pertinent Hx: Per EMR \"Kalpana Braga is a 68 y.o. female active smoker with PMHx of COPD, HTN, depression, HLD, osteoarthritis who presents to [facility] with shortness of breath.  Patient reports starting  night she has been having shortness of breath worse with exertion.  Progressively worsening since .  She reports that she has been coughing up some light yellow-green sputum no fevers however she has had chills.  She denies any nausea or vomiting she denies being around anyone that is been sick.  She is a active smoker about a pack a day for 45 years.  No oxygen at baseline.  In the ED initially saturating 70% on room air, escalated to 6 L nasal cannula saturating 86 to 90%.  Denies any chest pain.\" code stroke 3/23.  CT angiogram head and neck read as an occlusion of the anterior branch of the left M2 segment of the MCA. MRI of brain without contrast shows acute infarct in the distribution of the left middle cerebral artery    Restrictions/Precautions:  Restrictions/Precautions: General Precautions, Fall Risk  Position Activity Restriction  Other position/activity restrictions: aphasia (expressive >receptive), impulsive     Social/Functional History:  Lives With: Spouse  Type of Home: House  Home Layout: Two level, Performs ADL's on one level  Home Access: Ramped entrance  Home Equipment: None   Bathroom Shower/Tub: Walk-in shower  Bathroom Toilet: Standard  Bathroom Equipment: Shower chair       ADL Assistance: Independent  Homemaking Assistance: Independent  Ambulation Assistance:  exercises with OT then stated that was enough and would not continue with more exercises.         Modified Andreas Scale:  +3 - Moderate disability; requiring some help, but able to walk without physical assistance (SBA/CGA).   Patient could not live alone but could walk from one room to another without physical help from another person.  Education provided regarding stroke rehabilitation management.    ASSESSMENT:     Activity Tolerance:  Patient tolerance of  treatment: Fair treatment tolerance      Discharge Recommendations: Subacute/skilled nursing facility and Patient would benefit from continued OT at discharge  Equipment Recommendations: Equipment Needed: No  Other: continue to monitor  Plan: Times Per Week: 5-6x  Current Treatment Recommendations: Strengthening, Balance training, Functional mobility training, Endurance training, Neuromuscular re-education, Safety education & training, Patient/Caregiver education & training, Equipment evaluation, education, & procurement, Self-Care / ADL    Education:  Learners: Patient  Energy Conservation and Importance of Increasing Activity    Goals  Short Term Goals  Time Frame for Short Term Goals: until discharge  Short Term Goal 1: Patient will safely complete various functional transfers with SBA in prep for toileting and showering.  Short Term Goal 2: Patient will improve functional ambulation to household distance with SBA and LRAD and min cues for safety to improve safety in living environment.  Short Term Goal 3: Patient will complete BADL routine MOD I with use of energy conservation techniques as needed.  Short Term Goal 4: Patient will sequence 2-3 step task with supervision and min vcs for problem-solving in prep for meal prep and laundry engagement.  Long Term Goals  Time Frame for Long Term Goals : None due to ELOS    Following session, patient left in safe position with all fall risk precautions in place.

## 2024-04-11 NOTE — PROGRESS NOTES
cognition    Restrictions/Precautions:  Restrictions/Precautions: General Precautions, Fall Risk  Position Activity Restriction  Other position/activity restrictions: aphasia (expressive >receptive), impulsive     SUBJECTIVE: Patient resting in recliner and pleasantly agrees to therapy session. RN ok'd treatment     PAIN: 0/10:     Vitals: Oxygen: >91% throughout treatment     OBJECTIVE:  Bed Mobility:  Not Tested    Transfers:  Sit to Stand: Stand By Assistance, cues for hand placement  Stand to Sit:Contact Guard Assistance, cues for hand placement    Ambulation:  Moderate Assistance  Distance: 20 ft   Surface: Level Tile  Device:Rolling Walker  Gait Deviations:  Forward Flexed Posture, Decreased Step Length Bilaterally, Decreased Gait Speed, Decreased Heel Strike Bilaterally, Wide Base of Support, and Mild Path Deviations  Mod A for line management, cues to remain within RW       Exercise:  Patient was guided in 1 set(s) 2 reps of exercise to both lower extremities.  Seated marches, Seated heel/toe raises, Long arc quads, and Seated abduction/adduction.  Exercises were completed for increased independence with functional mobility.    Functional Outcome Measures: Completed  -PAC Inpatient Mobility without Stair Climbing Raw Score : 15  AM-PAC Inpatient without Stair Climbing T-Scale Score : 43.03  Modified Worthington Scale:  Not Applicable    ASSESSMENT:  Assessment: Patient progressing toward established goals. Patient participating better in therapy session's and requires less encouragement   Activity Tolerance:  Patient tolerance of  treatment: good.      Equipment Recommendations:Equipment Needed: Yes (RW, w/c?)  Discharge Recommendations: Subacte/Skilled Nursing Facility  Plan: Current Treatment Recommendations: Strengthening, Balance training, Functional mobility training, Endurance training, Transfer training, Gait training, Safety education & training, Positioning, Equipment evaluation, education, &  procurement, Therapeutic activities, Patient/Caregiver education & training, Stair training, Neuromuscular re-education, Home exercise program  General Plan:  (6x N)    Education  Education:  Learners: Patient  Patient Education: Plan of Care, Home Exercise Program, Transfers, Gait    Goals:  Patient Goals : return home  Short Term Goals  Time Frame for Short Term Goals: by discharge  Short Term Goal 1: Pt will demo supine to/from sit transfers with IND with bed flat to progress with mobility.  Short Term Goal 2: Pt will demo S for sit to/from stand transfers with LRAD to progress with mobility.  Short Term Goal 3: Pt will amb for 40 feet with LRAD with SBA to progress with mobility.  Short Term Goal 4: Will complete functional outcome assessment when able.  Long Term Goals  Time Frame for Long Term Goals : NA due to short ELOS    Following session, patient left in safe position with all fall risk precautions in place.

## 2024-04-11 NOTE — PLAN OF CARE
Problem: Discharge Planning  Goal: Discharge to home or other facility with appropriate resources  4/10/2024 2117 by Jaqui Carvajal, RN  Outcome: Progressing     Problem: Safety - Adult  Goal: Free from fall injury  4/10/2024 2117 by Jaqui Carvajal, RN  Outcome: Progressing     Problem: Chronic Conditions and Co-morbidities  Goal: Patient's chronic conditions and co-morbidity symptoms are monitored and maintained or improved  4/10/2024 2117 by Jaqui Carvajal, RN  Outcome: Progressing

## 2024-04-11 NOTE — PROGRESS NOTES
Physician Progress Note        Patient:   Kalpana Braga  YOB: 1955  Age:  68 y.o.  Room:  [facility]04/004-  MRN:  [id number]   Acct: [id number]  PCP: Tatiana Velasquez APRN - CNP    Date of Admission:  3/21/2024  8:53 PM  Date of Service:  4/11/2024    Reason for Consult: Goals of Care.    History Obtained From:-  Patient, Electronic Medical Record, Patient's RN             Subjective   Kalpana Braga was seen in their room today for follow up with the palliative care team. There was not family present at the bedside. She is doing well this morning.  She is not complaining of any pain or shortness of breath. They have  Effexor 75 mg  for scheduled symptom relief. From 8 AM yesterday to 8 AM today, they have used Tylenol 650 mg once  for as needed symptom relief. Their medications are working well to control their symptoms. They did get a good night sleep. They are eating their meals. The patient did have a bowel movement in the last 24 hours. She additionally denied any nausea, vomiting, fatigue or drowsiness. She was seen with the NP student this morning.     Objective   Vitals:-    /65   Pulse 59   Temp 97.8 °F (36.6 °C) (Oral)   Resp 20   Ht 1.626 m (5' 4\")   Wt 89.2 kg (196 lb 10.4 oz)   SpO2 94%   BMI 33.76 kg/m²     Physical Exam  Vitals and nursing note reviewed.   Constitutional:       General: She is awake. She is not in acute distress.     Appearance: She is ill-appearing.   HENT:      Head: Normocephalic and atraumatic.      Right Ear: External ear normal.      Left Ear: External ear normal.      Nose: No rhinorrhea.   Eyes:      General:         Right eye: No discharge.         Left eye: No discharge.   Cardiovascular:      Rate and Rhythm: Normal rate.   Pulmonary:      Effort: Pulmonary effort is normal. No respiratory distress.   Musculoskeletal:      Cervical back: Neck supple.   Neurological:      Mental Status: She is alert.   Psychiatric:

## 2024-04-12 PROCEDURE — 97530 THERAPEUTIC ACTIVITIES: CPT

## 2024-04-12 PROCEDURE — 6370000000 HC RX 637 (ALT 250 FOR IP)

## 2024-04-12 PROCEDURE — 1200000000 HC SEMI PRIVATE

## 2024-04-12 PROCEDURE — 97116 GAIT TRAINING THERAPY: CPT

## 2024-04-12 PROCEDURE — 6370000000 HC RX 637 (ALT 250 FOR IP): Performed by: NURSE PRACTITIONER

## 2024-04-12 PROCEDURE — 99233 SBSQ HOSP IP/OBS HIGH 50: CPT | Performed by: STUDENT IN AN ORGANIZED HEALTH CARE EDUCATION/TRAINING PROGRAM

## 2024-04-12 PROCEDURE — 6370000000 HC RX 637 (ALT 250 FOR IP): Performed by: STUDENT IN AN ORGANIZED HEALTH CARE EDUCATION/TRAINING PROGRAM

## 2024-04-12 PROCEDURE — 2580000003 HC RX 258

## 2024-04-12 PROCEDURE — 2700000000 HC OXYGEN THERAPY PER DAY

## 2024-04-12 PROCEDURE — 94640 AIRWAY INHALATION TREATMENT: CPT

## 2024-04-12 PROCEDURE — 94761 N-INVAS EAR/PLS OXIMETRY MLT: CPT

## 2024-04-12 PROCEDURE — 97535 SELF CARE MNGMENT TRAINING: CPT

## 2024-04-12 PROCEDURE — 6360000002 HC RX W HCPCS

## 2024-04-12 PROCEDURE — 99232 SBSQ HOSP IP/OBS MODERATE 35: CPT | Performed by: HOSPITALIST

## 2024-04-12 RX ORDER — POLYETHYLENE GLYCOL 3350 17 G/17G
17 POWDER, FOR SOLUTION ORAL DAILY
Qty: 527 G | Refills: 1 | DISCHARGE
Start: 2024-04-13 | End: 2024-06-14

## 2024-04-12 RX ORDER — OXYCODONE HYDROCHLORIDE 5 MG/1
2.5 TABLET ORAL EVERY 6 HOURS PRN
Qty: 60 TABLET | Refills: 0 | Status: SHIPPED | OUTPATIENT
Start: 2024-04-12 | End: 2024-05-12

## 2024-04-12 RX ORDER — POTASSIUM CHLORIDE 20 MEQ/1
40 TABLET, EXTENDED RELEASE ORAL ONCE
Status: COMPLETED | OUTPATIENT
Start: 2024-04-12 | End: 2024-04-12

## 2024-04-12 RX ADMIN — HYDRALAZINE HYDROCHLORIDE 25 MG: 25 TABLET, FILM COATED ORAL at 17:52

## 2024-04-12 RX ADMIN — BENZONATATE 100 MG: 100 CAPSULE ORAL at 09:49

## 2024-04-12 RX ADMIN — SODIUM CHLORIDE, PRESERVATIVE FREE 10 ML: 5 INJECTION INTRAVENOUS at 20:45

## 2024-04-12 RX ADMIN — HYDROXYZINE HYDROCHLORIDE 10 MG: 10 TABLET ORAL at 09:48

## 2024-04-12 RX ADMIN — METOPROLOL TARTRATE 25 MG: 25 TABLET, FILM COATED ORAL at 20:43

## 2024-04-12 RX ADMIN — METOPROLOL TARTRATE 25 MG: 25 TABLET, FILM COATED ORAL at 09:48

## 2024-04-12 RX ADMIN — GUAIFENESIN 1200 MG: 600 TABLET, EXTENDED RELEASE ORAL at 20:43

## 2024-04-12 RX ADMIN — SODIUM CHLORIDE, PRESERVATIVE FREE 10 ML: 5 INJECTION INTRAVENOUS at 09:50

## 2024-04-12 RX ADMIN — ATORVASTATIN CALCIUM 40 MG: 40 TABLET, FILM COATED ORAL at 20:43

## 2024-04-12 RX ADMIN — POLYETHYLENE GLYCOL 3350 17 G: 17 POWDER, FOR SOLUTION ORAL at 09:49

## 2024-04-12 RX ADMIN — OXYCODONE 2.5 MG: 5 TABLET ORAL at 20:43

## 2024-04-12 RX ADMIN — OXYCODONE 2.5 MG: 5 TABLET ORAL at 07:52

## 2024-04-12 RX ADMIN — HYDROXYZINE HYDROCHLORIDE 10 MG: 10 TABLET ORAL at 22:22

## 2024-04-12 RX ADMIN — HYDRALAZINE HYDROCHLORIDE 25 MG: 25 TABLET, FILM COATED ORAL at 09:50

## 2024-04-12 RX ADMIN — VENLAFAXINE HYDROCHLORIDE 75 MG: 75 CAPSULE, EXTENDED RELEASE ORAL at 09:48

## 2024-04-12 RX ADMIN — TRAZODONE HYDROCHLORIDE 50 MG: 50 TABLET ORAL at 20:43

## 2024-04-12 RX ADMIN — ENOXAPARIN SODIUM 40 MG: 100 INJECTION SUBCUTANEOUS at 09:49

## 2024-04-12 RX ADMIN — FERROUS SULFATE TAB 325 MG (65 MG ELEMENTAL FE) 325 MG: 325 (65 FE) TAB at 09:48

## 2024-04-12 RX ADMIN — CLOPIDOGREL BISULFATE 75 MG: 75 TABLET ORAL at 09:48

## 2024-04-12 RX ADMIN — POTASSIUM CHLORIDE 40 MEQ: 1500 TABLET, EXTENDED RELEASE ORAL at 09:49

## 2024-04-12 RX ADMIN — LOSARTAN POTASSIUM 100 MG: 100 TABLET, FILM COATED ORAL at 09:48

## 2024-04-12 RX ADMIN — TIOTROPIUM BROMIDE AND OLODATEROL 2 PUFF: 3.124; 2.736 SPRAY, METERED RESPIRATORY (INHALATION) at 08:08

## 2024-04-12 RX ADMIN — GUAIFENESIN 1200 MG: 600 TABLET, EXTENDED RELEASE ORAL at 09:48

## 2024-04-12 RX ADMIN — ASPIRIN 81 MG 81 MG: 81 TABLET ORAL at 09:48

## 2024-04-12 ASSESSMENT — PAIN DESCRIPTION - DESCRIPTORS
DESCRIPTORS: ACHING
DESCRIPTORS: ACHING

## 2024-04-12 ASSESSMENT — PAIN DESCRIPTION - ORIENTATION: ORIENTATION: MID

## 2024-04-12 ASSESSMENT — PAIN DESCRIPTION - LOCATION
LOCATION: GENERALIZED
LOCATION: CHEST

## 2024-04-12 ASSESSMENT — PAIN SCALES - GENERAL
PAINLEVEL_OUTOF10: 4
PAINLEVEL_OUTOF10: 7
PAINLEVEL_OUTOF10: 4
PAINLEVEL_OUTOF10: 0

## 2024-04-12 NOTE — PROGRESS NOTES
Hospitalist Progress Note      Patient:  Kalpana Braga    Unit/Bed:5K-04/004-A  YOB: 1955  MRN: [id number]   Acct: [id number]   PCP: Tatiana Velasquez APRN - CNP  Date of Admission: 3/21/2024    Assessment/Plan:    Passive management of problem list below at this time. ECF confirmed at this time, waiting for precert. Will need oncology outpatient.  Inpatient oncology not indicated at this time.  Patient denied from [facility] 2/2 refusing therapy over the weekend.  Evaluated by PT this morning, referral made to [facility] in process.    #Non-small cell carcinoma of the lung, 3.9 x 5.5 cm right suprahilar mass: CT chest shows hypodense 3.9 x 5.5 cm right suprahilar mass extending to right mediastinum and subcarinal region.  Groundglass interstitial opacities with complete right upper lung collapse 2/2 mass. Patient does have extensive smoking history.    EBUS 4/2/24 showed evidence of non small cell carcinoma. Oncology and radiation oncology consulted per pulmonology team.  Patient received radiation treatment per radiation oncology recommendations given mass obstructing right mainstem bronchus. Tolerated well.  Plan  -Pulmonology consulted, cleared for discharge from their standpoint  -Will need outpatient oncology follow-up with staging workup such at PET scan, CTAP, etc.  -Oncology and radiation oncology consult, appreciate recommendations, received radiation inpatient per radiation oncology, tolerated well.  -Palliative care consulted at request of patient and family, plan to start oxycodone IR 2.5 mg q6h as needed for shortness of breath.  Will follow-up with palliative care clinic on discharge.    #Acute hypoxic respiratory failure, multifactorial due to newly diagnosed non-small cell carcinoma, PNA, COPD exacerbation, stable: Required 6L nasal cannula in ED.  Endorsed shortness of breath, cough, yellow/green sputum production.   signed by: Fernando Preciado MD on    03/21/2024 11:14 PM      All CTs at this facility use dose modulation techniques and iterative    reconstructions, and/or weight-based dosing   when appropriate to reduce radiation to a low as reasonably achievable.      3D Post-processing was performed on this study.      XR CHEST PORTABLE   Final Result   1. Faint hazy opacities in the left mid lung may reflect infiltrates.   2. New consolidation versus atelectasis in the right lung apex. CT can be    helpful for further evaluation.      This document has been electronically signed by: Fernando Preciado MD on    03/21/2024 10:22 PM      CT CHEST WO CONTRAST    (Results Pending)     CTA CHEST W WO CONTRAST PE Eval    Result Date: 3/21/2024  CT angiography chest with contrast. 3D Postprocessing. Comparison: CR/SR - XR CHEST PORTABLE - 03/21/2024 09:45 PM EDT Findings: Dilatation of the central pulmonary artery at 3.8 cm. Extrinsic compression of a few right upper lobe pulmonary arterial branches. Pulmonary arteries are otherwise well opacified. Scattered plaque is seen in the thoracic aorta without aneurysm or dissection. Hypodense 3.9 x 5.5 cm right suprahilar mass extending to the right mediastinum and subcarinal region. This causes right upper lobe bronchial compression with complete right upper lung atelectasis. Compensatory hyperinflation of the right middle and right lower lungs. Additional nonspecific patchy ground-glass consolidations scattered throughout the left upper and lower lungs. No pleural effusion or pneumothorax. The heart is not enlarged. There is no pericardial effusion. The thyroid is unremarkable. Visualized upper abdomen is unremarkable. No acute fractures.     1. No pulmonary emboli. 2. Hypodense 3.9 x 5.5 cm right suprahilar mass extending to the right mediastinum and subcarinal region, suspicious for neoplasm until proven otherwise. 3. Complete right upper lung collapse secondary to the right suprahilar mass. 4.

## 2024-04-12 NOTE — PLAN OF CARE
Problem: Discharge Planning  Goal: Discharge to home or other facility with appropriate resources  Outcome: Progressing  Flowsheets (Taken 4/11/2024 2213)  Discharge to home or other facility with appropriate resources:   Identify barriers to discharge with patient and caregiver   Arrange for needed discharge resources and transportation as appropriate   Identify discharge learning needs (meds, wound care, etc)   Refer to discharge planning if patient needs post-hospital services based on physician order or complex needs related to functional status, cognitive ability or social support system     Problem: Safety - Adult  Goal: Free from fall injury  Outcome: Progressing  Note: Patient remains free from falls this shift. Fall precautions in place with bed/chair exit alarmed. Fall sign posted and fall armband in place. Nonskid footwear used with transferring. Educated patient to use call light when in need of staff assistance with transferring, ambulating, and other activities of daily living. Patient appropriately uses call light this shift.        Problem: Chronic Conditions and Co-morbidities  Goal: Patient's chronic conditions and co-morbidity symptoms are monitored and maintained or improved  Outcome: Progressing  Flowsheets (Taken 4/11/2024 2213)  Care Plan - Patient's Chronic Conditions and Co-Morbidity Symptoms are Monitored and Maintained or Improved:   Monitor and assess patient's chronic conditions and comorbid symptoms for stability, deterioration, or improvement   Collaborate with multidisciplinary team to address chronic and comorbid conditions and prevent exacerbation or deterioration   Update acute care plan with appropriate goals if chronic or comorbid symptoms are exacerbated and prevent overall improvement and discharge     Problem: Pain  Goal: Verbalizes/displays adequate comfort level or baseline comfort level  Outcome: Progressing  Flowsheets (Taken 4/11/2024 2213)  Verbalizes/displays adequate  Progressing  Flowsheets (Taken 4/11/2024 2213)  Achieves stable or improved neurological status: Assess for and report changes in neurological status  Goal: Achieves maximal functionality and self care  Outcome: Progressing  Flowsheets (Taken 4/11/2024 2213)  Achieves maximal functionality and self care:   Monitor swallowing and airway patency with patient fatigue and changes in neurological status   Encourage and assist patient to increase activity and self care with guidance from physical therapy/occupational therapy     Problem: Nutrition Deficit:  Goal: Optimize nutritional status  Outcome: Progressing  Flowsheets (Taken 4/11/2024 2213)  Nutrient intake appropriate for improving, restoring, or maintaining nutritional needs:   Assess nutritional status and recommend course of action   Monitor oral intake, labs, and treatment plans   Care plan reviewed with patient.  Patient verbalizes understanding of the plan of care and contribute to goal setting.

## 2024-04-12 NOTE — PROGRESS NOTES
Speech: Speech normal. She is communicative.         Behavior: Behavior is not agitated, aggressive or hyperactive. Behavior is cooperative.          Palliative Performance Scale   50%  Mainly sit/lie; Extensive disease; Can't do any work; Considerable assist; intake normal or reduced; LOC full/confusion     Assessment and Plan   Kalpana Braga is a 68 y.o. who is admitted to [facility] for Dyspnea. Palliative care is consulted for Goals of Care.  Kalpana is doing well.  Her symptoms are well-controlled on her current medications.  We will continue to these at this time.  We are awaiting pre-CERT for discharge.  Hopefully this comes through soon.  We will continue his current medication at discharge.  Palliative care will continue to follow patient while she is  hospitalized and on discharge and outpatient palliative care clinic for further goals of care discussions.         Principal Problem:    Dyspnea  Active Problems:    Acute hypoxemic respiratory failure (HCC)    Lung mass    Pneumonia of both lungs due to infectious organism    CVA (cerebral vascular accident) (HCC)    Moderate malnutrition (HCC)    Malignant neoplasm of upper lobe of right lung (HCC)    Palliative care patient  Resolved Problems:    * No resolved hospital problems. *     Continue current plan of care for chronic diseases per primary and specialist teams  Continue Oxycodone IR 2.5 mg every 6 hours as needed for uncontrolled shortness of breath  Continue Miralax daily for constipation  Will refer to Palliative Care Clinic on discharge  Palliative Care will continue to have goals of care discussions with the patient and/or family  Palliative Care will continue to follow the patient while they are hospitalized   Please Perfect[employer]erve or call the Palliative Care team with any questions or concerns  Palliative care prescriptions will be/have been sent to patient's outpatient pharmacy    CURRENT CODE STATUS:  Full Code No additional

## 2024-04-12 NOTE — PROGRESS NOTES
[facility]  STRZ ONC MED 5K  Occupational Therapy  Daily Note  Time:   Time In: 1040  Time Out: 1126  Timed Code Treatment Minutes: 46 Minutes  Minutes: 46          Date: 2024  Patient Name: Kalpana Barga,   Gender: female      Room: [facility]04/004-A  MRN: [id number]  : 1955  (68 y.o.)  Referring Practitioner: Marcus Ga MD  Diagnosis: lung mass  Additional Pertinent Hx: Per EMR \"Kalpana Braga is a 68 y.o. female active smoker with PMHx of COPD, HTN, depression, HLD, osteoarthritis who presents to [facility] with shortness of breath.  Patient reports starting  night she has been having shortness of breath worse with exertion.  Progressively worsening since .  She reports that she has been coughing up some light yellow-green sputum no fevers however she has had chills.  She denies any nausea or vomiting she denies being around anyone that is been sick.  She is a active smoker about a pack a day for 45 years.  No oxygen at baseline.  In the ED initially saturating 70% on room air, escalated to 6 L nasal cannula saturating 86 to 90%.  Denies any chest pain.\" code stroke 3/23.  CT angiogram head and neck read as an occlusion of the anterior branch of the left M2 segment of the MCA. MRI of brain without contrast shows acute infarct in the distribution of the left middle cerebral artery    Restrictions/Precautions:  Restrictions/Precautions: General Precautions, Fall Risk  Position Activity Restriction  Other position/activity restrictions: aphasia (expressive >receptive), impulsive     Social/Functional History:  Lives With: Spouse  Type of Home: House  Home Layout: Two level, Performs ADL's on one level  Home Access: Ramped entrance  Home Equipment: None   Bathroom Shower/Tub: Walk-in shower  Bathroom Toilet: Standard  Bathroom Equipment: Shower chair       ADL Assistance: Independent  Homemaking Assistance: Independent  Ambulation Assistance:

## 2024-04-12 NOTE — CARE COORDINATION
4/12/24, 2:40 PM EDT    DISCHARGE PLANNING EVALUATION       Spoke with Roseline at [facility].  Pre-cert is not back at this time. Roseline has the floor number if it comes back and packet is on the chart.

## 2024-04-12 NOTE — ADT AUTH CERT
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   Reg Status PCP Date Last Verified Next Review Date   Verified Tatiana Velasquez, APRN - CNP  [phone] 03/28/24 04/11/24   Admission Date Discharge Date Admitting Provider     03/21/24 -- Sulema Lagos MD    Marital Status Taoism        Scientology     Emergency Contact 1 Emergency Contact 2  Kristopher Paola (6)  [place]  [phone] (H) Dario Paola (6)  [place]  [phone] (H)  Physician Progress NotesNotes from 04/09/24 through 04/12/24  Progress Notes by Kike York DO at 4/11/2024  3:04 PM  Author: Kike York DO Service: Internal Medicine Author Type: Resident  Filed: 4/11/2024  3:09 PM Date of Service: 4/11/2024  3:04 PM Status: Attested  : Kike York DO (Resident) Cosigner: Sulema Lagos MD at 4/11/2024  4:08 PM  Attestation signed by Sulema Lagos MD at 4/11/2024  4:08 PM  I have discussed the case, including pertinent history and exam findings with the treating resident. I have assessed the patient independently and discussed the findings and my opinion with the treating resident.  I have thoroughly read the Resident\"s note.   I agree with the Resident's physical exam, assessment and plan for treatment, with any exceptions listed below.       EXCEPTIONS/ADDITIONAL COMMENTS: None; agree with details of exam and plan.      Expand All Collapse All                                                   untitled image  Hospitalist Progress Note        Patient:  Kalpana Braga                     Unit/Bed:[facility]04/004-A  YOB: 1955  MRN: [id number]          Acct:  0900(s)  03/31/24 0903(a)  03/31/24 0903(r) 25 mg   Oral      Coon, Guerline A  33 Given 03/31/24 1700(s)  03/31/24 1636(a)  03/31/24 1636(r) 25 mg   Oral      Coon, Guerline A  34 Given 04/01/24 0100(s)  04/01/24 0223(a)  04/01/24 0224(r) 25 mg   Oral      Kellie Payan  35 Given 04/01/24 0900(s)  04/01/24 0801(a)  04/01/24 0801(r) 25 mg   Oral      DamianBrian  36 Given 04/01/24 1700(s)  04/01/24 1632(a)  04/01/24 1632(r) 25 mg   Oral      Damian, Brian  37 Given 03/29/24 0900(s)  03/29/24 0800(a)  03/29/24 0803(r) 100 mg   Oral      Juve Sheffield  38 Given 03/30/24 0900(s)  03/30/24 0938(a)  03/30/24 0939(r) 100 mg   Oral      Juana Lares  39 Given 03/31/24 0900(s)  03/31/24 0904(a)  03/31/24 0905(r) 100 mg   Oral      Coon, Guerline A  40 Given 04/01/24 0900(s)  04/01/24 0801(a)  04/01/24 0801(r) 100 mg   Oral      DamianBrian  41 Not Given 03/29/24 0900(s)  03/29/24 0800(a)  03/29/24 0803(r) 25 mg   Oral     Reason: Contraindicated Juve Sheffield  42 Given 03/29/24 2100(s)  03/30/24 0009(a)  03/30/24 0009(r) 25 mg   Oral      Audie Alfaro  43 Not Given 03/30/24 0900(s)  03/30/24 0939(a)  03/30/24 0940(r) 25 mg   Oral     held for HR 51  Reason: Order parameters not met Juana Lares  44 Not Given 03/30/24 2100(s)  03/30/24 2104(a)  03/30/24 2104(r) 25 mg   Oral     Reason: Order parameters not met Audie Alfaro  45 Not Given 03/31/24 0900(s)  03/31/24 0904(a)  03/31/24 0905(r) 25 mg   Oral     HR52  Reason: Order parameters not met Guerline Bailey  46 Given 03/31/24 2100(s)  03/31/24 2119(a)  03/31/24 2119(r) 25 mg   Oral      Kellie Payan  47 Given 04/01/24 0900(s)  04/01/24 0801(a)  04/01/24 0801(r) 25 mg   Oral      Brian Salgado  48 Given 04/01/24 2100(s)  04/01/24 1943(a)  04/01/24 1943(r) 25 mg   Oral      Page Hernandez  49 Not Given 03/31/24 1130(s)  03/31/24 1130(a)  03/31/24 1130(r) 17 g   Oral     Loose bowel movement  Reason: Other Guerline Bailey A  50 Given 04/01/24

## 2024-04-12 NOTE — PROGRESS NOTES
[facility]  INPATIENT PHYSICAL THERAPY  DAILY NOTE  STRZ ONC MED 5K - 5K-04/004-A    Time In: 1400  Time Out: 1438  Timed Code Treatment Minutes: 38 Minutes  Minutes: 38          Date: 2024  Patient Name: Kalpana Braga,  Gender:  female        MRN: [id number]  : 1955  (68 y.o.)     Referring Practitioner: Marcus Ga MD  Diagnosis: lung mass  Additional Pertinent Hx: Per EMR \"Kalpana Braga is a 68 y.o. female active smoker with PMHx of COPD, HTN, depression, HLD, osteoarthritis who [facility].  Patient reports starting  night she has been having shortness of breath worse with exertion.  Progressively worsening since .  She reports that she has been coughing up some light yellow-green sputum no fevers however she has had chills.  She denies any nausea or vomiting she denies being around anyone that is been sick.  She is a active smoker about a pack a day for 45 years.  No oxygen at baseline.  In the ED initially saturating 70% on room air, escalated to 6 L nasal cannula saturating 86 to 90%.  Denies any chest pain.\" code stroke 3/23.  CT angiogram head and neck read as an occlusion of the anterior branch of the left M2 segment of the MCA. MRI of brain without contrast shows acute infarct in the distribution of the left middle cerebral artery. -- s/p EBUS 4/4     Prior Level of Function:  Lives With: Spouse  Type of Home: House  Home Layout: Two level, Performs ADL's on one level  Home Access: Ramped entrance  Home Equipment: None   Bathroom Shower/Tub: Walk-in shower  Bathroom Toilet: Standard  Bathroom Equipment: Shower chair    ADL Assistance: Independent  Homemaking Assistance: Independent  Ambulation Assistance: Independent  Transfer Assistance: Independent  Active : Yes  Additional Comments: Pt reports being IND with functional tasks prior, no use of an AD. Unsure of accracy of information due to  & training, Stair training, Neuromuscular re-education, Home exercise program  General Plan:  (6x N)    Education  Education:  Learners: Patient  Patient Education: Plan of Care, Home Exercise Program, Transfers, Gait    Goals:  Patient Goals : return home  Short Term Goals  Time Frame for Short Term Goals: by discharge  Short Term Goal 1: Pt will demo supine to/from sit transfers with IND with bed flat to progress with mobility.  Short Term Goal 2: Pt will demo S for sit to/from stand transfers with LRAD to progress with mobility.  Short Term Goal 3: Pt will amb for 40 feet with LRAD with SBA to progress with mobility.  Short Term Goal 4: Will complete functional outcome assessment when able.  Long Term Goals  Time Frame for Long Term Goals : NA due to short ELOS    Following session, patient left in safe position with all fall risk precautions in place.

## 2024-04-12 NOTE — PLAN OF CARE
Problem: Discharge Planning  Goal: Discharge to home or other facility with appropriate resources  Outcome: Progressing  Discharge plan is in process. Plan discharge to ECF.     Problem: Safety - Adult  Goal: Free from fall injury  Outcome: Progressing  Fall assessment completed. Patient using call light appropriately to call for assistance with ambulation to bathroom.  Personal items within reach. Patient is also compliant with use of non-skid slippers.      Problem: Chronic Conditions and Co-morbidities  Goal: Patient's chronic conditions and co-morbidity symptoms are monitored and maintained or improved  Outcome: Progressing     Patient's chronic conditions and co-morbidity symptoms are monitored and maintained.    Problem: Pain  Goal: Verbalizes/displays adequate comfort level or baseline comfort level  Outcome: Progressing  Patient states pain relief from PRN pain medications. Pain reassessed one hour post PRN pain medication given.  Patient rates pain 0 on LELA 0-10 scale.     Problem: Skin/Tissue Integrity  Goal: Absence of new skin breakdown  Description: 1.  Monitor for areas of redness and/or skin breakdown  2.  Assess vascular access sites hourly  3.  Every 4-6 hours minimum:  Change oxygen saturation probe site  4.  Every 4-6 hours:  If on nasal continuous positive airway pressure, respiratory therapy assess nares and determine need for appliance change or resting period.  Outcome: Progressing  No skin breakdown this shift. Patient being assisted with turning. Patients states understanding of repositioning every two hours.     Problem: Respiratory - Adult  Goal: Achieves optimal ventilation and oxygenation  Outcome: Progressing  Patients oxygen saturation 96 % on 4 L02 per Nasal Canula.  No shortness of breath noted. Lung sounds clear, able C&DB as ordered.     Problem: Infection - Adult  Goal: Absence of infection at discharge  Outcome: Progressing  No s/s infection for shift. VS-WNL.     Problem:

## 2024-04-13 ENCOUNTER — APPOINTMENT (OUTPATIENT)
Age: 69
End: 2024-04-13
Payer: COMMERCIAL

## 2024-04-13 VITALS
DIASTOLIC BLOOD PRESSURE: 53 MMHG | WEIGHT: 196.65 LBS | SYSTOLIC BLOOD PRESSURE: 106 MMHG | OXYGEN SATURATION: 94 % | TEMPERATURE: 98.3 F | RESPIRATION RATE: 16 BRPM | HEART RATE: 65 BPM | BODY MASS INDEX: 33.57 KG/M2 | HEIGHT: 64 IN

## 2024-04-13 DIAGNOSIS — C34.91 SQUAMOUS CELL CARCINOMA LUNG, RIGHT (HCC): Primary | ICD-10-CM

## 2024-04-13 LAB — ECHO BSA: 1.97 M2

## 2024-04-13 PROCEDURE — 6370000000 HC RX 637 (ALT 250 FOR IP)

## 2024-04-13 PROCEDURE — 6360000002 HC RX W HCPCS

## 2024-04-13 PROCEDURE — 99239 HOSP IP/OBS DSCHRG MGMT >30: CPT | Performed by: HOSPITALIST

## 2024-04-13 PROCEDURE — 6370000000 HC RX 637 (ALT 250 FOR IP): Performed by: NURSE PRACTITIONER

## 2024-04-13 PROCEDURE — 94761 N-INVAS EAR/PLS OXIMETRY MLT: CPT

## 2024-04-13 PROCEDURE — 2580000003 HC RX 258

## 2024-04-13 PROCEDURE — 2700000000 HC OXYGEN THERAPY PER DAY

## 2024-04-13 PROCEDURE — 93270 REMOTE 30 DAY ECG REV/REPORT: CPT

## 2024-04-13 PROCEDURE — 94640 AIRWAY INHALATION TREATMENT: CPT

## 2024-04-13 RX ORDER — TRAZODONE HYDROCHLORIDE 50 MG/1
50 TABLET ORAL NIGHTLY
Qty: 15 TABLET | Refills: 0 | DISCHARGE
Start: 2024-04-13 | End: 2024-04-28

## 2024-04-13 RX ORDER — ASPIRIN 81 MG/1
81 TABLET, CHEWABLE ORAL DAILY
Qty: 30 TABLET | Refills: 1 | DISCHARGE
Start: 2024-04-13

## 2024-04-13 RX ORDER — FERROUS SULFATE 325(65) MG
325 TABLET ORAL EVERY OTHER DAY
Qty: 30 TABLET | Refills: 3 | DISCHARGE
Start: 2024-04-14

## 2024-04-13 RX ORDER — HYDROXYZINE HYDROCHLORIDE 10 MG/1
10 TABLET, FILM COATED ORAL 3 TIMES DAILY PRN
Qty: 30 TABLET | Refills: 0 | DISCHARGE
Start: 2024-04-13 | End: 2024-04-23

## 2024-04-13 RX ORDER — OXYCODONE HYDROCHLORIDE 5 MG/1
2.5 TABLET ORAL EVERY 6 HOURS PRN
Qty: 28 TABLET | Refills: 0 | Status: SHIPPED | OUTPATIENT
Start: 2024-04-13 | End: 2024-04-27

## 2024-04-13 RX ORDER — HYDRALAZINE HYDROCHLORIDE 25 MG/1
25 TABLET, FILM COATED ORAL 3 TIMES DAILY
Qty: 90 TABLET | Refills: 1 | DISCHARGE
Start: 2024-04-13

## 2024-04-13 RX ORDER — GUAIFENESIN 600 MG/1
1200 TABLET, EXTENDED RELEASE ORAL 2 TIMES DAILY
Qty: 60 TABLET | Refills: 1 | DISCHARGE
Start: 2024-04-13

## 2024-04-13 RX ORDER — CLOPIDOGREL BISULFATE 75 MG/1
75 TABLET ORAL DAILY
Qty: 30 TABLET | Refills: 1 | DISCHARGE
Start: 2024-04-13

## 2024-04-13 RX ADMIN — BENZONATATE 100 MG: 100 CAPSULE ORAL at 08:19

## 2024-04-13 RX ADMIN — ENOXAPARIN SODIUM 40 MG: 100 INJECTION SUBCUTANEOUS at 08:18

## 2024-04-13 RX ADMIN — HYDRALAZINE HYDROCHLORIDE 25 MG: 25 TABLET, FILM COATED ORAL at 01:28

## 2024-04-13 RX ADMIN — TIOTROPIUM BROMIDE AND OLODATEROL 2 PUFF: 3.124; 2.736 SPRAY, METERED RESPIRATORY (INHALATION) at 09:40

## 2024-04-13 RX ADMIN — CLOPIDOGREL BISULFATE 75 MG: 75 TABLET ORAL at 08:18

## 2024-04-13 RX ADMIN — SODIUM CHLORIDE, PRESERVATIVE FREE 10 ML: 5 INJECTION INTRAVENOUS at 08:21

## 2024-04-13 RX ADMIN — VENLAFAXINE HYDROCHLORIDE 75 MG: 75 CAPSULE, EXTENDED RELEASE ORAL at 08:19

## 2024-04-13 RX ADMIN — GUAIFENESIN 1200 MG: 600 TABLET, EXTENDED RELEASE ORAL at 08:19

## 2024-04-13 RX ADMIN — LOSARTAN POTASSIUM 100 MG: 100 TABLET, FILM COATED ORAL at 08:19

## 2024-04-13 RX ADMIN — HYDRALAZINE HYDROCHLORIDE 25 MG: 25 TABLET, FILM COATED ORAL at 08:19

## 2024-04-13 RX ADMIN — ASPIRIN 81 MG 81 MG: 81 TABLET ORAL at 08:18

## 2024-04-13 RX ADMIN — METOPROLOL TARTRATE 25 MG: 25 TABLET, FILM COATED ORAL at 08:20

## 2024-04-13 ASSESSMENT — PAIN SCALES - GENERAL
PAINLEVEL_OUTOF10: 0
PAINLEVEL_OUTOF10: 0

## 2024-04-13 NOTE — PLAN OF CARE
Problem: Respiratory - Adult  Goal: Clear lung sounds  Outcome: Progressing  Note: Mdi to maintain open airways. Patient mutually agreed on goals.

## 2024-04-13 NOTE — PROGRESS NOTES
Pt left for Deuce.  This RN found dentures in the case lying in room.  This RN called and notified Maria Fernanda, and attempted to call patients' brother Kristopher, and Dario, unable to reach at this time.   1800- received call from Estephania Summers to come 4/14 to  patients dentures.

## 2024-04-13 NOTE — PLAN OF CARE
Problem: Discharge Planning  Goal: Discharge to home or other facility with appropriate resources  4/13/2024 0823 by Watson Mcbride, RN  Outcome: Progressing     Problem: Safety - Adult  Goal: Free from fall injury  4/13/2024 0823 by Watson Mcbride, RN  Outcome: Progressing     Problem: Chronic Conditions and Co-morbidities  Goal: Patient's chronic conditions and co-morbidity symptoms are monitored and maintained or improved  4/13/2024 0823 by Watson Mcbride, RN  Outcome: Progressing     Problem: Pain  Goal: Verbalizes/displays adequate comfort level or baseline comfort level  4/13/2024 0823 by Watson Mcbride, RN  Outcome: Progressing

## 2024-04-13 NOTE — DISCHARGE SUMMARY
Resident Discharge Summary (Hospitalist)      Patient Identification:   Kalpana Braga   : 1955  MRN: [id number]   Account: [id number]   Patient's PCP: Tatiana Velasquez APRN - CNP    Admit Date: 3/21/2024   Discharge Date:   2024    Admitting Physician: Sulema Lagos MD  Discharge Physician: Kike York DO       Discharge Diagnoses:  Non-small cell lung carcinoma w/ 3.9 x 5.5 cm right suprahilar mass  Diagnosed on EBUS performed 2024  Received dose of radiation inpatient  Plan to follow-up with oncology after completion of PET scan. Will need full staging workup  Acute hypoxic respiratory failure likely 2/2 new diagnosis of cancer, pneumonia, COPD exacerbation, improved  Completed antibiotics and steroids  Oxygenation status improved, requiring mild O2 supplementation on NC  Patient saw palliative care, oxycodone IR 2.5 mg q6h prescribed for uncontrolled shortness of breath  Follow-up with palliative care outpatient  Left MCA stroke  Moist diet w/ thin liquids  Continue aspirin and Plavix  Continue Lipitor  30-day cardiac event monitor on discharge  Follow-up with neurology outpatient  HTN  Home medications include Norvasc and losartan  Regimen on discharge: Lopressor 25 mg twice daily, hydralazine 25 mg 3 times daily, losartan 100 mg daily  Mild hyponatremia, resolved  Suspected CAP, resolved  Suspected COPD exacerbation, resolved  Leukocytosis, resolved  HAGMA, resolved  Elevated troponin, resolved  N/V/D 2/2 viral illness, resolved  HTN  HLD   Anxiety/depression      Hospital Course:   Kalpana Braga is a 68 y.o. female with PMHx COPD, hypertension, hyperlipidemia, anxiety/depression admitted to [facility] on 3/21/2024 for increased shortness of breath.  Initially diagnosed with suspected CAP and COPD exacerbation and completed steroid and antibiotic course.  Patient noted to have a large right suprahilar mass on CT, pulmonology evaluated and performed EBUS with  extended release tablet  Commonly known as: TYLENOL     losartan 100 MG tablet  Commonly known as: COZAAR  Take 1 tablet by mouth daily     UNABLE TO FIND  1 each by Does not apply route continuous               Where to Get Your Medications        You can get these medications from any pharmacy    Bring a paper prescription for each of these medications  oxyCODONE 5 MG immediate release tablet       Information about where to get these medications is not yet available    Ask your nurse or doctor about these medications  aspirin 81 MG chewable tablet  clopidogrel 75 MG tablet  ferrous sulfate 325 (65 Fe) MG tablet  guaiFENesin 600 MG extended release tablet  hydrALAZINE 25 MG tablet  hydrOXYzine HCl 10 MG tablet  metoprolol tartrate 25 MG tablet  polyethylene glycol 17 g packet  tiotropium-olodaterol 2.5-2.5 MCG/ACT Aers  traZODone 50 MG tablet          Time Spent on discharge is 56 minutes in the examination, evaluation, counseling and review of medications and discharge plan.    Thank you Tatiana Velasquez, PAO - MIKHAIL for the opportunity to be involved in this patient's care.      Signed:    Electronically Signed by  Kike York DO, MBA  PGY-1 Internal Medicine Resident  [facility]  On 4/13/2024 at 3:05 PM    Case was discussed with Attending, Dr. Lagos    This report has been created using voice recognition software. It may contain minor errors which are inherent in voice recognition technology

## 2024-04-13 NOTE — PROGRESS NOTES
[facility]  PHYSICAL THERAPY MISSED TREATMENT NOTE  STRZ ONC MED 5K    Date: 2024  Patient Name: Kalpana Braga        MRN: [id number]   : 1955  (68 y.o.)  Gender: female   Referring Practitioner: Marcus Ga MD  Diagnosis: lung mass         REASON FOR MISSED TREATMENT:  Missed Treat.  Per nursing, pt being d/c to SNF shortly.

## 2024-04-13 NOTE — DISCHARGE INSTRUCTIONS
-Medication changes/additions per summary  -Will require oxygen at [place]  -Follow-ups have been made with pulmonology, sleep clinic, PCP, neurology.  Will need follow-up with oncology, to be scheduled by their office after PET scan has been scheduled.  
positive S1/positive S2

## 2024-04-15 ENCOUNTER — TELEPHONE (OUTPATIENT)
Dept: FAMILY MEDICINE CLINIC | Age: 69
End: 2024-04-15

## 2024-04-15 NOTE — TELEPHONE ENCOUNTER
[facility] Transitions Initial Follow Up Call    Outreach made within 2 business days of discharge: Yes    Patient: Kalpana Braga Patient : 1955   MRN: [id number]  Reason for Admission: There are no discharge diagnoses documented for the most recent discharge.  Discharge Date: 24       Spoke with: kalpana    Discharge department/facility: [facility]    TCM Interactive Patient Contact:  Was patient able to fill all prescriptions: Yes  Was patient instructed to bring all medications to the follow-up visit: Yes  Is patient taking all medications as directed in the discharge summary? Yes  Does patient understand their discharge instructions: Yes  Does patient have questions or concerns that need addressed prior to 7-14 day follow up office visit: no  Currently admitted to [facility]    Scheduled appointment with PCP within 7-14 days    Follow Up  Future Appointments   Date Time Provider Department Center   2024  9:45 AM STR PET IMAGING ROOM STRZ PET STR Rad/Card   2024  2:00 PM Cesar Samson MD N Oncology [facility]   2024  2:00 PM Carmine Maradiaga MD N SRPX PALLI [facility]   5/15/2024  8:15 PM SCHEDULE, STR SLEEP TECH 01 STRZ SLEEP [place] HOD   2024 10:30 AM STR PULMONARY FUNCTION ROOM 1 STR PFT [facility]   2024 11:40 AM STR CT IMAGING RM1  [facility] STRZ OUT EXP STR Rad/Card   2024  1:30 PM Laurel Floyd, APRN - CNP N Pulm Med [facility]   2024  1:00 PM Lolly Baron, APRHANSEL - CNP N Pulm Med [facility]       Carly Maya MA

## 2024-04-16 VITALS
OXYGEN SATURATION: 92 % | DIASTOLIC BLOOD PRESSURE: 60 MMHG | WEIGHT: 201.8 LBS | HEART RATE: 89 BPM | SYSTOLIC BLOOD PRESSURE: 110 MMHG | RESPIRATION RATE: 18 BRPM | BODY MASS INDEX: 34.64 KG/M2 | TEMPERATURE: 98.4 F

## 2024-04-16 ASSESSMENT — ENCOUNTER SYMPTOMS
NAUSEA: 0
WHEEZING: 0
CONSTIPATION: 0
COUGH: 0
RHINORRHEA: 0
EYE REDNESS: 0
EYE DISCHARGE: 0
DIARRHEA: 0
SHORTNESS OF BREATH: 1
VOMITING: 0

## 2024-04-16 NOTE — ADT AUTH CERT
Inhalation      Polina Mercer R  81 Given 04/02/24 0900(s)  04/02/24 0957(a)  04/02/24 0957(r) 2 puff   Inhalation      Nora Flores R  82 Given 03/30/24 0900(s)  03/30/24 0938(a)  03/30/24 0939(r) 75 mg   Oral      Juana Lares  83 Given 03/31/24 0900(s)  03/31/24 0904(a)  03/31/24 0905(r) 75 mg   Oral      Guerline Bailey A  84 Given 04/01/24 0900(s)  04/01/24 0801(a)  04/01/24 0801(r) 75 mg   Oral      Brian Salgado  85 Given 04/02/24 0900(s)  04/02/24 0935(a)  04/02/24 0949(r) 75 mg   Oral      Connor Cao    (s)=scheduled time  (a)=action time  (r)=recorded time    There is too much information to fit in the report. To view medications before 03/30/24, use the MAR activity.   Jump to the MAR

## 2024-04-16 NOTE — PROGRESS NOTES
Kalpana Braga is a 68 y.o. female who presents today for her medical conditions/complaints as noted below.   Chief Complaint   Patient presents with    Admission to [facility] 4/13/24       HPI:     Kalpana Braga is a 69 yo female who was seen today for her admission to [facility] (admitted 4/13/24).  She has a past medical history of COPD, HTN, HLD, anxiety, and depression.  She was admitted to [facility] from 3/21/24-4/13/24.  Hospital records, labs, and imaging were reviewed and are summarized below.  She presented to the ER with progressively worsening SOB.  She was thought to have a COPD exacerbation and community-acquired PNA so was treated with antibiotics and steroids but her symptoms persisted.  CT chest showed a large right suprahilar mass so she underwent an EBUS by pulmonology which revealed non-small cell lung  carcinoma.  The mass was compressing on the right mainstem bronchus so she received inpatient radiation.  She was treated with supplemental oxygen via NC.  During her hospitalization she had an episode of confusion so a CT head was done which was unremarkable, but CTA head and neck showed an occlusion at the M2 segment of the MCA, and MRI brain confirmed the acute infarct.  Neurology recommended ASA and Plavix indefinitely as well as a statin and cardiac monitor at discharge.  The remainder of her hospitalization was unremarkable.  She was initially going to go to [facility] but elected to come here for her rehab stay.  She will have her PET scan tomorrow which she is very anxious about.  She is on Oxycodone IR for her SOB but still gets SOB with minimal conversation.  She is on oxygen at 2.5 LPM.      Past Medical History:   Diagnosis Date    COPD (chronic obstructive pulmonary disease) (HCC)     Hyperlipidemia     Hypertension     Osteoarthritis     Smoker     Urticaria       Past Surgical History:   Procedure Laterality Date    BRONCHOSCOPY N/A 4/2/2024    EBUS performed by Philippe

## 2024-04-17 ENCOUNTER — OUTSIDE SERVICES (OUTPATIENT)
Dept: FAMILY MEDICINE CLINIC | Age: 69
End: 2024-04-17
Payer: COMMERCIAL

## 2024-04-17 DIAGNOSIS — I63.519 CEREBROVASCULAR ACCIDENT (CVA) DUE TO STENOSIS OF MIDDLE CEREBRAL ARTERY, UNSPECIFIED BLOOD VESSEL LATERALITY (HCC): ICD-10-CM

## 2024-04-17 DIAGNOSIS — F33.1 MODERATE EPISODE OF RECURRENT MAJOR DEPRESSIVE DISORDER (HCC): ICD-10-CM

## 2024-04-17 DIAGNOSIS — E44.0 MODERATE MALNUTRITION (HCC): ICD-10-CM

## 2024-04-17 DIAGNOSIS — E78.00 PURE HYPERCHOLESTEROLEMIA: ICD-10-CM

## 2024-04-17 DIAGNOSIS — J44.1 COPD WITH ACUTE EXACERBATION (HCC): ICD-10-CM

## 2024-04-17 DIAGNOSIS — C34.11 MALIGNANT NEOPLASM OF UPPER LOBE OF RIGHT LUNG (HCC): Primary | ICD-10-CM

## 2024-04-17 DIAGNOSIS — I10 ESSENTIAL HYPERTENSION: ICD-10-CM

## 2024-04-17 DIAGNOSIS — M15.9 PRIMARY OSTEOARTHRITIS INVOLVING MULTIPLE JOINTS: ICD-10-CM

## 2024-04-17 PROCEDURE — 99306 1ST NF CARE HIGH MDM 50: CPT | Performed by: FAMILY MEDICINE

## 2024-04-18 ENCOUNTER — HOSPITAL ENCOUNTER (OUTPATIENT)
Dept: PET IMAGING | Age: 69
Discharge: HOME | End: 2024-04-18
Payer: COMMERCIAL

## 2024-04-18 DIAGNOSIS — C34.11 MALIGNANT NEOPLASM OF UPPER LOBE OF RIGHT LUNG (HCC): ICD-10-CM

## 2024-04-18 PROCEDURE — 3430000000 HC RX DIAGNOSTIC RADIOPHARMACEUTICAL: Performed by: NURSE PRACTITIONER

## 2024-04-18 PROCEDURE — 78815 PET IMAGE W/CT SKULL-THIGH: CPT

## 2024-04-18 PROCEDURE — A9609 HC RX DIAGNOSTIC RADIOPHARMACEUTICAL: HCPCS | Performed by: NURSE PRACTITIONER

## 2024-04-18 RX ORDER — FLUDEOXYGLUCOSE F 18 200 MCI/ML
10.9 INJECTION, SOLUTION INTRAVENOUS
Status: COMPLETED | OUTPATIENT
Start: 2024-04-18 | End: 2024-04-18

## 2024-04-18 RX ADMIN — FLUDEOXYGLUCOSE F 18 10.9 MILLICURIE: 200 INJECTION, SOLUTION INTRAVENOUS at 10:15

## 2024-04-22 ENCOUNTER — APPOINTMENT (OUTPATIENT)
Dept: ULTRASOUND IMAGING | Age: 69
DRG: 189 | End: 2024-04-22
Payer: COMMERCIAL

## 2024-04-22 ENCOUNTER — APPOINTMENT (OUTPATIENT)
Dept: GENERAL RADIOLOGY | Age: 69
DRG: 189 | End: 2024-04-22
Payer: COMMERCIAL

## 2024-04-22 ENCOUNTER — HOSPITAL ENCOUNTER (INPATIENT)
Age: 69
LOS: 15 days | Discharge: NURSING, REHAB OR LONG-TERM CARE | DRG: 189 | End: 2024-05-07
Attending: STUDENT IN AN ORGANIZED HEALTH CARE EDUCATION/TRAINING PROGRAM | Admitting: INTERNAL MEDICINE
Payer: COMMERCIAL

## 2024-04-22 DIAGNOSIS — F33.1 MODERATE EPISODE OF RECURRENT MAJOR DEPRESSIVE DISORDER (HCC): ICD-10-CM

## 2024-04-22 DIAGNOSIS — E78.00 PURE HYPERCHOLESTEROLEMIA: ICD-10-CM

## 2024-04-22 DIAGNOSIS — C34.11 MALIGNANT NEOPLASM OF UPPER LOBE OF RIGHT LUNG (HCC): ICD-10-CM

## 2024-04-22 DIAGNOSIS — J43.1 PANLOBULAR EMPHYSEMA (HCC): Chronic | ICD-10-CM

## 2024-04-22 DIAGNOSIS — Z51.5 PALLIATIVE CARE PATIENT: ICD-10-CM

## 2024-04-22 DIAGNOSIS — R45.1 RESTLESS: ICD-10-CM

## 2024-04-22 DIAGNOSIS — R06.02 SHORTNESS OF BREATH: Primary | ICD-10-CM

## 2024-04-22 DIAGNOSIS — R74.8 ELEVATED LIVER ENZYMES: ICD-10-CM

## 2024-04-22 DIAGNOSIS — R06.00 DYSPNEA, UNSPECIFIED TYPE: ICD-10-CM

## 2024-04-22 PROBLEM — J96.01 ACUTE HYPOXIC RESPIRATORY FAILURE (HCC): Status: ACTIVE | Noted: 2024-04-22

## 2024-04-22 LAB
ALBUMIN SERPL BCG-MCNC: 3.5 G/DL (ref 3.5–5.1)
ALP SERPL-CCNC: 186 U/L (ref 38–126)
ALT SERPL W/O P-5'-P-CCNC: 540 U/L (ref 11–66)
AMMONIA PLAS-MCNC: 25 UMOL/L (ref 11–60)
AMPHETAMINES UR QL SCN: NEGATIVE
ANION GAP SERPL CALC-SCNC: 14 MEQ/L (ref 8–16)
APTT PPP: 32.3 SECONDS (ref 22–38)
ARTERIAL PATENCY WRIST A: POSITIVE
AST SERPL-CCNC: 977 U/L (ref 5–40)
BACTERIA: ABNORMAL
BARBITURATES UR QL SCN: NEGATIVE
BASE EXCESS BLDA CALC-SCNC: 3.1 MMOL/L (ref -2–3)
BASE EXCESS BLDA CALC-SCNC: 4.9 MMOL/L (ref -2.5–2.5)
BASOPHILS ABSOLUTE: 0 THOU/MM3 (ref 0–0.1)
BASOPHILS NFR BLD AUTO: 0.3 %
BDY SITE: ABNORMAL
BENZODIAZ UR QL SCN: NEGATIVE
BILIRUB CONJ SERPL-MCNC: 0.3 MG/DL (ref 0–0.3)
BILIRUB SERPL-MCNC: 0.7 MG/DL (ref 0.3–1.2)
BILIRUB UR QL STRIP: NEGATIVE
BUN SERPL-MCNC: 11 MG/DL (ref 7–22)
BZE UR QL SCN: NEGATIVE
CALCIUM SERPL-MCNC: 8.9 MG/DL (ref 8.5–10.5)
CANNABINOIDS UR QL SCN: POSITIVE
CASTS #/AREA URNS LPF: ABNORMAL /LPF
CASTS #/AREA URNS LPF: ABNORMAL /LPF
CHARACTER UR: CLEAR
CHARCOAL URNS QL MICRO: ABNORMAL
CHLORIDE SERPL-SCNC: 99 MEQ/L (ref 98–111)
CK SERPL-CCNC: 66 U/L (ref 30–135)
CO2 SERPL-SCNC: 28 MEQ/L (ref 23–33)
COLLECTED BY:: ABNORMAL
COLLECTED BY:: ABNORMAL
COLOR UR: ABNORMAL
COMMENT: ABNORMAL
CREAT SERPL-MCNC: 0.7 MG/DL (ref 0.4–1.2)
CRP SERPL-MCNC: 5.68 MG/DL (ref 0–1)
CRYSTALS URNS QL MICRO: ABNORMAL
DEPRECATED RDW RBC AUTO: 49.6 FL (ref 35–45)
DEVICE: ABNORMAL
EOSINOPHIL NFR BLD AUTO: 0.1 %
EOSINOPHILS ABSOLUTE: 0 THOU/MM3 (ref 0–0.4)
EPITHELIAL CELLS, UA: ABNORMAL /HPF
ERYTHROCYTE [DISTWIDTH] IN BLOOD BY AUTOMATED COUNT: 15.5 % (ref 11.5–14.5)
ERYTHROCYTE [SEDIMENTATION RATE] IN BLOOD BY WESTERGREN METHOD: 49 MM/HR (ref 0–20)
ETHANOL SERPL-MCNC: < 0.01 %
FENTANYL: NEGATIVE
FIO2 ON VENT O2 ANALYZER: 70 %
GFR SERPL CREATININE-BSD FRML MDRD: > 90 ML/MIN/1.73M2
GGT SERPL-CCNC: 161 U/L (ref 8–69)
GLUCOSE FLD-MCNC: 118 MG/DL
GLUCOSE SERPL-MCNC: 114 MG/DL (ref 70–108)
GLUCOSE UR QL STRIP.AUTO: NEGATIVE MG/DL
HAV IGM SER QL: NEGATIVE
HBV CORE IGM SERPL QL IA: NEGATIVE
HBV SURFACE AG SERPL QL IA: NEGATIVE
HCO3 BLDA-SCNC: 27 MMOL/L (ref 23–28)
HCO3 BLDA-SCNC: 31 MMOL/L (ref 23–28)
HCT VFR BLD AUTO: 33 % (ref 37–47)
HCV IGG SERPL QL IA: NEGATIVE
HGB BLD-MCNC: 10.2 GM/DL (ref 12–16)
HGB UR QL STRIP.AUTO: NEGATIVE
IMM GRANULOCYTES # BLD AUTO: 0.11 THOU/MM3 (ref 0–0.07)
IMM GRANULOCYTES NFR BLD AUTO: 1.3 %
INR PPP: 1.71 (ref 0.85–1.13)
KETONES UR QL STRIP.AUTO: ABNORMAL
LDH FLD L TO P-CCNC: 83 U/L
LDH SERPL L TO P-CCNC: 1435 U/L (ref 100–190)
LEUKOCYTE ESTERASE UR QL STRIP.AUTO: NEGATIVE
LYMPHOCYTES ABSOLUTE: 1.2 THOU/MM3 (ref 1–4.8)
LYMPHOCYTES NFR BLD AUTO: 14.1 %
MCH RBC QN AUTO: 27.5 PG (ref 26–33)
MCHC RBC AUTO-ENTMCNC: 30.9 GM/DL (ref 32.2–35.5)
MCV RBC AUTO: 88.9 FL (ref 81–99)
MONOCYTES ABSOLUTE: 1 THOU/MM3 (ref 0.4–1.3)
MONOCYTES NFR BLD AUTO: 11.3 %
NEUTROPHILS NFR BLD AUTO: 72.9 %
NITRITE UR QL STRIP.AUTO: NEGATIVE
NRBC BLD AUTO-RTO: 0 /100 WBC
NT-PROBNP SERPL IA-MCNC: 7201 PG/ML (ref 0–124)
OPIATES UR QL SCN: NEGATIVE
OSMOLALITY SERPL CALC.SUM OF ELEC: 281.5 MOSMOL/KG (ref 275–300)
OXYCODONE: POSITIVE
PCO2 BLDA: 51 MMHG (ref 35–45)
PCO2 TEMP ADJ BLDMV: 36 MMHG (ref 41–51)
PCP UR QL SCN: NEGATIVE
PH BIFL: 7.38 [PH]
PH BLDA: 7.39 [PH] (ref 7.35–7.45)
PH BLDMV: 7.48 [PH] (ref 7.31–7.41)
PH UR STRIP.AUTO: 6.5 [PH] (ref 5–9)
PLATELET # BLD AUTO: 395 THOU/MM3 (ref 130–400)
PMV BLD AUTO: 10.1 FL (ref 9.4–12.4)
PO2 BLDA: 130 MMHG (ref 71–104)
PO2 BLDMV: 60 MMHG (ref 25–40)
POTASSIUM SERPL-SCNC: 4.8 MEQ/L (ref 3.5–5.2)
PROCALCITONIN SERPL IA-MCNC: 0.05 NG/ML (ref 0.01–0.09)
PROT FLD-MCNC: 1.7 GM/DL
PROT SERPL-MCNC: 6.6 G/DL (ref 6.1–8)
PROT SERPL-MCNC: 6.8 G/DL (ref 6.1–8)
PROT UR STRIP.AUTO-MCNC: 30 MG/DL
RBC # BLD AUTO: 3.71 MILL/MM3 (ref 4.2–5.4)
RBC #/AREA URNS HPF: ABNORMAL /HPF
RENAL EPI CELLS #/AREA URNS HPF: ABNORMAL /[HPF]
SAO2 % BLDA: 99 %
SAO2 % BLDMV: 92 %
SEGMENTED NEUTROPHILS ABSOLUTE COUNT: 6.3 THOU/MM3 (ref 1.8–7.7)
SODIUM SERPL-SCNC: 141 MEQ/L (ref 135–145)
SP GR UR REFRACT.AUTO: 1.02 (ref 1–1.03)
TROPONIN, HIGH SENSITIVITY: 23 NG/L (ref 0–12)
UROBILINOGEN UR QL STRIP.AUTO: 0.2 EU/DL (ref 0–1)
WBC # BLD AUTO: 8.7 THOU/MM3 (ref 4.8–10.8)
WBC #/AREA URNS HPF: ABNORMAL /HPF
YEAST LIKE FUNGI URNS QL MICRO: ABNORMAL

## 2024-04-22 PROCEDURE — 83615 LACTATE (LD) (LDH) ENZYME: CPT

## 2024-04-22 PROCEDURE — 93010 ELECTROCARDIOGRAM REPORT: CPT | Performed by: NUCLEAR MEDICINE

## 2024-04-22 PROCEDURE — 71046 X-RAY EXAM CHEST 2 VIEWS: CPT

## 2024-04-22 PROCEDURE — 85730 THROMBOPLASTIN TIME PARTIAL: CPT

## 2024-04-22 PROCEDURE — 88108 CYTOPATH CONCENTRATE TECH: CPT

## 2024-04-22 PROCEDURE — 71045 X-RAY EXAM CHEST 1 VIEW: CPT

## 2024-04-22 PROCEDURE — 2140000000 HC CCU INTERMEDIATE R&B

## 2024-04-22 PROCEDURE — 2580000003 HC RX 258: Performed by: STUDENT IN AN ORGANIZED HEALTH CARE EDUCATION/TRAINING PROGRAM

## 2024-04-22 PROCEDURE — 82248 BILIRUBIN DIRECT: CPT

## 2024-04-22 PROCEDURE — 93005 ELECTROCARDIOGRAM TRACING: CPT | Performed by: STUDENT IN AN ORGANIZED HEALTH CARE EDUCATION/TRAINING PROGRAM

## 2024-04-22 PROCEDURE — 85651 RBC SED RATE NONAUTOMATED: CPT

## 2024-04-22 PROCEDURE — 99285 EMERGENCY DEPT VISIT HI MDM: CPT

## 2024-04-22 PROCEDURE — 6360000002 HC RX W HCPCS: Performed by: STUDENT IN AN ORGANIZED HEALTH CARE EDUCATION/TRAINING PROGRAM

## 2024-04-22 PROCEDURE — 88305 TISSUE EXAM BY PATHOLOGIST: CPT

## 2024-04-22 PROCEDURE — 89051 BODY FLUID CELL COUNT: CPT

## 2024-04-22 PROCEDURE — 82077 ASSAY SPEC XCP UR&BREATH IA: CPT

## 2024-04-22 PROCEDURE — 83880 ASSAY OF NATRIURETIC PEPTIDE: CPT

## 2024-04-22 PROCEDURE — 81001 URINALYSIS AUTO W/SCOPE: CPT

## 2024-04-22 PROCEDURE — 86140 C-REACTIVE PROTEIN: CPT

## 2024-04-22 PROCEDURE — 6360000002 HC RX W HCPCS

## 2024-04-22 PROCEDURE — 88112 CYTOPATH CELL ENHANCE TECH: CPT

## 2024-04-22 PROCEDURE — 36415 COLL VENOUS BLD VENIPUNCTURE: CPT

## 2024-04-22 PROCEDURE — 82140 ASSAY OF AMMONIA: CPT

## 2024-04-22 PROCEDURE — 85025 COMPLETE CBC W/AUTO DIFF WBC: CPT

## 2024-04-22 PROCEDURE — 84155 ASSAY OF PROTEIN SERUM: CPT

## 2024-04-22 PROCEDURE — 96374 THER/PROPH/DIAG INJ IV PUSH: CPT

## 2024-04-22 PROCEDURE — 84157 ASSAY OF PROTEIN OTHER: CPT

## 2024-04-22 PROCEDURE — 99223 1ST HOSP IP/OBS HIGH 75: CPT | Performed by: INTERNAL MEDICINE

## 2024-04-22 PROCEDURE — 84484 ASSAY OF TROPONIN QUANT: CPT

## 2024-04-22 PROCEDURE — 80074 ACUTE HEPATITIS PANEL: CPT

## 2024-04-22 PROCEDURE — 82550 ASSAY OF CK (CPK): CPT

## 2024-04-22 PROCEDURE — 84145 PROCALCITONIN (PCT): CPT

## 2024-04-22 PROCEDURE — 2700000000 HC OXYGEN THERAPY PER DAY

## 2024-04-22 PROCEDURE — 82977 ASSAY OF GGT: CPT

## 2024-04-22 PROCEDURE — 36600 WITHDRAWAL OF ARTERIAL BLOOD: CPT

## 2024-04-22 PROCEDURE — 85610 PROTHROMBIN TIME: CPT

## 2024-04-22 PROCEDURE — 76705 ECHO EXAM OF ABDOMEN: CPT

## 2024-04-22 PROCEDURE — 83986 ASSAY PH BODY FLUID NOS: CPT

## 2024-04-22 PROCEDURE — 94761 N-INVAS EAR/PLS OXIMETRY MLT: CPT

## 2024-04-22 PROCEDURE — 80053 COMPREHEN METABOLIC PANEL: CPT

## 2024-04-22 PROCEDURE — 82945 GLUCOSE OTHER FLUID: CPT

## 2024-04-22 PROCEDURE — 32555 ASPIRATE PLEURA W/ IMAGING: CPT

## 2024-04-22 PROCEDURE — 0W993ZZ DRAINAGE OF RIGHT PLEURAL CAVITY, PERCUTANEOUS APPROACH: ICD-10-PCS | Performed by: STUDENT IN AN ORGANIZED HEALTH CARE EDUCATION/TRAINING PROGRAM

## 2024-04-22 PROCEDURE — 80307 DRUG TEST PRSMV CHEM ANLYZR: CPT

## 2024-04-22 PROCEDURE — 82803 BLOOD GASES ANY COMBINATION: CPT

## 2024-04-22 RX ORDER — SODIUM CHLORIDE 0.9 % (FLUSH) 0.9 %
10 SYRINGE (ML) INJECTION PRN
Status: DISCONTINUED | OUTPATIENT
Start: 2024-04-22 | End: 2024-05-07 | Stop reason: HOSPADM

## 2024-04-22 RX ORDER — HYDRALAZINE HYDROCHLORIDE 20 MG/ML
5 INJECTION INTRAMUSCULAR; INTRAVENOUS EVERY 6 HOURS PRN
Status: DISCONTINUED | OUTPATIENT
Start: 2024-04-22 | End: 2024-05-07 | Stop reason: HOSPADM

## 2024-04-22 RX ORDER — LORAZEPAM 2 MG/ML
INJECTION INTRAMUSCULAR
Status: COMPLETED
Start: 2024-04-22 | End: 2024-04-22

## 2024-04-22 RX ORDER — POTASSIUM CHLORIDE 7.45 MG/ML
10 INJECTION INTRAVENOUS PRN
Status: DISCONTINUED | OUTPATIENT
Start: 2024-04-22 | End: 2024-05-07 | Stop reason: HOSPADM

## 2024-04-22 RX ORDER — SODIUM CHLORIDE 0.9 % (FLUSH) 0.9 %
5-40 SYRINGE (ML) INJECTION EVERY 12 HOURS SCHEDULED
Status: DISCONTINUED | OUTPATIENT
Start: 2024-04-22 | End: 2024-05-07 | Stop reason: HOSPADM

## 2024-04-22 RX ORDER — LORAZEPAM 2 MG/ML
1 INJECTION INTRAMUSCULAR EVERY 6 HOURS PRN
Status: DISCONTINUED | OUTPATIENT
Start: 2024-04-22 | End: 2024-04-24

## 2024-04-22 RX ORDER — ACETAMINOPHEN 325 MG/1
650 TABLET ORAL EVERY 6 HOURS PRN
Status: DISCONTINUED | OUTPATIENT
Start: 2024-04-22 | End: 2024-05-07 | Stop reason: HOSPADM

## 2024-04-22 RX ORDER — POLYETHYLENE GLYCOL 3350 17 G/17G
17 POWDER, FOR SOLUTION ORAL DAILY PRN
Status: DISCONTINUED | OUTPATIENT
Start: 2024-04-22 | End: 2024-05-07 | Stop reason: HOSPADM

## 2024-04-22 RX ORDER — LORAZEPAM 2 MG/ML
0.5 INJECTION INTRAMUSCULAR ONCE
Status: COMPLETED | OUTPATIENT
Start: 2024-04-22 | End: 2024-04-22

## 2024-04-22 RX ORDER — MAGNESIUM SULFATE IN WATER 40 MG/ML
2000 INJECTION, SOLUTION INTRAVENOUS PRN
Status: DISCONTINUED | OUTPATIENT
Start: 2024-04-22 | End: 2024-05-07 | Stop reason: HOSPADM

## 2024-04-22 RX ORDER — SODIUM CHLORIDE 9 MG/ML
INJECTION, SOLUTION INTRAVENOUS PRN
Status: DISCONTINUED | OUTPATIENT
Start: 2024-04-22 | End: 2024-05-07 | Stop reason: HOSPADM

## 2024-04-22 RX ORDER — LORAZEPAM 2 MG/ML
1 INJECTION INTRAMUSCULAR ONCE
Status: COMPLETED | OUTPATIENT
Start: 2024-04-22 | End: 2024-04-22

## 2024-04-22 RX ORDER — ONDANSETRON 2 MG/ML
4 INJECTION INTRAMUSCULAR; INTRAVENOUS EVERY 6 HOURS PRN
Status: DISCONTINUED | OUTPATIENT
Start: 2024-04-22 | End: 2024-05-07 | Stop reason: HOSPADM

## 2024-04-22 RX ORDER — POTASSIUM CHLORIDE 20 MEQ/1
40 TABLET, EXTENDED RELEASE ORAL PRN
Status: DISCONTINUED | OUTPATIENT
Start: 2024-04-22 | End: 2024-05-07 | Stop reason: HOSPADM

## 2024-04-22 RX ORDER — ONDANSETRON 4 MG/1
4 TABLET, ORALLY DISINTEGRATING ORAL EVERY 8 HOURS PRN
Status: DISCONTINUED | OUTPATIENT
Start: 2024-04-22 | End: 2024-05-07 | Stop reason: HOSPADM

## 2024-04-22 RX ORDER — ACETAMINOPHEN 650 MG/1
650 SUPPOSITORY RECTAL EVERY 6 HOURS PRN
Status: DISCONTINUED | OUTPATIENT
Start: 2024-04-22 | End: 2024-05-07 | Stop reason: HOSPADM

## 2024-04-22 RX ADMIN — SODIUM CHLORIDE, PRESERVATIVE FREE 10 ML: 5 INJECTION INTRAVENOUS at 20:00

## 2024-04-22 RX ADMIN — LORAZEPAM 0.5 MG: 2 INJECTION INTRAMUSCULAR; INTRAVENOUS at 11:43

## 2024-04-22 RX ADMIN — LORAZEPAM 0.5 MG: 2 INJECTION INTRAMUSCULAR at 15:33

## 2024-04-22 RX ADMIN — LORAZEPAM 1 MG: 2 INJECTION INTRAMUSCULAR; INTRAVENOUS at 23:57

## 2024-04-22 RX ADMIN — LORAZEPAM 0.5 MG: 2 INJECTION INTRAMUSCULAR; INTRAVENOUS at 15:33

## 2024-04-22 NOTE — ED NOTES
Pt transported to [facility] in stable condition. Floor notified, spoke to Pam.

## 2024-04-22 NOTE — ED NOTES
Pt SPO2 82% consistently at this time. Pt oxygen increased to 6LNC. SPO2 87%. Dr. Cheek notified and high flow ordered for pt.

## 2024-04-22 NOTE — ED NOTES
Pt still restless in bed. Pt responds to verbal stimuli. Resp regular. Family at bedside. Updated. SR up x 2. Posey alarm in place. Call light in reach.

## 2024-04-22 NOTE — ED NOTES
ED to inpatient nurses report    Chief Complaint   Patient presents with    Shortness of Breath    restless      Present to ED from [facility]  LOC: alert and orientated to name and place  Vital signs   Vitals:    04/22/24 1308 04/22/24 1420 04/22/24 1453 04/22/24 1515   BP: (!) 157/76 (!) 185/69  (!) 155/60   Pulse: 60   62   Resp: 18 18  18   Temp:       TempSrc:       SpO2: 92% 93% 91% 100%   Weight:          Oxygen Baseline 91    Current needs required high flow Bipap/Cpap No  LDAs:   Peripheral IV 04/22/24 Left Antecubital (Active)   Site Assessment Clean, dry & intact 04/22/24 1515   Dressing Status Clean, dry & intact 04/22/24 1515     Mobility: Fully dependent  Pending ED orders: none  Present condition: stable    C-SSRS    Swallow Screening    Preferred Language: English     Electronically signed by ADELSO LUBIN RN on 4/22/2024 at 4:01 PM

## 2024-04-22 NOTE — H&P
Hospitalist - History & Physical      Patient: Kalpana Braga    Unit/Bed:/010A  YOB: 1955  MRN: [id number]   Acct: [id number]   PCP: Tatiana Velasquez, PAO - CNP    Date of Service: Pt seen/examined on 04/22/24  and Admitted to Inpatient with expected LOS greater than two midnights due to medical therapy.     Chief Complaint:  SOB,     Assessment and Plan:    Lethargy / Encephalopathy?, multifactorial: profound burden of disease; recent CVA, PNA, newly Dx malignancy, no disease in brain per imaging; UDS (+) cannabinoids + Oxycodone (Rx known)    Acute Hypoxic RF w/respiratory distress, 2/2 Lung cancer w/significant Rt-sided effusion: vascular congestion / LL mild interstitial edema/pneumonitis; 86% on 3LNC -> 4LNC improved to 92% -> 82% -> increased to 6LNC w/SpO2 84-87% -> initiated HHFNC -> >95%  Required Ativan 0.5mg for agitation 2/2 resp distress after pulling off HHFNC   On NRB post-thoracentesis -> changed back to HHFNC 70%/60L w/100% SpO2  ABG ordered -> f/u on results; pt lethargy / mentation -> poor PAP candidate   pH 7.38, pO2 130, pCO2 51, Bicarb 28 on 70% HHFNC   Wean O2 as tolerated + position pt for max respiratory dynamics   Right-sided total opacification of right hemithorax: 2/2 ?malignant effusion / post-obstructive PNA; 1L yellow fluid removed from Thoracentesis -> fluid being evaluated completely   (-) Light's criteria -> Transudate etiology - hepatic hydrothorax? CHF?   Pending cytology / gram stain, etc...  May warrant a CT Chest to further evaluate   Newly Dx NSCLC, Squamous CCA: Initial clinical stage ~T3 N3, Stage IIIC; 3.9x5.5cm right suprahilar mass s/p EBUS w/Bx 4R / Statin & LND Bx - both (+) NSCLC favoring SCC; CT A/P 3/24 (-) metastatic disease; Complete atelectasis of RUL w/impending right mainstem bronchus obstruction -> S/p urgent palliative radiation therapy; PET-CT 4/18/24 FDG avid right suprahilar mass contiguous w/right hilar + mediastinal LNDs

## 2024-04-22 NOTE — ED NOTES
Pt lying in bed, restless at times. Pt responds to verbal commands. Family at side. Pt on high flow. Call light in reach.

## 2024-04-22 NOTE — ED NOTES
RN in room, pt sitting up on bed with legs through side rail, gasping for air. Pt SPO2 70%. Brother at bedside. Pt had pulled tubing off of high flow machine. Pt not getting oxygen at this time. Tubing reconnected. Pt repositioned in bed. Dr. Cheek and Dr. Enamorado at bedside. Verbal order for ativan given. Pt SPO2 back to 99% after tubing reconnected.

## 2024-04-22 NOTE — ED NOTES
Pt transported to [facility] in stable condition. Floor notified, spoke with Lisa.

## 2024-04-22 NOTE — ED TRIAGE NOTES
Pt to er. Pt coming from [facility] for SOB and restlessness. Pt alert to person and place. Pt states she feels SOB. Denies all pain. Resp regular. Pt is on 3 LNC all the time. Pt does appear to be restless, constantly moving legs and arms.

## 2024-04-22 NOTE — ED NOTES
Pt very restless, sitting up and down in bed, attempting to put legs through railings. Dr. Cheek at bedside. Will medicate per orders.

## 2024-04-22 NOTE — ED PROVIDER NOTES
[facility] EMERGENCY DEPT    Pt Name: Kalpana Braga  MRN: [id number]  Birthdate 1955  Date of evaluation: 4/22/2024  Resident Physician: Julissa Cheek MD  Supervising Physician: Ruslan Enamorado DO    CHIEF COMPLAINT       Chief Complaint   Patient presents with    Shortness of Breath    restless     HISTORY OF PRESENT ILLNESS   Kalpana Braga is a 68 y.o. female with PMHx of COPD, obesity, pneumonia, malignant neoplasm of upper lobe of right lung, palliative care patient  who presents to the emergency department for evaluation of shortness of breath, restlessness.    Patient comes to ED from the office for shortness of breath and restlessness.  Per chart review, she was seen by PCP earlier today for increased agitation and anxiety per nursing staff.  She is very irritable and fidgeting at PCP office.  States that she frequently takes out her nasal cannula.  Is baseline on 3 L nasal cannula.  Does have a history of lung cancer.  Plan for PCP was to start Ativan 0.5 mg every 4 hours as needed.    Due to the increased agitation, patient came to ED for further evaluation.    The patient has no other acute complaints at this time.    Review of Systems    Negative Except as Documented Above.    PASTMEDICAL HISTORY     Past Medical History:   Diagnosis Date    COPD (chronic obstructive pulmonary disease) ([facility])     Hyperlipidemia     Hypertension     Osteoarthritis     Smoker     Urticaria        Patient Active Problem List   Diagnosis Code    COPD (chronic obstructive pulmonary disease) ([facility]) J44.9    Hyperlipidemia E78.5    Smoker F17.200    Osteoarthritis M19.90    Essential hypertension I10    Current moderate episode of major depressive disorder without prior episode ([facility]) F32.1    Prediabetes R73.03    Class 2 severe obesity due to excess calories with serious comorbidity and body mass index (BMI) of 39.0 to 39.9 in adult ([facility]) E66.01, Z68.39    Dyspnea R06.00    Acute hypoxemic respiratory failure ([facility])

## 2024-04-23 PROBLEM — J90 PLEURAL EFFUSION ON RIGHT: Status: ACTIVE | Noted: 2024-04-23

## 2024-04-23 PROBLEM — R74.8 ELEVATED LIVER ENZYMES: Status: ACTIVE | Noted: 2024-04-23

## 2024-04-23 PROBLEM — R45.1 RESTLESS: Status: ACTIVE | Noted: 2024-04-23

## 2024-04-23 PROBLEM — D64.9 ANEMIA: Status: ACTIVE | Noted: 2024-04-23

## 2024-04-23 LAB
ALBUMIN SERPL BCG-MCNC: 3 G/DL (ref 3.5–5.1)
ALP SERPL-CCNC: 152 U/L (ref 38–126)
ALT SERPL W/O P-5'-P-CCNC: 472 U/L (ref 11–66)
ANION GAP SERPL CALC-SCNC: 11 MEQ/L (ref 8–16)
AST SERPL-CCNC: 647 U/L (ref 5–40)
BASOPHILS ABSOLUTE: 0 THOU/MM3 (ref 0–0.1)
BASOPHILS NFR BLD AUTO: 0.3 %
BILIRUB CONJ SERPL-MCNC: < 0.2 MG/DL (ref 0–0.3)
BILIRUB SERPL-MCNC: 0.4 MG/DL (ref 0.3–1.2)
BUN SERPL-MCNC: 10 MG/DL (ref 7–22)
CALCIUM SERPL-MCNC: 8.4 MG/DL (ref 8.5–10.5)
CHARACTER, BODY FLUID: CLEAR
CHLORIDE SERPL-SCNC: 99 MEQ/L (ref 98–111)
CO2 SERPL-SCNC: 31 MEQ/L (ref 23–33)
COLOR FLD: NORMAL
CREAT SERPL-MCNC: 0.5 MG/DL (ref 0.4–1.2)
DEPRECATED RDW RBC AUTO: 51.1 FL (ref 35–45)
EOSINOPHIL NFR BLD AUTO: 0.9 %
EOSINOPHILS ABSOLUTE: 0.1 THOU/MM3 (ref 0–0.4)
ERYTHROCYTE [DISTWIDTH] IN BLOOD BY AUTOMATED COUNT: 15.8 % (ref 11.5–14.5)
GFR SERPL CREATININE-BSD FRML MDRD: > 90 ML/MIN/1.73M2
GLUCOSE SERPL-MCNC: 74 MG/DL (ref 70–108)
GRANULOCYTES NFR FLD AUTO: 0 %
HCT VFR BLD AUTO: 30.7 % (ref 37–47)
HGB BLD-MCNC: 9.2 GM/DL (ref 12–16)
IMM GRANULOCYTES # BLD AUTO: 0.06 THOU/MM3 (ref 0–0.07)
IMM GRANULOCYTES NFR BLD AUTO: 0.8 %
INR PPP: 1.57 (ref 0.85–1.13)
LYMPHOCYTES ABSOLUTE: 1.2 THOU/MM3 (ref 1–4.8)
LYMPHOCYTES NFR BLD AUTO: 15.5 %
LYMPHOCYTES NFR FLD MANUAL: 62 %
MCH RBC QN AUTO: 27.1 PG (ref 26–33)
MCHC RBC AUTO-ENTMCNC: 30 GM/DL (ref 32.2–35.5)
MCV RBC AUTO: 90.6 FL (ref 81–99)
MESOTHELIAL CELLS BODY FLUID: 21 %
MESOTHELIAL CELLS BODY FLUID: NORMAL
MONOCYTES ABSOLUTE: 0.9 THOU/MM3 (ref 0.4–1.3)
MONOCYTES NFR BLD AUTO: 12.1 %
MONOCYTES NFR FLD MANUAL: 8 %
MONONUC CELLS NFR FLD AUTO: 0 %
NEUTROPHILS NFR BLD AUTO: 70.4 %
NEUTS SEG NFR FLD MANUAL: 9 %
NRBC BLD AUTO-RTO: 0 /100 WBC
NUC CELL # FLD AUTO: 209 /CUMM (ref 0–500)
PATHOLOGIST REVIEW: NORMAL
PLATELET # BLD AUTO: 308 THOU/MM3 (ref 130–400)
PMV BLD AUTO: 10.4 FL (ref 9.4–12.4)
POTASSIUM SERPL-SCNC: 3.9 MEQ/L (ref 3.5–5.2)
PROT SERPL-MCNC: 5.7 G/DL (ref 6.1–8)
RBC # BLD AUTO: 3.39 MILL/MM3 (ref 4.2–5.4)
RBC # FLD AUTO: < 2000 /CUMM
SEGMENTED NEUTROPHILS ABSOLUTE COUNT: 5.3 THOU/MM3 (ref 1.8–7.7)
SODIUM SERPL-SCNC: 141 MEQ/L (ref 135–145)
SPECIMEN: NORMAL
TOTAL VOLUME RECEIVED BODY FLUID: 70 ML
WBC # BLD AUTO: 7.5 THOU/MM3 (ref 4.8–10.8)

## 2024-04-23 PROCEDURE — 94761 N-INVAS EAR/PLS OXIMETRY MLT: CPT

## 2024-04-23 PROCEDURE — 94640 AIRWAY INHALATION TREATMENT: CPT

## 2024-04-23 PROCEDURE — 99232 SBSQ HOSP IP/OBS MODERATE 35: CPT | Performed by: HOSPITALIST

## 2024-04-23 PROCEDURE — 2580000003 HC RX 258: Performed by: STUDENT IN AN ORGANIZED HEALTH CARE EDUCATION/TRAINING PROGRAM

## 2024-04-23 PROCEDURE — 6370000000 HC RX 637 (ALT 250 FOR IP): Performed by: HOSPITALIST

## 2024-04-23 PROCEDURE — 99223 1ST HOSP IP/OBS HIGH 75: CPT | Performed by: STUDENT IN AN ORGANIZED HEALTH CARE EDUCATION/TRAINING PROGRAM

## 2024-04-23 PROCEDURE — 80048 BASIC METABOLIC PNL TOTAL CA: CPT

## 2024-04-23 PROCEDURE — 99254 IP/OBS CNSLTJ NEW/EST MOD 60: CPT | Performed by: NURSE PRACTITIONER

## 2024-04-23 PROCEDURE — 93010 ELECTROCARDIOGRAM REPORT: CPT | Performed by: NUCLEAR MEDICINE

## 2024-04-23 PROCEDURE — 6360000002 HC RX W HCPCS: Performed by: HOSPITALIST

## 2024-04-23 PROCEDURE — 99291 CRITICAL CARE FIRST HOUR: CPT | Performed by: INTERNAL MEDICINE

## 2024-04-23 PROCEDURE — 99223 1ST HOSP IP/OBS HIGH 75: CPT | Performed by: INTERNAL MEDICINE

## 2024-04-23 PROCEDURE — 85610 PROTHROMBIN TIME: CPT

## 2024-04-23 PROCEDURE — 6360000002 HC RX W HCPCS: Performed by: STUDENT IN AN ORGANIZED HEALTH CARE EDUCATION/TRAINING PROGRAM

## 2024-04-23 PROCEDURE — 6370000000 HC RX 637 (ALT 250 FOR IP)

## 2024-04-23 PROCEDURE — 2700000000 HC OXYGEN THERAPY PER DAY

## 2024-04-23 PROCEDURE — 36415 COLL VENOUS BLD VENIPUNCTURE: CPT

## 2024-04-23 PROCEDURE — 6370000000 HC RX 637 (ALT 250 FOR IP): Performed by: STUDENT IN AN ORGANIZED HEALTH CARE EDUCATION/TRAINING PROGRAM

## 2024-04-23 PROCEDURE — 80076 HEPATIC FUNCTION PANEL: CPT

## 2024-04-23 PROCEDURE — 2140000000 HC CCU INTERMEDIATE R&B

## 2024-04-23 PROCEDURE — 85025 COMPLETE CBC W/AUTO DIFF WBC: CPT

## 2024-04-23 PROCEDURE — 2500000003 HC RX 250 WO HCPCS: Performed by: HOSPITALIST

## 2024-04-23 RX ORDER — HYDROXYZINE HYDROCHLORIDE 10 MG/1
10 TABLET, FILM COATED ORAL 3 TIMES DAILY PRN
Status: DISCONTINUED | OUTPATIENT
Start: 2024-04-23 | End: 2024-05-07 | Stop reason: HOSPADM

## 2024-04-23 RX ORDER — SENNOSIDES 8.6 MG
1300 CAPSULE ORAL DAILY
Status: DISCONTINUED | OUTPATIENT
Start: 2024-04-23 | End: 2024-04-23 | Stop reason: ALTCHOICE

## 2024-04-23 RX ORDER — HEPARIN SODIUM 5000 [USP'U]/ML
5000 INJECTION, SOLUTION INTRAVENOUS; SUBCUTANEOUS EVERY 8 HOURS SCHEDULED
Status: DISCONTINUED | OUTPATIENT
Start: 2024-04-23 | End: 2024-05-07 | Stop reason: HOSPADM

## 2024-04-23 RX ORDER — IPRATROPIUM BROMIDE AND ALBUTEROL SULFATE 2.5; .5 MG/3ML; MG/3ML
1 SOLUTION RESPIRATORY (INHALATION)
Status: DISCONTINUED | OUTPATIENT
Start: 2024-04-23 | End: 2024-04-24

## 2024-04-23 RX ORDER — IPRATROPIUM BROMIDE AND ALBUTEROL SULFATE 2.5; .5 MG/3ML; MG/3ML
1 SOLUTION RESPIRATORY (INHALATION)
Status: DISCONTINUED | OUTPATIENT
Start: 2024-04-23 | End: 2024-04-23

## 2024-04-23 RX ORDER — TRAZODONE HYDROCHLORIDE 50 MG/1
50 TABLET ORAL NIGHTLY
Status: DISCONTINUED | OUTPATIENT
Start: 2024-04-23 | End: 2024-05-07 | Stop reason: HOSPADM

## 2024-04-23 RX ORDER — OXYCODONE HYDROCHLORIDE 5 MG/1
2.5 TABLET ORAL EVERY 6 HOURS PRN
Status: DISCONTINUED | OUTPATIENT
Start: 2024-04-23 | End: 2024-05-07 | Stop reason: HOSPADM

## 2024-04-23 RX ORDER — ALBUTEROL SULFATE 2.5 MG/3ML
2.5 SOLUTION RESPIRATORY (INHALATION) EVERY 6 HOURS PRN
Status: DISCONTINUED | OUTPATIENT
Start: 2024-04-23 | End: 2024-04-24

## 2024-04-23 RX ORDER — LORAZEPAM 0.5 MG/1
0.5 TABLET ORAL EVERY 4 HOURS PRN
Status: DISCONTINUED | OUTPATIENT
Start: 2024-04-23 | End: 2024-04-24

## 2024-04-23 RX ORDER — VENLAFAXINE HYDROCHLORIDE 75 MG/1
75 CAPSULE, EXTENDED RELEASE ORAL DAILY
Status: DISCONTINUED | OUTPATIENT
Start: 2024-04-23 | End: 2024-05-07 | Stop reason: HOSPADM

## 2024-04-23 RX ORDER — MULTIVITAMIN WITH IRON
1 TABLET ORAL DAILY
Status: DISCONTINUED | OUTPATIENT
Start: 2024-04-24 | End: 2024-05-07 | Stop reason: HOSPADM

## 2024-04-23 RX ORDER — SENNOSIDES A AND B 8.6 MG/1
1 TABLET, FILM COATED ORAL NIGHTLY PRN
Status: DISCONTINUED | OUTPATIENT
Start: 2024-04-23 | End: 2024-05-07 | Stop reason: HOSPADM

## 2024-04-23 RX ORDER — CLOPIDOGREL BISULFATE 75 MG/1
75 TABLET ORAL DAILY
Status: DISCONTINUED | OUTPATIENT
Start: 2024-04-23 | End: 2024-05-07 | Stop reason: HOSPADM

## 2024-04-23 RX ORDER — HYDRALAZINE HYDROCHLORIDE 25 MG/1
25 TABLET, FILM COATED ORAL 3 TIMES DAILY
Status: DISCONTINUED | OUTPATIENT
Start: 2024-04-23 | End: 2024-05-05

## 2024-04-23 RX ORDER — POLYETHYLENE GLYCOL 3350 17 G/17G
17 POWDER, FOR SOLUTION ORAL DAILY
Status: DISCONTINUED | OUTPATIENT
Start: 2024-04-23 | End: 2024-05-07 | Stop reason: HOSPADM

## 2024-04-23 RX ORDER — FERROUS SULFATE 325(65) MG
325 TABLET ORAL EVERY OTHER DAY
Status: DISCONTINUED | OUTPATIENT
Start: 2024-04-24 | End: 2024-05-07 | Stop reason: HOSPADM

## 2024-04-23 RX ORDER — OXYCODONE HYDROCHLORIDE 5 MG/1
2.5 TABLET ORAL EVERY 6 HOURS PRN
Status: DISCONTINUED | OUTPATIENT
Start: 2024-04-23 | End: 2024-04-30 | Stop reason: SDUPTHER

## 2024-04-23 RX ORDER — LACTOBACILLUS RHAMNOSUS GG 10B CELL
1 CAPSULE ORAL DAILY
Status: DISCONTINUED | OUTPATIENT
Start: 2024-04-24 | End: 2024-05-07 | Stop reason: HOSPADM

## 2024-04-23 RX ORDER — LOSARTAN POTASSIUM 100 MG/1
100 TABLET ORAL DAILY
Status: DISCONTINUED | OUTPATIENT
Start: 2024-04-23 | End: 2024-05-07 | Stop reason: HOSPADM

## 2024-04-23 RX ORDER — ASPIRIN 81 MG/1
81 TABLET, CHEWABLE ORAL DAILY
Status: DISCONTINUED | OUTPATIENT
Start: 2024-04-23 | End: 2024-05-07 | Stop reason: HOSPADM

## 2024-04-23 RX ADMIN — SODIUM CHLORIDE, PRESERVATIVE FREE 10 ML: 5 INJECTION INTRAVENOUS at 12:15

## 2024-04-23 RX ADMIN — HEPARIN SODIUM 5000 UNITS: 5000 INJECTION INTRAVENOUS; SUBCUTANEOUS at 23:03

## 2024-04-23 RX ADMIN — POLYETHYLENE GLYCOL 3350 17 G: 17 POWDER, FOR SOLUTION ORAL at 14:54

## 2024-04-23 RX ADMIN — SODIUM CHLORIDE, PRESERVATIVE FREE 10 ML: 5 INJECTION INTRAVENOUS at 18:31

## 2024-04-23 RX ADMIN — LOSARTAN POTASSIUM 100 MG: 100 TABLET, FILM COATED ORAL at 18:54

## 2024-04-23 RX ADMIN — TRAZODONE HYDROCHLORIDE 50 MG: 50 TABLET ORAL at 20:52

## 2024-04-23 RX ADMIN — MICONAZOLE NITRATE: 2 POWDER TOPICAL at 14:54

## 2024-04-23 RX ADMIN — LORAZEPAM 1 MG: 2 INJECTION INTRAMUSCULAR; INTRAVENOUS at 12:13

## 2024-04-23 RX ADMIN — VENLAFAXINE HYDROCHLORIDE 75 MG: 75 CAPSULE, EXTENDED RELEASE ORAL at 18:10

## 2024-04-23 RX ADMIN — METOPROLOL TARTRATE 25 MG: 25 TABLET, FILM COATED ORAL at 15:22

## 2024-04-23 RX ADMIN — LORAZEPAM 0.5 MG: 0.5 TABLET ORAL at 20:52

## 2024-04-23 RX ADMIN — ASPIRIN 81 MG 81 MG: 81 TABLET ORAL at 18:11

## 2024-04-23 RX ADMIN — MICONAZOLE NITRATE: 2 POWDER TOPICAL at 20:52

## 2024-04-23 RX ADMIN — LORAZEPAM 1 MG: 2 INJECTION INTRAMUSCULAR; INTRAVENOUS at 18:30

## 2024-04-23 RX ADMIN — SODIUM CHLORIDE, PRESERVATIVE FREE 10 ML: 5 INJECTION INTRAVENOUS at 20:53

## 2024-04-23 RX ADMIN — HYDRALAZINE HYDROCHLORIDE 25 MG: 25 TABLET, FILM COATED ORAL at 14:54

## 2024-04-23 RX ADMIN — CLOPIDOGREL BISULFATE 75 MG: 75 TABLET ORAL at 18:11

## 2024-04-23 RX ADMIN — IPRATROPIUM BROMIDE AND ALBUTEROL SULFATE 1 DOSE: .5; 3 SOLUTION RESPIRATORY (INHALATION) at 15:53

## 2024-04-23 RX ADMIN — SODIUM CHLORIDE, PRESERVATIVE FREE 10 ML: 5 INJECTION INTRAVENOUS at 10:31

## 2024-04-23 ASSESSMENT — PAIN SCALES - GENERAL
PAINLEVEL_OUTOF10: 0
PAINLEVEL_OUTOF10: 0

## 2024-04-23 NOTE — CARE COORDINATION
04/23/24 1526   Service Assessment   Patient Orientation Other (see comment)  (confused)   Cognition Severely Impaired   History Provided By Child/Family;Medical Record   Primary Caregiver Other (Comment)  (ECF)   Support Systems Family Members   Patient's Healthcare Decision Maker is: Named in Scanned ACP Document   PCP Verified by CM Yes   Last Visit to PCP Within last 3 months   Prior Functional Level Assistance with the following:;Bathing;Dressing;Toileting;Feeding;Cooking;Housework;Shopping;Mobility   Current Functional Level Mobility;Shopping;Housework;Cooking;Feeding;Toileting;Dressing;Bathing;Assistance with the following:   Can patient return to prior living arrangement Yes   Ability to make needs known: Poor   Family able to assist with home care needs: Other (comment)  (Return to ECF)   Would you like for me to discuss the discharge plan with any other family members/significant others, and if so, who? Yes  (Brothmaranda Summers)   Financial Resources Medicare   [employer] Transportation;ECF/Home Care   Social/Functional History   Lives With Other (comment)  (ECF)   Type of Home Facility   Active  No   Discharge Planning   Type of Residence Skilled Nursing Facility   Living Arrangements Other (Comment)  (ECF)   Current Services Prior To Admission Skilled Nursing Facility   Potential Assistance Needed Skilled Nursing Facility;Transportation   DME Ordered? No   Type of Home Care Services None   Patient expects to be discharged to: Skilled nursing facility   Services At/After Discharge   Services At/After Discharge Aide services;Nursing services;In ambulance;Transport;PT;OT    Resource Information Provided? No   Mode of Transport at Discharge BLS   Confirm Follow Up Transport Other (see comment)  (ECF)   Condition of Participation: Discharge Planning   The Plan for Transition of Care is related to the following treatment goals: Return to [facility] for rehab.   The Patient and/or Patient

## 2024-04-23 NOTE — DISCHARGE INSTR - COC
J44.9    Hyperlipidemia E78.5    Smoker F17.200    Osteoarthritis M19.90    Essential hypertension I10    Current moderate episode of major depressive disorder without prior episode ([facility]) F32.1    Prediabetes R73.03    Class 2 severe obesity due to excess calories with serious comorbidity and body mass index (BMI) of 39.0 to 39.9 in adult ([facility]) E66.01, Z68.39    Dyspnea R06.00    Acute hypoxemic respiratory failure ([facility]) J96.01    Lung mass R91.8    Pneumonia of both lungs due to infectious organism J18.9    CVA (cerebral vascular accident) ([facility]) I63.9    Moderate malnutrition ([facility]) E44.0    Malignant neoplasm of upper lobe of right lung ([facility]) C34.11    Palliative care patient Z51.5    Acute hypoxic respiratory failure ([facility]) J96.01    Pleural effusion on right J90    Elevated liver enzymes R74.8    Restless R45.1    Anemia D64.9    Severe malnutrition ([facility]) E43       Isolation/Infection:   Isolation            Contact          Patient Infection Status       Infection Onset Added Last Indicated Last Indicated By Review Planned Expiration Resolved Resolved By    MRSA 04/29/24 05/01/24 04/29/24 Culture, Urine        Urine 4/2024                       Nurse Assessment:  Last Vital Signs: BP (!) 164/57   Pulse 62   Temp 98.2 °F (36.8 °C) (Oral)   Resp 16   Ht 1.626 m (5' 4.02\")   Wt 86.6 kg (190 lb 14.7 oz)   SpO2 94%   PF (!) 16 L/min   BMI 32.75 kg/m²     Last documented pain score (0-10 scale): Pain Level: 2  Last Weight:   Wt Readings from Last 1 Encounters:   05/07/24 86.6 kg (190 lb 14.7 oz)     Mental Status:  oriented and alert to person, place, year, oriented disoriented at times.    IV Access:  - None    Nursing Mobility/ADLs:  Walking   Assisted  Transfer  Assisted  Bathing  Assisted  Dressing  Assisted  Toileting  Assisted  Feeding  Assisted  Med Admin  Assisted  Med Delivery   whole    Wound Care Documentation and Therapy:  Wound 04/29/24 Buttocks Left stage 2 (Active)   Wound Etiology Pressure Stage

## 2024-04-23 NOTE — FLOWSHEET NOTE
04/23/24 1016   Treatment Team Notification   Reason for Communication Medication concern   Name of Team Member Notified Dr. Bass   Treatment Team Role Attending Provider   Method of Communication Secure Message   Response See orders   Notification Time 1017     Home meds were never addressed

## 2024-04-23 NOTE — CARE COORDINATION
04/23/24 1337   Readmission Assessment   Number of Days since last admission? 8-30 days   Previous Disposition SNF  ([facility])   Who is being Interviewed Patient  (Pt and chart review, pt having some confusion)   What was the patient's/caregiver's perception as to why they think they needed to return back to the hospital? Other (Comment)  (SOB, confusion)   Did you visit your Primary Care Physician after you left the hospital, before you returned this time? Yes  (At [facility])   Did you see a specialist, such as Cardiac, Pulmonary, Orthopedic Physician, etc. after you left the hospital? No   Who advised the patient to return to the hospital? Skilled Unit  ([facility])   Does the patient report anything that got in the way of taking their medications? No  ([facility])   In our efforts to provide the best possible care to you and others like you, can you think of anything that we could have done to help you after you left the hospital the first time, so that you might not have needed to return so soon? Other (Comment)  (From [facility])

## 2024-04-23 NOTE — CARE COORDINATION
Case Management Assessment Initial Evaluation    Date/Time of Evaluation: 2024 8:51 AM  Assessment Completed by: Rylie Luo RN    If patient is discharged prior to next notation, then this note serves as note for discharge by case management.    Patient Name: Kalpana Braga                   YOB: 1955  Diagnosis: Shortness of breath [R06.02]  Restless [R45.1]  Elevated liver enzymes [R74.8]  Acute hypoxic respiratory failure (HCC) [J96.01]                   Date / Time: 2024 11:08 AM  Location: [address] [place] [address]     Patient Admission Status: Inpatient   Readmission Risk Low 0-14, Mod 15-19), High > 20: Readmission Risk Score: 14.3    Current PCP: Tatiana Velasquez, APRN - CNP    Additional Case Management Notes: Admitted through ED with SOB and increased agitation. HF O2, 60L and 45% FiO2, sats 97%. BNP 7201.0. CRP 5.68. Sed rate 49. Palliative Care consulted. IS.     Procedures:    US guided right thoracentesis: removed 1L pale yellow fluid.     Imagin/22 PCXR:   1. Borderline heart size. Total opacification of the right hemithorax which has developed since prior study, consistent with a large effusion and apparent underlying atelectasis/pneumonia.  2. Findings of mild pulmonary vascular congestion left side. Question mild interstitial edema/pneumonitis left mid and upper lung fields. No effusion.   US GB: Normal gallbladder ultrasound without evidence of cholelithiasis. Ascites.   Repeat PCXR: No pneumothorax. Some aeration of the right perihilar location. There is still significant opacification throughout the entire right hemithorax with volume loss.    Patient Goals/Plan/Treatment Preferences: [facility] is from [facility]. SW consulted and full CM assessment deferred to NANCY.

## 2024-04-23 NOTE — FLOWSHEET NOTE
Discussed code status with the patient, her brother Kristopher, and her sister in law Heidi.  She states that she would not want to be shocked, she would not want rescue meds, she would not want to be intubated and she would not want compressions.  Her family members are in agreement with DNRCCA no x4.  She also states that she would not want chemo.

## 2024-04-24 ENCOUNTER — APPOINTMENT (OUTPATIENT)
Dept: CT IMAGING | Age: 69
DRG: 189 | End: 2024-04-24
Payer: COMMERCIAL

## 2024-04-24 PROBLEM — E43 SEVERE MALNUTRITION (HCC): Chronic | Status: ACTIVE | Noted: 2024-04-24

## 2024-04-24 LAB
BASOPHILS ABSOLUTE: 0 THOU/MM3 (ref 0–0.1)
BASOPHILS NFR BLD AUTO: 0.3 %
DEPRECATED RDW RBC AUTO: 51.3 FL (ref 35–45)
EKG Q-T INTERVAL: 460 MS
EKG Q-T INTERVAL: 538 MS
EKG QRS DURATION: 80 MS
EKG QRS DURATION: 84 MS
EKG QTC CALCULATION (BAZETT): 486 MS
EKG QTC CALCULATION (BAZETT): 504 MS
EKG R AXIS: -145 DEGREES
EKG R AXIS: 176 DEGREES
EKG T AXIS: 56 DEGREES
EKG T AXIS: 69 DEGREES
EKG VENTRICULAR RATE: 53 BPM
EKG VENTRICULAR RATE: 67 BPM
EOSINOPHIL NFR BLD AUTO: 1.2 %
EOSINOPHILS ABSOLUTE: 0.1 THOU/MM3 (ref 0–0.4)
ERYTHROCYTE [DISTWIDTH] IN BLOOD BY AUTOMATED COUNT: 15.6 % (ref 11.5–14.5)
HCT VFR BLD AUTO: 38 % (ref 37–47)
HGB BLD-MCNC: 11.2 GM/DL (ref 12–16)
IMM GRANULOCYTES # BLD AUTO: 0.09 THOU/MM3 (ref 0–0.07)
IMM GRANULOCYTES NFR BLD AUTO: 0.9 %
LYMPHOCYTES ABSOLUTE: 1.7 THOU/MM3 (ref 1–4.8)
LYMPHOCYTES NFR BLD AUTO: 16.7 %
MCH RBC QN AUTO: 27.1 PG (ref 26–33)
MCHC RBC AUTO-ENTMCNC: 29.5 GM/DL (ref 32.2–35.5)
MCV RBC AUTO: 92 FL (ref 81–99)
MONOCYTES ABSOLUTE: 1.2 THOU/MM3 (ref 0.4–1.3)
MONOCYTES NFR BLD AUTO: 11.7 %
NEUTROPHILS NFR BLD AUTO: 69.2 %
NRBC BLD AUTO-RTO: 0 /100 WBC
PLATELET # BLD AUTO: 347 THOU/MM3 (ref 130–400)
PMV BLD AUTO: 10.2 FL (ref 9.4–12.4)
RBC # BLD AUTO: 4.13 MILL/MM3 (ref 4.2–5.4)
SEGMENTED NEUTROPHILS ABSOLUTE COUNT: 7.1 THOU/MM3 (ref 1.8–7.7)
WBC # BLD AUTO: 10.2 THOU/MM3 (ref 4.8–10.8)

## 2024-04-24 PROCEDURE — 94640 AIRWAY INHALATION TREATMENT: CPT

## 2024-04-24 PROCEDURE — 2140000000 HC CCU INTERMEDIATE R&B

## 2024-04-24 PROCEDURE — 85025 COMPLETE CBC W/AUTO DIFF WBC: CPT

## 2024-04-24 PROCEDURE — 2700000000 HC OXYGEN THERAPY PER DAY

## 2024-04-24 PROCEDURE — 6370000000 HC RX 637 (ALT 250 FOR IP): Performed by: HOSPITALIST

## 2024-04-24 PROCEDURE — 6360000002 HC RX W HCPCS: Performed by: HOSPITALIST

## 2024-04-24 PROCEDURE — 94761 N-INVAS EAR/PLS OXIMETRY MLT: CPT

## 2024-04-24 PROCEDURE — 2580000003 HC RX 258: Performed by: STUDENT IN AN ORGANIZED HEALTH CARE EDUCATION/TRAINING PROGRAM

## 2024-04-24 PROCEDURE — 6370000000 HC RX 637 (ALT 250 FOR IP)

## 2024-04-24 PROCEDURE — 99233 SBSQ HOSP IP/OBS HIGH 50: CPT | Performed by: STUDENT IN AN ORGANIZED HEALTH CARE EDUCATION/TRAINING PROGRAM

## 2024-04-24 PROCEDURE — 99232 SBSQ HOSP IP/OBS MODERATE 35: CPT | Performed by: HOSPITALIST

## 2024-04-24 PROCEDURE — 36415 COLL VENOUS BLD VENIPUNCTURE: CPT

## 2024-04-24 PROCEDURE — 6370000000 HC RX 637 (ALT 250 FOR IP): Performed by: STUDENT IN AN ORGANIZED HEALTH CARE EDUCATION/TRAINING PROGRAM

## 2024-04-24 PROCEDURE — 6360000004 HC RX CONTRAST MEDICATION

## 2024-04-24 PROCEDURE — 71260 CT THORAX DX C+: CPT

## 2024-04-24 PROCEDURE — 99233 SBSQ HOSP IP/OBS HIGH 50: CPT | Performed by: INTERNAL MEDICINE

## 2024-04-24 RX ORDER — ALBUTEROL SULFATE 2.5 MG/3ML
2.5 SOLUTION RESPIRATORY (INHALATION) EVERY 6 HOURS PRN
Status: DISCONTINUED | OUTPATIENT
Start: 2024-04-24 | End: 2024-05-07 | Stop reason: HOSPADM

## 2024-04-24 RX ORDER — LORAZEPAM 1 MG/1
1 TABLET ORAL EVERY 6 HOURS PRN
Status: DISCONTINUED | OUTPATIENT
Start: 2024-04-24 | End: 2024-05-07 | Stop reason: HOSPADM

## 2024-04-24 RX ORDER — LORAZEPAM 1 MG/1
1 TABLET ORAL EVERY 4 HOURS PRN
Status: DISCONTINUED | OUTPATIENT
Start: 2024-04-24 | End: 2024-04-24

## 2024-04-24 RX ORDER — LORAZEPAM 2 MG/ML
1 INJECTION INTRAMUSCULAR EVERY 6 HOURS PRN
Status: DISCONTINUED | OUTPATIENT
Start: 2024-04-24 | End: 2024-05-05

## 2024-04-24 RX ORDER — IPRATROPIUM BROMIDE AND ALBUTEROL SULFATE 2.5; .5 MG/3ML; MG/3ML
1 SOLUTION RESPIRATORY (INHALATION) EVERY 4 HOURS PRN
Status: DISCONTINUED | OUTPATIENT
Start: 2024-04-24 | End: 2024-05-07 | Stop reason: HOSPADM

## 2024-04-24 RX ORDER — IPRATROPIUM BROMIDE AND ALBUTEROL SULFATE 2.5; .5 MG/3ML; MG/3ML
1 SOLUTION RESPIRATORY (INHALATION)
Status: DISCONTINUED | OUTPATIENT
Start: 2024-04-24 | End: 2024-04-24

## 2024-04-24 RX ORDER — ATORVASTATIN CALCIUM 40 MG/1
40 TABLET, FILM COATED ORAL NIGHTLY
Status: DISCONTINUED | OUTPATIENT
Start: 2024-04-24 | End: 2024-05-07 | Stop reason: HOSPADM

## 2024-04-24 RX ADMIN — IOPAMIDOL 80 ML: 755 INJECTION, SOLUTION INTRAVENOUS at 08:08

## 2024-04-24 RX ADMIN — Medication 1 TABLET: at 08:37

## 2024-04-24 RX ADMIN — FERROUS SULFATE TAB 325 MG (65 MG ELEMENTAL FE) 325 MG: 325 (65 FE) TAB at 08:41

## 2024-04-24 RX ADMIN — ASPIRIN 81 MG 81 MG: 81 TABLET ORAL at 08:41

## 2024-04-24 RX ADMIN — LOSARTAN POTASSIUM 100 MG: 100 TABLET, FILM COATED ORAL at 08:37

## 2024-04-24 RX ADMIN — VENLAFAXINE HYDROCHLORIDE 75 MG: 75 CAPSULE, EXTENDED RELEASE ORAL at 08:37

## 2024-04-24 RX ADMIN — Medication 1 CAPSULE: at 08:42

## 2024-04-24 RX ADMIN — HEPARIN SODIUM 5000 UNITS: 5000 INJECTION INTRAVENOUS; SUBCUTANEOUS at 14:52

## 2024-04-24 RX ADMIN — METOPROLOL TARTRATE 25 MG: 25 TABLET, FILM COATED ORAL at 21:03

## 2024-04-24 RX ADMIN — METOPROLOL TARTRATE 25 MG: 25 TABLET, FILM COATED ORAL at 08:42

## 2024-04-24 RX ADMIN — MICONAZOLE NITRATE: 2 POWDER TOPICAL at 21:06

## 2024-04-24 RX ADMIN — ATORVASTATIN CALCIUM 40 MG: 40 TABLET, FILM COATED ORAL at 21:03

## 2024-04-24 RX ADMIN — MICONAZOLE NITRATE: 2 POWDER TOPICAL at 08:38

## 2024-04-24 RX ADMIN — SODIUM CHLORIDE, PRESERVATIVE FREE 10 ML: 5 INJECTION INTRAVENOUS at 21:07

## 2024-04-24 RX ADMIN — POLYETHYLENE GLYCOL 3350 17 G: 17 POWDER, FOR SOLUTION ORAL at 08:42

## 2024-04-24 RX ADMIN — TRAZODONE HYDROCHLORIDE 50 MG: 50 TABLET ORAL at 21:03

## 2024-04-24 RX ADMIN — HYDRALAZINE HYDROCHLORIDE 25 MG: 25 TABLET, FILM COATED ORAL at 21:03

## 2024-04-24 RX ADMIN — LORAZEPAM 1 MG: 1 TABLET ORAL at 16:21

## 2024-04-24 RX ADMIN — HYDRALAZINE HYDROCHLORIDE 25 MG: 25 TABLET, FILM COATED ORAL at 14:59

## 2024-04-24 RX ADMIN — TIOTROPIUM BROMIDE AND OLODATEROL 2 PUFF: 3.124; 2.736 SPRAY, METERED RESPIRATORY (INHALATION) at 09:36

## 2024-04-24 RX ADMIN — CLOPIDOGREL BISULFATE 75 MG: 75 TABLET ORAL at 08:41

## 2024-04-24 RX ADMIN — HYDRALAZINE HYDROCHLORIDE 25 MG: 25 TABLET, FILM COATED ORAL at 08:41

## 2024-04-24 RX ADMIN — SODIUM CHLORIDE, PRESERVATIVE FREE 10 ML: 5 INJECTION INTRAVENOUS at 08:35

## 2024-04-24 RX ADMIN — IPRATROPIUM BROMIDE AND ALBUTEROL SULFATE 1 DOSE: .5; 3 SOLUTION RESPIRATORY (INHALATION) at 09:36

## 2024-04-24 ASSESSMENT — PAIN SCALES - GENERAL
PAINLEVEL_OUTOF10: 0

## 2024-04-24 NOTE — CONSULTS
concerning for known malignancy and metastatic disease.  She represented to [facility] with a chief complaints of shortness of breath. She was then admitted for further care.  Palliative care was consulted for Goals of Care, Symptom Management, and Continuity of Care.    Symptoms:  Pain: Patient denies pain  Shortness of breath: They deny cough. They report shortness of breath at rest and on exertion.  Sleep: They are not having issues with sleep.  Fatigue/Drowsiness: Patient denies fatigue.  Patient denies drowsiness.   Appetite: Patient reports poor appetite. They associate shortness of breath with their poor appetite. This has been a problem for 3 weeks.  Wt. Loss: Patient's weight is stable.   Nausea/Vomiting: Patient denies nausea and vomiting.  Constipation/Diarrhea: Patient denies constipation or diarrhea.  Depression: Patient denies depression or issues with mood  Anxiety: Patient reports anxiety. They are currently taking Ativan 1mg IV as needed for their anxiety. It is  working well for them.    Serious Illness Conversation:  Please refer to the previous outpatient or inpatient palliative care notes and/or Palliative Care RN note for full goals of care discussion. Briefly, will have discussion on goals of care when family is present.    Advanced Directives:  Patient does have capacity to make medical decisions.  Patient does have a Health Care Power of .    Past Medical History:    Past Medical History:   Diagnosis Date    COPD (chronic obstructive pulmonary disease) (HCC)     CVA (cerebral vascular accident) ([facility]) 03/24/2024    LMCA    Hyperlipidemia     Hypertension     Osteoarthritis     Smoker     Urticaria        Past Surgical History:    Past Surgical History:   Procedure Laterality Date    BRONCHOSCOPY N/A 4/2/2024    EBUS performed by Lois Paez MD at [facility] ENDOSCOPY    DILATION AND CURETTAGE OF UTERUS      KNEE ARTHROSCOPY         Scheduled Medications:   sodium chloride flush  5-40 
acetaminophen (TYLENOL) suppository 650 mg, 650 mg, Rectal, Q6H PRN  hydrALAZINE (APRESOLINE) injection 5 mg, 5 mg, IntraVENous, Q6H PRN  Allergies:  Apresoline [hydralazine], Enalapril, Hctz [hydrochlorothiazide], Sulfa antibiotics, and Tenex [guanfacine hcl]    Social History:    TOBACCO:   reports that she has been smoking cigarettes. She started smoking about 51 years ago. She has a 51.3 pack-year smoking history. She has never used smokeless tobacco.  ETOH:   reports that she does not currently use alcohol after a past usage of about 2.0 - 3.0 standard drinks of alcohol per week.  DRUGS:   reports current drug use. Drug: Marijuana (Weed).    Family History:       Problem Relation Age of Onset    Heart Disease Father      REVIEW OF SYSTEMS:    12 point review of symptoms completed all other systems negative except those noted in the HPI.  PHYSICAL EXAM:      Vitals:    /77   Pulse 58   Temp 98.1 °F (36.7 °C) (Oral)   Resp 18   Ht 1.626 m (5' 4.02\")   Wt 87.3 kg (192 lb 7.4 oz)   SpO2 94%   PF (!) 16 L/min   BMI 33.02 kg/m²     CONSTITUTIONAL:  awake, alert, cooperative, no apparent distress, and appears stated age  EYES:  Lids and lashes normal, pupils equal, round and reactive to light, extra ocular muscles intact, sclera clear, conjunctiva normal  LUNGS:  No increased work of breathing, significantly diminished on the right side, slightly diminished on the left, no crackles or wheezing  CARDIOVASCULAR:  Normal apical impulse, regular rate and rhythm, normal S1 and S2, no S3 or S4, and no murmur noted  ABDOMEN:  No scars, normal bowel sounds, soft, non-distended, non-tender, no masses palpated, no hepatosplenomegally  NEUROLOGIC:  Awake, alert, oriented to name, place and time.  Cranial nerves II-XII are grossly intact.  Motor is 5 out of 5 bilaterally.  Cerebellar finger to nose, heel to shin intact.  Sensory is intact.  Babinski down going, Romberg negative, and gait is normal.  SKIN:  no 
time involved with patient care by the Pulmonary & Critical care service team spent by me.    Please see my modifications mentioned below:  She is on Hi Flow.  Not in any distress.  Lab Results   Component Value Date/Time    PH 7.39 04/22/2024 06:02 PM    PCO2 51 04/22/2024 06:02 PM    PO2 130 04/22/2024 06:02 PM    HCO3 31 04/22/2024 06:02 PM    O2SAT 99 04/22/2024 06:02 PM     Lab Results   Component Value Date/Time    IFIO2 70 04/22/2024 06:02 PM     Follow pleural fluid analysis.  Rest of the plan as above.  For CT chest with IV contrast.  Discussed with RN taking care of patient regarding patient condition and my management plan at bed side.  Kalpana Braga educated about my impression and plan. She verbalizes understanding.    Electronically signed by   Lois Paez MD on 4/23/2024 at 2:38 PM    
head 63/23/2023 TECHNIQUE: 5 mm axial imaging through the head without IV contrast. All CT scans at this facility use dose modulation, iterative reconstruction, and/or weight based dosing when appropriate to reduce the radiation dose to as low as reasonably achievable. FINDINGS: Hypodensity is seen in the left frontal lobe and the left insular region. Mild areas of scattered periventricular hypodensities related to microvascular changes. Mild cerebral volume loss. No ventriculomegaly.  No midline shift or mass effect.  No acute intracranial hemorrhage. No intracranial collection. The posterior fossa is unremarkable. The craniocervical junction is unremarkable. No acute bony abnormality. The  paranasal sinuses are clear. The  mastoid air cells are clear. The orbits are unremarkable.     1. No acute intracranial hemorrhage 2. Area of recent infarct seen in the left frontal lobe and left insular region. The pertinent finding(s) was called to Dr. Reji St at 1204 hours on 3/26/2024 by Dr. Lomeli. Verbal acknowledgment and readback was given. **This report has been created using voice recognition software.  It may contain minor errors which are inherent in voice recognition technology.** Final report electronically signed by Dr Tanna Lomeli on 3/26/2024 12:06 PM    Echo (TTE) complete (PRN contrast/bubble/strain/3D)    Result Date: 3/25/2024    Left Ventricle: Normal left ventricular systolic function with a visually estimated EF of 60 - 65%. Left ventricle size is normal. Normal wall thickness. Normal wall motion. Grade I diastolic dysfunction with normal LAP.   Left Atrium: Left atrium is mildly dilated.   Interatrial Septum: No interatrial shunt visualized with color Doppler and agitated saline. Grade 0 Absence of bubbles. Agitated saline study was negative without provocation.   Image quality is technically difficult. Procedure performed with the patient in a supine position.     Bronchoscopy    Result Date:

## 2024-04-24 NOTE — CARE COORDINATION
4/24/24, 12:47 PM EDT    DISCHARGE PLANNING EVALUATION  Received a message from Christine at [facility].  She reports Kalpana would be a precert for return.  NANCY met with Kalpana today and she is eager to return to [facility].

## 2024-04-25 ENCOUNTER — ANESTHESIA EVENT (OUTPATIENT)
Dept: ENDOSCOPY | Age: 69
End: 2024-04-25
Payer: COMMERCIAL

## 2024-04-25 ENCOUNTER — APPOINTMENT (OUTPATIENT)
Dept: GENERAL RADIOLOGY | Age: 69
DRG: 189 | End: 2024-04-25
Payer: COMMERCIAL

## 2024-04-25 ENCOUNTER — ANESTHESIA (OUTPATIENT)
Dept: ENDOSCOPY | Age: 69
End: 2024-04-25
Payer: COMMERCIAL

## 2024-04-25 LAB
BASOPHILS ABSOLUTE: 0 THOU/MM3 (ref 0–0.1)
BASOPHILS NFR BLD AUTO: 0.2 %
DEPRECATED RDW RBC AUTO: 50.4 FL (ref 35–45)
EOSINOPHIL NFR BLD AUTO: 1 %
EOSINOPHILS ABSOLUTE: 0.1 THOU/MM3 (ref 0–0.4)
ERYTHROCYTE [DISTWIDTH] IN BLOOD BY AUTOMATED COUNT: 15.7 % (ref 11.5–14.5)
HCT VFR BLD AUTO: 33 % (ref 37–47)
HGB BLD-MCNC: 10 GM/DL (ref 12–16)
IMM GRANULOCYTES # BLD AUTO: 0.07 THOU/MM3 (ref 0–0.07)
IMM GRANULOCYTES NFR BLD AUTO: 0.9 %
LYMPHOCYTES ABSOLUTE: 1.2 THOU/MM3 (ref 1–4.8)
LYMPHOCYTES NFR BLD AUTO: 14.7 %
MCH RBC QN AUTO: 27.1 PG (ref 26–33)
MCHC RBC AUTO-ENTMCNC: 30.3 GM/DL (ref 32.2–35.5)
MCV RBC AUTO: 89.4 FL (ref 81–99)
MONOCYTES ABSOLUTE: 1 THOU/MM3 (ref 0.4–1.3)
MONOCYTES NFR BLD AUTO: 12.5 %
NEUTROPHILS NFR BLD AUTO: 70.7 %
NRBC BLD AUTO-RTO: 0 /100 WBC
PLATELET # BLD AUTO: 341 THOU/MM3 (ref 130–400)
PMV BLD AUTO: 10.3 FL (ref 9.4–12.4)
RBC # BLD AUTO: 3.69 MILL/MM3 (ref 4.2–5.4)
SEGMENTED NEUTROPHILS ABSOLUTE COUNT: 5.7 THOU/MM3 (ref 1.8–7.7)
WBC # BLD AUTO: 8 THOU/MM3 (ref 4.8–10.8)

## 2024-04-25 PROCEDURE — 7100000000 HC PACU RECOVERY - FIRST 15 MIN: Performed by: INTERNAL MEDICINE

## 2024-04-25 PROCEDURE — 0BC38ZZ EXTIRPATION OF MATTER FROM RIGHT MAIN BRONCHUS, VIA NATURAL OR ARTIFICIAL OPENING ENDOSCOPIC: ICD-10-PCS | Performed by: INTERNAL MEDICINE

## 2024-04-25 PROCEDURE — C1874 STENT, COATED/COV W/DEL SYS: HCPCS | Performed by: INTERNAL MEDICINE

## 2024-04-25 PROCEDURE — 97162 PT EVAL MOD COMPLEX 30 MIN: CPT

## 2024-04-25 PROCEDURE — 87102 FUNGUS ISOLATION CULTURE: CPT

## 2024-04-25 PROCEDURE — 87205 SMEAR GRAM STAIN: CPT

## 2024-04-25 PROCEDURE — 0BC48ZZ EXTIRPATION OF MATTER FROM RIGHT UPPER LOBE BRONCHUS, VIA NATURAL OR ARTIFICIAL OPENING ENDOSCOPIC: ICD-10-PCS | Performed by: INTERNAL MEDICINE

## 2024-04-25 PROCEDURE — 6370000000 HC RX 637 (ALT 250 FOR IP): Performed by: STUDENT IN AN ORGANIZED HEALTH CARE EDUCATION/TRAINING PROGRAM

## 2024-04-25 PROCEDURE — 2580000003 HC RX 258: Performed by: STUDENT IN AN ORGANIZED HEALTH CARE EDUCATION/TRAINING PROGRAM

## 2024-04-25 PROCEDURE — 6360000002 HC RX W HCPCS: Performed by: HOSPITALIST

## 2024-04-25 PROCEDURE — 94761 N-INVAS EAR/PLS OXIMETRY MLT: CPT

## 2024-04-25 PROCEDURE — 97116 GAIT TRAINING THERAPY: CPT

## 2024-04-25 PROCEDURE — 2720000010 HC SURG SUPPLY STERILE: Performed by: INTERNAL MEDICINE

## 2024-04-25 PROCEDURE — 99232 SBSQ HOSP IP/OBS MODERATE 35: CPT | Performed by: INTERNAL MEDICINE

## 2024-04-25 PROCEDURE — 3700000000 HC ANESTHESIA ATTENDED CARE: Performed by: INTERNAL MEDICINE

## 2024-04-25 PROCEDURE — 5A09357 ASSISTANCE WITH RESPIRATORY VENTILATION, LESS THAN 24 CONSECUTIVE HOURS, CONTINUOUS POSITIVE AIRWAY PRESSURE: ICD-10-PCS | Performed by: INTERNAL MEDICINE

## 2024-04-25 PROCEDURE — 87077 CULTURE AEROBIC IDENTIFY: CPT

## 2024-04-25 PROCEDURE — 97535 SELF CARE MNGMENT TRAINING: CPT

## 2024-04-25 PROCEDURE — 0B9F8ZX DRAINAGE OF RIGHT LOWER LUNG LOBE, VIA NATURAL OR ARTIFICIAL OPENING ENDOSCOPIC, DIAGNOSTIC: ICD-10-PCS | Performed by: INTERNAL MEDICINE

## 2024-04-25 PROCEDURE — 6370000000 HC RX 637 (ALT 250 FOR IP): Performed by: HOSPITALIST

## 2024-04-25 PROCEDURE — 2500000003 HC RX 250 WO HCPCS: Performed by: NURSE ANESTHETIST, CERTIFIED REGISTERED

## 2024-04-25 PROCEDURE — 7100000001 HC PACU RECOVERY - ADDTL 15 MIN: Performed by: INTERNAL MEDICINE

## 2024-04-25 PROCEDURE — 85025 COMPLETE CBC W/AUTO DIFF WBC: CPT

## 2024-04-25 PROCEDURE — 6360000002 HC RX W HCPCS: Performed by: NURSE ANESTHETIST, CERTIFIED REGISTERED

## 2024-04-25 PROCEDURE — 2580000003 HC RX 258: Performed by: INTERNAL MEDICINE

## 2024-04-25 PROCEDURE — 0B738ZZ DILATION OF RIGHT MAIN BRONCHUS, VIA NATURAL OR ARTIFICIAL OPENING ENDOSCOPIC: ICD-10-PCS | Performed by: INTERNAL MEDICINE

## 2024-04-25 PROCEDURE — 71045 X-RAY EXAM CHEST 1 VIEW: CPT

## 2024-04-25 PROCEDURE — C1726 CATH, BAL DIL, NON-VASCULAR: HCPCS | Performed by: INTERNAL MEDICINE

## 2024-04-25 PROCEDURE — 36415 COLL VENOUS BLD VENIPUNCTURE: CPT

## 2024-04-25 PROCEDURE — 94640 AIRWAY INHALATION TREATMENT: CPT

## 2024-04-25 PROCEDURE — 87070 CULTURE OTHR SPECIMN AEROBIC: CPT

## 2024-04-25 PROCEDURE — 3609027000 HC BRONCHOSCOPY: Performed by: INTERNAL MEDICINE

## 2024-04-25 PROCEDURE — 2140000000 HC CCU INTERMEDIATE R&B

## 2024-04-25 PROCEDURE — 97166 OT EVAL MOD COMPLEX 45 MIN: CPT

## 2024-04-25 PROCEDURE — 2700000000 HC OXYGEN THERAPY PER DAY

## 2024-04-25 PROCEDURE — 3609010800 HC BRONCHOSCOPY ALVEOLAR LAVAGE: Performed by: INTERNAL MEDICINE

## 2024-04-25 PROCEDURE — 3700000001 HC ADD 15 MINUTES (ANESTHESIA): Performed by: INTERNAL MEDICINE

## 2024-04-25 PROCEDURE — 99232 SBSQ HOSP IP/OBS MODERATE 35: CPT | Performed by: HOSPITALIST

## 2024-04-25 PROCEDURE — 94660 CPAP INITIATION&MGMT: CPT

## 2024-04-25 DEVICE — DISTAL RELEASE STENT SYSTEM
Type: IMPLANTABLE DEVICE | Site: BRONCHUS | Status: FUNCTIONAL
Brand: ULTRAFLEX™ TRACHEOBRONCHIAL

## 2024-04-25 RX ORDER — SODIUM CHLORIDE 9 MG/ML
INJECTION, SOLUTION INTRAVENOUS PRN
Status: DISCONTINUED | OUTPATIENT
Start: 2024-04-25 | End: 2024-04-25 | Stop reason: HOSPADM

## 2024-04-25 RX ORDER — SUCCINYLCHOLINE/SOD CL,ISO/PF 200MG/10ML
SYRINGE (ML) INTRAVENOUS PRN
Status: DISCONTINUED | OUTPATIENT
Start: 2024-04-25 | End: 2024-04-25 | Stop reason: SDUPTHER

## 2024-04-25 RX ORDER — SODIUM CHLORIDE 0.9 % (FLUSH) 0.9 %
5-40 SYRINGE (ML) INJECTION EVERY 12 HOURS SCHEDULED
Status: DISCONTINUED | OUTPATIENT
Start: 2024-04-25 | End: 2024-04-25

## 2024-04-25 RX ORDER — DEXAMETHASONE SODIUM PHOSPHATE 4 MG/ML
INJECTION, SOLUTION INTRA-ARTICULAR; INTRALESIONAL; INTRAMUSCULAR; INTRAVENOUS; SOFT TISSUE PRN
Status: DISCONTINUED | OUTPATIENT
Start: 2024-04-25 | End: 2024-04-25 | Stop reason: SDUPTHER

## 2024-04-25 RX ORDER — ONDANSETRON 2 MG/ML
INJECTION INTRAMUSCULAR; INTRAVENOUS PRN
Status: DISCONTINUED | OUTPATIENT
Start: 2024-04-25 | End: 2024-04-25 | Stop reason: SDUPTHER

## 2024-04-25 RX ORDER — PHENYLEPHRINE HCL IN 0.9% NACL 1 MG/10 ML
SYRINGE (ML) INTRAVENOUS PRN
Status: DISCONTINUED | OUTPATIENT
Start: 2024-04-25 | End: 2024-04-25

## 2024-04-25 RX ORDER — LIDOCAINE HYDROCHLORIDE 20 MG/ML
INJECTION, SOLUTION INTRAVENOUS PRN
Status: DISCONTINUED | OUTPATIENT
Start: 2024-04-25 | End: 2024-04-25 | Stop reason: SDUPTHER

## 2024-04-25 RX ORDER — PROPOFOL 10 MG/ML
INJECTION, EMULSION INTRAVENOUS PRN
Status: DISCONTINUED | OUTPATIENT
Start: 2024-04-25 | End: 2024-04-25 | Stop reason: SDUPTHER

## 2024-04-25 RX ORDER — SODIUM CHLORIDE 0.9 % (FLUSH) 0.9 %
5-40 SYRINGE (ML) INJECTION PRN
Status: DISCONTINUED | OUTPATIENT
Start: 2024-04-25 | End: 2024-04-25 | Stop reason: HOSPADM

## 2024-04-25 RX ORDER — GLYCOPYRROLATE 0.2 MG/ML
INJECTION INTRAMUSCULAR; INTRAVENOUS PRN
Status: DISCONTINUED | OUTPATIENT
Start: 2024-04-25 | End: 2024-04-25 | Stop reason: SDUPTHER

## 2024-04-25 RX ORDER — ROCURONIUM BROMIDE 10 MG/ML
INJECTION, SOLUTION INTRAVENOUS PRN
Status: DISCONTINUED | OUTPATIENT
Start: 2024-04-25 | End: 2024-04-25 | Stop reason: SDUPTHER

## 2024-04-25 RX ORDER — LIDOCAINE HYDROCHLORIDE 40 MG/ML
2.5 INJECTION, SOLUTION RETROBULBAR ONCE
Status: DISCONTINUED | OUTPATIENT
Start: 2024-04-25 | End: 2024-04-25 | Stop reason: HOSPADM

## 2024-04-25 RX ADMIN — Medication 1 CAPSULE: at 08:08

## 2024-04-25 RX ADMIN — Medication 1 TABLET: at 08:08

## 2024-04-25 RX ADMIN — LORAZEPAM 1 MG: 1 TABLET ORAL at 01:27

## 2024-04-25 RX ADMIN — Medication 100 MG: at 14:03

## 2024-04-25 RX ADMIN — SODIUM CHLORIDE, PRESERVATIVE FREE 10 ML: 5 INJECTION INTRAVENOUS at 08:07

## 2024-04-25 RX ADMIN — TIOTROPIUM BROMIDE AND OLODATEROL 2 PUFF: 3.124; 2.736 SPRAY, METERED RESPIRATORY (INHALATION) at 09:31

## 2024-04-25 RX ADMIN — Medication 130 MG: at 14:03

## 2024-04-25 RX ADMIN — METOPROLOL TARTRATE 25 MG: 25 TABLET, FILM COATED ORAL at 08:08

## 2024-04-25 RX ADMIN — GLYCOPYRROLATE 0.2 MG: 0.2 INJECTION INTRAMUSCULAR; INTRAVENOUS at 13:58

## 2024-04-25 RX ADMIN — MICONAZOLE NITRATE: 2 POWDER TOPICAL at 08:13

## 2024-04-25 RX ADMIN — HEPARIN SODIUM 5000 UNITS: 5000 INJECTION INTRAVENOUS; SUBCUTANEOUS at 06:07

## 2024-04-25 RX ADMIN — ROCURONIUM BROMIDE 25 MG: 10 INJECTION INTRAVENOUS at 14:12

## 2024-04-25 RX ADMIN — HEPARIN SODIUM 5000 UNITS: 5000 INJECTION INTRAVENOUS; SUBCUTANEOUS at 21:42

## 2024-04-25 RX ADMIN — CLOPIDOGREL BISULFATE 75 MG: 75 TABLET ORAL at 08:32

## 2024-04-25 RX ADMIN — ONDANSETRON 4 MG: 2 INJECTION INTRAMUSCULAR; INTRAVENOUS at 14:13

## 2024-04-25 RX ADMIN — SODIUM CHLORIDE: 9 INJECTION, SOLUTION INTRAVENOUS at 13:55

## 2024-04-25 RX ADMIN — LOSARTAN POTASSIUM 100 MG: 100 TABLET, FILM COATED ORAL at 08:08

## 2024-04-25 RX ADMIN — DEXAMETHASONE SODIUM PHOSPHATE 8 MG: 4 INJECTION, SOLUTION INTRAMUSCULAR; INTRAVENOUS at 14:13

## 2024-04-25 RX ADMIN — SODIUM CHLORIDE, PRESERVATIVE FREE 10 ML: 5 INJECTION INTRAVENOUS at 21:42

## 2024-04-25 RX ADMIN — PROPOFOL 120 MG: 10 INJECTION, EMULSION INTRAVENOUS at 14:03

## 2024-04-25 RX ADMIN — PHENYLEPHRINE HYDROCHLORIDE 100 MCG: 10 INJECTION INTRAVENOUS at 14:20

## 2024-04-25 RX ADMIN — SUGAMMADEX 200 MG: 100 INJECTION, SOLUTION INTRAVENOUS at 14:40

## 2024-04-25 RX ADMIN — ASPIRIN 81 MG 81 MG: 81 TABLET ORAL at 08:32

## 2024-04-25 RX ADMIN — PHENYLEPHRINE HYDROCHLORIDE 200 MCG: 10 INJECTION INTRAVENOUS at 14:11

## 2024-04-25 RX ADMIN — POLYETHYLENE GLYCOL 3350 17 G: 17 POWDER, FOR SOLUTION ORAL at 08:10

## 2024-04-25 RX ADMIN — HYDRALAZINE HYDROCHLORIDE 25 MG: 25 TABLET, FILM COATED ORAL at 08:15

## 2024-04-25 RX ADMIN — VENLAFAXINE HYDROCHLORIDE 75 MG: 75 CAPSULE, EXTENDED RELEASE ORAL at 08:08

## 2024-04-25 ASSESSMENT — PAIN SCALES - WONG BAKER
WONGBAKER_NUMERICALRESPONSE: NO HURT

## 2024-04-25 ASSESSMENT — PAIN SCALES - GENERAL
PAINLEVEL_OUTOF10: 0
PAINLEVEL_OUTOF10: 0

## 2024-04-25 ASSESSMENT — COPD QUESTIONNAIRES: CAT_SEVERITY: NO INTERVAL CHANGE

## 2024-04-25 ASSESSMENT — PAIN - FUNCTIONAL ASSESSMENT: PAIN_FUNCTIONAL_ASSESSMENT: WONG-BAKER FACES

## 2024-04-25 ASSESSMENT — ENCOUNTER SYMPTOMS: SHORTNESS OF BREATH: 1

## 2024-04-25 NOTE — H&P
Update History & Physical    The patient's History and Physical of April 24, and was reviewed with the patient and I examined the patient. There was no change. The surgical site was confirmed by the patient and me.       Plan: The risks, benefits, expected outcome, and alternative to the recommended procedure have been discussed with the patient. Patient understands and wants to proceed with the procedure.     Electronically signed by LEAH MORENO DO on 4/25/2024 at 1:12 PM

## 2024-04-25 NOTE — PROCEDURES
Bronchoscopy Procedure Note    Date of Operation: 4/25/2024    Pre-op Diagnosis: Completely occlusion of right mainstem    Post-op Diagnosis: Successful balloon dilation with balloon, placement of 12x30 mm partially covered [employer] stent to bronchus intermedius, tumor foreign body removal and tumor debulking of the right upper lobe endobronchial tumor    Surgeon: LEAH MORENO DO    Assistants: Pati Lemons CNP    Anesthesia: General endotracheal anesthesia    Operation: Flexible fiberoptic bronchoscopy, with tumor debulking and tumor foreign body removal using APC and forceps, balloon dilation of airway, placement of bronchial stent bronchus intermedius, BAL RLL    Findings: Endobronchial lesion from right upper lobe partially impeding the bronchus intermedius with external compression causing 100% collapse of BI past the right upper lobe.    Specimen: N/A    Estimated Blood Loss: Minimal    Drains: N/A    Complications: None apparent    Indications and History:  The patient is a 68 y.o. female with tumor blocking right mainstem airway.  The risks, benefits, complications, treatment options and expected outcomes were discussed with the patient.  The possibilities of reaction to medication, pulmonary aspiration, perforation of a viscus, bleeding, failure to diagnose a condition and creating a complication requiring transfusion or operation were discussed with the patient who freely signed the consent.      Description of Procedure:  The patient was seen in the Holding Room and the site of surgery properly noted/marked.  The patient was taken to OR 14, identified as Kalpana Braga and the procedure verified as Flexible Fiberoptic Bronchoscopy.  A Time Out was held and the above information confirmed.     After adequate sedation the scope was placed thru the ET tube and advanced to the tracheal ruddy.  The scope was used to inspect the trachea, left mainstem, upper lobe and lower lobe then pulled

## 2024-04-25 NOTE — CARE COORDINATION
4/25/24, 2:25 PM EDT    DISCHARGE ON GOING EVALUATION    Kalpana KAUR [facility] day: 3  Location: [facility] ENDO POOL RM/NONE Reason for admit: Shortness of breath [R06.02]  Restless [R45.1]  Elevated liver enzymes [R74.8]  Acute hypoxic respiratory failure (HCC) [J96.01]     Procedures:   4/22 US guided right thoracentesis: removed 1L pale yellow fluid.   4/25 Bronchoscopy with Dr. Horan: planned    Imaging since last note:   4/24 CT chest:   1. Near complete opacification of the right hemithorax with almost no aerated lung visualized is unchanged. The moderate right pleural effusion is stable. The right suprahilar mass and associated lymphadenopathy remains difficult to measure discretely apart from the area of consolidation. The mass very approximately measures 4.7 x 6.0 cm (previously measured 3.6 x 6.0 cm on the comparison CTA chest in which it was better visualize).    2. The small left pleural effusion has increased in volume and the mild consolidation at the left lung base is more prominent. A 15 mm subpleural left upper lobe pulmonary nodule (series 301, image 19) remain stable.  3. Chronic finding.     Barriers to Discharge: Hospitalist, Pulmonology, Oncology and Palliative care following. Interventional Pulmonology consulted and planning Bronchoscopy with possible stenting today. Hospice consulted.     PCP: Tatiana Velasquez, PAO - CNP  Readmission Risk Score: 26.4    Patient Goals/Plan/Treatment [facility]

## 2024-04-25 NOTE — ANESTHESIA POSTPROCEDURE EVALUATION
Department of Anesthesiology  Postprocedure Note    Patient: Kalpana Braga  MRN: [id number]  YOB: 1955  Date of evaluation: 4/25/2024    Procedure Summary       Date: 04/25/24 Room / Location: [place] [address] / [facility]    Anesthesia Start: 1357 Anesthesia Stop: 1502    Procedures:       BRONCHOSCOPY WITH APC BALLOON DILATION AND STENT INSERTION      BRONCHOSCOPY ALVEOLAR LAVAGE Diagnosis:       Acute hypoxic respiratory failure (HCC)      (Acute hypoxic respiratory failure (HCC) [J96.01])    Surgeons: Everett Horan DO Responsible Provider: Chin Chaudhari DO    Anesthesia Type: general ASA Status: 3            Anesthesia Type: No value filed.    Ruthie Phase I: Ruthie Score: 8    Ruthie Phase II:      Anesthesia Post Evaluation    Patient location during evaluation: PACU  Patient participation: complete - patient participated  Level of consciousness: awake  Airway patency: patent  Nausea & Vomiting: no nausea  Cardiovascular status: hemodynamically stable  Respiratory status: BIPAP  Hydration status: stable  Pain management: adequate    No notable events documented.

## 2024-04-25 NOTE — ANESTHESIA PRE PROCEDURE
BP Readings from Last 3 Encounters:   04/25/24 133/69   04/13/24 (!) 106/53   03/14/24 120/60       NPO Status: Time of last liquid consumption: 2100                        Time of last solid consumption: 1900                        Date of last liquid consumption: 04/24/24                        Date of last solid food consumption: 04/24/24    BMI:   Wt Readings from Last 3 Encounters:   04/25/24 87.3 kg (192 lb 7.4 oz)   04/09/24 89.2 kg (196 lb 10.4 oz)   03/14/24 88.7 kg (195 lb 9.6 oz)     Body mass index is 33.02 kg/m².    CBC:   Lab Results   Component Value Date/Time    WBC 8.0 04/25/2024 04:44 AM    RBC 3.69 04/25/2024 04:44 AM    RBC 4.62 03/30/2012 08:28 PM    HGB 10.0 04/25/2024 04:44 AM    HCT 33.0 04/25/2024 04:44 AM    MCV 89.4 04/25/2024 04:44 AM    RDW 14.4 07/31/2016 01:46 PM     04/25/2024 04:44 AM       CMP:   Lab Results   Component Value Date/Time     04/23/2024 04:32 AM    K 3.9 04/23/2024 04:32 AM    CL 99 04/23/2024 04:32 AM    CO2 31 04/23/2024 04:32 AM    BUN 10 04/23/2024 04:32 AM    CREATININE 0.5 04/23/2024 04:32 AM    LABGLOM >90 04/23/2024 04:32 AM    GLUCOSE 74 04/23/2024 04:32 AM    GLUCOSE 111 07/10/2017 10:25 AM    PROT 5.7 04/23/2024 04:32 AM    CALCIUM 8.4 04/23/2024 04:32 AM    BILITOT 0.4 04/23/2024 04:32 AM    ALKPHOS 152 04/23/2024 04:32 AM     04/23/2024 04:32 AM     04/23/2024 04:32 AM       POC Tests: No results for input(s): \"POCGLU\", \"POCNA\", \"POCK\", \"POCCL\", \"POCBUN\", \"POCHEMO\", \"POCHCT\" in the last 72 hours.    Coags:   Lab Results   Component Value Date/Time    INR 1.57 04/23/2024 04:32 AM    APTT 32.3 04/22/2024 11:15 AM       HCG (If Applicable): No results found for: \"PREGTESTUR\", \"PREGSERUM\", \"HCG\", \"HCGQUANT\"     ABGs: No results found for: \"PHART\", \"PO2ART\", \"XWG0DQM\", \"OTM2QHR\", \"BEART\", \"H3ACXSYE\"     Type & Screen (If Applicable):  No results found for: \"LABABO\", \"LABRH\"    Drug/Infectious Status (If Applicable):  Lab Results

## 2024-04-26 LAB
ALBUMIN SERPL BCG-MCNC: 2.7 G/DL (ref 3.5–5.1)
ALP SERPL-CCNC: 135 U/L (ref 38–126)
ALT SERPL W/O P-5'-P-CCNC: 223 U/L (ref 11–66)
ANION GAP SERPL CALC-SCNC: 9 MEQ/L (ref 8–16)
AST SERPL-CCNC: 111 U/L (ref 5–40)
BILIRUB SERPL-MCNC: 0.3 MG/DL (ref 0.3–1.2)
BUN SERPL-MCNC: 11 MG/DL (ref 7–22)
CALCIUM SERPL-MCNC: 8.7 MG/DL (ref 8.5–10.5)
CHLORIDE SERPL-SCNC: 101 MEQ/L (ref 98–111)
CO2 SERPL-SCNC: 31 MEQ/L (ref 23–33)
CREAT SERPL-MCNC: 0.6 MG/DL (ref 0.4–1.2)
DEPRECATED RDW RBC AUTO: 52.6 FL (ref 35–45)
ERYTHROCYTE [DISTWIDTH] IN BLOOD BY AUTOMATED COUNT: 15.9 % (ref 11.5–14.5)
GFR SERPL CREATININE-BSD FRML MDRD: > 90 ML/MIN/1.73M2
GLUCOSE SERPL-MCNC: 100 MG/DL (ref 70–108)
HCT VFR BLD AUTO: 35.5 % (ref 37–47)
HGB BLD-MCNC: 10.3 GM/DL (ref 12–16)
MCH RBC QN AUTO: 27 PG (ref 26–33)
MCHC RBC AUTO-ENTMCNC: 29 GM/DL (ref 32.2–35.5)
MCV RBC AUTO: 92.9 FL (ref 81–99)
PLATELET # BLD AUTO: 348 THOU/MM3 (ref 130–400)
PMV BLD AUTO: 10.1 FL (ref 9.4–12.4)
POTASSIUM SERPL-SCNC: 4.5 MEQ/L (ref 3.5–5.2)
PROT SERPL-MCNC: 5.9 G/DL (ref 6.1–8)
RBC # BLD AUTO: 3.82 MILL/MM3 (ref 4.2–5.4)
SODIUM SERPL-SCNC: 141 MEQ/L (ref 135–145)
WBC # BLD AUTO: 8.1 THOU/MM3 (ref 4.8–10.8)

## 2024-04-26 PROCEDURE — 6370000000 HC RX 637 (ALT 250 FOR IP): Performed by: HOSPITALIST

## 2024-04-26 PROCEDURE — 2140000000 HC CCU INTERMEDIATE R&B

## 2024-04-26 PROCEDURE — 85027 COMPLETE CBC AUTOMATED: CPT

## 2024-04-26 PROCEDURE — 94640 AIRWAY INHALATION TREATMENT: CPT

## 2024-04-26 PROCEDURE — 2700000000 HC OXYGEN THERAPY PER DAY

## 2024-04-26 PROCEDURE — 99232 SBSQ HOSP IP/OBS MODERATE 35: CPT | Performed by: INTERNAL MEDICINE

## 2024-04-26 PROCEDURE — 99232 SBSQ HOSP IP/OBS MODERATE 35: CPT | Performed by: HOSPITALIST

## 2024-04-26 PROCEDURE — 36415 COLL VENOUS BLD VENIPUNCTURE: CPT

## 2024-04-26 PROCEDURE — 6370000000 HC RX 637 (ALT 250 FOR IP): Performed by: STUDENT IN AN ORGANIZED HEALTH CARE EDUCATION/TRAINING PROGRAM

## 2024-04-26 PROCEDURE — 80053 COMPREHEN METABOLIC PANEL: CPT

## 2024-04-26 PROCEDURE — 6370000000 HC RX 637 (ALT 250 FOR IP)

## 2024-04-26 PROCEDURE — 6360000002 HC RX W HCPCS: Performed by: HOSPITALIST

## 2024-04-26 PROCEDURE — 2580000003 HC RX 258: Performed by: STUDENT IN AN ORGANIZED HEALTH CARE EDUCATION/TRAINING PROGRAM

## 2024-04-26 PROCEDURE — 97110 THERAPEUTIC EXERCISES: CPT

## 2024-04-26 PROCEDURE — 99233 SBSQ HOSP IP/OBS HIGH 50: CPT | Performed by: STUDENT IN AN ORGANIZED HEALTH CARE EDUCATION/TRAINING PROGRAM

## 2024-04-26 RX ADMIN — HEPARIN SODIUM 5000 UNITS: 5000 INJECTION INTRAVENOUS; SUBCUTANEOUS at 14:35

## 2024-04-26 RX ADMIN — HYDRALAZINE HYDROCHLORIDE 25 MG: 25 TABLET, FILM COATED ORAL at 20:19

## 2024-04-26 RX ADMIN — LOSARTAN POTASSIUM 100 MG: 100 TABLET, FILM COATED ORAL at 09:23

## 2024-04-26 RX ADMIN — TRAZODONE HYDROCHLORIDE 50 MG: 50 TABLET ORAL at 20:20

## 2024-04-26 RX ADMIN — HEPARIN SODIUM 5000 UNITS: 5000 INJECTION INTRAVENOUS; SUBCUTANEOUS at 20:19

## 2024-04-26 RX ADMIN — FERROUS SULFATE TAB 325 MG (65 MG ELEMENTAL FE) 325 MG: 325 (65 FE) TAB at 09:23

## 2024-04-26 RX ADMIN — ASPIRIN 81 MG 81 MG: 81 TABLET ORAL at 09:23

## 2024-04-26 RX ADMIN — Medication 1 TABLET: at 09:23

## 2024-04-26 RX ADMIN — ATORVASTATIN CALCIUM 40 MG: 40 TABLET, FILM COATED ORAL at 20:19

## 2024-04-26 RX ADMIN — METOPROLOL TARTRATE 25 MG: 25 TABLET, FILM COATED ORAL at 20:19

## 2024-04-26 RX ADMIN — HEPARIN SODIUM 5000 UNITS: 5000 INJECTION INTRAVENOUS; SUBCUTANEOUS at 06:28

## 2024-04-26 RX ADMIN — HYDRALAZINE HYDROCHLORIDE 25 MG: 25 TABLET, FILM COATED ORAL at 14:36

## 2024-04-26 RX ADMIN — VENLAFAXINE HYDROCHLORIDE 75 MG: 75 CAPSULE, EXTENDED RELEASE ORAL at 09:23

## 2024-04-26 RX ADMIN — SODIUM CHLORIDE, PRESERVATIVE FREE 10 ML: 5 INJECTION INTRAVENOUS at 20:20

## 2024-04-26 RX ADMIN — MICONAZOLE NITRATE: 2 POWDER TOPICAL at 09:24

## 2024-04-26 RX ADMIN — Medication 1 CAPSULE: at 09:23

## 2024-04-26 RX ADMIN — HYDRALAZINE HYDROCHLORIDE 25 MG: 25 TABLET, FILM COATED ORAL at 09:23

## 2024-04-26 RX ADMIN — CLOPIDOGREL BISULFATE 75 MG: 75 TABLET ORAL at 09:24

## 2024-04-26 RX ADMIN — TIOTROPIUM BROMIDE AND OLODATEROL 2 PUFF: 3.124; 2.736 SPRAY, METERED RESPIRATORY (INHALATION) at 09:24

## 2024-04-26 RX ADMIN — MICONAZOLE NITRATE: 2 POWDER TOPICAL at 20:20

## 2024-04-26 RX ADMIN — LORAZEPAM 1 MG: 1 TABLET ORAL at 15:48

## 2024-04-26 NOTE — CARE COORDINATION
4/26/24, 2:36 PM EDT    DISCHARGE PLANNING EVALUATION    Spoke with [facility].  They will be able to accept under hospice if they decide to go that way.  They will need to work out costs with the family prior to admission.

## 2024-04-26 NOTE — CARE COORDINATION
4/26/24, 2:37 PM EDT    DISCHARGE ON GOING EVALUATION    [facility] day: 4  Location: [address] [place] [address]-A Reason for admit: Shortness of breath [R06.02]  Restless [R45.1]  Elevated liver enzymes [R74.8]  Acute hypoxic respiratory failure (HCC) [J96.01]     Procedures:   4/22 US guided right thoracentesis: removed 1L pale yellow fluid.   4/25 Bronchoscopy with Dr. Horan: Flexible fiberoptic bronchoscopy, with tumor debulking and tumor foreign body removal using APC and forceps, balloon dilation of airway, placement of bronchial stent bronchus intermedius, BAL RLL.    Imaging since last note:   4/25 PCXR: Persistent complete opacification of the right thorax.     Barriers to Discharge: Hospitalist, Pulmonology, Oncology and Palliative care following. Bronch done yesterday and bronchial stent done, see above. Hospice consulted.     PCP: Tatiana Velasquez, APRN - CNP  Readmission Risk Score: 25.4    Patient Goals/Plan/Treatment [facility].

## 2024-04-27 LAB
ALBUMIN SERPL BCG-MCNC: 2.8 G/DL (ref 3.5–5.1)
ALP SERPL-CCNC: 130 U/L (ref 38–126)
ALT SERPL W/O P-5'-P-CCNC: 171 U/L (ref 11–66)
ANION GAP SERPL CALC-SCNC: 7 MEQ/L (ref 8–16)
AST SERPL-CCNC: 59 U/L (ref 5–40)
BASOPHILS ABSOLUTE: 0 THOU/MM3 (ref 0–0.1)
BASOPHILS NFR BLD AUTO: 0.2 %
BILIRUB SERPL-MCNC: 0.2 MG/DL (ref 0.3–1.2)
BUN SERPL-MCNC: 17 MG/DL (ref 7–22)
CALCIUM SERPL-MCNC: 8.8 MG/DL (ref 8.5–10.5)
CHLORIDE SERPL-SCNC: 100 MEQ/L (ref 98–111)
CO2 SERPL-SCNC: 34 MEQ/L (ref 23–33)
CREAT SERPL-MCNC: 0.7 MG/DL (ref 0.4–1.2)
DEPRECATED RDW RBC AUTO: 53.2 FL (ref 35–45)
EOSINOPHIL NFR BLD AUTO: 0.7 %
EOSINOPHILS ABSOLUTE: 0.1 THOU/MM3 (ref 0–0.4)
ERYTHROCYTE [DISTWIDTH] IN BLOOD BY AUTOMATED COUNT: 16 % (ref 11.5–14.5)
GFR SERPL CREATININE-BSD FRML MDRD: > 90 ML/MIN/1.73M2
GLUCOSE SERPL-MCNC: 91 MG/DL (ref 70–108)
HCT VFR BLD AUTO: 33.8 % (ref 37–47)
HGB BLD-MCNC: 9.9 GM/DL (ref 12–16)
IMM GRANULOCYTES # BLD AUTO: 0.06 THOU/MM3 (ref 0–0.07)
IMM GRANULOCYTES NFR BLD AUTO: 0.7 %
LYMPHOCYTES ABSOLUTE: 1.8 THOU/MM3 (ref 1–4.8)
LYMPHOCYTES NFR BLD AUTO: 21.4 %
MCH RBC QN AUTO: 27.3 PG (ref 26–33)
MCHC RBC AUTO-ENTMCNC: 29.3 GM/DL (ref 32.2–35.5)
MCV RBC AUTO: 93.1 FL (ref 81–99)
MONOCYTES ABSOLUTE: 0.8 THOU/MM3 (ref 0.4–1.3)
MONOCYTES NFR BLD AUTO: 10.1 %
NEUTROPHILS NFR BLD AUTO: 66.9 %
NRBC BLD AUTO-RTO: 0 /100 WBC
PLATELET # BLD AUTO: 330 THOU/MM3 (ref 130–400)
PMV BLD AUTO: 10.2 FL (ref 9.4–12.4)
POTASSIUM SERPL-SCNC: 3.8 MEQ/L (ref 3.5–5.2)
PROT SERPL-MCNC: 6.1 G/DL (ref 6.1–8)
RBC # BLD AUTO: 3.63 MILL/MM3 (ref 4.2–5.4)
SEGMENTED NEUTROPHILS ABSOLUTE COUNT: 5.6 THOU/MM3 (ref 1.8–7.7)
SODIUM SERPL-SCNC: 141 MEQ/L (ref 135–145)
WBC # BLD AUTO: 8.4 THOU/MM3 (ref 4.8–10.8)

## 2024-04-27 PROCEDURE — 6370000000 HC RX 637 (ALT 250 FOR IP): Performed by: HOSPITALIST

## 2024-04-27 PROCEDURE — 2700000000 HC OXYGEN THERAPY PER DAY

## 2024-04-27 PROCEDURE — 6370000000 HC RX 637 (ALT 250 FOR IP): Performed by: STUDENT IN AN ORGANIZED HEALTH CARE EDUCATION/TRAINING PROGRAM

## 2024-04-27 PROCEDURE — 2580000003 HC RX 258: Performed by: STUDENT IN AN ORGANIZED HEALTH CARE EDUCATION/TRAINING PROGRAM

## 2024-04-27 PROCEDURE — 99232 SBSQ HOSP IP/OBS MODERATE 35: CPT | Performed by: HOSPITALIST

## 2024-04-27 PROCEDURE — 6370000000 HC RX 637 (ALT 250 FOR IP)

## 2024-04-27 PROCEDURE — 94640 AIRWAY INHALATION TREATMENT: CPT

## 2024-04-27 PROCEDURE — 2140000000 HC CCU INTERMEDIATE R&B

## 2024-04-27 PROCEDURE — 85025 COMPLETE CBC W/AUTO DIFF WBC: CPT

## 2024-04-27 PROCEDURE — 80053 COMPREHEN METABOLIC PANEL: CPT

## 2024-04-27 PROCEDURE — 99232 SBSQ HOSP IP/OBS MODERATE 35: CPT | Performed by: INTERNAL MEDICINE

## 2024-04-27 PROCEDURE — 6360000002 HC RX W HCPCS: Performed by: HOSPITALIST

## 2024-04-27 PROCEDURE — 36415 COLL VENOUS BLD VENIPUNCTURE: CPT

## 2024-04-27 RX ADMIN — OXYCODONE 2.5 MG: 5 TABLET ORAL at 13:48

## 2024-04-27 RX ADMIN — MICONAZOLE NITRATE: 2 POWDER TOPICAL at 09:46

## 2024-04-27 RX ADMIN — CLOPIDOGREL BISULFATE 75 MG: 75 TABLET ORAL at 09:47

## 2024-04-27 RX ADMIN — HYDROXYZINE HYDROCHLORIDE 10 MG: 10 TABLET ORAL at 22:05

## 2024-04-27 RX ADMIN — ATORVASTATIN CALCIUM 40 MG: 40 TABLET, FILM COATED ORAL at 20:43

## 2024-04-27 RX ADMIN — Medication 1 TABLET: at 09:46

## 2024-04-27 RX ADMIN — SODIUM CHLORIDE, PRESERVATIVE FREE 10 ML: 5 INJECTION INTRAVENOUS at 08:52

## 2024-04-27 RX ADMIN — Medication 1 CAPSULE: at 09:47

## 2024-04-27 RX ADMIN — METOPROLOL TARTRATE 25 MG: 25 TABLET, FILM COATED ORAL at 09:47

## 2024-04-27 RX ADMIN — TRAZODONE HYDROCHLORIDE 50 MG: 50 TABLET ORAL at 20:43

## 2024-04-27 RX ADMIN — HEPARIN SODIUM 5000 UNITS: 5000 INJECTION INTRAVENOUS; SUBCUTANEOUS at 13:51

## 2024-04-27 RX ADMIN — MICONAZOLE NITRATE: 2 POWDER TOPICAL at 20:45

## 2024-04-27 RX ADMIN — ASPIRIN 81 MG 81 MG: 81 TABLET ORAL at 09:44

## 2024-04-27 RX ADMIN — HYDRALAZINE HYDROCHLORIDE 25 MG: 25 TABLET, FILM COATED ORAL at 09:46

## 2024-04-27 RX ADMIN — HEPARIN SODIUM 5000 UNITS: 5000 INJECTION INTRAVENOUS; SUBCUTANEOUS at 22:05

## 2024-04-27 RX ADMIN — HYDROXYZINE HYDROCHLORIDE 10 MG: 10 TABLET ORAL at 14:41

## 2024-04-27 RX ADMIN — VENLAFAXINE HYDROCHLORIDE 75 MG: 75 CAPSULE, EXTENDED RELEASE ORAL at 09:46

## 2024-04-27 RX ADMIN — TIOTROPIUM BROMIDE AND OLODATEROL 2 PUFF: 3.124; 2.736 SPRAY, METERED RESPIRATORY (INHALATION) at 09:38

## 2024-04-27 RX ADMIN — SODIUM CHLORIDE, PRESERVATIVE FREE 10 ML: 5 INJECTION INTRAVENOUS at 20:44

## 2024-04-27 RX ADMIN — ACETAMINOPHEN 650 MG: 325 TABLET ORAL at 09:46

## 2024-04-27 RX ADMIN — LOSARTAN POTASSIUM 100 MG: 100 TABLET, FILM COATED ORAL at 09:46

## 2024-04-27 ASSESSMENT — PAIN SCALES - GENERAL
PAINLEVEL_OUTOF10: 5
PAINLEVEL_OUTOF10: 3

## 2024-04-27 ASSESSMENT — PAIN DESCRIPTION - LOCATION: LOCATION: CHEST

## 2024-04-27 ASSESSMENT — PAIN DESCRIPTION - ORIENTATION: ORIENTATION: OTHER (COMMENT)

## 2024-04-27 ASSESSMENT — PAIN DESCRIPTION - DESCRIPTORS: DESCRIPTORS: ACHING

## 2024-04-27 ASSESSMENT — PAIN DESCRIPTION - PAIN TYPE: TYPE: SURGICAL PAIN

## 2024-04-28 LAB
ALBUMIN SERPL BCG-MCNC: 3 G/DL (ref 3.5–5.1)
ALP SERPL-CCNC: 126 U/L (ref 38–126)
ALT SERPL W/O P-5'-P-CCNC: 129 U/L (ref 11–66)
ANION GAP SERPL CALC-SCNC: 11 MEQ/L (ref 8–16)
AST SERPL-CCNC: 41 U/L (ref 5–40)
BACTERIA SPEC RESP CULT: NORMAL
BASOPHILS ABSOLUTE: 0 THOU/MM3 (ref 0–0.1)
BASOPHILS NFR BLD AUTO: 0.1 %
BILIRUB SERPL-MCNC: 0.4 MG/DL (ref 0.3–1.2)
BUN SERPL-MCNC: 13 MG/DL (ref 7–22)
CALCIUM SERPL-MCNC: 9 MG/DL (ref 8.5–10.5)
CHLORIDE SERPL-SCNC: 97 MEQ/L (ref 98–111)
CO2 SERPL-SCNC: 32 MEQ/L (ref 23–33)
CREAT SERPL-MCNC: 0.6 MG/DL (ref 0.4–1.2)
DEPRECATED RDW RBC AUTO: 53.3 FL (ref 35–45)
EOSINOPHIL NFR BLD AUTO: 0.1 %
EOSINOPHILS ABSOLUTE: 0 THOU/MM3 (ref 0–0.4)
ERYTHROCYTE [DISTWIDTH] IN BLOOD BY AUTOMATED COUNT: 16.3 % (ref 11.5–14.5)
GFR SERPL CREATININE-BSD FRML MDRD: > 90 ML/MIN/1.73M2
GLUCOSE SERPL-MCNC: 126 MG/DL (ref 70–108)
GRAM STN SPEC: NORMAL
HCT VFR BLD AUTO: 35 % (ref 37–47)
HGB BLD-MCNC: 10.4 GM/DL (ref 12–16)
IMM GRANULOCYTES # BLD AUTO: 0.1 THOU/MM3 (ref 0–0.07)
IMM GRANULOCYTES NFR BLD AUTO: 0.7 %
LYMPHOCYTES ABSOLUTE: 1 THOU/MM3 (ref 1–4.8)
LYMPHOCYTES NFR BLD AUTO: 7.1 %
MCH RBC QN AUTO: 27.4 PG (ref 26–33)
MCHC RBC AUTO-ENTMCNC: 29.7 GM/DL (ref 32.2–35.5)
MCV RBC AUTO: 92.1 FL (ref 81–99)
MONOCYTES ABSOLUTE: 1.1 THOU/MM3 (ref 0.4–1.3)
MONOCYTES NFR BLD AUTO: 8.1 %
NEUTROPHILS NFR BLD AUTO: 83.9 %
NRBC BLD AUTO-RTO: 0 /100 WBC
PLATELET # BLD AUTO: 331 THOU/MM3 (ref 130–400)
PMV BLD AUTO: 10.2 FL (ref 9.4–12.4)
POTASSIUM SERPL-SCNC: 4.7 MEQ/L (ref 3.5–5.2)
PROT SERPL-MCNC: 6.6 G/DL (ref 6.1–8)
RBC # BLD AUTO: 3.8 MILL/MM3 (ref 4.2–5.4)
SEGMENTED NEUTROPHILS ABSOLUTE COUNT: 11.7 THOU/MM3 (ref 1.8–7.7)
SODIUM SERPL-SCNC: 140 MEQ/L (ref 135–145)
WBC # BLD AUTO: 14 THOU/MM3 (ref 4.8–10.8)

## 2024-04-28 PROCEDURE — 1200000000 HC SEMI PRIVATE

## 2024-04-28 PROCEDURE — 36415 COLL VENOUS BLD VENIPUNCTURE: CPT

## 2024-04-28 PROCEDURE — 6360000002 HC RX W HCPCS: Performed by: STUDENT IN AN ORGANIZED HEALTH CARE EDUCATION/TRAINING PROGRAM

## 2024-04-28 PROCEDURE — 2580000003 HC RX 258: Performed by: STUDENT IN AN ORGANIZED HEALTH CARE EDUCATION/TRAINING PROGRAM

## 2024-04-28 PROCEDURE — 80053 COMPREHEN METABOLIC PANEL: CPT

## 2024-04-28 PROCEDURE — 6360000002 HC RX W HCPCS: Performed by: HOSPITALIST

## 2024-04-28 PROCEDURE — 6370000000 HC RX 637 (ALT 250 FOR IP): Performed by: HOSPITALIST

## 2024-04-28 PROCEDURE — 85025 COMPLETE CBC W/AUTO DIFF WBC: CPT

## 2024-04-28 PROCEDURE — 94640 AIRWAY INHALATION TREATMENT: CPT

## 2024-04-28 PROCEDURE — 6370000000 HC RX 637 (ALT 250 FOR IP): Performed by: STUDENT IN AN ORGANIZED HEALTH CARE EDUCATION/TRAINING PROGRAM

## 2024-04-28 PROCEDURE — 99232 SBSQ HOSP IP/OBS MODERATE 35: CPT | Performed by: INTERNAL MEDICINE

## 2024-04-28 PROCEDURE — 99232 SBSQ HOSP IP/OBS MODERATE 35: CPT | Performed by: HOSPITALIST

## 2024-04-28 PROCEDURE — 2140000000 HC CCU INTERMEDIATE R&B

## 2024-04-28 PROCEDURE — 6370000000 HC RX 637 (ALT 250 FOR IP)

## 2024-04-28 RX ORDER — ALBUTEROL SULFATE 90 UG/1
2 AEROSOL, METERED RESPIRATORY (INHALATION) EVERY 4 HOURS PRN
Qty: 1 EACH | Refills: 3 | DISCHARGE
Start: 2024-04-28

## 2024-04-28 RX ORDER — ALBUTEROL SULFATE 2.5 MG/3ML
2.5 SOLUTION RESPIRATORY (INHALATION) EVERY 6 HOURS PRN
DISCHARGE
Start: 2024-04-28

## 2024-04-28 RX ORDER — L.ACID/L.CASEI/B.BIF/B.LON/FOS 2B CELL-50
1 CAPSULE ORAL DAILY
Qty: 30 CAPSULE | Refills: 0 | DISCHARGE
Start: 2024-04-28

## 2024-04-28 RX ORDER — LORAZEPAM 1 MG/1
1 TABLET ORAL EVERY 6 HOURS PRN
Qty: 60 TABLET | Refills: 0 | Status: SHIPPED | OUTPATIENT
Start: 2024-04-28 | End: 2024-05-07

## 2024-04-28 RX ORDER — OXYCODONE HYDROCHLORIDE 5 MG/1
2.5 TABLET ORAL EVERY 6 HOURS PRN
Qty: 60 TABLET | Refills: 0 | Status: SHIPPED | OUTPATIENT
Start: 2024-04-28 | End: 2024-05-07

## 2024-04-28 RX ORDER — VENLAFAXINE HYDROCHLORIDE 75 MG/1
75 CAPSULE, EXTENDED RELEASE ORAL DAILY
Qty: 30 CAPSULE | Refills: 0 | DISCHARGE
Start: 2024-04-28

## 2024-04-28 RX ORDER — TRAZODONE HYDROCHLORIDE 50 MG/1
50 TABLET ORAL NIGHTLY
Qty: 15 TABLET | Refills: 0 | DISCHARGE
Start: 2024-04-28 | End: 2024-05-13

## 2024-04-28 RX ORDER — SENNOSIDES 8.6 MG
1300 CAPSULE ORAL DAILY
Qty: 60 TABLET | Refills: 3 | DISCHARGE
Start: 2024-04-28

## 2024-04-28 RX ADMIN — LORAZEPAM 1 MG: 1 TABLET ORAL at 04:25

## 2024-04-28 RX ADMIN — HEPARIN SODIUM 5000 UNITS: 5000 INJECTION INTRAVENOUS; SUBCUTANEOUS at 05:38

## 2024-04-28 RX ADMIN — LORAZEPAM 1 MG: 2 INJECTION INTRAMUSCULAR; INTRAVENOUS at 10:00

## 2024-04-28 RX ADMIN — ASPIRIN 81 MG 81 MG: 81 TABLET ORAL at 10:42

## 2024-04-28 RX ADMIN — TIOTROPIUM BROMIDE AND OLODATEROL 2 PUFF: 3.124; 2.736 SPRAY, METERED RESPIRATORY (INHALATION) at 10:58

## 2024-04-28 RX ADMIN — Medication 1 TABLET: at 10:42

## 2024-04-28 RX ADMIN — LORAZEPAM 1 MG: 1 TABLET ORAL at 23:22

## 2024-04-28 RX ADMIN — LOSARTAN POTASSIUM 100 MG: 100 TABLET, FILM COATED ORAL at 10:42

## 2024-04-28 RX ADMIN — OXYCODONE 2.5 MG: 5 TABLET ORAL at 01:02

## 2024-04-28 RX ADMIN — HYDROXYZINE HYDROCHLORIDE 10 MG: 10 TABLET ORAL at 21:35

## 2024-04-28 RX ADMIN — CLOPIDOGREL BISULFATE 75 MG: 75 TABLET ORAL at 10:42

## 2024-04-28 RX ADMIN — ATORVASTATIN CALCIUM 40 MG: 40 TABLET, FILM COATED ORAL at 21:01

## 2024-04-28 RX ADMIN — VENLAFAXINE HYDROCHLORIDE 75 MG: 75 CAPSULE, EXTENDED RELEASE ORAL at 10:42

## 2024-04-28 RX ADMIN — OXYCODONE 2.5 MG: 5 TABLET ORAL at 12:55

## 2024-04-28 RX ADMIN — MICONAZOLE NITRATE: 2 POWDER TOPICAL at 10:41

## 2024-04-28 RX ADMIN — SODIUM CHLORIDE, PRESERVATIVE FREE 10 ML: 5 INJECTION INTRAVENOUS at 10:41

## 2024-04-28 RX ADMIN — FERROUS SULFATE TAB 325 MG (65 MG ELEMENTAL FE) 325 MG: 325 (65 FE) TAB at 10:42

## 2024-04-28 RX ADMIN — HYDRALAZINE HYDROCHLORIDE 25 MG: 25 TABLET, FILM COATED ORAL at 10:42

## 2024-04-28 RX ADMIN — METOPROLOL TARTRATE 25 MG: 25 TABLET, FILM COATED ORAL at 10:42

## 2024-04-28 RX ADMIN — MICONAZOLE NITRATE: 2 POWDER TOPICAL at 21:02

## 2024-04-28 RX ADMIN — TRAZODONE HYDROCHLORIDE 50 MG: 50 TABLET ORAL at 21:01

## 2024-04-28 RX ADMIN — HEPARIN SODIUM 5000 UNITS: 5000 INJECTION INTRAVENOUS; SUBCUTANEOUS at 21:31

## 2024-04-28 RX ADMIN — HYDRALAZINE HYDROCHLORIDE 25 MG: 25 TABLET, FILM COATED ORAL at 21:01

## 2024-04-28 RX ADMIN — Medication 1 CAPSULE: at 10:42

## 2024-04-28 ASSESSMENT — PAIN DESCRIPTION - DESCRIPTORS: DESCRIPTORS: ACHING;SORE;TENDER

## 2024-04-28 ASSESSMENT — PAIN - FUNCTIONAL ASSESSMENT: PAIN_FUNCTIONAL_ASSESSMENT: ACTIVITIES ARE NOT PREVENTED

## 2024-04-28 ASSESSMENT — PAIN DESCRIPTION - LOCATION: LOCATION: RIB CAGE

## 2024-04-28 ASSESSMENT — PAIN DESCRIPTION - ORIENTATION: ORIENTATION: RIGHT;LEFT

## 2024-04-28 ASSESSMENT — PAIN SCALES - GENERAL
PAINLEVEL_OUTOF10: 6
PAINLEVEL_OUTOF10: 4

## 2024-04-28 ASSESSMENT — PAIN DESCRIPTION - PAIN TYPE: TYPE: CHRONIC PAIN

## 2024-04-28 ASSESSMENT — PAIN DESCRIPTION - FREQUENCY: FREQUENCY: CONTINUOUS

## 2024-04-28 ASSESSMENT — PAIN DESCRIPTION - ONSET: ONSET: ON-GOING

## 2024-04-29 ENCOUNTER — APPOINTMENT (OUTPATIENT)
Dept: GENERAL RADIOLOGY | Age: 69
DRG: 189 | End: 2024-04-29
Payer: COMMERCIAL

## 2024-04-29 LAB
ALBUMIN SERPL BCG-MCNC: 3 G/DL (ref 3.5–5.1)
ALP SERPL-CCNC: 125 U/L (ref 38–126)
ALT SERPL W/O P-5'-P-CCNC: 96 U/L (ref 11–66)
ANION GAP SERPL CALC-SCNC: 8 MEQ/L (ref 8–16)
AST SERPL-CCNC: 28 U/L (ref 5–40)
BACTERIA: ABNORMAL
BILIRUB SERPL-MCNC: 0.6 MG/DL (ref 0.3–1.2)
BILIRUB UR QL STRIP: NEGATIVE
BUN SERPL-MCNC: 12 MG/DL (ref 7–22)
CALCIUM SERPL-MCNC: 9 MG/DL (ref 8.5–10.5)
CASTS #/AREA URNS LPF: ABNORMAL /LPF
CASTS #/AREA URNS LPF: ABNORMAL /LPF
CHARACTER UR: ABNORMAL
CHARCOAL URNS QL MICRO: ABNORMAL
CHLORIDE SERPL-SCNC: 96 MEQ/L (ref 98–111)
CO2 SERPL-SCNC: 31 MEQ/L (ref 23–33)
COLOR UR: YELLOW
CREAT SERPL-MCNC: 0.5 MG/DL (ref 0.4–1.2)
CRYSTALS URNS QL MICRO: ABNORMAL
DEPRECATED RDW RBC AUTO: 53 FL (ref 35–45)
EPITHELIAL CELLS, UA: ABNORMAL /HPF
ERYTHROCYTE [DISTWIDTH] IN BLOOD BY AUTOMATED COUNT: 16.6 % (ref 11.5–14.5)
FUNGUS SPEC CULT: NORMAL
FUNGUS SPEC FUNGUS STN: NORMAL
GFR SERPL CREATININE-BSD FRML MDRD: > 90 ML/MIN/1.73M2
GLUCOSE SERPL-MCNC: 91 MG/DL (ref 70–108)
GLUCOSE UR QL STRIP.AUTO: NEGATIVE MG/DL
HCT VFR BLD AUTO: 33.7 % (ref 37–47)
HGB BLD-MCNC: 10.3 GM/DL (ref 12–16)
HGB UR QL STRIP.AUTO: NEGATIVE
KETONES UR QL STRIP.AUTO: NEGATIVE
LEUKOCYTE ESTERASE UR QL STRIP.AUTO: ABNORMAL
MCH RBC QN AUTO: 27.6 PG (ref 26–33)
MCHC RBC AUTO-ENTMCNC: 30.6 GM/DL (ref 32.2–35.5)
MCV RBC AUTO: 90.3 FL (ref 81–99)
NITRITE UR QL STRIP.AUTO: POSITIVE
PH UR STRIP.AUTO: 8 [PH] (ref 5–9)
PLATELET # BLD AUTO: 300 THOU/MM3 (ref 130–400)
PMV BLD AUTO: 10 FL (ref 9.4–12.4)
POTASSIUM SERPL-SCNC: 4 MEQ/L (ref 3.5–5.2)
PROT SERPL-MCNC: 6.3 G/DL (ref 6.1–8)
PROT UR STRIP.AUTO-MCNC: NEGATIVE MG/DL
RBC # BLD AUTO: 3.73 MILL/MM3 (ref 4.2–5.4)
RBC #/AREA URNS HPF: ABNORMAL /HPF
RENAL EPI CELLS #/AREA URNS HPF: ABNORMAL /[HPF]
SODIUM SERPL-SCNC: 135 MEQ/L (ref 135–145)
SP GR UR REFRACT.AUTO: 1.01 (ref 1–1.03)
UROBILINOGEN UR QL STRIP.AUTO: 0.2 EU/DL (ref 0–1)
WBC # BLD AUTO: 16.3 THOU/MM3 (ref 4.8–10.8)
WBC #/AREA URNS HPF: ABNORMAL /HPF
YEAST LIKE FUNGI URNS QL MICRO: ABNORMAL

## 2024-04-29 PROCEDURE — 1200000000 HC SEMI PRIVATE

## 2024-04-29 PROCEDURE — 97530 THERAPEUTIC ACTIVITIES: CPT

## 2024-04-29 PROCEDURE — 81001 URINALYSIS AUTO W/SCOPE: CPT

## 2024-04-29 PROCEDURE — 87147 CULTURE TYPE IMMUNOLOGIC: CPT

## 2024-04-29 PROCEDURE — 1200000003 HC TELEMETRY R&B

## 2024-04-29 PROCEDURE — 94640 AIRWAY INHALATION TREATMENT: CPT

## 2024-04-29 PROCEDURE — 6360000002 HC RX W HCPCS: Performed by: HOSPITALIST

## 2024-04-29 PROCEDURE — 6370000000 HC RX 637 (ALT 250 FOR IP): Performed by: STUDENT IN AN ORGANIZED HEALTH CARE EDUCATION/TRAINING PROGRAM

## 2024-04-29 PROCEDURE — 6370000000 HC RX 637 (ALT 250 FOR IP)

## 2024-04-29 PROCEDURE — 87086 URINE CULTURE/COLONY COUNT: CPT

## 2024-04-29 PROCEDURE — 87186 SC STD MICRODIL/AGAR DIL: CPT

## 2024-04-29 PROCEDURE — 2700000000 HC OXYGEN THERAPY PER DAY

## 2024-04-29 PROCEDURE — 87077 CULTURE AEROBIC IDENTIFY: CPT

## 2024-04-29 PROCEDURE — 36415 COLL VENOUS BLD VENIPUNCTURE: CPT

## 2024-04-29 PROCEDURE — 94761 N-INVAS EAR/PLS OXIMETRY MLT: CPT

## 2024-04-29 PROCEDURE — 71046 X-RAY EXAM CHEST 2 VIEWS: CPT

## 2024-04-29 PROCEDURE — 99232 SBSQ HOSP IP/OBS MODERATE 35: CPT | Performed by: HOSPITALIST

## 2024-04-29 PROCEDURE — 6370000000 HC RX 637 (ALT 250 FOR IP): Performed by: HOSPITALIST

## 2024-04-29 PROCEDURE — 85027 COMPLETE CBC AUTOMATED: CPT

## 2024-04-29 PROCEDURE — 80053 COMPREHEN METABOLIC PANEL: CPT

## 2024-04-29 PROCEDURE — 97110 THERAPEUTIC EXERCISES: CPT

## 2024-04-29 RX ADMIN — Medication 1 CAPSULE: at 08:17

## 2024-04-29 RX ADMIN — LOSARTAN POTASSIUM 100 MG: 100 TABLET, FILM COATED ORAL at 08:17

## 2024-04-29 RX ADMIN — METOPROLOL TARTRATE 25 MG: 25 TABLET, FILM COATED ORAL at 20:13

## 2024-04-29 RX ADMIN — TRAZODONE HYDROCHLORIDE 50 MG: 50 TABLET ORAL at 20:13

## 2024-04-29 RX ADMIN — MICONAZOLE NITRATE: 2 POWDER TOPICAL at 08:18

## 2024-04-29 RX ADMIN — Medication 1 TABLET: at 08:16

## 2024-04-29 RX ADMIN — VENLAFAXINE HYDROCHLORIDE 75 MG: 75 CAPSULE, EXTENDED RELEASE ORAL at 08:16

## 2024-04-29 RX ADMIN — ASPIRIN 81 MG 81 MG: 81 TABLET ORAL at 08:16

## 2024-04-29 RX ADMIN — HEPARIN SODIUM 5000 UNITS: 5000 INJECTION INTRAVENOUS; SUBCUTANEOUS at 13:43

## 2024-04-29 RX ADMIN — HYDRALAZINE HYDROCHLORIDE 25 MG: 25 TABLET, FILM COATED ORAL at 13:37

## 2024-04-29 RX ADMIN — CLOPIDOGREL BISULFATE 75 MG: 75 TABLET ORAL at 08:17

## 2024-04-29 RX ADMIN — HYDRALAZINE HYDROCHLORIDE 25 MG: 25 TABLET, FILM COATED ORAL at 20:13

## 2024-04-29 RX ADMIN — METOPROLOL TARTRATE 25 MG: 25 TABLET, FILM COATED ORAL at 08:16

## 2024-04-29 RX ADMIN — HEPARIN SODIUM 5000 UNITS: 5000 INJECTION INTRAVENOUS; SUBCUTANEOUS at 05:57

## 2024-04-29 RX ADMIN — HYDROXYZINE HYDROCHLORIDE 10 MG: 10 TABLET ORAL at 08:24

## 2024-04-29 RX ADMIN — ATORVASTATIN CALCIUM 40 MG: 40 TABLET, FILM COATED ORAL at 20:13

## 2024-04-29 RX ADMIN — HEPARIN SODIUM 5000 UNITS: 5000 INJECTION INTRAVENOUS; SUBCUTANEOUS at 20:12

## 2024-04-29 RX ADMIN — HYDRALAZINE HYDROCHLORIDE 25 MG: 25 TABLET, FILM COATED ORAL at 08:17

## 2024-04-29 RX ADMIN — MICONAZOLE NITRATE: 2 POWDER TOPICAL at 20:24

## 2024-04-29 RX ADMIN — TIOTROPIUM BROMIDE AND OLODATEROL 2 PUFF: 3.124; 2.736 SPRAY, METERED RESPIRATORY (INHALATION) at 07:40

## 2024-04-29 ASSESSMENT — PAIN SCALES - GENERAL
PAINLEVEL_OUTOF10: 0

## 2024-04-29 NOTE — CARE COORDINATION
4/29/24, 8:44 AM EDT    DISCHARGE PLANNING EVALUATION    Was told by staff that [facility] gave away patient's bed.  Called Christine at [facility].  The bed was not given away.  They could not take back because if she goes skilled she needs precerted.  If she goes Hospice they need to reach out to family to discuss costs.  Updated Dr. Church.      Update 2:20 pm: Spoke with Kalpana's  Dilcia at [facility].  Updated her on discharge plan.  She is aware we are waiting to find out if the patient plans on going skilled or hospice.  She told SW to call with any needs regarding the discharge plan.  Her number is [phone].

## 2024-04-29 NOTE — CARE COORDINATION
4/29/24, 9:01 AM EDT    DISCHARGE ON GOING EVALUATION    Kalpana DIETRICH [facility] day: 7  Location: [address] [place] [address]- Reason for admit: Shortness of breath [R06.02]  Restless [R45.1]  Elevated liver enzymes [R74.8]  Acute hypoxic respiratory failure (HCC) [J96.01]     Procedures:   4/22 US guided right thoracentesis: removed 1L pale yellow fluid.   4/25 Bronchoscopy with Dr. Horan: Flexible fiberoptic bronchoscopy, with tumor debulking and tumor foreign body removal using APC and forceps, balloon dilation of airway, placement of bronchial stent bronchus intermedius, BAL RLL.    Imaging since last note:   4/29 CXR: Mild cardiomegaly. Total opacification right hemithorax, probably due to a combination of atelectasis/pneumonia/pleural effusion. Old fracture right humeral diametaphysis. No appreciable change from prior study..    Barriers to Discharge: Hospitalist, Pulmonology, Palliative Care following. Hospice met with pt/family over the weekend. Still have not decided if going hospice or wanting to return skilled to facility. PT/OT.     PCP: Tatiana Velasquez, APRN - CNP  Readmission Risk Score: 22    Patient Goals/Plan/Treatment Preferences: Pt is from [facility]. SW following. Will be precert if not going Hospice.     Pt transferred to [id number], handoff report given to JOMAR Gonzalez CM.

## 2024-04-30 LAB
ALBUMIN SERPL BCG-MCNC: 2.8 G/DL (ref 3.5–5.1)
ALP SERPL-CCNC: 109 U/L (ref 38–126)
ALT SERPL W/O P-5'-P-CCNC: 65 U/L (ref 11–66)
ANION GAP SERPL CALC-SCNC: 15 MEQ/L (ref 8–16)
AST SERPL-CCNC: 22 U/L (ref 5–40)
BILIRUB SERPL-MCNC: 0.8 MG/DL (ref 0.3–1.2)
BUN SERPL-MCNC: 11 MG/DL (ref 7–22)
CALCIUM SERPL-MCNC: 9.3 MG/DL (ref 8.5–10.5)
CHLORIDE SERPL-SCNC: 95 MEQ/L (ref 98–111)
CO2 SERPL-SCNC: 27 MEQ/L (ref 23–33)
CREAT SERPL-MCNC: 0.5 MG/DL (ref 0.4–1.2)
DEPRECATED RDW RBC AUTO: 51.8 FL (ref 35–45)
ERYTHROCYTE [DISTWIDTH] IN BLOOD BY AUTOMATED COUNT: 16.2 % (ref 11.5–14.5)
GFR SERPL CREATININE-BSD FRML MDRD: > 90 ML/MIN/1.73M2
GLUCOSE SERPL-MCNC: 96 MG/DL (ref 70–108)
HCT VFR BLD AUTO: 30.9 % (ref 37–47)
HGB BLD-MCNC: 9.4 GM/DL (ref 12–16)
MCH RBC QN AUTO: 26.9 PG (ref 26–33)
MCHC RBC AUTO-ENTMCNC: 30.4 GM/DL (ref 32.2–35.5)
MCV RBC AUTO: 88.5 FL (ref 81–99)
PLATELET # BLD AUTO: 315 THOU/MM3 (ref 130–400)
PMV BLD AUTO: 10.2 FL (ref 9.4–12.4)
POTASSIUM SERPL-SCNC: 3.9 MEQ/L (ref 3.5–5.2)
PROT SERPL-MCNC: 6.2 G/DL (ref 6.1–8)
RBC # BLD AUTO: 3.49 MILL/MM3 (ref 4.2–5.4)
SODIUM SERPL-SCNC: 137 MEQ/L (ref 135–145)
WBC # BLD AUTO: 14.4 THOU/MM3 (ref 4.8–10.8)

## 2024-04-30 PROCEDURE — 6360000002 HC RX W HCPCS: Performed by: HOSPITALIST

## 2024-04-30 PROCEDURE — 97116 GAIT TRAINING THERAPY: CPT

## 2024-04-30 PROCEDURE — 97110 THERAPEUTIC EXERCISES: CPT

## 2024-04-30 PROCEDURE — 2580000003 HC RX 258

## 2024-04-30 PROCEDURE — 6370000000 HC RX 637 (ALT 250 FOR IP): Performed by: STUDENT IN AN ORGANIZED HEALTH CARE EDUCATION/TRAINING PROGRAM

## 2024-04-30 PROCEDURE — 36415 COLL VENOUS BLD VENIPUNCTURE: CPT

## 2024-04-30 PROCEDURE — 97530 THERAPEUTIC ACTIVITIES: CPT

## 2024-04-30 PROCEDURE — 1200000003 HC TELEMETRY R&B

## 2024-04-30 PROCEDURE — 6370000000 HC RX 637 (ALT 250 FOR IP): Performed by: HOSPITALIST

## 2024-04-30 PROCEDURE — 99233 SBSQ HOSP IP/OBS HIGH 50: CPT | Performed by: STUDENT IN AN ORGANIZED HEALTH CARE EDUCATION/TRAINING PROGRAM

## 2024-04-30 PROCEDURE — 80053 COMPREHEN METABOLIC PANEL: CPT

## 2024-04-30 PROCEDURE — 6370000000 HC RX 637 (ALT 250 FOR IP)

## 2024-04-30 PROCEDURE — 1200000000 HC SEMI PRIVATE

## 2024-04-30 PROCEDURE — 99232 SBSQ HOSP IP/OBS MODERATE 35: CPT | Performed by: HOSPITALIST

## 2024-04-30 PROCEDURE — 97535 SELF CARE MNGMENT TRAINING: CPT

## 2024-04-30 PROCEDURE — 85027 COMPLETE CBC AUTOMATED: CPT

## 2024-04-30 PROCEDURE — 6360000002 HC RX W HCPCS

## 2024-04-30 PROCEDURE — 2580000003 HC RX 258: Performed by: STUDENT IN AN ORGANIZED HEALTH CARE EDUCATION/TRAINING PROGRAM

## 2024-04-30 RX ORDER — LINEZOLID 600 MG/1
600 TABLET, FILM COATED ORAL EVERY 12 HOURS SCHEDULED
Status: DISCONTINUED | OUTPATIENT
Start: 2024-04-30 | End: 2024-04-30

## 2024-04-30 RX ADMIN — VENLAFAXINE HYDROCHLORIDE 75 MG: 75 CAPSULE, EXTENDED RELEASE ORAL at 08:52

## 2024-04-30 RX ADMIN — LOSARTAN POTASSIUM 100 MG: 100 TABLET, FILM COATED ORAL at 08:52

## 2024-04-30 RX ADMIN — CLOPIDOGREL BISULFATE 75 MG: 75 TABLET ORAL at 08:52

## 2024-04-30 RX ADMIN — ATORVASTATIN CALCIUM 40 MG: 40 TABLET, FILM COATED ORAL at 20:36

## 2024-04-30 RX ADMIN — HYDRALAZINE HYDROCHLORIDE 25 MG: 25 TABLET, FILM COATED ORAL at 13:57

## 2024-04-30 RX ADMIN — HEPARIN SODIUM 5000 UNITS: 5000 INJECTION INTRAVENOUS; SUBCUTANEOUS at 13:57

## 2024-04-30 RX ADMIN — METOPROLOL TARTRATE 25 MG: 25 TABLET, FILM COATED ORAL at 20:36

## 2024-04-30 RX ADMIN — FERROUS SULFATE TAB 325 MG (65 MG ELEMENTAL FE) 325 MG: 325 (65 FE) TAB at 08:52

## 2024-04-30 RX ADMIN — TRAZODONE HYDROCHLORIDE 50 MG: 50 TABLET ORAL at 20:36

## 2024-04-30 RX ADMIN — HYDRALAZINE HYDROCHLORIDE 25 MG: 25 TABLET, FILM COATED ORAL at 08:52

## 2024-04-30 RX ADMIN — POLYETHYLENE GLYCOL 3350 17 G: 17 POWDER, FOR SOLUTION ORAL at 08:52

## 2024-04-30 RX ADMIN — ASPIRIN 81 MG 81 MG: 81 TABLET ORAL at 08:52

## 2024-04-30 RX ADMIN — LORAZEPAM 1 MG: 1 TABLET ORAL at 14:56

## 2024-04-30 RX ADMIN — VANCOMYCIN HYDROCHLORIDE 2000 MG: 1 INJECTION, POWDER, LYOPHILIZED, FOR SOLUTION INTRAVENOUS at 20:43

## 2024-04-30 RX ADMIN — Medication 1 TABLET: at 08:52

## 2024-04-30 RX ADMIN — SODIUM CHLORIDE, PRESERVATIVE FREE 10 ML: 5 INJECTION INTRAVENOUS at 20:36

## 2024-04-30 RX ADMIN — Medication 1 CAPSULE: at 08:52

## 2024-04-30 RX ADMIN — MICONAZOLE NITRATE: 2 POWDER TOPICAL at 11:56

## 2024-04-30 RX ADMIN — HEPARIN SODIUM 5000 UNITS: 5000 INJECTION INTRAVENOUS; SUBCUTANEOUS at 06:52

## 2024-04-30 RX ADMIN — HYDRALAZINE HYDROCHLORIDE 25 MG: 25 TABLET, FILM COATED ORAL at 22:30

## 2024-04-30 RX ADMIN — MICONAZOLE NITRATE: 2 POWDER TOPICAL at 20:45

## 2024-04-30 ASSESSMENT — PAIN SCALES - GENERAL
PAINLEVEL_OUTOF10: 0

## 2024-04-30 NOTE — CARE COORDINATION
4/30/24, 3:14 PM EDT    DISCHARGE ON GOING EVALUATION    Kalpana A [facility] day: 8  [facility] Reason for admit: Shortness of breath [R06.02]  Restless [R45.1]  Elevated liver enzymes [R74.8]  Acute hypoxic respiratory failure (HCC) [J96.01]     Procedures:   4/22 US guided right thoracentesis: removed 1L pale yellow fluid.   4/25 Bronchoscopy with Dr. Horan: Flexible fiberoptic bronchoscopy, with tumor debulking and tumor foreign body removal using APC and forceps, balloon dilation of airway, placement of bronchial stent bronchus intermedius, BAL RLL.    Imaging since last note: none     Barriers to Discharge: Transfer from . Hospitalist and hospice following. PT/OT. PO ativan prn. Telesitter at bedside    PCP: Tatiana Velasquez, PAO - CNP  Readmission Risk Score: 22.8    Patient Goals/Plan/Treatment Preferences: Plan to return to [facility] with hospice once private pay payments are arranged.

## 2024-04-30 NOTE — CARE COORDINATION
4/30/24, 7:07 AM EDT    DISCHARGE BARRIERS        Patient transferred to [facility]. Report given to unit SW, Margarita, regarding discharge plan for this patient.

## 2024-04-30 NOTE — CARE COORDINATION
4/30/24, 9:20 AM EDT    DISCHARGE PLANNING EVALUATION    Spoke with Deuce, updated on family's plan for return with hospice.   Facility staff will contact patient's brother to discuss private pay for return with hospice.

## 2024-05-01 ENCOUNTER — APPOINTMENT (OUTPATIENT)
Dept: GENERAL RADIOLOGY | Age: 69
DRG: 189 | End: 2024-05-01
Payer: COMMERCIAL

## 2024-05-01 LAB
ALBUMIN SERPL BCG-MCNC: 2.7 G/DL (ref 3.5–5.1)
ALP SERPL-CCNC: 115 U/L (ref 38–126)
ALT SERPL W/O P-5'-P-CCNC: 52 U/L (ref 11–66)
ANION GAP SERPL CALC-SCNC: 11 MEQ/L (ref 8–16)
AST SERPL-CCNC: 24 U/L (ref 5–40)
BACTERIA UR CULT: ABNORMAL
BILIRUB SERPL-MCNC: 0.5 MG/DL (ref 0.3–1.2)
BUN SERPL-MCNC: 9 MG/DL (ref 7–22)
CALCIUM SERPL-MCNC: 8.9 MG/DL (ref 8.5–10.5)
CHLORIDE SERPL-SCNC: 97 MEQ/L (ref 98–111)
CO2 SERPL-SCNC: 28 MEQ/L (ref 23–33)
CREAT SERPL-MCNC: 0.5 MG/DL (ref 0.4–1.2)
ERYTHROCYTE [DISTWIDTH] IN BLOOD BY AUTOMATED COUNT: 16.2 % (ref 11.5–14.5)
GFR SERPL CREATININE-BSD FRML MDRD: > 90 ML/MIN/1.73M2
GLUCOSE SERPL-MCNC: 97 MG/DL (ref 70–108)
HCT VFR BLD AUTO: 32.7 % (ref 37–47)
HGB BLD-MCNC: 10 GM/DL (ref 12–16)
MCH RBC QN AUTO: 26.9 PG (ref 26–33)
MCHC RBC AUTO-ENTMCNC: 30.6 GM/DL (ref 32.2–35.5)
MCV RBC AUTO: 87.9 FL (ref 81–99)
ORGANISM: ABNORMAL
PLATELET # BLD AUTO: 316 THOU/MM3 (ref 130–400)
PMV BLD AUTO: 10.3 FL (ref 9.4–12.4)
POTASSIUM SERPL-SCNC: 3.6 MEQ/L (ref 3.5–5.2)
PROT SERPL-MCNC: 6.4 G/DL (ref 6.1–8)
RBC # BLD AUTO: 3.72 MILL/MM3 (ref 4.2–5.4)
SODIUM SERPL-SCNC: 136 MEQ/L (ref 135–145)
WBC # BLD AUTO: 10.7 THOU/MM3 (ref 4.8–10.8)

## 2024-05-01 PROCEDURE — 6370000000 HC RX 637 (ALT 250 FOR IP): Performed by: STUDENT IN AN ORGANIZED HEALTH CARE EDUCATION/TRAINING PROGRAM

## 2024-05-01 PROCEDURE — 6360000002 HC RX W HCPCS

## 2024-05-01 PROCEDURE — 36415 COLL VENOUS BLD VENIPUNCTURE: CPT

## 2024-05-01 PROCEDURE — 2580000003 HC RX 258: Performed by: STUDENT IN AN ORGANIZED HEALTH CARE EDUCATION/TRAINING PROGRAM

## 2024-05-01 PROCEDURE — 80053 COMPREHEN METABOLIC PANEL: CPT

## 2024-05-01 PROCEDURE — 71046 X-RAY EXAM CHEST 2 VIEWS: CPT

## 2024-05-01 PROCEDURE — 2580000003 HC RX 258

## 2024-05-01 PROCEDURE — 99233 SBSQ HOSP IP/OBS HIGH 50: CPT | Performed by: STUDENT IN AN ORGANIZED HEALTH CARE EDUCATION/TRAINING PROGRAM

## 2024-05-01 PROCEDURE — 1200000000 HC SEMI PRIVATE

## 2024-05-01 PROCEDURE — 51798 US URINE CAPACITY MEASURE: CPT

## 2024-05-01 PROCEDURE — 85027 COMPLETE CBC AUTOMATED: CPT

## 2024-05-01 PROCEDURE — 97530 THERAPEUTIC ACTIVITIES: CPT

## 2024-05-01 PROCEDURE — 6370000000 HC RX 637 (ALT 250 FOR IP): Performed by: HOSPITALIST

## 2024-05-01 PROCEDURE — 6370000000 HC RX 637 (ALT 250 FOR IP)

## 2024-05-01 PROCEDURE — 1200000003 HC TELEMETRY R&B

## 2024-05-01 RX ORDER — LIDOCAINE 4 G/G
1 PATCH TOPICAL DAILY
Status: CANCELLED | OUTPATIENT
Start: 2024-05-01

## 2024-05-01 RX ADMIN — CLOPIDOGREL BISULFATE 75 MG: 75 TABLET ORAL at 09:50

## 2024-05-01 RX ADMIN — HYDRALAZINE HYDROCHLORIDE 25 MG: 25 TABLET, FILM COATED ORAL at 08:59

## 2024-05-01 RX ADMIN — HYDRALAZINE HYDROCHLORIDE 25 MG: 25 TABLET, FILM COATED ORAL at 14:44

## 2024-05-01 RX ADMIN — ASPIRIN 81 MG 81 MG: 81 TABLET ORAL at 09:01

## 2024-05-01 RX ADMIN — METOPROLOL TARTRATE 25 MG: 25 TABLET, FILM COATED ORAL at 20:47

## 2024-05-01 RX ADMIN — LOSARTAN POTASSIUM 100 MG: 100 TABLET, FILM COATED ORAL at 08:59

## 2024-05-01 RX ADMIN — MICONAZOLE NITRATE: 2 POWDER TOPICAL at 09:01

## 2024-05-01 RX ADMIN — Medication 1 TABLET: at 08:59

## 2024-05-01 RX ADMIN — Medication 1250 MG: at 09:11

## 2024-05-01 RX ADMIN — Medication 1250 MG: at 20:54

## 2024-05-01 RX ADMIN — OXYCODONE 2.5 MG: 5 TABLET ORAL at 18:52

## 2024-05-01 RX ADMIN — POLYETHYLENE GLYCOL 3350 17 G: 17 POWDER, FOR SOLUTION ORAL at 09:01

## 2024-05-01 RX ADMIN — TRAZODONE HYDROCHLORIDE 50 MG: 50 TABLET ORAL at 20:48

## 2024-05-01 RX ADMIN — ATORVASTATIN CALCIUM 40 MG: 40 TABLET, FILM COATED ORAL at 20:48

## 2024-05-01 RX ADMIN — MICONAZOLE NITRATE: 2 POWDER TOPICAL at 20:48

## 2024-05-01 RX ADMIN — Medication 1 CAPSULE: at 09:01

## 2024-05-01 RX ADMIN — SODIUM CHLORIDE, PRESERVATIVE FREE 10 ML: 5 INJECTION INTRAVENOUS at 09:02

## 2024-05-01 RX ADMIN — SODIUM CHLORIDE, PRESERVATIVE FREE 10 ML: 5 INJECTION INTRAVENOUS at 20:48

## 2024-05-01 RX ADMIN — METOPROLOL TARTRATE 25 MG: 25 TABLET, FILM COATED ORAL at 08:59

## 2024-05-01 RX ADMIN — HYDRALAZINE HYDROCHLORIDE 25 MG: 25 TABLET, FILM COATED ORAL at 20:48

## 2024-05-01 RX ADMIN — VENLAFAXINE HYDROCHLORIDE 75 MG: 75 CAPSULE, EXTENDED RELEASE ORAL at 08:59

## 2024-05-01 RX ADMIN — ACETAMINOPHEN 650 MG: 325 TABLET ORAL at 09:00

## 2024-05-01 ASSESSMENT — PAIN SCALES - GENERAL
PAINLEVEL_OUTOF10: 0
PAINLEVEL_OUTOF10: 6
PAINLEVEL_OUTOF10: 0
PAINLEVEL_OUTOF10: 2
PAINLEVEL_OUTOF10: 2

## 2024-05-01 ASSESSMENT — PAIN DESCRIPTION - LOCATION
LOCATION: FLANK
LOCATION: BACK
LOCATION: BACK

## 2024-05-01 ASSESSMENT — PAIN DESCRIPTION - DESCRIPTORS
DESCRIPTORS: ACHING

## 2024-05-01 ASSESSMENT — PAIN DESCRIPTION - ONSET: ONSET: ON-GOING

## 2024-05-01 ASSESSMENT — PAIN DESCRIPTION - PAIN TYPE: TYPE: CHRONIC PAIN

## 2024-05-01 ASSESSMENT — PAIN DESCRIPTION - FREQUENCY: FREQUENCY: CONTINUOUS

## 2024-05-01 ASSESSMENT — PAIN DESCRIPTION - ORIENTATION
ORIENTATION: LOWER;MID
ORIENTATION: RIGHT
ORIENTATION: LOWER

## 2024-05-01 ASSESSMENT — PAIN - FUNCTIONAL ASSESSMENT: PAIN_FUNCTIONAL_ASSESSMENT: ACTIVITIES ARE NOT PREVENTED

## 2024-05-01 NOTE — CARE COORDINATION
5/1/24, 8:38 AM EDT    DISCHARGE PLANNING EVALUATION     Spoke with [facility].  [facility] staff spoke with patient's brother.  He shared that he was not aware of the private pay cost, and that patient does not have resources to private pay for her snf stay with hospice.       Facility staff is continuing to work with family on a plan for possible admission and payment resources, including medicaid application.   Facility and family do not have an agreement yet on plan.

## 2024-05-02 LAB
ALBUMIN SERPL BCG-MCNC: 2.8 G/DL (ref 3.5–5.1)
ALP SERPL-CCNC: 110 U/L (ref 38–126)
ALT SERPL W/O P-5'-P-CCNC: 44 U/L (ref 11–66)
ANION GAP SERPL CALC-SCNC: 10 MEQ/L (ref 8–16)
AST SERPL-CCNC: 25 U/L (ref 5–40)
BASOPHILS ABSOLUTE: 0 THOU/MM3 (ref 0–0.1)
BASOPHILS NFR BLD AUTO: 0.2 %
BILIRUB SERPL-MCNC: 0.4 MG/DL (ref 0.3–1.2)
BUN SERPL-MCNC: 7 MG/DL (ref 7–22)
CALCIUM SERPL-MCNC: 9.2 MG/DL (ref 8.5–10.5)
CHLORIDE SERPL-SCNC: 99 MEQ/L (ref 98–111)
CO2 SERPL-SCNC: 29 MEQ/L (ref 23–33)
CREAT SERPL-MCNC: 0.5 MG/DL (ref 0.4–1.2)
DEPRECATED RDW RBC AUTO: 52.5 FL (ref 35–45)
EOSINOPHIL NFR BLD AUTO: 0.9 %
EOSINOPHILS ABSOLUTE: 0.1 THOU/MM3 (ref 0–0.4)
ERYTHROCYTE [DISTWIDTH] IN BLOOD BY AUTOMATED COUNT: 16.4 % (ref 11.5–14.5)
GFR SERPL CREATININE-BSD FRML MDRD: > 90 ML/MIN/1.73M2
GLUCOSE SERPL-MCNC: 115 MG/DL (ref 70–108)
HCT VFR BLD AUTO: 32.4 % (ref 37–47)
HGB BLD-MCNC: 9.9 GM/DL (ref 12–16)
IMM GRANULOCYTES # BLD AUTO: 0.06 THOU/MM3 (ref 0–0.07)
IMM GRANULOCYTES NFR BLD AUTO: 0.6 %
LYMPHOCYTES ABSOLUTE: 1 THOU/MM3 (ref 1–4.8)
LYMPHOCYTES NFR BLD AUTO: 10.1 %
MCH RBC QN AUTO: 27 PG (ref 26–33)
MCHC RBC AUTO-ENTMCNC: 30.6 GM/DL (ref 32.2–35.5)
MCV RBC AUTO: 88.5 FL (ref 81–99)
MONOCYTES ABSOLUTE: 1.2 THOU/MM3 (ref 0.4–1.3)
MONOCYTES NFR BLD AUTO: 12.2 %
NEUTROPHILS ABSOLUTE: 7.7 THOU/MM3 (ref 1.8–7.7)
NEUTROPHILS NFR BLD AUTO: 76 %
NRBC BLD AUTO-RTO: 0 /100 WBC
PLATELET # BLD AUTO: 331 THOU/MM3 (ref 130–400)
PMV BLD AUTO: 10.3 FL (ref 9.4–12.4)
POTASSIUM SERPL-SCNC: 3.3 MEQ/L (ref 3.5–5.2)
PROT SERPL-MCNC: 6.5 G/DL (ref 6.1–8)
RBC # BLD AUTO: 3.66 MILL/MM3 (ref 4.2–5.4)
SODIUM SERPL-SCNC: 138 MEQ/L (ref 135–145)
VANCOMYCIN SERPL-MCNC: 10.9 UG/ML (ref 0.1–39.9)
WBC # BLD AUTO: 10.1 THOU/MM3 (ref 4.8–10.8)

## 2024-05-02 PROCEDURE — 80202 ASSAY OF VANCOMYCIN: CPT

## 2024-05-02 PROCEDURE — 1200000000 HC SEMI PRIVATE

## 2024-05-02 PROCEDURE — 6370000000 HC RX 637 (ALT 250 FOR IP): Performed by: HOSPITALIST

## 2024-05-02 PROCEDURE — 6370000000 HC RX 637 (ALT 250 FOR IP): Performed by: STUDENT IN AN ORGANIZED HEALTH CARE EDUCATION/TRAINING PROGRAM

## 2024-05-02 PROCEDURE — 2580000003 HC RX 258

## 2024-05-02 PROCEDURE — 80053 COMPREHEN METABOLIC PANEL: CPT

## 2024-05-02 PROCEDURE — 94640 AIRWAY INHALATION TREATMENT: CPT

## 2024-05-02 PROCEDURE — 6370000000 HC RX 637 (ALT 250 FOR IP)

## 2024-05-02 PROCEDURE — 6360000002 HC RX W HCPCS

## 2024-05-02 PROCEDURE — 99232 SBSQ HOSP IP/OBS MODERATE 35: CPT | Performed by: STUDENT IN AN ORGANIZED HEALTH CARE EDUCATION/TRAINING PROGRAM

## 2024-05-02 PROCEDURE — 36415 COLL VENOUS BLD VENIPUNCTURE: CPT

## 2024-05-02 PROCEDURE — 85025 COMPLETE CBC W/AUTO DIFF WBC: CPT

## 2024-05-02 PROCEDURE — 1200000003 HC TELEMETRY R&B

## 2024-05-02 PROCEDURE — 6360000002 HC RX W HCPCS: Performed by: STUDENT IN AN ORGANIZED HEALTH CARE EDUCATION/TRAINING PROGRAM

## 2024-05-02 PROCEDURE — 2700000000 HC OXYGEN THERAPY PER DAY

## 2024-05-02 PROCEDURE — 2580000003 HC RX 258: Performed by: STUDENT IN AN ORGANIZED HEALTH CARE EDUCATION/TRAINING PROGRAM

## 2024-05-02 PROCEDURE — 94761 N-INVAS EAR/PLS OXIMETRY MLT: CPT

## 2024-05-02 RX ADMIN — METOPROLOL TARTRATE 25 MG: 25 TABLET, FILM COATED ORAL at 20:29

## 2024-05-02 RX ADMIN — HYDRALAZINE HYDROCHLORIDE 25 MG: 25 TABLET, FILM COATED ORAL at 16:12

## 2024-05-02 RX ADMIN — METOPROLOL TARTRATE 25 MG: 25 TABLET, FILM COATED ORAL at 09:28

## 2024-05-02 RX ADMIN — SODIUM CHLORIDE, PRESERVATIVE FREE 10 ML: 5 INJECTION INTRAVENOUS at 09:28

## 2024-05-02 RX ADMIN — LORAZEPAM 1 MG: 2 INJECTION INTRAMUSCULAR; INTRAVENOUS at 01:43

## 2024-05-02 RX ADMIN — TRAZODONE HYDROCHLORIDE 50 MG: 50 TABLET ORAL at 20:29

## 2024-05-02 RX ADMIN — HYDRALAZINE HYDROCHLORIDE 25 MG: 25 TABLET, FILM COATED ORAL at 09:28

## 2024-05-02 RX ADMIN — Medication 1 CAPSULE: at 09:28

## 2024-05-02 RX ADMIN — SODIUM CHLORIDE, PRESERVATIVE FREE 10 ML: 5 INJECTION INTRAVENOUS at 20:29

## 2024-05-02 RX ADMIN — Medication 1250 MG: at 20:27

## 2024-05-02 RX ADMIN — Medication 1 TABLET: at 09:28

## 2024-05-02 RX ADMIN — FERROUS SULFATE TAB 325 MG (65 MG ELEMENTAL FE) 325 MG: 325 (65 FE) TAB at 09:28

## 2024-05-02 RX ADMIN — ATORVASTATIN CALCIUM 40 MG: 40 TABLET, FILM COATED ORAL at 20:29

## 2024-05-02 RX ADMIN — LORAZEPAM 1 MG: 1 TABLET ORAL at 14:36

## 2024-05-02 RX ADMIN — HYDROXYZINE HYDROCHLORIDE 10 MG: 10 TABLET ORAL at 20:29

## 2024-05-02 RX ADMIN — IPRATROPIUM BROMIDE AND ALBUTEROL SULFATE 1 DOSE: .5; 3 SOLUTION RESPIRATORY (INHALATION) at 04:14

## 2024-05-02 RX ADMIN — MICONAZOLE NITRATE: 2 POWDER TOPICAL at 20:28

## 2024-05-02 RX ADMIN — ACETAMINOPHEN 650 MG: 325 TABLET ORAL at 20:28

## 2024-05-02 RX ADMIN — VENLAFAXINE HYDROCHLORIDE 75 MG: 75 CAPSULE, EXTENDED RELEASE ORAL at 09:28

## 2024-05-02 RX ADMIN — TIOTROPIUM BROMIDE AND OLODATEROL 2 PUFF: 3.124; 2.736 SPRAY, METERED RESPIRATORY (INHALATION) at 08:45

## 2024-05-02 RX ADMIN — CLOPIDOGREL BISULFATE 75 MG: 75 TABLET ORAL at 09:28

## 2024-05-02 RX ADMIN — ASPIRIN 81 MG 81 MG: 81 TABLET ORAL at 09:28

## 2024-05-02 RX ADMIN — LOSARTAN POTASSIUM 100 MG: 100 TABLET, FILM COATED ORAL at 09:28

## 2024-05-02 RX ADMIN — Medication 1250 MG: at 09:32

## 2024-05-02 RX ADMIN — HYDRALAZINE HYDROCHLORIDE 25 MG: 25 TABLET, FILM COATED ORAL at 20:29

## 2024-05-02 RX ADMIN — MICONAZOLE NITRATE: 2 POWDER TOPICAL at 09:28

## 2024-05-02 ASSESSMENT — PAIN SCALES - GENERAL: PAINLEVEL_OUTOF10: 0

## 2024-05-02 NOTE — CARE COORDINATION
5/2/24, 10:05 AM EDT    DISCHARGE PLANNING EVALUATION    [facility] kendell Mota continues to work with patient's brother on payment for return to [facility] with hospice.  [facility] staff has shared that family would need to start medicaid application for return with hospice.  [facility] has not yet received a response from family.

## 2024-05-02 NOTE — ADT AUTH CERT
Patient Demographics  Name Patient ID SSN Gender Identity Birth Date  Kalpana Braga [id number]  Female [birth year] (68 yrs)  Address Phone Email Employer   [address] [facility] [id number] [phone] (H)  [phone] (M) -- [employer]  [address]   [facility] [address]  [phone]   [facility] Race Occupation Emp Status   BARBARA White (non-) -- Full Time   Reg Status PCP Date Last Verified Next Review Date   Verified Tatiana Velasquez APRN - CNP  [phone] 04/18/24 05/02/24   Admission Date Discharge Date Admitting Provider     04/22/24 -- --    Marital Status Hinduism        Hoahaoism     Emergency Contact 1 Emergency Contact 2  Kristopher Guevara (6)  [place]  [phone] (H) Dario Guevara (6)  [place]  [phone] (H)  Physician Progress NotesNotes from 04/29/24 through 05/02/24  Progress Notes by Carmine Maradiaga MD at 5/1/2024  4:35 PM  Author: Carmine Maradiaga MD Service: Palliative Care Author Type: Physician  Filed: 5/1/2024 11:49 PM Date of Service: 5/1/2024  4:35 PM Status: Signed  : Carmine Maradiaga MD (Physician)                    untitled image             Physician Progress Note        Patient:   Kalpana Braga  YOB: 1955  Age:  68 y.o.  Room:  [address] [place] [address]  MRN:  [id number]        Acct: [id number]  PCP: Tatiana Velasquez APRN - CNP     Date of Admission:  4/22/2024 11:08 AM  Date of Service:  5/1/2024     Reason for Consult: Goals of Care, Symptom Management, and End Stage Disease.     History Obtained From:-  Patient, Electronic Medical Record, Patient's primary nurse             Subjective  Kalpana Braga was seen in their room today for follow up with the palliative care team. There was not family present at the bedside. Kalpana is doing well this afternoon.  She denied having any pain or shortness of breath.  She additionally denied having any nausea or vomiting. They have  Effexor 75 mg daily and MiraLAX 17 g daily  for 
2100(s)  04/29/24 2013(a)  04/29/24 2024(r) 40 mg   Oral      Glenn Flores  5 Given 04/28/24 0900(s)  04/28/24 1042(a)  04/28/24 1042(r) 75 mg   Oral      Moy, Jacqui  6 Given 04/29/24 0900(s)  04/29/24 0817(a)  04/29/24 0817(r) 75 mg   Oral      MariyarLisa  7 Given 04/28/24 0900(s)  04/28/24 1042(a)  04/28/24 1042(r) 325 mg   Oral      Moy, Jacqui  8 Given 04/28/24 0600(s)  04/28/24 0538(a)  04/28/24 0539(r) 5,000 Units   SubCUTAneous Abdomen LLQ (Left Lower Quadrant)    Christine Linares  9 Not Given 04/28/24 1400(s)  04/28/24 1330(a)  04/28/24 1330(r) 5,000 Units   SubCUTAneous     Pt going hospice.  Reason: Other Moy Jacqui  10 Given 04/28/24 2200(s)  04/28/24 2131(a)  04/28/24 2131(r) 5,000 Units   SubCUTAneous Abdomen RUQ (Right Upper Quadrant)    Jose Young  11 Given 04/29/24 0600(s)  04/29/24 0557(a)  04/29/24 0557(r) 5,000 Units   SubCUTAneous Abdomen LUQ (Left Upper Quadrant)    Jose Young  12 Given 04/29/24 1400(s)  04/29/24 1343(a)  04/29/24 1343(r) 5,000 Units   SubCUTAneous Abdomen LLQ (Left Lower Quadrant)    Jorges, Jessica  13 Given 04/29/24 2200(s)  04/29/24 2012(a)  04/29/24 2024(r) 5,000 Units   SubCUTAneous Abdomen RLQ (Right Lower Quadrant)    Glenn Flores  14 Given 04/30/24 0600(s)  04/30/24 0652(a)  04/30/24 0654(r) 5,000 Units   SubCUTAneous Abdomen LLQ (Left Lower Quadrant)    Glenn Flores  15 Given 04/28/24 0900(s)  04/28/24 1042(a)  04/28/24 1042(r) 25 mg   Oral      Jacqui Winter  16 Not Given 04/28/24 1400(s)  04/28/24 1330(a)  04/28/24 1330(r) 25 mg   Oral     Reason: Order parameters not met Jacqui Winter  17 Given 04/28/24 2100(s)  04/28/24 2101(a)  04/28/24 2102(r) 25 mg   Oral      Jose Young  18 Given 04/29/24 0900(s)  04/29/24 0817(a)  04/29/24 0817(r) 25 mg   Oral      Lisa Lock  19 Given 04/29/24 1400(s)  04/29/24 1337(a)  04/29/24 1337(r) 25 mg   Oral      Irons, Jessica  20 Given 04/29/24 2100(s)  04/29/24 2013(a)  04/29/24 2024(r) 25 mg   Oral    
10 mg  Freq: 3 TIMES DAILY PRN Route: PO  PRN Reason: Anxiety  Start: 04/23/24 1413  losartan (COZAAR) tablet 100 mg  Dose: 100 mg  Freq: DAILY Route: PO  Start: 04/23/24 1600  Admin Instructions:  Hold for SBP < 110  [id number]    magnesium sulfate 2000 mg in 50 mL IVPB premix  Dose: 2,000 mg  Freq: PRN Route: IV  PRN Reason: Other  PRN Comment: Magnesium Replacement  Start: 04/22/24 1744  Admin Instructions:  Mag Lab Replacement Action  1.4-1.6 mg/dL     2,000 mg Total Dose     Given as 1,000 mg IVPB x 2 doses or 2,000 mg IVPB x 1 dose                    1.0-1.3 mg/dL   4,000 mg Total Dose                 Given as 1,000 mg IVPB x 4 doses or 2,000 mg IVPB x 2 doses    Less than 1.0 mg/dL   CALL PHYSICIAN and give                   4,000 mg Total Dose                   Given as 1,000 mg IVPB x 4 doses or 2,000 mg IVPB x 2 doses      Infuse at 1,000 mg/hr  Repeat Mag level 1 hour after final administration  Protocol not for use in Patients with CrCl less than 30ml/min  metoprolol tartrate (LOPRESSOR) tablet 25 mg  Dose: 25 mg  Freq: 2 TIMES DAILY Route: PO  Start: 04/23/24 1500  Admin Instructions:  Hold for SBP < 110 or HR < 65  [id number]    miconazole (MICOTIN) 2 % powder  Freq: 2 TIMES DAILY Route: TOP  Start: 04/23/24 1215  Admin Instructions:  Apply to abdominal fold and under breasts.  [id number]    [id number]     ondansetron (ZOFRAN-ODT) disintegrating tablet 4 mg  Dose: 4 mg  Freq: EVERY 8 HOURS PRN Route: PO  PRN Reasons: Nausea,Vomiting  Start: 04/22/24 1744  Or  ondansetron (ZOFRAN) injection 4 mg  Dose: 4 mg  Freq: EVERY 6 HOURS PRN Route: IV  PRN Reasons: Nausea,Vomiting  Start: 04/22/24 1744  Admin Instructions:  Administer if oral route cannot be used.  oxyCODONE (ROXICODONE) immediate release tablet 2.5 mg  Dose: 2.5 mg  Freq: EVERY 6 HOURS PRN Route: PO  PRN Reason: Pain Severe (7-10)  Start: 04/23/24 1413  oxyCODONE (ROXICODONE) immediate release tablet 2.5 mg  Dose: 2.5 mg  Freq: EVERY 6 HOURS PRN Route: PO  PRN

## 2024-05-02 NOTE — CARE COORDINATION
5/2/24, 3:23 PM EDT    DISCHARGE PLANNING EVALUATION    Patient's brother shared information about change in insurance to [employer], which was shared with Deuce.        Patient would still be private pay at [facility] for hospice.  If patient and family wanted to return skilled care for rehab, could attempt precert for skilled admission.  Would still likely need medicaid application for longer term stay at snf.       Patient's brother has left before alba could meet with him and patient

## 2024-05-03 LAB
ALBUMIN SERPL BCG-MCNC: 2.6 G/DL (ref 3.5–5.1)
ALP SERPL-CCNC: 118 U/L (ref 38–126)
ALT SERPL W/O P-5'-P-CCNC: 38 U/L (ref 11–66)
ANION GAP SERPL CALC-SCNC: 11 MEQ/L (ref 8–16)
AST SERPL-CCNC: 36 U/L (ref 5–40)
BASOPHILS ABSOLUTE: 0 THOU/MM3 (ref 0–0.1)
BASOPHILS NFR BLD AUTO: 0.3 %
BILIRUB SERPL-MCNC: 0.6 MG/DL (ref 0.3–1.2)
BUN SERPL-MCNC: 6 MG/DL (ref 7–22)
CALCIUM SERPL-MCNC: 9.3 MG/DL (ref 8.5–10.5)
CHLORIDE SERPL-SCNC: 98 MEQ/L (ref 98–111)
CO2 SERPL-SCNC: 30 MEQ/L (ref 23–33)
CREAT SERPL-MCNC: 0.5 MG/DL (ref 0.4–1.2)
DEPRECATED RDW RBC AUTO: 50.5 FL (ref 35–45)
EOSINOPHIL NFR BLD AUTO: 0.7 %
EOSINOPHILS ABSOLUTE: 0.1 THOU/MM3 (ref 0–0.4)
ERYTHROCYTE [DISTWIDTH] IN BLOOD BY AUTOMATED COUNT: 16.2 % (ref 11.5–14.5)
GFR SERPL CREATININE-BSD FRML MDRD: > 90 ML/MIN/1.73M2
GLUCOSE SERPL-MCNC: 99 MG/DL (ref 70–108)
HCT VFR BLD AUTO: 33.6 % (ref 37–47)
HGB BLD-MCNC: 10.3 GM/DL (ref 12–16)
IMM GRANULOCYTES # BLD AUTO: 0.06 THOU/MM3 (ref 0–0.07)
IMM GRANULOCYTES NFR BLD AUTO: 0.5 %
LYMPHOCYTES ABSOLUTE: 0.8 THOU/MM3 (ref 1–4.8)
LYMPHOCYTES NFR BLD AUTO: 6.8 %
MCH RBC QN AUTO: 26.7 PG (ref 26–33)
MCHC RBC AUTO-ENTMCNC: 30.7 GM/DL (ref 32.2–35.5)
MCV RBC AUTO: 87 FL (ref 81–99)
MONOCYTES ABSOLUTE: 1.2 THOU/MM3 (ref 0.4–1.3)
MONOCYTES NFR BLD AUTO: 9.6 %
NEUTROPHILS ABSOLUTE: 10 THOU/MM3 (ref 1.8–7.7)
NEUTROPHILS NFR BLD AUTO: 82.1 %
NRBC BLD AUTO-RTO: 0 /100 WBC
PLATELET # BLD AUTO: 349 THOU/MM3 (ref 130–400)
PMV BLD AUTO: 10.2 FL (ref 9.4–12.4)
POTASSIUM SERPL-SCNC: 3.5 MEQ/L (ref 3.5–5.2)
PROT SERPL-MCNC: 6.6 G/DL (ref 6.1–8)
RBC # BLD AUTO: 3.86 MILL/MM3 (ref 4.2–5.4)
SODIUM SERPL-SCNC: 139 MEQ/L (ref 135–145)
WBC # BLD AUTO: 12.2 THOU/MM3 (ref 4.8–10.8)

## 2024-05-03 PROCEDURE — 36415 COLL VENOUS BLD VENIPUNCTURE: CPT

## 2024-05-03 PROCEDURE — 6370000000 HC RX 637 (ALT 250 FOR IP)

## 2024-05-03 PROCEDURE — 1200000000 HC SEMI PRIVATE

## 2024-05-03 PROCEDURE — 2580000003 HC RX 258: Performed by: STUDENT IN AN ORGANIZED HEALTH CARE EDUCATION/TRAINING PROGRAM

## 2024-05-03 PROCEDURE — 80053 COMPREHEN METABOLIC PANEL: CPT

## 2024-05-03 PROCEDURE — 85025 COMPLETE CBC W/AUTO DIFF WBC: CPT

## 2024-05-03 PROCEDURE — 94640 AIRWAY INHALATION TREATMENT: CPT

## 2024-05-03 PROCEDURE — 2580000003 HC RX 258

## 2024-05-03 PROCEDURE — 6370000000 HC RX 637 (ALT 250 FOR IP): Performed by: STUDENT IN AN ORGANIZED HEALTH CARE EDUCATION/TRAINING PROGRAM

## 2024-05-03 PROCEDURE — 6360000002 HC RX W HCPCS

## 2024-05-03 PROCEDURE — 1200000003 HC TELEMETRY R&B

## 2024-05-03 PROCEDURE — 6370000000 HC RX 637 (ALT 250 FOR IP): Performed by: HOSPITALIST

## 2024-05-03 PROCEDURE — 99232 SBSQ HOSP IP/OBS MODERATE 35: CPT | Performed by: STUDENT IN AN ORGANIZED HEALTH CARE EDUCATION/TRAINING PROGRAM

## 2024-05-03 RX ORDER — ALBUTEROL SULFATE 90 UG/1
2 AEROSOL, METERED RESPIRATORY (INHALATION)
Status: DISCONTINUED | OUTPATIENT
Start: 2024-05-03 | End: 2024-05-04

## 2024-05-03 RX ADMIN — Medication 1 CAPSULE: at 09:23

## 2024-05-03 RX ADMIN — METOPROLOL TARTRATE 25 MG: 25 TABLET, FILM COATED ORAL at 09:18

## 2024-05-03 RX ADMIN — IPRATROPIUM BROMIDE 2 PUFF: 17 AEROSOL, METERED RESPIRATORY (INHALATION) at 08:23

## 2024-05-03 RX ADMIN — SODIUM CHLORIDE, PRESERVATIVE FREE 10 ML: 5 INJECTION INTRAVENOUS at 09:26

## 2024-05-03 RX ADMIN — TRAZODONE HYDROCHLORIDE 50 MG: 50 TABLET ORAL at 19:56

## 2024-05-03 RX ADMIN — POLYETHYLENE GLYCOL 3350 17 G: 17 POWDER, FOR SOLUTION ORAL at 11:16

## 2024-05-03 RX ADMIN — MICONAZOLE NITRATE: 2 POWDER TOPICAL at 19:56

## 2024-05-03 RX ADMIN — Medication 1250 MG: at 09:31

## 2024-05-03 RX ADMIN — ALBUTEROL SULFATE 2 PUFF: 90 AEROSOL, METERED RESPIRATORY (INHALATION) at 17:35

## 2024-05-03 RX ADMIN — Medication 1 TABLET: at 09:19

## 2024-05-03 RX ADMIN — HYDROXYZINE HYDROCHLORIDE 10 MG: 10 TABLET ORAL at 11:21

## 2024-05-03 RX ADMIN — LOSARTAN POTASSIUM 100 MG: 100 TABLET, FILM COATED ORAL at 09:18

## 2024-05-03 RX ADMIN — Medication 1250 MG: at 20:11

## 2024-05-03 RX ADMIN — ACETAMINOPHEN 650 MG: 325 TABLET ORAL at 19:56

## 2024-05-03 RX ADMIN — HYDRALAZINE HYDROCHLORIDE 25 MG: 25 TABLET, FILM COATED ORAL at 09:18

## 2024-05-03 RX ADMIN — ASPIRIN 81 MG 81 MG: 81 TABLET ORAL at 09:23

## 2024-05-03 RX ADMIN — HYDRALAZINE HYDROCHLORIDE 25 MG: 25 TABLET, FILM COATED ORAL at 19:56

## 2024-05-03 RX ADMIN — MICONAZOLE NITRATE: 2 POWDER TOPICAL at 09:26

## 2024-05-03 RX ADMIN — VENLAFAXINE HYDROCHLORIDE 75 MG: 75 CAPSULE, EXTENDED RELEASE ORAL at 09:19

## 2024-05-03 RX ADMIN — ALBUTEROL SULFATE 2 PUFF: 90 AEROSOL, METERED RESPIRATORY (INHALATION) at 08:23

## 2024-05-03 RX ADMIN — IPRATROPIUM BROMIDE 2 PUFF: 17 AEROSOL, METERED RESPIRATORY (INHALATION) at 17:35

## 2024-05-03 RX ADMIN — ATORVASTATIN CALCIUM 40 MG: 40 TABLET, FILM COATED ORAL at 19:56

## 2024-05-03 RX ADMIN — METOPROLOL TARTRATE 25 MG: 25 TABLET, FILM COATED ORAL at 19:56

## 2024-05-03 RX ADMIN — HYDRALAZINE HYDROCHLORIDE 25 MG: 25 TABLET, FILM COATED ORAL at 15:51

## 2024-05-03 RX ADMIN — CLOPIDOGREL BISULFATE 75 MG: 75 TABLET ORAL at 09:23

## 2024-05-03 RX ADMIN — SODIUM CHLORIDE, PRESERVATIVE FREE 10 ML: 5 INJECTION INTRAVENOUS at 20:09

## 2024-05-03 RX ADMIN — TIOTROPIUM BROMIDE AND OLODATEROL 2 PUFF: 3.124; 2.736 SPRAY, METERED RESPIRATORY (INHALATION) at 08:15

## 2024-05-03 RX ADMIN — LORAZEPAM 1 MG: 1 TABLET ORAL at 17:06

## 2024-05-03 ASSESSMENT — PAIN SCALES - GENERAL
PAINLEVEL_OUTOF10: 0
PAINLEVEL_OUTOF10: 0

## 2024-05-03 NOTE — CARE COORDINATION
5/3/24, 1:41 PM EDT    DISCHARGE ON GOING EVALUATION    Kalpana A [facility] day: 11  Location: [facility]19/019-A Reason for admit: Shortness of breath [R06.02]  Restless [R45.1]  Elevated liver enzymes [R74.8]  Acute hypoxic respiratory failure (HCC) [J96.01]     Procedures:   4/22 US guided right thoracentesis: removed 1L pale yellow fluid.   4/25 Bronchoscopy with Dr. Horan: Flexible fiberoptic bronchoscopy, with tumor debulking and tumor foreign body removal using APC and forceps, balloon dilation of airway, placement of bronchial stent bronchus intermedius, BAL RLL.       Imaging since last note:   5/2 Stable right hemithorax opacification.Volume loss noted with tracheal shift to right.No acute infiltrates noted on the left.  Heart size not assessed.Old right proximal humerus fracture.  No acute bony abnormalities noted.Electronic device over midline chest.    Barriers to Discharge: Remains on IV vanc. Nebs. Palliative care following. Telesitter.     PCP: Tatiana Velasquez, APRN - CNP  Readmission Risk Score: 23.1    Patient Goals/Plan/Treatment Preferences: Family working with [facility] to agree on payment for SNF with hospice.

## 2024-05-03 NOTE — CARE COORDINATION
5/3/24, 12:02 PM EDT    DISCHARGE PLANNING EVALUATION    Call made to patient's brother, message left requesting return call to discuss plan for discharge.    Spoke with Deuce lee, who has had a message from patient's brother that family is looking into secondary insurance for coverage, although facility has not found coverage for snf with hospice with patient's insurance.      Discharge planning is ongoing.    If returning skilled, will need to be able to progress with PT/OT and will need precert.  If returning with hospice would be private pay for snf.

## 2024-05-04 LAB
ALBUMIN SERPL BCG-MCNC: 2.5 G/DL (ref 3.5–5.1)
ALP SERPL-CCNC: 116 U/L (ref 38–126)
ALT SERPL W/O P-5'-P-CCNC: 31 U/L (ref 11–66)
ANION GAP SERPL CALC-SCNC: 8 MEQ/L (ref 8–16)
AST SERPL-CCNC: 25 U/L (ref 5–40)
BASOPHILS ABSOLUTE: 0 THOU/MM3 (ref 0–0.1)
BASOPHILS NFR BLD AUTO: 0.2 %
BILIRUB SERPL-MCNC: 0.5 MG/DL (ref 0.3–1.2)
BUN SERPL-MCNC: 6 MG/DL (ref 7–22)
CALCIUM SERPL-MCNC: 9.4 MG/DL (ref 8.5–10.5)
CHLORIDE SERPL-SCNC: 98 MEQ/L (ref 98–111)
CO2 SERPL-SCNC: 32 MEQ/L (ref 23–33)
CREAT SERPL-MCNC: 0.5 MG/DL (ref 0.4–1.2)
DEPRECATED RDW RBC AUTO: 51.2 FL (ref 35–45)
EOSINOPHIL NFR BLD AUTO: 1 %
EOSINOPHILS ABSOLUTE: 0.1 THOU/MM3 (ref 0–0.4)
ERYTHROCYTE [DISTWIDTH] IN BLOOD BY AUTOMATED COUNT: 16 % (ref 11.5–14.5)
GFR SERPL CREATININE-BSD FRML MDRD: > 90 ML/MIN/1.73M2
GLUCOSE SERPL-MCNC: 98 MG/DL (ref 70–108)
HCT VFR BLD AUTO: 34.2 % (ref 37–47)
HGB BLD-MCNC: 10.2 GM/DL (ref 12–16)
IMM GRANULOCYTES # BLD AUTO: 0.1 THOU/MM3 (ref 0–0.07)
IMM GRANULOCYTES NFR BLD AUTO: 0.7 %
LYMPHOCYTES ABSOLUTE: 1.1 THOU/MM3 (ref 1–4.8)
LYMPHOCYTES NFR BLD AUTO: 7.3 %
MCH RBC QN AUTO: 26.5 PG (ref 26–33)
MCHC RBC AUTO-ENTMCNC: 29.8 GM/DL (ref 32.2–35.5)
MCV RBC AUTO: 88.8 FL (ref 81–99)
MONOCYTES ABSOLUTE: 1.3 THOU/MM3 (ref 0.4–1.3)
MONOCYTES NFR BLD AUTO: 8.8 %
NEUTROPHILS ABSOLUTE: 11.9 THOU/MM3 (ref 1.8–7.7)
NEUTROPHILS NFR BLD AUTO: 82 %
NRBC BLD AUTO-RTO: 0 /100 WBC
PLATELET # BLD AUTO: 362 THOU/MM3 (ref 130–400)
PMV BLD AUTO: 9.9 FL (ref 9.4–12.4)
POTASSIUM SERPL-SCNC: 3.4 MEQ/L (ref 3.5–5.2)
PROT SERPL-MCNC: 6.3 G/DL (ref 6.1–8)
RBC # BLD AUTO: 3.85 MILL/MM3 (ref 4.2–5.4)
SODIUM SERPL-SCNC: 138 MEQ/L (ref 135–145)
WBC # BLD AUTO: 14.5 THOU/MM3 (ref 4.8–10.8)

## 2024-05-04 PROCEDURE — 85025 COMPLETE CBC W/AUTO DIFF WBC: CPT

## 2024-05-04 PROCEDURE — 6370000000 HC RX 637 (ALT 250 FOR IP): Performed by: HOSPITALIST

## 2024-05-04 PROCEDURE — 94640 AIRWAY INHALATION TREATMENT: CPT

## 2024-05-04 PROCEDURE — 80053 COMPREHEN METABOLIC PANEL: CPT

## 2024-05-04 PROCEDURE — 6360000002 HC RX W HCPCS

## 2024-05-04 PROCEDURE — 2580000003 HC RX 258: Performed by: STUDENT IN AN ORGANIZED HEALTH CARE EDUCATION/TRAINING PROGRAM

## 2024-05-04 PROCEDURE — 6360000002 HC RX W HCPCS: Performed by: STUDENT IN AN ORGANIZED HEALTH CARE EDUCATION/TRAINING PROGRAM

## 2024-05-04 PROCEDURE — 6370000000 HC RX 637 (ALT 250 FOR IP)

## 2024-05-04 PROCEDURE — 6370000000 HC RX 637 (ALT 250 FOR IP): Performed by: STUDENT IN AN ORGANIZED HEALTH CARE EDUCATION/TRAINING PROGRAM

## 2024-05-04 PROCEDURE — 99231 SBSQ HOSP IP/OBS SF/LOW 25: CPT | Performed by: STUDENT IN AN ORGANIZED HEALTH CARE EDUCATION/TRAINING PROGRAM

## 2024-05-04 PROCEDURE — 94761 N-INVAS EAR/PLS OXIMETRY MLT: CPT

## 2024-05-04 PROCEDURE — 2700000000 HC OXYGEN THERAPY PER DAY

## 2024-05-04 PROCEDURE — 1200000000 HC SEMI PRIVATE

## 2024-05-04 PROCEDURE — 36415 COLL VENOUS BLD VENIPUNCTURE: CPT

## 2024-05-04 PROCEDURE — 2580000003 HC RX 258

## 2024-05-04 RX ORDER — HYDRALAZINE HYDROCHLORIDE 20 MG/ML
10 INJECTION INTRAMUSCULAR; INTRAVENOUS ONCE
Status: COMPLETED | OUTPATIENT
Start: 2024-05-04 | End: 2024-05-04

## 2024-05-04 RX ORDER — ALBUTEROL SULFATE 90 UG/1
2 AEROSOL, METERED RESPIRATORY (INHALATION)
Status: DISCONTINUED | OUTPATIENT
Start: 2024-05-04 | End: 2024-05-06

## 2024-05-04 RX ADMIN — HYDRALAZINE HYDROCHLORIDE 25 MG: 25 TABLET, FILM COATED ORAL at 08:39

## 2024-05-04 RX ADMIN — MICONAZOLE NITRATE: 2 POWDER TOPICAL at 08:40

## 2024-05-04 RX ADMIN — MICONAZOLE NITRATE: 2 POWDER TOPICAL at 19:54

## 2024-05-04 RX ADMIN — CLOPIDOGREL BISULFATE 75 MG: 75 TABLET ORAL at 08:40

## 2024-05-04 RX ADMIN — Medication 1 TABLET: at 08:39

## 2024-05-04 RX ADMIN — HYDRALAZINE HYDROCHLORIDE 10 MG: 20 INJECTION, SOLUTION INTRAMUSCULAR; INTRAVENOUS at 17:56

## 2024-05-04 RX ADMIN — FERROUS SULFATE TAB 325 MG (65 MG ELEMENTAL FE) 325 MG: 325 (65 FE) TAB at 08:40

## 2024-05-04 RX ADMIN — METOPROLOL TARTRATE 25 MG: 25 TABLET, FILM COATED ORAL at 08:39

## 2024-05-04 RX ADMIN — ALBUTEROL SULFATE 2 PUFF: 90 AEROSOL, METERED RESPIRATORY (INHALATION) at 08:09

## 2024-05-04 RX ADMIN — HYDRALAZINE HYDROCHLORIDE 25 MG: 25 TABLET, FILM COATED ORAL at 15:47

## 2024-05-04 RX ADMIN — Medication 1 CAPSULE: at 08:40

## 2024-05-04 RX ADMIN — LORAZEPAM 1 MG: 2 INJECTION INTRAMUSCULAR; INTRAVENOUS at 03:13

## 2024-05-04 RX ADMIN — TRAZODONE HYDROCHLORIDE 50 MG: 50 TABLET ORAL at 19:54

## 2024-05-04 RX ADMIN — ALBUTEROL SULFATE 2 PUFF: 90 AEROSOL, METERED RESPIRATORY (INHALATION) at 21:44

## 2024-05-04 RX ADMIN — TIOTROPIUM BROMIDE AND OLODATEROL 2 PUFF: 3.124; 2.736 SPRAY, METERED RESPIRATORY (INHALATION) at 08:09

## 2024-05-04 RX ADMIN — ALBUTEROL SULFATE 2 PUFF: 90 AEROSOL, METERED RESPIRATORY (INHALATION) at 11:50

## 2024-05-04 RX ADMIN — POLYETHYLENE GLYCOL 3350 17 G: 17 POWDER, FOR SOLUTION ORAL at 08:39

## 2024-05-04 RX ADMIN — ATORVASTATIN CALCIUM 40 MG: 40 TABLET, FILM COATED ORAL at 19:53

## 2024-05-04 RX ADMIN — METOPROLOL TARTRATE 25 MG: 25 TABLET, FILM COATED ORAL at 19:53

## 2024-05-04 RX ADMIN — ASPIRIN 81 MG 81 MG: 81 TABLET ORAL at 09:47

## 2024-05-04 RX ADMIN — SODIUM CHLORIDE, PRESERVATIVE FREE 10 ML: 5 INJECTION INTRAVENOUS at 20:04

## 2024-05-04 RX ADMIN — Medication 1250 MG: at 20:05

## 2024-05-04 RX ADMIN — LOSARTAN POTASSIUM 100 MG: 100 TABLET, FILM COATED ORAL at 08:39

## 2024-05-04 RX ADMIN — SODIUM CHLORIDE, PRESERVATIVE FREE 10 ML: 5 INJECTION INTRAVENOUS at 08:40

## 2024-05-04 RX ADMIN — Medication 1250 MG: at 09:45

## 2024-05-04 RX ADMIN — IPRATROPIUM BROMIDE 2 PUFF: 17 AEROSOL, METERED RESPIRATORY (INHALATION) at 08:09

## 2024-05-04 RX ADMIN — HYDRALAZINE HYDROCHLORIDE 25 MG: 25 TABLET, FILM COATED ORAL at 19:53

## 2024-05-04 RX ADMIN — LORAZEPAM 1 MG: 2 INJECTION INTRAMUSCULAR; INTRAVENOUS at 18:32

## 2024-05-04 RX ADMIN — IPRATROPIUM BROMIDE 2 PUFF: 17 AEROSOL, METERED RESPIRATORY (INHALATION) at 21:44

## 2024-05-04 RX ADMIN — VENLAFAXINE HYDROCHLORIDE 75 MG: 75 CAPSULE, EXTENDED RELEASE ORAL at 08:39

## 2024-05-04 RX ADMIN — LORAZEPAM 1 MG: 2 INJECTION INTRAMUSCULAR; INTRAVENOUS at 09:39

## 2024-05-04 RX ADMIN — IPRATROPIUM BROMIDE 2 PUFF: 17 AEROSOL, METERED RESPIRATORY (INHALATION) at 11:50

## 2024-05-05 LAB
ALBUMIN SERPL BCG-MCNC: 2.7 G/DL (ref 3.5–5.1)
ALP SERPL-CCNC: 120 U/L (ref 38–126)
ALT SERPL W/O P-5'-P-CCNC: 28 U/L (ref 11–66)
ANION GAP SERPL CALC-SCNC: 9 MEQ/L (ref 8–16)
AST SERPL-CCNC: 27 U/L (ref 5–40)
BASOPHILS ABSOLUTE: 0 THOU/MM3 (ref 0–0.1)
BASOPHILS NFR BLD AUTO: 0.2 %
BILIRUB SERPL-MCNC: 0.5 MG/DL (ref 0.3–1.2)
BUN SERPL-MCNC: 7 MG/DL (ref 7–22)
CALCIUM SERPL-MCNC: 9.5 MG/DL (ref 8.5–10.5)
CHLORIDE SERPL-SCNC: 94 MEQ/L (ref 98–111)
CO2 SERPL-SCNC: 33 MEQ/L (ref 23–33)
CREAT SERPL-MCNC: 0.4 MG/DL (ref 0.4–1.2)
DEPRECATED RDW RBC AUTO: 52 FL (ref 35–45)
EOSINOPHIL NFR BLD AUTO: 0.6 %
EOSINOPHILS ABSOLUTE: 0.1 THOU/MM3 (ref 0–0.4)
ERYTHROCYTE [DISTWIDTH] IN BLOOD BY AUTOMATED COUNT: 16 % (ref 11.5–14.5)
GFR SERPL CREATININE-BSD FRML MDRD: > 90 ML/MIN/1.73M2
GLUCOSE SERPL-MCNC: 102 MG/DL (ref 70–108)
HCT VFR BLD AUTO: 34.7 % (ref 37–47)
HGB BLD-MCNC: 10.3 GM/DL (ref 12–16)
IMM GRANULOCYTES # BLD AUTO: 0.13 THOU/MM3 (ref 0–0.07)
IMM GRANULOCYTES NFR BLD AUTO: 1 %
LYMPHOCYTES ABSOLUTE: 1.1 THOU/MM3 (ref 1–4.8)
LYMPHOCYTES NFR BLD AUTO: 8.5 %
MCH RBC QN AUTO: 26.4 PG (ref 26–33)
MCHC RBC AUTO-ENTMCNC: 29.7 GM/DL (ref 32.2–35.5)
MCV RBC AUTO: 89 FL (ref 81–99)
MONOCYTES ABSOLUTE: 1.2 THOU/MM3 (ref 0.4–1.3)
MONOCYTES NFR BLD AUTO: 8.9 %
NEUTROPHILS ABSOLUTE: 10.8 THOU/MM3 (ref 1.8–7.7)
NEUTROPHILS NFR BLD AUTO: 80.8 %
NRBC BLD AUTO-RTO: 0 /100 WBC
PLATELET # BLD AUTO: 380 THOU/MM3 (ref 130–400)
PMV BLD AUTO: 10.1 FL (ref 9.4–12.4)
POTASSIUM SERPL-SCNC: 3.2 MEQ/L (ref 3.5–5.2)
PROT SERPL-MCNC: 6.3 G/DL (ref 6.1–8)
RBC # BLD AUTO: 3.9 MILL/MM3 (ref 4.2–5.4)
SODIUM SERPL-SCNC: 136 MEQ/L (ref 135–145)
WBC # BLD AUTO: 13.4 THOU/MM3 (ref 4.8–10.8)

## 2024-05-05 PROCEDURE — 36415 COLL VENOUS BLD VENIPUNCTURE: CPT

## 2024-05-05 PROCEDURE — 6360000002 HC RX W HCPCS

## 2024-05-05 PROCEDURE — 2580000003 HC RX 258: Performed by: STUDENT IN AN ORGANIZED HEALTH CARE EDUCATION/TRAINING PROGRAM

## 2024-05-05 PROCEDURE — 6370000000 HC RX 637 (ALT 250 FOR IP): Performed by: STUDENT IN AN ORGANIZED HEALTH CARE EDUCATION/TRAINING PROGRAM

## 2024-05-05 PROCEDURE — 6370000000 HC RX 637 (ALT 250 FOR IP)

## 2024-05-05 PROCEDURE — 2700000000 HC OXYGEN THERAPY PER DAY

## 2024-05-05 PROCEDURE — 99231 SBSQ HOSP IP/OBS SF/LOW 25: CPT | Performed by: STUDENT IN AN ORGANIZED HEALTH CARE EDUCATION/TRAINING PROGRAM

## 2024-05-05 PROCEDURE — 94640 AIRWAY INHALATION TREATMENT: CPT

## 2024-05-05 PROCEDURE — 1200000000 HC SEMI PRIVATE

## 2024-05-05 PROCEDURE — 6370000000 HC RX 637 (ALT 250 FOR IP): Performed by: HOSPITALIST

## 2024-05-05 PROCEDURE — 80053 COMPREHEN METABOLIC PANEL: CPT

## 2024-05-05 PROCEDURE — 94760 N-INVAS EAR/PLS OXIMETRY 1: CPT

## 2024-05-05 PROCEDURE — 2580000003 HC RX 258

## 2024-05-05 PROCEDURE — 85025 COMPLETE CBC W/AUTO DIFF WBC: CPT

## 2024-05-05 RX ORDER — HYDRALAZINE HYDROCHLORIDE 50 MG/1
50 TABLET, FILM COATED ORAL 3 TIMES DAILY
Status: DISCONTINUED | OUTPATIENT
Start: 2024-05-05 | End: 2024-05-07 | Stop reason: HOSPADM

## 2024-05-05 RX ADMIN — IPRATROPIUM BROMIDE 2 PUFF: 17 AEROSOL, METERED RESPIRATORY (INHALATION) at 22:52

## 2024-05-05 RX ADMIN — ALBUTEROL SULFATE 2 PUFF: 90 AEROSOL, METERED RESPIRATORY (INHALATION) at 22:52

## 2024-05-05 RX ADMIN — METOPROLOL TARTRATE 25 MG: 25 TABLET, FILM COATED ORAL at 10:50

## 2024-05-05 RX ADMIN — Medication 1 TABLET: at 10:50

## 2024-05-05 RX ADMIN — LOSARTAN POTASSIUM 100 MG: 100 TABLET, FILM COATED ORAL at 10:50

## 2024-05-05 RX ADMIN — Medication 1250 MG: at 22:21

## 2024-05-05 RX ADMIN — HYDRALAZINE HYDROCHLORIDE 50 MG: 50 TABLET ORAL at 14:19

## 2024-05-05 RX ADMIN — HYDROXYZINE HYDROCHLORIDE 10 MG: 10 TABLET ORAL at 20:46

## 2024-05-05 RX ADMIN — CLOPIDOGREL BISULFATE 75 MG: 75 TABLET ORAL at 10:50

## 2024-05-05 RX ADMIN — ATORVASTATIN CALCIUM 40 MG: 40 TABLET, FILM COATED ORAL at 20:46

## 2024-05-05 RX ADMIN — TRAZODONE HYDROCHLORIDE 50 MG: 50 TABLET ORAL at 20:44

## 2024-05-05 RX ADMIN — ALBUTEROL SULFATE 2 PUFF: 90 AEROSOL, METERED RESPIRATORY (INHALATION) at 09:49

## 2024-05-05 RX ADMIN — ASPIRIN 81 MG 81 MG: 81 TABLET ORAL at 10:49

## 2024-05-05 RX ADMIN — HYDRALAZINE HYDROCHLORIDE 25 MG: 25 TABLET, FILM COATED ORAL at 10:50

## 2024-05-05 RX ADMIN — MICONAZOLE NITRATE: 2 POWDER TOPICAL at 20:46

## 2024-05-05 RX ADMIN — Medication 1 CAPSULE: at 10:50

## 2024-05-05 RX ADMIN — Medication 1250 MG: at 10:59

## 2024-05-05 RX ADMIN — POTASSIUM BICARBONATE 40 MEQ: 782 TABLET, EFFERVESCENT ORAL at 14:20

## 2024-05-05 RX ADMIN — IPRATROPIUM BROMIDE 2 PUFF: 17 AEROSOL, METERED RESPIRATORY (INHALATION) at 13:53

## 2024-05-05 RX ADMIN — POLYETHYLENE GLYCOL 3350 17 G: 17 POWDER, FOR SOLUTION ORAL at 10:50

## 2024-05-05 RX ADMIN — VENLAFAXINE HYDROCHLORIDE 75 MG: 75 CAPSULE, EXTENDED RELEASE ORAL at 10:50

## 2024-05-05 RX ADMIN — SODIUM CHLORIDE, PRESERVATIVE FREE 10 ML: 5 INJECTION INTRAVENOUS at 20:44

## 2024-05-05 RX ADMIN — TIOTROPIUM BROMIDE AND OLODATEROL 2 PUFF: 3.124; 2.736 SPRAY, METERED RESPIRATORY (INHALATION) at 09:50

## 2024-05-05 RX ADMIN — MICONAZOLE NITRATE: 2 POWDER TOPICAL at 10:47

## 2024-05-05 RX ADMIN — METOPROLOL TARTRATE 25 MG: 25 TABLET, FILM COATED ORAL at 20:54

## 2024-05-05 RX ADMIN — IPRATROPIUM BROMIDE 2 PUFF: 17 AEROSOL, METERED RESPIRATORY (INHALATION) at 09:49

## 2024-05-05 RX ADMIN — HYDRALAZINE HYDROCHLORIDE 50 MG: 50 TABLET ORAL at 20:46

## 2024-05-05 RX ADMIN — ALBUTEROL SULFATE 2 PUFF: 90 AEROSOL, METERED RESPIRATORY (INHALATION) at 13:53

## 2024-05-05 ASSESSMENT — PAIN SCALES - GENERAL: PAINLEVEL_OUTOF10: 0

## 2024-05-06 PROCEDURE — 94640 AIRWAY INHALATION TREATMENT: CPT

## 2024-05-06 PROCEDURE — 6370000000 HC RX 637 (ALT 250 FOR IP): Performed by: STUDENT IN AN ORGANIZED HEALTH CARE EDUCATION/TRAINING PROGRAM

## 2024-05-06 PROCEDURE — 6370000000 HC RX 637 (ALT 250 FOR IP)

## 2024-05-06 PROCEDURE — 6370000000 HC RX 637 (ALT 250 FOR IP): Performed by: HOSPITALIST

## 2024-05-06 PROCEDURE — 94761 N-INVAS EAR/PLS OXIMETRY MLT: CPT

## 2024-05-06 PROCEDURE — 2700000000 HC OXYGEN THERAPY PER DAY

## 2024-05-06 PROCEDURE — 2580000003 HC RX 258: Performed by: STUDENT IN AN ORGANIZED HEALTH CARE EDUCATION/TRAINING PROGRAM

## 2024-05-06 PROCEDURE — 1200000000 HC SEMI PRIVATE

## 2024-05-06 PROCEDURE — 99231 SBSQ HOSP IP/OBS SF/LOW 25: CPT | Performed by: STUDENT IN AN ORGANIZED HEALTH CARE EDUCATION/TRAINING PROGRAM

## 2024-05-06 RX ORDER — ALBUTEROL SULFATE 90 UG/1
2 AEROSOL, METERED RESPIRATORY (INHALATION)
Status: DISCONTINUED | OUTPATIENT
Start: 2024-05-06 | End: 2024-05-07 | Stop reason: HOSPADM

## 2024-05-06 RX ADMIN — Medication 1 TABLET: at 10:15

## 2024-05-06 RX ADMIN — ALBUTEROL SULFATE 2 PUFF: 90 AEROSOL, METERED RESPIRATORY (INHALATION) at 07:30

## 2024-05-06 RX ADMIN — VENLAFAXINE HYDROCHLORIDE 75 MG: 75 CAPSULE, EXTENDED RELEASE ORAL at 10:15

## 2024-05-06 RX ADMIN — ATORVASTATIN CALCIUM 40 MG: 40 TABLET, FILM COATED ORAL at 20:43

## 2024-05-06 RX ADMIN — HYDRALAZINE HYDROCHLORIDE 50 MG: 50 TABLET ORAL at 20:43

## 2024-05-06 RX ADMIN — METOPROLOL TARTRATE 25 MG: 25 TABLET, FILM COATED ORAL at 10:15

## 2024-05-06 RX ADMIN — SODIUM CHLORIDE, PRESERVATIVE FREE 10 ML: 5 INJECTION INTRAVENOUS at 10:15

## 2024-05-06 RX ADMIN — METOPROLOL TARTRATE 25 MG: 25 TABLET, FILM COATED ORAL at 20:43

## 2024-05-06 RX ADMIN — Medication 1 CAPSULE: at 10:15

## 2024-05-06 RX ADMIN — IPRATROPIUM BROMIDE 2 PUFF: 17 AEROSOL, METERED RESPIRATORY (INHALATION) at 07:30

## 2024-05-06 RX ADMIN — SODIUM CHLORIDE, PRESERVATIVE FREE 10 ML: 5 INJECTION INTRAVENOUS at 20:51

## 2024-05-06 RX ADMIN — CLOPIDOGREL BISULFATE 75 MG: 75 TABLET ORAL at 10:15

## 2024-05-06 RX ADMIN — HYDRALAZINE HYDROCHLORIDE 50 MG: 50 TABLET ORAL at 15:58

## 2024-05-06 RX ADMIN — LOSARTAN POTASSIUM 100 MG: 100 TABLET, FILM COATED ORAL at 10:15

## 2024-05-06 RX ADMIN — ASPIRIN 81 MG 81 MG: 81 TABLET ORAL at 10:15

## 2024-05-06 RX ADMIN — ALBUTEROL SULFATE 2 PUFF: 90 AEROSOL, METERED RESPIRATORY (INHALATION) at 15:42

## 2024-05-06 RX ADMIN — MICONAZOLE NITRATE: 2 POWDER TOPICAL at 10:15

## 2024-05-06 RX ADMIN — TIOTROPIUM BROMIDE AND OLODATEROL 2 PUFF: 3.124; 2.736 SPRAY, METERED RESPIRATORY (INHALATION) at 07:30

## 2024-05-06 RX ADMIN — TRAZODONE HYDROCHLORIDE 50 MG: 50 TABLET ORAL at 20:42

## 2024-05-06 RX ADMIN — IPRATROPIUM BROMIDE 2 PUFF: 17 AEROSOL, METERED RESPIRATORY (INHALATION) at 15:42

## 2024-05-06 RX ADMIN — HYDRALAZINE HYDROCHLORIDE 50 MG: 50 TABLET ORAL at 10:15

## 2024-05-06 RX ADMIN — FERROUS SULFATE TAB 325 MG (65 MG ELEMENTAL FE) 325 MG: 325 (65 FE) TAB at 10:15

## 2024-05-06 ASSESSMENT — PAIN SCALES - GENERAL
PAINLEVEL_OUTOF10: 0
PAINLEVEL_OUTOF10: 0

## 2024-05-06 NOTE — CARE COORDINATION
5/6/24, 11:29 AM EDT    DISCHARGE PLANNING EVALUATION    Spoke with patient's brother, Kristopher.  He and family have decided to apply for medicaid and plan for Kalpana to return to [facility] with hospice.     Kristopher shares he has called [facility] today and will be working with them on financial arrangements.       [facility] continues to work with family on financial arrangements. Family should be meeting with [facility] staff today or tomorrow to  medicaid application and sign financial documents.  [facility] is hopeful for resolution to financial arrangements soon.       Insurance will only pay for snf if qualifying for skilled care, would be private pay for room/board at snf with hospice, unless qualifying for medicaid.  This payment arrangement is the same for any snf.

## 2024-05-07 VITALS
SYSTOLIC BLOOD PRESSURE: 170 MMHG | DIASTOLIC BLOOD PRESSURE: 84 MMHG | WEIGHT: 190.92 LBS | OXYGEN SATURATION: 96 % | TEMPERATURE: 98.3 F | HEIGHT: 64 IN | HEART RATE: 65 BPM | BODY MASS INDEX: 32.59 KG/M2 | RESPIRATION RATE: 16 BRPM

## 2024-05-07 LAB
ALBUMIN SERPL BCG-MCNC: 2.4 G/DL (ref 3.5–5.1)
ALP SERPL-CCNC: 121 U/L (ref 38–126)
ALT SERPL W/O P-5'-P-CCNC: 25 U/L (ref 11–66)
ANION GAP SERPL CALC-SCNC: 9 MEQ/L (ref 8–16)
AST SERPL-CCNC: 38 U/L (ref 5–40)
BILIRUB SERPL-MCNC: 0.5 MG/DL (ref 0.3–1.2)
BUN SERPL-MCNC: 9 MG/DL (ref 7–22)
CALCIUM SERPL-MCNC: 10.1 MG/DL (ref 8.5–10.5)
CHLORIDE SERPL-SCNC: 97 MEQ/L (ref 98–111)
CO2 SERPL-SCNC: 31 MEQ/L (ref 23–33)
CREAT SERPL-MCNC: 0.6 MG/DL (ref 0.4–1.2)
GFR SERPL CREATININE-BSD FRML MDRD: > 90 ML/MIN/1.73M2
GLUCOSE SERPL-MCNC: 98 MG/DL (ref 70–108)
POTASSIUM SERPL-SCNC: 3.3 MEQ/L (ref 3.5–5.2)
PROT SERPL-MCNC: 6.4 G/DL (ref 6.1–8)
SODIUM SERPL-SCNC: 137 MEQ/L (ref 135–145)

## 2024-05-07 PROCEDURE — 36415 COLL VENOUS BLD VENIPUNCTURE: CPT

## 2024-05-07 PROCEDURE — 6370000000 HC RX 637 (ALT 250 FOR IP): Performed by: STUDENT IN AN ORGANIZED HEALTH CARE EDUCATION/TRAINING PROGRAM

## 2024-05-07 PROCEDURE — 6370000000 HC RX 637 (ALT 250 FOR IP): Performed by: HOSPITALIST

## 2024-05-07 PROCEDURE — 2700000000 HC OXYGEN THERAPY PER DAY

## 2024-05-07 PROCEDURE — 80053 COMPREHEN METABOLIC PANEL: CPT

## 2024-05-07 PROCEDURE — 94640 AIRWAY INHALATION TREATMENT: CPT

## 2024-05-07 PROCEDURE — 94761 N-INVAS EAR/PLS OXIMETRY MLT: CPT

## 2024-05-07 PROCEDURE — 2580000003 HC RX 258: Performed by: STUDENT IN AN ORGANIZED HEALTH CARE EDUCATION/TRAINING PROGRAM

## 2024-05-07 PROCEDURE — 97110 THERAPEUTIC EXERCISES: CPT

## 2024-05-07 RX ORDER — CLOPIDOGREL BISULFATE 75 MG/1
75 TABLET ORAL DAILY
Qty: 30 TABLET | Refills: 3 | DISCHARGE
Start: 2024-05-07

## 2024-05-07 RX ORDER — HYDROXYZINE HYDROCHLORIDE 10 MG/1
25 TABLET, FILM COATED ORAL 3 TIMES DAILY PRN
Qty: 90 TABLET | Refills: 0 | DISCHARGE
Start: 2024-05-07 | End: 2024-06-06

## 2024-05-07 RX ORDER — ASPIRIN 81 MG/1
81 TABLET, CHEWABLE ORAL DAILY
Qty: 30 TABLET | Refills: 3 | DISCHARGE
Start: 2024-05-07

## 2024-05-07 RX ORDER — GUAIFENESIN 600 MG/1
1200 TABLET, EXTENDED RELEASE ORAL 2 TIMES DAILY
Qty: 60 TABLET | Refills: 0 | DISCHARGE
Start: 2024-05-07 | End: 2024-06-06

## 2024-05-07 RX ORDER — LOSARTAN POTASSIUM 100 MG/1
100 TABLET ORAL DAILY
Qty: 90 TABLET | Refills: 1 | DISCHARGE
Start: 2024-05-07

## 2024-05-07 RX ORDER — DOCUSATE SODIUM 100 MG/1
100 CAPSULE, LIQUID FILLED ORAL 2 TIMES DAILY
Qty: 60 CAPSULE | Refills: 0 | DISCHARGE
Start: 2024-05-07 | End: 2024-06-06

## 2024-05-07 RX ORDER — CAL/D3/MAG11/ZINC/COP/MANG/BOR 600 MG-800
1 TABLET ORAL DAILY
Qty: 30 TABLET | Refills: 0 | DISCHARGE
Start: 2024-05-07

## 2024-05-07 RX ORDER — FERROUS SULFATE 325(65) MG
325 TABLET ORAL EVERY OTHER DAY
Qty: 90 TABLET | Refills: 0 | DISCHARGE
Start: 2024-05-07

## 2024-05-07 RX ORDER — LORAZEPAM 1 MG/1
1 TABLET ORAL EVERY 6 HOURS PRN
Qty: 60 TABLET | Refills: 0 | Status: SHIPPED | OUTPATIENT
Start: 2024-05-07 | End: 2024-06-06

## 2024-05-07 RX ORDER — OXYCODONE HYDROCHLORIDE 5 MG/1
2.5 TABLET ORAL EVERY 6 HOURS PRN
Qty: 60 TABLET | Refills: 0 | Status: SHIPPED | OUTPATIENT
Start: 2024-05-07 | End: 2024-06-06

## 2024-05-07 RX ORDER — SENNOSIDES A AND B 8.6 MG/1
1 TABLET, FILM COATED ORAL 2 TIMES DAILY PRN
Qty: 60 TABLET | Refills: 0 | Status: SHIPPED | OUTPATIENT
Start: 2024-05-07 | End: 2025-05-07

## 2024-05-07 RX ORDER — ATORVASTATIN CALCIUM 40 MG/1
40 TABLET, FILM COATED ORAL DAILY
Qty: 30 TABLET | Refills: 3 | DISCHARGE
Start: 2024-05-07

## 2024-05-07 RX ADMIN — Medication 1 TABLET: at 08:42

## 2024-05-07 RX ADMIN — ALBUTEROL SULFATE 2 PUFF: 90 AEROSOL, METERED RESPIRATORY (INHALATION) at 08:25

## 2024-05-07 RX ADMIN — HYDRALAZINE HYDROCHLORIDE 50 MG: 50 TABLET ORAL at 08:42

## 2024-05-07 RX ADMIN — MICONAZOLE NITRATE: 2 POWDER TOPICAL at 08:43

## 2024-05-07 RX ADMIN — TIOTROPIUM BROMIDE AND OLODATEROL 2 PUFF: 3.124; 2.736 SPRAY, METERED RESPIRATORY (INHALATION) at 08:27

## 2024-05-07 RX ADMIN — CLOPIDOGREL BISULFATE 75 MG: 75 TABLET ORAL at 08:43

## 2024-05-07 RX ADMIN — OXYCODONE 2.5 MG: 5 TABLET ORAL at 05:33

## 2024-05-07 RX ADMIN — METOPROLOL TARTRATE 25 MG: 25 TABLET, FILM COATED ORAL at 08:42

## 2024-05-07 RX ADMIN — SODIUM CHLORIDE, PRESERVATIVE FREE 10 ML: 5 INJECTION INTRAVENOUS at 08:43

## 2024-05-07 RX ADMIN — Medication 1 CAPSULE: at 08:43

## 2024-05-07 RX ADMIN — HYDRALAZINE HYDROCHLORIDE 50 MG: 50 TABLET ORAL at 14:18

## 2024-05-07 RX ADMIN — Medication 1 TABLET: at 08:43

## 2024-05-07 RX ADMIN — ASPIRIN 81 MG 81 MG: 81 TABLET ORAL at 08:43

## 2024-05-07 RX ADMIN — VENLAFAXINE HYDROCHLORIDE 75 MG: 75 CAPSULE, EXTENDED RELEASE ORAL at 08:42

## 2024-05-07 RX ADMIN — LOSARTAN POTASSIUM 100 MG: 100 TABLET, FILM COATED ORAL at 08:42

## 2024-05-07 ASSESSMENT — PAIN DESCRIPTION - LOCATION: LOCATION: HEAD

## 2024-05-07 ASSESSMENT — PAIN DESCRIPTION - DESCRIPTORS: DESCRIPTORS: ACHING

## 2024-05-07 ASSESSMENT — PAIN DESCRIPTION - ORIENTATION: ORIENTATION: MID

## 2024-05-07 ASSESSMENT — PAIN - FUNCTIONAL ASSESSMENT: PAIN_FUNCTIONAL_ASSESSMENT: ACTIVITIES ARE NOT PREVENTED

## 2024-05-07 ASSESSMENT — PAIN SCALES - GENERAL
PAINLEVEL_OUTOF10: 2
PAINLEVEL_OUTOF10: 8

## 2024-05-07 NOTE — DISCHARGE SUMMARY
DISCHARGE SUMMARY      Patient Identification:   Kalpana Braga   : 1955  MRN: [id number]   Account: [id number]      Patient's PCP: Tatiana Velasquez APRN - CNP    Admit Date: 2024     Discharge Date: 24    Admitting Physician: No admitting provider for patient encounter.     Discharge Physician: Richie Church DO     Discharge Diagnoses:    Acute Hypoxic Respiratory failure: Patient required 3L NC during stay. At baseline. As below. CXR  shows stable right hemithorax opacification, no acute change from prior study - showed near complete opacification of the right hemithorax with almost no aerated lung visualized is unchanged. Multifactorial 2/2 right mainstem bronchus obstruction from tumor s/p debulking and stent, pleural effusion s/p thoracentesis, COPD. Pulmonology followed PRN- Stiolto 2 puffs daily, Proventil Q6H PRN for wheezing. Palliative care following, patient on Oxycodone 2.5mg immediate release Q6H PRN. IS, acapella.   Recent diagnosis of R sided Non-small cell lung carcinoma s/p tumor debulking and stent placement 24: Locally metastatic with lymph node involvement. Patient is s/p EBUS- Initial staging T3 N3 stage IIIc 3.9 x 5.5cm right suprahilar mass. Pt received radiation treatment during most recent hospitalization. Pulmonology following PRN. Interventional pulmonology, Dr. Horan, performed bronchoscopy with APC tumor debulking and stent placement 24. Per oncology, patient stated she does not wish to proceed with chemotherapy treatment - oncology has signed off. Palliative care following. Hospice discussion 24 - patient's brother (power of ) wishes to proceed with SNF on hospice. DNR-CC signed by Dr. Maradiaga.   Primary Hypertension, uncontrolled: Continued home Losartan 100mg daily, Hydralazine 25mg TID, Lopressor 25mg BID.  Urinary Tract Infection, resolved: UA with many bacteria and urine culture positive for MRSA. Completed IV Vancomycin x 5

## 2024-05-07 NOTE — PLAN OF CARE
Problem: Discharge Planning  Goal: Discharge to home or other facility with appropriate resources  4/23/2024 1539 by Lara Powell RN  Outcome: Progressing  Flowsheets (Taken 4/23/2024 0124 by Annie Morris, RN)  Discharge to home or other facility with appropriate resources:   Identify barriers to discharge with patient and caregiver   Arrange for needed discharge resources and transportation as appropriate  Note: She is from [facility] and plans on returning there at discharge.    4/23/2024 1526 by Sofie Feliciano LSW  Outcome: Progressing     Problem: Skin/Tissue Integrity  Goal: Absence of new skin breakdown  Description: 1.  Monitor for areas of redness and/or skin breakdown  2.  Assess vascular access sites hourly  3.  Every 4-6 hours minimum:  Change oxygen saturation probe site  4.  Every 4-6 hours:  If on nasal continuous positive airway pressure, respiratory therapy assess nares and determine need for appliance change or resting period.  Outcome: Progressing  Note: Ongoing assessment & interventions provided throughout shift.  Skin assessments provided.  Encouraging/assisting patient to turn as needed.       Problem: Safety - Adult  Goal: Free from fall injury  Outcome: Progressing  Flowsheets (Taken 4/23/2024 0124 by Annie Morris, RN)  Free From Fall Injury:   Instruct family/caregiver on patient safety   Based on caregiver fall risk screen, instruct family/caregiver to ask for assistance with transferring infant if caregiver noted to have fall risk factors  Note: Bed locked & in low position, call light in reach, side-rails up x2, bed/chair alarm utilized, non-slip socks on when ambulating, reminded patient to use call light to call for assistance.       Problem: Chronic Conditions and Co-morbidities  Goal: Patient's chronic conditions and co-morbidity symptoms are monitored and maintained or improved  Outcome: Progressing  Flowsheets (Taken 4/23/2024 1539)  Care Plan - 
  Problem: Discharge Planning  Goal: Discharge to home or other facility with appropriate resources  4/24/2024 0107 by Annie Morris RN  Outcome: Progressing  Flowsheets (Taken 4/24/2024 0107)  Discharge to home or other facility with appropriate resources:   Identify barriers to discharge with patient and caregiver   Arrange for needed discharge resources and transportation as appropriate  4/23/2024 1539 by Lara Powell RN  Outcome: Progressing  Flowsheets (Taken 4/23/2024 0124 by Annie Morris, RN)  Discharge to home or other facility with appropriate resources:   Identify barriers to discharge with patient and caregiver   Arrange for needed discharge resources and transportation as appropriate  Note: She is from [facility] and plans on returning there at discharge.    4/23/2024 1526 by Sofie Feliciano LSW  Outcome: Progressing     Problem: Skin/Tissue Integrity  Goal: Absence of new skin breakdown  Description: 1.  Monitor for areas of redness and/or skin breakdown  2.  Assess vascular access sites hourly  3.  Every 4-6 hours minimum:  Change oxygen saturation probe site  4.  Every 4-6 hours:  If on nasal continuous positive airway pressure, respiratory therapy assess nares and determine need for appliance change or resting period.  4/24/2024 0107 by Annie Morris RN  Outcome: Progressing  4/23/2024 1539 by Lara Powell RN  Outcome: Progressing  Note: Ongoing assessment & interventions provided throughout shift.  Skin assessments provided.  Encouraging/assisting patient to turn as needed.       Problem: Safety - Adult  Goal: Free from fall injury  4/24/2024 0107 by Annie Morris, RN  Outcome: Progressing  Flowsheets (Taken 4/24/2024 0107)  Free From Fall Injury: Instruct family/caregiver on patient safety  4/23/2024 1539 by Lara Powell RN  Outcome: Progressing  Flowsheets (Taken 4/23/2024 0124 by Annie Morris, RN)  Free From Fall Injury:   Instruct family/caregiver on 
  Problem: Discharge Planning  Goal: Discharge to home or other facility with appropriate resources  4/24/2024 0936 by Lara Powell RN  Outcome: Progressing  Flowsheets (Taken 4/24/2024 0107 by Annie Morris, RN)  Discharge to home or other facility with appropriate resources:   Identify barriers to discharge with patient and caregiver   Arrange for needed discharge resources and transportation as appropriate  Note: She is from [facility] and plans on returning there at discharge.       Problem: Skin/Tissue Integrity  Goal: Absence of new skin breakdown  Description: 1.  Monitor for areas of redness and/or skin breakdown  2.  Assess vascular access sites hourly  3.  Every 4-6 hours minimum:  Change oxygen saturation probe site  4.  Every 4-6 hours:  If on nasal continuous positive airway pressure, respiratory therapy assess nares and determine need for appliance change or resting period.  4/24/2024 2057 by Edwardo Ro RN  Outcome: Progressing  Note: Pt has remained free from new skin breakdown  4/24/2024 0936 by Lara Powell RN  Outcome: Progressing  Note: Ongoing assessment & interventions provided throughout shift.  Skin assessments provided.  Encouraging/assisting patient to turn as needed.       Problem: Safety - Adult  Goal: Free from fall injury  4/24/2024 2057 by Edwardo Ro RN  Outcome: Progressing  Flowsheets (Taken 4/24/2024 0107 by Annie Morris, RN)  Free From Fall Injury: Instruct family/caregiver on patient safety  4/24/2024 0936 by Lara Powell RN  Outcome: Progressing  Flowsheets (Taken 4/24/2024 0107 by Annie Morris, RN)  Free From Fall Injury: Instruct family/caregiver on patient safety  Note: Bed locked & in low position, call light in reach, side-rails up x2, bed/chair alarm utilized, non-slip socks on when ambulating, reminded patient to use call light to call for assistance.       Problem: Chronic Conditions and Co-morbidities  Goal: Patient's chronic 
  Problem: Discharge Planning  Goal: Discharge to home or other facility with appropriate resources  4/26/2024 0056 by Christine Linares RN  Outcome: Progressing  Flowsheets (Taken 4/26/2024 0056)  Discharge to home or other facility with appropriate resources:   Identify discharge learning needs (meds, wound care, etc)   Identify barriers to discharge with patient and caregiver   Refer to discharge planning if patient needs post-hospital services based on physician order or complex needs related to functional status, cognitive ability or social support system   Arrange for needed discharge resources and transportation as appropriate     Problem: Skin/Tissue Integrity  Goal: Absence of new skin breakdown  Description: 1.  Monitor for areas of redness and/or skin breakdown  2.  Assess vascular access sites hourly  3.  Every 4-6 hours minimum:  Change oxygen saturation probe site  4.  Every 4-6 hours:  If on nasal continuous positive airway pressure, respiratory therapy assess nares and determine need for appliance change or resting period.  4/26/2024 0056 by Christine Linares, RN  Outcome: Progressing  Note: Ongoing assessment & interventions provided throughout shift.  Skin assessments provided.  Encouraging/assisting patient to turn as needed.      Problem: Safety - Adult  Goal: Free from fall injury  4/26/2024 0056 by Christine Linares, RN  Outcome: Progressing  Flowsheets (Taken 4/26/2024 0056)  Free From Fall Injury: Instruct family/caregiver on patient safety  Note: Bed locked & in low position, call light in reach, side-rails up x2, bed/chair alarm utilized, non-slip socks on when ambulating, reminded patient to use call light to call for assistance.      Problem: Chronic Conditions and Co-morbidities  Goal: Patient's chronic conditions and co-morbidity symptoms are monitored and maintained or improved  Outcome: Progressing  Flowsheets (Taken 4/26/2024 0056)  Care Plan - Patient's Chronic Conditions and Co-Morbidity 
  Problem: Discharge Planning  Goal: Discharge to home or other facility with appropriate resources  4/27/2024 1140 by Jacqui Winter RN  Outcome: Progressing  4/26/2024 2307 by Eleazar Smith RN  Outcome: Progressing  Flowsheets (Taken 4/26/2024 0056 by Christine Linares RN)  Discharge to home or other facility with appropriate resources:   Identify discharge learning needs (meds, wound care, etc)   Identify barriers to discharge with patient and caregiver   Refer to discharge planning if patient needs post-hospital services based on physician order or complex needs related to functional status, cognitive ability or social support system   Arrange for needed discharge resources and transportation as appropriate     Problem: Skin/Tissue Integrity  Goal: Absence of new skin breakdown  Description: 1.  Monitor for areas of redness and/or skin breakdown  2.  Assess vascular access sites hourly  3.  Every 4-6 hours minimum:  Change oxygen saturation probe site  4.  Every 4-6 hours:  If on nasal continuous positive airway pressure, respiratory therapy assess nares and determine need for appliance change or resting period.  4/27/2024 1140 by Jacqui Winter RN  Outcome: Progressing  4/26/2024 2307 by Eleazar Smith RN  Outcome: Progressing     Problem: Safety - Adult  Goal: Free from fall injury  4/27/2024 1140 by Jacqui Winter RN  Outcome: Progressing  4/26/2024 2307 by Eleazar Smith RN  Outcome: Progressing  Flowsheets (Taken 4/26/2024 0056 by Christine Linares RN)  Free From Fall Injury: Instruct family/caregiver on patient safety     Problem: Chronic Conditions and Co-morbidities  Goal: Patient's chronic conditions and co-morbidity symptoms are monitored and maintained or improved  4/27/2024 1140 by Jacqui Winter RN  Outcome: Progressing  4/26/2024 2307 by Eleazar Smith RN  Outcome: Progressing  Flowsheets (Taken 4/26/2024 0056 by Christine Linares RN)  Care Plan - Patient's Chronic Conditions and Co-Morbidity 
  Problem: Discharge Planning  Goal: Discharge to home or other facility with appropriate resources  4/28/2024 2354 by Jose Young RN  Outcome: Progressing     Problem: Skin/Tissue Integrity  Goal: Absence of new skin breakdown  4/28/2024 2354 by Jose Young RN  Outcome: Progressing     Problem: Safety - Adult  Goal: Free from fall injury  4/28/2024 2354 by Jose Young RN  Outcome: Progressing     Problem: Chronic Conditions and Co-morbidities  Goal: Patient's chronic conditions and co-morbidity symptoms are monitored and maintained or improved  4/28/2024 2354 by Jose Young RN  Outcome: Progressing     Problem: Respiratory - Adult  Goal: Clear lung sounds  4/28/2024 1059 by Farzana Menendez RCP  Outcome: Progressing  Note: Maintenance  Patient mutually agreed on goals.       Problem: Respiratory - Adult  Goal: Achieves optimal ventilation and oxygenation  Outcome: Progressing     Problem: Cardiovascular - Adult  Goal: Maintains optimal cardiac output and hemodynamic stability  Outcome: Progressing     Problem: Nutrition Deficit:  Goal: Optimize nutritional status  4/28/2024 2354 by Jose Young RN  Outcome: Progressing     Problem: Pain  Goal: Verbalizes/displays adequate comfort level or baseline comfort level  4/28/2024 2354 by Jose Young RN  Outcome: Progressing     Care plan reviewed with patient and verbalize understanding of the plan of care and contribute to goal setting.     
  Problem: Discharge Planning  Goal: Discharge to home or other facility with appropriate resources  4/29/2024 1221 by Lisa Lock, RN  Outcome: Progressing  Flowsheets (Taken 4/29/2024 1221)  Discharge to home or other facility with appropriate resources: Identify barriers to discharge with patient and caregiver     Problem: Skin/Tissue Integrity  Goal: Absence of new skin breakdown  Description: 1.  Monitor for areas of redness and/or skin breakdown  2.  Assess vascular access sites hourly  3.  Every 4-6 hours minimum:  Change oxygen saturation probe site  4.  Every 4-6 hours:  If on nasal continuous positive airway pressure, respiratory therapy assess nares and determine need for appliance change or resting period.  4/29/2024 1221 by Lisa Lock, RN  Outcome: Progressing     Problem: Safety - Adult  Goal: Free from fall injury  4/29/2024 1221 by Lisa Lock, RN  Outcome: Progressing  Flowsheets (Taken 4/29/2024 1123)  Free From Fall Injury: Instruct family/caregiver on patient safety     Problem: Cardiovascular - Adult  Goal: Maintains optimal cardiac output and hemodynamic stability  4/29/2024 1221 by Lisa Lock, RN  Outcome: Progressing  Flowsheets (Taken 4/29/2024 1221)  Maintains optimal cardiac output and hemodynamic stability: Monitor blood pressure and heart rate    Care plan reviewed with patient.  Patient verbalizes understanding of the care plan and contributed to goal setting.      
  Problem: Discharge Planning  Goal: Discharge to home or other facility with appropriate resources  5/3/2024 2256 by Zaynab Stearns RN  Outcome: Progressing  Flowsheets (Taken 5/3/2024 2256)  Discharge to home or other facility with appropriate resources:   Identify barriers to discharge with patient and caregiver   Arrange for needed discharge resources and transportation as appropriate   Identify discharge learning needs (meds, wound care, etc)     Problem: Skin/Tissue Integrity  Goal: Absence of new skin breakdown  Description: 1.  Monitor for areas of redness and/or skin breakdown  2.  Assess vascular access sites hourly  3.  Every 4-6 hours minimum:  Change oxygen saturation probe site  4.  Every 4-6 hours:  If on nasal continuous positive airway pressure, respiratory therapy assess nares and determine need for appliance change or resting period.  5/3/2024 2256 by Zaynab Stearns RN  Outcome: Progressing     Problem: Safety - Adult  Goal: Free from fall injury  5/3/2024 2256 by Zaynab Stearns RN  Outcome: Progressing  Flowsheets (Taken 5/3/2024 2256)  Free From Fall Injury: Instruct family/caregiver on patient safety     Problem: Safety - Adult  Goal: Isolation precautions  Description: Isolation precautions  5/3/2024 2256 by Zaynab Stearns RN  Outcome: Progressing  Note: On contact precaution     Problem: Chronic Conditions and Co-morbidities  Goal: Patient's chronic conditions and co-morbidity symptoms are monitored and maintained or improved  5/3/2024 2256 by Zaynab Stearns RN  Outcome: Progressing  Flowsheets (Taken 5/3/2024 2256)  Care Plan - Patient's Chronic Conditions and Co-Morbidity Symptoms are Monitored and Maintained or Improved:   Monitor and assess patient's chronic conditions and comorbid symptoms for stability, deterioration, or improvement   Collaborate with multidisciplinary team to address chronic and comorbid conditions and prevent exacerbation or deterioration     Problem: Respiratory 
  Problem: Discharge Planning  Goal: Discharge to home or other facility with appropriate resources  5/4/2024 1139 by Clarisse Teague LPN  Outcome: Progressing  Flowsheets (Taken 5/3/2024 2256 by Zaynab Stearns, RN)  Discharge to home or other facility with appropriate resources:   Identify barriers to discharge with patient and caregiver   Arrange for needed discharge resources and transportation as appropriate   Identify discharge learning needs (meds, wound care, etc)     Problem: Skin/Tissue Integrity  Goal: Absence of new skin breakdown  Description: 1.  Monitor for areas of redness and/or skin breakdown  2.  Assess vascular access sites hourly  3.  Every 4-6 hours minimum:  Change oxygen saturation probe site  4.  Every 4-6 hours:  If on nasal continuous positive airway pressure, respiratory therapy assess nares and determine need for appliance change or resting period.  5/4/2024 1139 by Clarisse Teague LPN  Outcome: Progressing  Note: No new skin breakdown on this unit during this shift.     Problem: Safety - Adult  Goal: Free from fall injury  5/4/2024 1139 by Clarisse Teague LPN  Outcome: Progressing  Flowsheets (Taken 5/3/2024 2256 by Zaynab Stearns, RN)  Free From Fall Injury: Instruct family/caregiver on patient safety     Problem: Safety - Adult  Goal: Isolation precautions  Description: Isolation precautions  5/4/2024 1139 by Clarisse Teague LPN  Outcome: Progressing  Note: Isolation precautions in use.     Problem: Chronic Conditions and Co-morbidities  Goal: Patient's chronic conditions and co-morbidity symptoms are monitored and maintained or improved  5/4/2024 1139 by Clarisse Teague LPN  Outcome: Progressing  Flowsheets (Taken 5/3/2024 2256 by Zaynab Stearns, RN)  Care Plan - Patient's Chronic Conditions and Co-Morbidity Symptoms are Monitored and Maintained or Improved:   Monitor and assess patient's chronic conditions and comorbid symptoms for stability, deterioration, or improvement   Collaborate with 
  Problem: Discharge Planning  Goal: Discharge to home or other facility with appropriate resources  5/5/2024 0857 by Rosas Ray RN  Outcome: Progressing  Flowsheets (Taken 5/5/2024 0857)  Discharge to home or other facility with appropriate resources:   Identify barriers to discharge with patient and caregiver   Identify discharge learning needs (meds, wound care, etc)   Refer to discharge planning if patient needs post-hospital services based on physician order or complex needs related to functional status, cognitive ability or social support system   Arrange for needed discharge resources and transportation as appropriate  5/4/2024 2256 by Zaynab Stearns, RN  Outcome: Progressing  Flowsheets (Taken 5/4/2024 2256)  Discharge to home or other facility with appropriate resources:   Identify barriers to discharge with patient and caregiver   Arrange for needed discharge resources and transportation as appropriate   Identify discharge learning needs (meds, wound care, etc)     Problem: Skin/Tissue Integrity  Goal: Absence of new skin breakdown  Description: 1.  Monitor for areas of redness and/or skin breakdown  2.  Assess vascular access sites hourly  3.  Every 4-6 hours minimum:  Change oxygen saturation probe site  4.  Every 4-6 hours:  If on nasal continuous positive airway pressure, respiratory therapy assess nares and determine need for appliance change or resting period.  5/5/2024 0857 by Rosas Ray RN  Outcome: Progressing  5/4/2024 2256 by Zaynab Stearns, RN  Outcome: Progressing     Problem: Safety - Adult  Goal: Free from fall injury  5/5/2024 0857 by Rosas Ray, RN  Outcome: Progressing  Flowsheets (Taken 5/5/2024 0857)  Free From Fall Injury: Instruct family/caregiver on patient safety  5/4/2024 2256 by Zaynab Stearns, RN  Outcome: Progressing  Flowsheets (Taken 5/4/2024 2256)  Free From Fall Injury: Instruct family/caregiver on patient safety  Goal: Isolation precautions  Description: 
  Problem: Discharge Planning  Goal: Discharge to home or other facility with appropriate resources  5/6/2024 2126 by Matthew Arcos RN  Outcome: Progressing  Flowsheets (Taken 5/6/2024 2009)  Discharge to home or other facility with appropriate resources:   Identify barriers to discharge with patient and caregiver   Arrange for needed discharge resources and transportation as appropriate   Identify discharge learning needs (meds, wound care, etc)   Refer to discharge planning if patient needs post-hospital services based on physician order or complex needs related to functional status, cognitive ability or social support system  5/6/2024 1745 by Junie Seth RN  Outcome: Progressing  Flowsheets (Taken 5/6/2024 1745)  Discharge to home or other facility with appropriate resources: Identify barriers to discharge with patient and caregiver     Problem: Skin/Tissue Integrity  Goal: Absence of new skin breakdown  Description: 1.  Monitor for areas of redness and/or skin breakdown  2.  Assess vascular access sites hourly  3.  Every 4-6 hours minimum:  Change oxygen saturation probe site  4.  Every 4-6 hours:  If on nasal continuous positive airway pressure, respiratory therapy assess nares and determine need for appliance change or resting period.  5/6/2024 2126 by Matthew Arcos RN  Outcome: Progressing  5/6/2024 1745 by Junie Seth RN  Outcome: Progressing  Note: No evidence of new skin breakdown assessed this shift.      Problem: Safety - Adult  Goal: Free from fall injury  5/6/2024 2126 by Matthew Arcos, RN  Outcome: Progressing  Flowsheets (Taken 5/6/2024 1745 by Junie Seth RN)  Free From Fall Injury: Instruct family/caregiver on patient safety  5/6/2024 1745 by Junie Seth RN  Outcome: Progressing  Flowsheets (Taken 5/6/2024 1745)  Free From Fall Injury: Instruct family/caregiver on patient safety     Problem: Safety - Adult  Goal: Isolation precautions  Description: Isolation 
  Problem: Discharge Planning  Goal: Discharge to home or other facility with appropriate resources  Outcome: Completed     Problem: Skin/Tissue Integrity  Goal: Absence of new skin breakdown  Description: 1.  Monitor for areas of redness and/or skin breakdown  2.  Assess vascular access sites hourly  3.  Every 4-6 hours minimum:  Change oxygen saturation probe site  4.  Every 4-6 hours:  If on nasal continuous positive airway pressure, respiratory therapy assess nares and determine need for appliance change or resting period.  Outcome: Completed     Problem: Safety - Adult  Goal: Free from fall injury  Outcome: Completed     Problem: Safety - Adult  Goal: Isolation precautions  Description: Isolation precautions  Outcome: Completed     Problem: Chronic Conditions and Co-morbidities  Goal: Patient's chronic conditions and co-morbidity symptoms are monitored and maintained or improved  Outcome: Completed     Problem: Respiratory - Adult  Goal: Achieves optimal ventilation and oxygenation  Outcome: Completed     Problem: Nutrition Deficit:  Goal: Optimize nutritional status  Outcome: Completed     Problem: Pain  Goal: Verbalizes/displays adequate comfort level or baseline comfort level  Outcome: Completed     Problem: Cardiovascular - Adult  Goal: Maintains optimal cardiac output and hemodynamic stability  Outcome: Completed     Problem: ABCDS Injury Assessment  Goal: Absence of physical injury  Outcome: Completed   Care plan reviewed with patient.  Patient verbalize understanding of the plan of care and contribute to goal setting.     
  Problem: Discharge Planning  Goal: Discharge to home or other facility with appropriate resources  Outcome: Progressing     Problem: Skin/Tissue Integrity  Goal: Absence of new skin breakdown  Description: 1.  Monitor for areas of redness and/or skin breakdown  2.  Assess vascular access sites hourly  3.  Every 4-6 hours minimum:  Change oxygen saturation probe site  4.  Every 4-6 hours:  If on nasal continuous positive airway pressure, respiratory therapy assess nares and determine need for appliance change or resting period.  Outcome: Progressing     Problem: Safety - Adult  Goal: Free from fall injury  Outcome: Progressing     Problem: Chronic Conditions and Co-morbidities  Goal: Patient's chronic conditions and co-morbidity symptoms are monitored and maintained or improved  Outcome: Progressing     Problem: Respiratory - Adult  Goal: Clear lung sounds  Description: Clear lung sounds  4/28/2024 1059 by Farzana Menendez, P  Outcome: Progressing  Note: Maintenance  Patient mutually agreed on goals.       Problem: Nutrition Deficit:  Goal: Optimize nutritional status  Outcome: Progressing     Problem: Pain  Goal: Verbalizes/displays adequate comfort level or baseline comfort level  Outcome: Progressing     
  Problem: Discharge Planning  Goal: Discharge to home or other facility with appropriate resources  Outcome: Progressing  Flowsheets (Taken 4/23/2024 0124)  Discharge to home or other facility with appropriate resources:   Identify barriers to discharge with patient and caregiver   Arrange for needed discharge resources and transportation as appropriate     Problem: Skin/Tissue Integrity  Goal: Absence of new skin breakdown  Description: 1.  Monitor for areas of redness and/or skin breakdown  2.  Assess vascular access sites hourly  3.  Every 4-6 hours minimum:  Change oxygen saturation probe site  4.  Every 4-6 hours:  If on nasal continuous positive airway pressure, respiratory therapy assess nares and determine need for appliance change or resting period.  Outcome: Progressing     Problem: Safety - Adult  Goal: Free from fall injury  Outcome: Progressing  Flowsheets (Taken 4/23/2024 0124)  Free From Fall Injury:   Instruct family/caregiver on patient safety   Based on caregiver fall risk screen, instruct family/caregiver to ask for assistance with transferring infant if caregiver noted to have fall risk factors     
  Problem: Discharge Planning  Goal: Discharge to home or other facility with appropriate resources  Outcome: Progressing  Flowsheets (Taken 4/24/2024 0107 by Annie Morris, RN)  Discharge to home or other facility with appropriate resources:   Identify barriers to discharge with patient and caregiver   Arrange for needed discharge resources and transportation as appropriate  Note: She is planning on returning to [facility] at discharge with hospice.       Problem: Skin/Tissue Integrity  Goal: Absence of new skin breakdown  Description: 1.  Monitor for areas of redness and/or skin breakdown  2.  Assess vascular access sites hourly  3.  Every 4-6 hours minimum:  Change oxygen saturation probe site  4.  Every 4-6 hours:  If on nasal continuous positive airway pressure, respiratory therapy assess nares and determine need for appliance change or resting period.  Outcome: Progressing  Note: Ongoing assessment & interventions provided throughout shift.  Skin assessments provided.  Encouraging/assisting patient to turn as needed.       Problem: Safety - Adult  Goal: Free from fall injury  Outcome: Progressing  Flowsheets (Taken 4/24/2024 0107 by Annie Morris, RN)  Free From Fall Injury: Instruct family/caregiver on patient safety  Note: Bed locked & in low position, call light in reach, side-rails up x2, bed/chair alarm utilized, non-slip socks on when ambulating, reminded patient to use call light to call for assistance.       Problem: Pain  Goal: Verbalizes/displays adequate comfort level or baseline comfort level  Outcome: Progressing  Flowsheets (Taken 4/25/2024 7928)  Verbalizes/displays adequate comfort level or baseline comfort level: Encourage patient to monitor pain and request assistance  Note: Patient denies pain so far this shift. Reminded patient to report any pain, pressure, or shortness of breath to the nurse.  Will continue to monitor.       Care plan reviewed with patient.  Patient verbalizes 
  Problem: Discharge Planning  Goal: Discharge to home or other facility with appropriate resources  Outcome: Progressing  Flowsheets (Taken 4/26/2024 0056 by Christine Linares, RN)  Discharge to home or other facility with appropriate resources:   Identify discharge learning needs (meds, wound care, etc)   Identify barriers to discharge with patient and caregiver   Refer to discharge planning if patient needs post-hospital services based on physician order or complex needs related to functional status, cognitive ability or social support system   Arrange for needed discharge resources and transportation as appropriate     Problem: Skin/Tissue Integrity  Goal: Absence of new skin breakdown  Description: 1.  Monitor for areas of redness and/or skin breakdown  2.  Assess vascular access sites hourly  3.  Every 4-6 hours minimum:  Change oxygen saturation probe site  4.  Every 4-6 hours:  If on nasal continuous positive airway pressure, respiratory therapy assess nares and determine need for appliance change or resting period.  Outcome: Progressing     Problem: Safety - Adult  Goal: Free from fall injury  Outcome: Progressing  Flowsheets (Taken 4/26/2024 0056 by Christine Linares, RN)  Free From Fall Injury: Instruct family/caregiver on patient safety     Problem: Chronic Conditions and Co-morbidities  Goal: Patient's chronic conditions and co-morbidity symptoms are monitored and maintained or improved  Outcome: Progressing  Flowsheets (Taken 4/26/2024 0056 by Christine Linares, RN)  Care Plan - Patient's Chronic Conditions and Co-Morbidity Symptoms are Monitored and Maintained or Improved:   Monitor and assess patient's chronic conditions and comorbid symptoms for stability, deterioration, or improvement   Collaborate with multidisciplinary team to address chronic and comorbid conditions and prevent exacerbation or deterioration   Update acute care plan with appropriate goals if chronic or comorbid symptoms are exacerbated and 
  Problem: Discharge Planning  Goal: Discharge to home or other facility with appropriate resources  Outcome: Progressing  Flowsheets (Taken 5/1/2024 0102)  Discharge to home or other facility with appropriate resources:   Identify barriers to discharge with patient and caregiver   Arrange for needed discharge resources and transportation as appropriate   Identify discharge learning needs (meds, wound care, etc)     Problem: Skin/Tissue Integrity  Goal: Absence of new skin breakdown  Description: 1.  Monitor for areas of redness and/or skin breakdown  2.  Assess vascular access sites hourly  3.  Every 4-6 hours minimum:  Change oxygen saturation probe site  4.  Every 4-6 hours:  If on nasal continuous positive airway pressure, respiratory therapy assess nares and determine need for appliance change or resting period.  Outcome: Progressing     Problem: Safety - Adult  Goal: Free from fall injury  Outcome: Progressing  Flowsheets (Taken 5/1/2024 0102)  Free From Fall Injury: Instruct family/caregiver on patient safety     Problem: Chronic Conditions and Co-morbidities  Goal: Patient's chronic conditions and co-morbidity symptoms are monitored and maintained or improved  Outcome: Progressing  Flowsheets (Taken 5/1/2024 0102)  Care Plan - Patient's Chronic Conditions and Co-Morbidity Symptoms are Monitored and Maintained or Improved:   Monitor and assess patient's chronic conditions and comorbid symptoms for stability, deterioration, or improvement   Collaborate with multidisciplinary team to address chronic and comorbid conditions and prevent exacerbation or deterioration     Problem: Respiratory - Adult  Goal: Achieves optimal ventilation and oxygenation  Outcome: Progressing  Flowsheets (Taken 5/1/2024 0102)  Achieves optimal ventilation and oxygenation:   Assess for changes in respiratory status   Assess for changes in mentation and behavior   Position to facilitate oxygenation and minimize respiratory effort   
  Problem: Discharge Planning  Goal: Discharge to home or other facility with appropriate resources  Outcome: Progressing  Flowsheets (Taken 5/2/2024 0229)  Discharge to home or other facility with appropriate resources:   Identify barriers to discharge with patient and caregiver   Arrange for needed discharge resources and transportation as appropriate   Identify discharge learning needs (meds, wound care, etc)     Problem: Skin/Tissue Integrity  Goal: Absence of new skin breakdown  Description: 1.  Monitor for areas of redness and/or skin breakdown  2.  Assess vascular access sites hourly  3.  Every 4-6 hours minimum:  Change oxygen saturation probe site  4.  Every 4-6 hours:  If on nasal continuous positive airway pressure, respiratory therapy assess nares and determine need for appliance change or resting period.  Outcome: Progressing     Problem: Safety - Adult  Goal: Free from fall injury  Outcome: Progressing  Flowsheets (Taken 5/2/2024 0229)  Free From Fall Injury: Instruct family/caregiver on patient safety     Problem: Chronic Conditions and Co-morbidities  Goal: Patient's chronic conditions and co-morbidity symptoms are monitored and maintained or improved  Outcome: Progressing  Flowsheets (Taken 5/2/2024 0229)  Care Plan - Patient's Chronic Conditions and Co-Morbidity Symptoms are Monitored and Maintained or Improved:   Monitor and assess patient's chronic conditions and comorbid symptoms for stability, deterioration, or improvement   Collaborate with multidisciplinary team to address chronic and comorbid conditions and prevent exacerbation or deterioration     Problem: Respiratory - Adult  Goal: Achieves optimal ventilation and oxygenation  Outcome: Progressing  Flowsheets (Taken 5/2/2024 0229)  Achieves optimal ventilation and oxygenation:   Assess for changes in respiratory status   Assess for changes in mentation and behavior   Position to facilitate oxygenation and minimize respiratory effort   
  Problem: Respiratory - Adult  Goal: Clear lung sounds  Description: Clear lung sounds  5/3/2024 1739 by Maryjane King, RCP  Outcome: Progressing     
  Problem: Respiratory - Adult  Goal: Clear lung sounds  Description: Clear lung sounds  Outcome: Progressing   Patient agreed on goals  
  Problem: Respiratory - Adult  Goal: Clear lung sounds  Description: Clear lung sounds  Outcome: Progressing   Patient agreed on goals  
  Problem: Respiratory - Adult  Goal: Clear lung sounds  Description: Clear lung sounds  Outcome: Progressing  Note: Maintenance  Patient mutually agreed on goals.       
  Problem: Respiratory - Adult  Goal: Clear lung sounds  Description: Clear lung sounds  Outcome: Progressing  Note: Patient agrees to goals     
  Problem: Respiratory - Adult  Goal: Clear lung sounds  Description: Clear lung sounds  Outcome: Progressing  Note: Patient had diminished breath sounds throughout all lung fields. Continue taking inhalers as ordered to improve aeration.     
  Problem: Safety - Adult  Goal: Free from fall injury  Outcome: Progressing  Goal: Isolation precautions  Description: Isolation precautions  Outcome: Progressing     Problem: Respiratory - Adult  Goal: Achieves optimal ventilation and oxygenation  Outcome: Progressing     Problem: Pain  Goal: Verbalizes/displays adequate comfort level or baseline comfort level  Outcome: Progressing     
Consult received.  Please see SW note dated 4/23/2024.    
Spoke with pt's brother Kristopher Guevara (power of ) regarding dispo and placement for patient - dependent upon whether he wants to proceed with hospice or not. Pt's brother stated he would like for patient to be discharged to SNF on hospice. Stated he has not been able to answer phone calls during work. Advised him to reach out to Heidi, hospice, to give an update. Pt's brother to call with any questions.  
1745)  Achieves optimal ventilation and oxygenation: Assess for changes in respiratory status     Problem: Nutrition Deficit:  Goal: Optimize nutritional status  Outcome: Progressing  Flowsheets (Taken 5/6/2024 1745)  Nutrient intake appropriate for improving, restoring, or maintaining nutritional needs: Assess nutritional status and recommend course of action     Problem: Pain  Goal: Verbalizes/displays adequate comfort level or baseline comfort level  Outcome: Progressing  Flowsheets (Taken 5/6/2024 1745)  Verbalizes/displays adequate comfort level or baseline comfort level: Encourage patient to monitor pain and request assistance     Problem: Cardiovascular - Adult  Goal: Maintains optimal cardiac output and hemodynamic stability  Outcome: Progressing  Flowsheets (Taken 5/6/2024 1745)  Maintains optimal cardiac output and hemodynamic stability: Monitor blood pressure and heart rate     Problem: ABCDS Injury Assessment  Goal: Absence of physical injury  Outcome: Progressing  Flowsheets (Taken 5/6/2024 1745)  Absence of Physical Injury: Implement safety measures based on patient assessment   Care plan reviewed with patient.  Patient verbalize understanding of the plan of care and contribute to goal setting.     
Annie Morris RN  Outcome: Progressing  Flowsheets (Taken 4/24/2024 0107)  Free From Fall Injury: Instruct family/caregiver on patient safety     Problem: Chronic Conditions and Co-morbidities  Goal: Patient's chronic conditions and co-morbidity symptoms are monitored and maintained or improved  4/24/2024 0936 by Lara Powell RN  Outcome: Progressing  Flowsheets (Taken 4/24/2024 0107 by Annie Morris, RN)  Care Plan - Patient's Chronic Conditions and Co-Morbidity Symptoms are Monitored and Maintained or Improved:   Monitor and assess patient's chronic conditions and comorbid symptoms for stability, deterioration, or improvement   Collaborate with multidisciplinary team to address chronic and comorbid conditions and prevent exacerbation or deterioration  Note: Lung sounds, pulse ox, and breathing monitored throughout shift.  Discussed correct technique and importance of deep breathing & coughing exercises with patient.  Patient able to demonstrate cough & deep breathing exercises to nurse.    4/24/2024 0107 by Annie Morris RN  Outcome: Progressing  Flowsheets (Taken 4/24/2024 0107)  Care Plan - Patient's Chronic Conditions and Co-Morbidity Symptoms are Monitored and Maintained or Improved:   Monitor and assess patient's chronic conditions and comorbid symptoms for stability, deterioration, or improvement   Collaborate with multidisciplinary team to address chronic and comorbid conditions and prevent exacerbation or deterioration     Care plan reviewed with patient.  Patient verbalizes understanding of the care plan and contributed to goal setting.    
recommend course of action     Problem: Pain  Goal: Verbalizes/displays adequate comfort level or baseline comfort level  Outcome: Progressing  Flowsheets (Taken 5/3/2024 1041)  Verbalizes/displays adequate comfort level or baseline comfort level: Encourage patient to monitor pain and request assistance     Problem: ABCDS Injury Assessment  Goal: Absence of physical injury  Outcome: Progressing  Flowsheets (Taken 5/3/2024 1041)  Absence of Physical Injury: Implement safety measures based on patient assessment   Problem: Respiratory - Adult  Goal: Achieves optimal ventilation and oxygenation  Outcome: Progressing  Flowsheets (Taken 5/3/2024 1041)  Achieves optimal ventilation and oxygenation:   Assess for changes in respiratory status   Position to facilitate oxygenation and minimize respiratory effort   Care plan reviewed with patient.  Patient verbalize understanding of the plan of care and contribute to goal setting.    
status   Assess for changes in mentation and behavior   Position to facilitate oxygenation and minimize respiratory effort   Oxygen supplementation based on oxygen saturation or arterial blood gases     Problem: Cardiovascular - Adult  Goal: Maintains optimal cardiac output and hemodynamic stability  Outcome: Progressing  Flowsheets (Taken 5/2/2024 0229 by Zaynab Stearns RN)  Maintains optimal cardiac output and hemodynamic stability:   Monitor blood pressure and heart rate   Assess for signs of decreased cardiac output     Problem: Nutrition Deficit:  Goal: Optimize nutritional status  Outcome: Progressing  Flowsheets (Taken 5/2/2024 0229 by Zaynab Stearns, RN)  Nutrient intake appropriate for improving, restoring, or maintaining nutritional needs:   Assess nutritional status and recommend course of action   Monitor oral intake, labs, and treatment plans     Problem: Pain  Goal: Verbalizes/displays adequate comfort level or baseline comfort level  Outcome: Progressing  Flowsheets (Taken 5/2/2024 0229 by Zaynab Stearns RN)  Verbalizes/displays adequate comfort level or baseline comfort level:   Encourage patient to monitor pain and request assistance   Assess pain using appropriate pain scale   Administer analgesics based on type and severity of pain and evaluate response   Implement non-pharmacological measures as appropriate and evaluate response     Problem: ABCDS Injury Assessment  Goal: Absence of physical injury  Outcome: Progressing  Flowsheets (Taken 5/2/2024 0229 by Zaynab Stearns, RN)  Absence of Physical Injury: Implement safety measures based on patient assessment   Care plan reviewed with patient.  Patient verbalize understanding of the plan of care and contribute to goal setting.     
Implement safety measures based on patient assessment  Note: Telesitter in use.     Care plan reviewed with patient and family.  Patient and family verbalize understanding of the plan of care and contribute to goal setting.

## 2024-05-07 NOTE — PROGRESS NOTES
Physician Progress Note        Patient:   Kalpana Braga  YOB: 1955  Age:  68 y.o.  Room:  [facility]19/019-  MRN:  [id number]   Acct: [id number]  PCP: Tatiana Velasquez APRN - CNP    Date of Admission:  4/22/2024 11:08 AM  Date of Service:  5/1/2024    Reason for Consult: Goals of Care, Symptom Management, and End Stage Disease.    History Obtained From:-  Patient, Electronic Medical Record, Patient's primary nurse             Subjective   Kalpana Braga was seen in their room today for follow up with the palliative care team. There was not family present at the bedside. Kalpana is doing well this afternoon.  She denied having any pain or shortness of breath.  She additionally denied having any nausea or vomiting. They have  Effexor 75 mg daily and MiraLAX 17 g daily  for scheduled symptom relief. From 8 AM yesterday to 8 AM today, they have used Ativan oral Tablet 1 mg once  for as needed symptom relief. Their medications are working well to control their symptoms. They did get a good night sleep. They are eating their meals. The patient did have a bowel movement in the last 24 hours. She had no other concerns for me this afternoon.  Her nurse reports that her symptoms have been well-controlled over the last 48 hours.  He states that she has not had any pain or looks to be uncomfortable.     Objective   Vitals:-    BP (!) 126/53   Pulse 57   Temp 98.2 °F (36.8 °C) (Oral)   Resp 20   Ht 1.626 m (5' 4.02\")   Wt 86.7 kg (191 lb 2.2 oz)   SpO2 96%   PF (!) 16 L/min   BMI 32.79 kg/m²     Physical Exam  Vitals and nursing note reviewed.   Constitutional:       General: She is awake. She is not in acute distress.     Appearance: She is ill-appearing.      Interventions: Nasal cannula in place.      Comments: Nasal Canula in hair   HENT:      Head: Normocephalic and atraumatic.      Right Ear: External ear normal.      Left Ear: External ear normal.      Nose: No 
                             Physician Progress Note        Patient:   Kalpana Braga  YOB: 1955  Age:  68 y.o.  Room:  [facility]19/019-  MRN:  [id number]   Acct: [id number]  PCP: Tatiana Velasquez APRN - CNP    Date of Admission:  4/22/2024 11:08 AM  Date of Service:  4/30/2024    Reason for Consult: Goals of Care, Symptom Management, End Stage Disease, and Continuity of Care.    History Obtained From:-  Patient, Electronic Medical Record, Patient's primary nurse, Hospice nurse             Subjective   Kalpana Braga was seen in their room today for follow up with the palliative care team. There was not family present at the bedside. She is doing relatively well this afternoon.  She did not have any pain, shortness of breath, nausea or vomiting.  She did report that she was a little anxious today.  She also has the nasal cannula tangled in her hair and is not wearing it. They have  Effexor 75 mg daily and MiraLAX 17 g daily  for scheduled symptom relief. From 8 AM yesterday to 8 AM today, they have used No Medications for as needed symptom relief. Their medications are not working well to control their symptoms. They did get a good night sleep. They are eating their meals. The patient did have a bowel movement in the last 24 hours. Her nurse reports that she was feeling very anxious today.  He gave her a Ativan to help with her symptoms and this improved her anxiety.     Objective   Vitals:-    BP (!) 141/54   Pulse 64   Temp 99 °F (37.2 °C) (Oral)   Resp 18   Ht 1.626 m (5' 4.02\")   Wt 86.7 kg (191 lb 2.2 oz)   SpO2 92%   PF (!) 16 L/min   BMI 32.79 kg/m²     Physical Exam  Vitals and nursing note reviewed.   Constitutional:       General: She is awake. She is not in acute distress.     Appearance: She is ill-appearing.      Interventions: Nasal cannula in place.      Comments: Nasal Canula in hair   HENT:      Head: Normocephalic and atraumatic.      Right Ear: External ear normal.      Left 
   04/29/24 0743   RT Protocol   History Pulmonary Disease 2   Respiratory pattern 0   Breath sounds 0   Cough 0   Indications for Bronchodilator Therapy Decreased or absent breath sounds   Bronchodilator Assessment Score 2       
  [place] [facility]      Patient - Kalpana Braga   MRN -  [id number]   [facility] # - [id number]   - 1955      Date of Admission -  2024 11:08 AM  Date of evaluation -  2024  Room - 3B-33/033-A   Hospital Day - 3  Consulting - Jerry Prince MD Primary Care Physician - Tatiana Velasquez APRN - CNP     Problem List      Active Hospital Problems    Diagnosis Date Noted    Severe malnutrition (HCC) [E43] 2024    Pleural effusion on right [J90] 2024    Elevated liver enzymes [R74.8] 2024    Restless [R45.1] 2024    Anemia [D64.9] 2024    Acute hypoxic respiratory failure (HCC) [J96.01] 2024    Palliative care patient [Z51.5] 2024    Malignant neoplasm of upper lobe of right lung (HCC) [C34.11] 2024    COPD (chronic obstructive pulmonary disease) (HCC) [J44.9] 2015     Reason for Consult    Acute hypoxic respiratory failure.   HPI   History Obtained From: Patient  and electronic medical record.    Kalpana Braga is a 68 y.o. female PMH hypertension, hyperlipidemia, osteoarthritis, COPD, tobacco abuse, left MCA CVA, diastolic heart failure, newly diagnosed non-small cell lung carcinoma presented to [facility] ED with complaints of shortness of breath and restlessness.  Patient was reportedly at her PCP office visit 2024 when she began to feel very irritable and agitated, as a result she was sent to the ED for increased agitation.  At the nursing facility patient is on 3 L nasal cannula, was escalated to high flow nasal cannula in the ED for reported acute respiratory failure.  Chest XR showing large effusion on the right side. Thoracentesis 24 draining 1 L transudate etiology via lights criteria.     Pulmonology consulted for respiratory failure with recent diagnosis of non-small cell lung carcinoma.  Initial staging T3 N3 stage IIIc 3.9x5.5cm right suprahilar mass s/p EBUS w/Bx 4R / Statin & LND Bx - both (+) 
  [place] [facility] Critical [facility]      Patient - Kalpana Braga   MRN -  [id number]   [facility] # - [id number]   - 1955      Date of Admission -  2024 11:08 AM  Date of evaluation -  2024  Room - 3B-33/033-A   Hospital Day - 2  Consulting - Jerry Prince MD Primary Care Physician - Tatiana Velasquez APRN - CNP     Problem List      Active Hospital Problems    Diagnosis Date Noted    Severe malnutrition (HCC) [E43] 2024    Pleural effusion on right [J90] 2024    Elevated liver enzymes [R74.8] 2024    Restless [R45.1] 2024    Anemia [D64.9] 2024    Acute hypoxic respiratory failure (HCC) [J96.01] 2024    Palliative care patient [Z51.5] 2024    Malignant neoplasm of upper lobe of right lung (HCC) [C34.11] 2024    COPD (chronic obstructive pulmonary disease) (HCC) [J44.9] 2015     Reason for Consult    Acute hypoxic respiratory failure   HPI   History Obtained From: Patient  and electronic medical record.    Kalpana Braga is a 68 y.o. female PMH hypertension, hyperlipidemia, osteoarthritis, COPD, tobacco abuse, left MCA CVA, diastolic heart failure, newly diagnosed non-small cell lung carcinoma presented to [facility] ED with complaints of shortness of breath and restlessness.  Patient was reportedly at her PCP office visit 2024 when she began to feel very irritable and agitated, as a result she was sent to the ED for increased agitation.  At the nursing facility patient is on 3 L nasal cannula, was escalated to high flow nasal cannula in the ED for reported acute respiratory failure.  Chest XR showing large effusion on the right side. Thoracentesis 24 draining 1 L transudate etiology via lights criteria.     Pulmonology consulted for respiratory failure with recent diagnosis of non-small cell lung carcinoma.  Initial staging T3 N3 stage IIIc 3.9x5.5cm right suprahilar mass s/p EBUS w/Bx 4R / Statin & LND Bx - both (+) 
  [place] [facility] Critical Care [facility]      Patient - Kalpana Braga   MRN -  [id number]   [facility] # - [id number]   - 1955      Date of Admission -  2024 11:08 AM  Date of evaluation -  2024  Room - 3B-33/033-A   Hospital Day - 5  Consulting - Jerry Prince MD Primary Care Physician - Tatiana Velasquez APRN - CNP     Problem List      Active Hospital Problems    Diagnosis Date Noted    Severe malnutrition (HCC) [E43] 2024    Pleural effusion on right [J90] 2024    Elevated liver enzymes [R74.8] 2024    Restless [R45.1] 2024    Anemia [D64.9] 2024    Acute hypoxic respiratory failure (HCC) [J96.01] 2024    Palliative care patient [Z51.5] 2024    Malignant neoplasm of upper lobe of right lung (HCC) [C34.11] 2024    COPD (chronic obstructive pulmonary disease) (HCC) [J44.9] 2015     Reason for Consult    Acute hypoxic respiratory failure   HPI   History Obtained From: Patient  and electronic medical record.    Kalpana Braga is a 68 y.o. female PMH hypertension, hyperlipidemia, osteoarthritis, COPD, tobacco abuse, left MCA CVA, diastolic heart failure, newly diagnosed non-small cell lung carcinoma presented to [facility] ED with complaints of shortness of breath and restlessness.  Patient was reportedly at her PCP office visit 2024 when she began to feel very irritable and agitated, as a result she was sent to the ED for increased agitation.  At the nursing facility patient is on 3 L nasal cannula, was escalated to high flow nasal cannula in the ED for reported acute respiratory failure.  Chest XR showing large effusion on the right side. Thoracentesis 24 draining 1 L transudate etiology via lights criteria.     Pulmonology consulted for respiratory failure with recent diagnosis of non-small cell lung carcinoma.  Initial staging T3 N3 stage IIIc 3.9x5.5cm right suprahilar mass s/p EBUS w/Bx 4R / Statin & LND Bx - both (+) 
  PROGRESS NOTE      Patient:  Kalpana Braga      Unit/Bed:3B-33/033-A    YOB: 1955    MRN: [id number]       Acct: [id number]     PCP: Tatiana Velasquez, PAO - CNP    Date of Admission: 4/22/2024      Assessment/Plan:      Hospice discussion 4/28/24 with pt and her brother. Currently not a hospice pt, hospice to follow.    Acute Hypoxic Respiratory failure: As below. Multifactorial 2/2 right mainstem bronchus obstruction from tumor s/p debulking and stent, pleural effusion s/p thoracentesis, suspected COPD. Pulmonology following- Stiolto 2 puffs daily, Proventil Q6H PRN for wheezing. Patient is on 4L NC with SpO2 92%. Continue to wean as tolerated, maintain SpO2 > 90%. IS, acapella.  Recent diagnosis of R sided Non-small cell lung carcinoma s/p tumor debulking and stent placement 4/25/24: Locally metastatic with lymph node involvement. Patient is s/p EBUS- Initial staging T3 N3 stage IIIc 3.9 x 5.5cm right suprahilar mass. Pt received radiation treatment during most recent hospitalization. Pulmonology following. Interventional pulmonology, Dr. Horan, performed bronchoscopy with APC tumor debulking and stent placement 4/25/24. Per oncology, patient stated she does not wish to proceed with chemotherapy treatment - oncology has signed off. Hospice discussion 4/28/24 - hospice Heidi attempted to reach out to pt's brother POA regarding decision - will determine SNF placement, care coordination following.  Leukocytosis: Continue to follow CBC. Will order UA with culture. Order CXR.   Waxing and waning delirium: Patient only oriented to self. Slow mentation. Able to follow commands. Continue to re-orient patient and reassess.  Large right sided pleural effusion s/p thoracentesis: Per Light's criteria appears to be transudate, cytology is negative for malignant cells.  Debility, inactivity: Per pt's brother, pt has not improved at rehab at the nursing home and is not walking around. She is not 
  PROGRESS NOTE      Patient:  Kalpana Braga      Unit/Bed:3B-33/033-A    YOB: 1955    MRN: [id number]       Acct: [id number]     PCP: Tatiana Velasquez APRN - CNP    Date of Admission: 4/22/2024      Assessment/Plan:      Hospice discussion 4/28/24 with pt and her brother. Currently not a hospice pt, hospice to follow.    Acute Hypoxic Respiratory failure: As below. Multifactorial 2/2 right mainstem bronchus obstruction from tumor s/p debulking and stent, pleural effusion s/p thoracentesis, suspected COPD. Pulmonology following- Stiolto 2 puffs daily, Proventil Q6H PRN for wheezing. Patient is on 4L NC with SpO2 92%. Continue to wean as tolerated, maintain SpO2 > 90%. IS, acapella.  Recent diagnosis of R sided Non-small cell lung carcinoma s/p tumor debulking and stent placement 4/25/24: Locally metastatic with lymph node involvement. Patient is s/p EBUS- Initial staging T3 N3 stage IIIc 3.9 x 5.5cm right suprahilar mass. Pt received radiation treatment during most recent hospitalization. Pulmonology following. Interventional pulmonology, Dr. Horan, performed bronchoscopy with APC tumor debulking and stent placement 4/25/24. Per oncology, patient stated she does not wish to proceed with chemotherapy treatment - oncology has signed off. Hospice discussion 4/28/24 - hospice to follow.  Waxing and waning delirium: Patient only oriented to self. Slow mentation. Continue to re-orient patient and reassess.  Large right sided pleural effusion s/p thoracentesis: Per Light's criteria appears to be transudate, cytology is negative for malignant cells.  Debility, inactivity: Per pt's brother, pt has not improved at rehab at the nursing home and is not walking around. She is not compliant with therapy at the nursing home. PT/OT. Patient ambulates with nurse assist.  Primary Hypertension, uncontrolled: Continue home Losartan 100mg daily, Hydralazine 25mg TID, Lopressor 25mg BID.  Hx COPD, not in acute 
  PROGRESS NOTE      Patient:  Kalpana Braga      Unit/Bed:3B-33/033-A    YOB: 1955    MRN: [id number]       Acct: [id number]     PCP: Tatiana Velasquez APRN - CNP    Date of Admission: 4/22/2024      Assessment/Plan:        Active Hospital Problems    Diagnosis Date Noted    Severe malnutrition (HCC) [E43] 04/24/2024    Pleural effusion on right [J90] 04/23/2024    Elevated liver enzymes [R74.8] 04/23/2024    Restless [R45.1] 04/23/2024    Anemia [D64.9] 04/23/2024    Acute hypoxic respiratory failure (HCC) [J96.01] 04/22/2024    Palliative care patient [Z51.5] 04/09/2024    Malignant neoplasm of upper lobe of right lung (HCC) [C34.11] 04/05/2024    COPD (chronic obstructive pulmonary disease) (HCC) [J44.9] 01/19/2015       Acute Hypoxic Respiratory failure, improving: As below. Multifactorial 2/2 right mainstem bronchus obstruction from tumor, pleural effusion, suspected COPD. Pulmonology following- continue Stiolto 2 puffs daily, Proventil Q6H PRN for wheezing. Patient weaned down to NC, is on 1L with SpO2 94%. Continue to maintain SpO2 > 90%.  Recent diagnosis of R sided Non-small cell lung carcinoma: Locally metastatic with lymph node involvement. Patient is s/p EBUS- Initial staging T3 N3 stage IIIc 3.9 x 5.5cm right suprahilar mass. She did receive radiation treatment during most recent hospitalization. Tumor appears to be obstructing right mainstem bronchus. Pulmonology and oncology following, appreciate recommendations. Per interventional pulmonology, Dr. Horan, plan to proceed with bronchoscopy with APC tumor debulking and possible stent placement. Noted that the procedure is purely palliative and has no effect on the cancer and she would still require treatment if she would like . Per oncology, patient stated she does not wish to proceed with chemotherapy treatment. Palliative care following, appreciate input. Dr. Maradiaga and pulmonology had a goals of care discussion with the 
  PROGRESS NOTE      Patient:  Kalpana Braga      Unit/Bed:3B-33/033-A    YOB: 1955    MRN: [id number]       Acct: [id number]     PCP: Tatiana Velasquez APRN - CNP    Date of Admission: 4/22/2024      Assessment/Plan:      Acute Hypoxic Respiratory failure: As below. Multifactorial 2/2 right mainstem bronchus obstruction from tumor, pleural effusion s/p thoracentesis, suspected COPD. Pulmonology following- continue Stiolto 2 puffs daily, Proventil Q6H PRN for wheezing. Patient is on BiPAP post procedure with Dr. Horan, interventional pulmonology, tumor debulking and stent placement 4/25/24. Continue to wean as tolerated, maintain SpO2 > 90%.  Recent diagnosis of R sided Non-small cell lung carcinoma s/p tumor debulking and stent placement 4/25/24: Locally metastatic with lymph node involvement. Patient is s/p EBUS- Initial staging T3 N3 stage IIIc 3.9 x 5.5cm right suprahilar mass. She received radiation treatment during most recent hospitalization. Pulmonology following, appreciate recommendations. Interventional pulmonology, Dr. Horan, performed bronchoscopy with APC tumor debulking and stent placement 4/25/24. Per oncology, patient stated she does not wish to proceed with chemotherapy treatment - oncology has signed off. Palliative care following, appreciate input. Dr. Maradiaga and pulmonology had a goals of care discussion with the patient 4/24/24. Continue Oxycodone IR 2.5 mg every 6 hours as needed for uncontrolled shortness of breath. Patient is a DNR x 4. Patient stated she \"would just like to be kept comfortable\" - Hospice consult ordered given poor prognosis.  Large right sided pleural effusion s/p thoracentesis: Per Light's criteria appears to be transudate, cytology is negative for malignant cells.  Debility, inactivity: Per pt's brother, pt has not improved at rehab at the nursing home and is not walking around. She is not compliant with therapy at the nursing home. Continue 
  PROGRESS NOTE      Patient:  Kalpana Braga      Unit/Bed:7K-19/019-A    YOB: 1955    MRN: [id number]       Acct: [id number]     PCP: Tatiana Velasquez APRN - CNP    Date of Admission: 4/22/2024      Assessment/Plan:      Acute Hypoxic Respiratory failure: Patient requiring 3L NC, SpO2 94%. As below. Continue to wean oxygen as tolerated, maintain SpO2 > 90%. CXR 5/1 shows stable right hemithorax opacification, no acute change from prior study - showed near complete opacification of the right hemithorax with almost no aerated lung visualized is unchanged. Multifactorial 2/2 right mainstem bronchus obstruction from tumor s/p debulking and stent, pleural effusion s/p thoracentesis, suspected COPD. Pulmonology following PRN- Stiolto 2 puffs daily, Proventil Q6H PRN for wheezing. Palliative care following, patient on Oxycodone 2.5mg immediate release Q6H PRN. IS, acapella. Home O2 eval upon discharge unless discharging to SNF.   Recent diagnosis of R sided Non-small cell lung carcinoma s/p tumor debulking and stent placement 4/25/24: Locally metastatic with lymph node involvement. Patient is s/p EBUS- Initial staging T3 N3 stage IIIc 3.9 x 5.5cm right suprahilar mass. Pt received radiation treatment during most recent hospitalization. Pulmonology following PRN. Interventional pulmonology, Dr. Horan, performed bronchoscopy with APC tumor debulking and stent placement 4/25/24. Per oncology, patient stated she does not wish to proceed with chemotherapy treatment - oncology has signed off. Palliative care following. Hospice discussion 4/28/24 - patient's brother (power of ) wishes to proceed with SNF on hospice. DNR-CC signed by Dr. Maradiaga. Care coordination arranging SNF placement - per Margarita  - patient's brother, family is looking into secondary insurance for coverage, although facility has not found coverage for SNF with hospice with patient's insurance.  Discharge planning 
  PROGRESS NOTE      Patient:  Kalpana Braga      Unit/Bed:7K-19/019-A    YOB: 1955    MRN: [id number]       Acct: [id number]     PCP: Tatiana Velasquez APRN - CNP    Date of Admission: 4/22/2024      Assessment/Plan:      Acute Hypoxic Respiratory failure: Patient requiring 3L NC, SpO2 94%. As below. Repeat CXR shows stable right hemithorax opacification, no acute change from prior study - showed near complete opacification of the right hemithorax with almost no aerated lung visualized is unchanged. Multifactorial 2/2 right mainstem bronchus obstruction from tumor s/p debulking and stent, pleural effusion s/p thoracentesis, suspected COPD. Pulmonology following PRN- Stiolto 2 puffs daily, Proventil Q6H PRN for wheezing. Continue to wean oxygen as tolerated, maintain SpO2 > 90%. Palliative care following, patient on Oxycodone 2.5mg immediate release Q6H PRN. IS, acapella. Home O2 eval upon discharge unless discharging to SNF.   Recent diagnosis of R sided Non-small cell lung carcinoma s/p tumor debulking and stent placement 4/25/24: Locally metastatic with lymph node involvement. Patient is s/p EBUS- Initial staging T3 N3 stage IIIc 3.9 x 5.5cm right suprahilar mass. Pt received radiation treatment during most recent hospitalization. Pulmonology following PRN. Interventional pulmonology, Dr. Horan, performed bronchoscopy with APC tumor debulking and stent placement 4/25/24. Per oncology, patient stated she does not wish to proceed with chemotherapy treatment - oncology has signed off. Palliative care following. Hospice discussion 4/28/24 - patient's brother (power of ) wishes to proceed with SNF on hospice. DNR-CC signed by Dr. Maradiaga. Care coordination arranging SNF placement - likely need to apply for Medicaid for stay at SNF.  Urinary Tract Infection: WBC WNL. UA with many bacteria and urine culture positive for MRSA. Continue IV Vancomycin (4/30/24-).  Waxing and waning 
  Physician Progress Note      PATIENT:               QUITA BENSON  CSN #:                  [id number]  :                       1955  ADMIT DATE:       2024 11:08 AM  DISCH DATE:  RESPONDING  PROVIDER #:        Jerry Prince MD          QUERY TEXT:    Pt admitted with Acute on chronic respiratory failure with hypoxemia and has   encephalopathy documented. If possible, please document in progress notes and   discharge summary further specificity regarding the type of encephalopathy:    The medical record reflects the following:  Risk Factors: Acute Hypoxic RF w/respiratory distress, 2/2 Lung cancer   w/significant Rt-sided effusion: vascular congestion / LL mild interstitial   edema/pneumonitis  Clinical Indicators: 4-22 h/p: Lethargy / Encephalopathy?, multifactorial:   profound burden of disease; recent CVA, PNA, newly Dx malignancy, no disease   in brain per imaging; UDS (+) cannabinoids + Oxycodone (Rx known)  Treatment: Bed alarms and neuro checks. Palliative care  Thank you.  Rylie Hannah RN, Clinical Documentation Integrity, [employer], [facility], [facility]  Options provided:  -- Metabolic encephalopathy  -- Alcoholic encephalopathy  -- Anoxic encephalopathy  -- Hypertensive encephalopathy  -- Septic encephalopathy  -- Toxic encephalopathy  -- Toxic metabolic encephalopathy  -- Wernicke?s encephalopathy  -- Other - I will add my own diagnosis  -- Disagree - Not applicable / Not valid  -- Disagree - Clinically unable to determine / Unknown  -- Refer to Clinical Documentation Reviewer    PROVIDER RESPONSE TEXT:    This patient has metabolic encephalopathy.    Query created by: Rylie Hannah on 2024 1:34 PM      QUERY TEXT:    Patient admitted with Acute on chronic respiratory failure with hypoxemia.   Noted to have by dietician:  Malnutrition Status:  Severe malnutrition. If   possible, please document in progress notes and discharge summary if you are   evaluating and /or 
 [place]  INPATIENT PHYSICAL THERAPY  DAILY NOTE  [facility] CCU-STEPDOWN 3B - 3B-33/033-A    Time In: 0936  Time Out: 0959  Timed Code Treatment Minutes: 23 Minutes  Minutes: 23          Date: 2024  Patient Name: Kalpana Braga,  Gender:  female        MRN: [id number]  : 1955  (68 y.o.)     Referring Practitioner: Dr. MARISOL Church  Diagnosis: SOB  Additional Pertinent Hx: Per MD note:68 y.o. female who  has a past medical history of COPD (chronic obstructive pulmonary disease) (HCC), CVA (cerebral vascular accident) (HCC), Hyperlipidemia, Hypertension, Osteoarthritis, Smoker, and Urticaria. They present to the hospital with Shortness of Breath and restless and are admitted for Acute hypoxic respiratory failure (HCC).  Patient presented 3/21/24 with shortness of breath over the past couple of weeks.  She was initially saturating 70% on room air in the ED, requiring 6 L nasal cannula.  CTA chest showed no evidence of PE, however there was hypodense 3.9 x 5.5 cm right suprahilar mass extending to the right mediastinum and subcarinal region suspicious for neoplasm.  EBUS was completed 2024 with evidence of non-small cell carcinoma, which is a new diagnosis for patient.  Oncology and radiation oncology were consulted.  She received initial radiation treatment due to mass obstructing right mainstem bronchus on 2024 and tolerated this well.  She will continue to have further outpatient staging workup with oncology after discharge.  After admission, patient developed expressive aphasia on 3/24/2024, and was subsequently found to have acute left MCA infarct. She was then discharge to SNF.  PET scan on 2024 showed suprahilar mass with right hilar and mediastinal lymph nodes concerning for known malignancy and metastatic disease.  She represented to [facility] with a chief complaints of shortness of breath     Prior Level of Function:  Lives With: Other (comment) (ECF)  Type of Home: 
 [facility]  INPATIENT PHYSICAL THERAPY  DAILY NOTE  [facility] ORTHOPEDICS 7K - 7K-19/019-A    Time In: 1030  Time Out: 1049  Timed Code Treatment Minutes: 19 Minutes  Minutes: 19          Date: 2024  Patient Name: Kalpana Braga,  Gender:  female        MRN: [id number]  : 1955  (68 y.o.)     Referring Practitioner: Dr. MARISOL Church  Diagnosis: SOB  Additional Pertinent Hx: Per MD note:68 y.o. female who  has a past medical history of COPD (chronic obstructive pulmonary disease) (HCC), CVA (cerebral vascular accident) (HCC), Hyperlipidemia, Hypertension, Osteoarthritis, Smoker, and Urticaria. They present to the hospital with Shortness of Breath and restless and are admitted for Acute hypoxic respiratory failure (HCC).  Patient presented 3/21/24 with shortness of breath over the past couple of weeks.  She was initially saturating 70% on room air in the ED, requiring 6 L nasal cannula.  CTA chest showed no evidence of PE, however there was hypodense 3.9 x 5.5 cm right suprahilar mass extending to the right mediastinum and subcarinal region suspicious for neoplasm.  EBUS was completed 2024 with evidence of non-small cell carcinoma, which is a new diagnosis for patient.  Oncology and radiation oncology were consulted.  She received initial radiation treatment due to mass obstructing right mainstem bronchus on 2024 and tolerated this well.  She will continue to have further outpatient staging workup with oncology after discharge.  After admission, patient developed expressive aphasia on 3/24/2024, and was subsequently found to have acute left MCA infarct. She was then discharge to SNF.  PET scan on 2024 showed suprahilar mass with right hilar and mediastinal lymph nodes concerning for known malignancy and metastatic disease.  She represented to [facility] with a chief complaints of shortness of breath     Prior Level of Function:  Lives With: Other (comment) (ECF)  Type of Home: 
 [facility]  INPATIENT PHYSICAL THERAPY  DAILY NOTE  [facility] ORTHOPEDICS 7K - 7K-19/019-A    Time In: 1000  Time Out: 1027  Timed Code Treatment Minutes: 27 Minutes  Minutes: 27          Date: 2024  Patient Name: Kalpana Braga,  Gender:  female        MRN: [id number]  : 1955  (68 y.o.)     Referring Practitioner: Dr. MARISOL Church  Diagnosis: SOB  Additional Pertinent Hx: Per MD note:68 y.o. female who  has a past medical history of COPD (chronic obstructive pulmonary disease) (HCC), CVA (cerebral vascular accident) (HCC), Hyperlipidemia, Hypertension, Osteoarthritis, Smoker, and Urticaria. They present to the hospital with Shortness of Breath and restless and are admitted for Acute hypoxic respiratory failure (HCC).  Patient presented 3/21/24 with shortness of breath over the past couple of weeks.  She was initially saturating 70% on room air in the ED, requiring 6 L nasal cannula.  CTA chest showed no evidence of PE, however there was hypodense 3.9 x 5.5 cm right suprahilar mass extending to the right mediastinum and subcarinal region suspicious for neoplasm.  EBUS was completed 2024 with evidence of non-small cell carcinoma, which is a new diagnosis for patient.  Oncology and radiation oncology were consulted.  She received initial radiation treatment due to mass obstructing right mainstem bronchus on 2024 and tolerated this well.  She will continue to have further outpatient staging workup with oncology after discharge.  After admission, patient developed expressive aphasia on 3/24/2024, and was subsequently found to have acute left MCA infarct. She was then discharge to SNF.  PET scan on 2024 showed suprahilar mass with right hilar and mediastinal lymph nodes concerning for known malignancy and metastatic disease.  She represented to [facility] with a chief complaints of shortness of breath     Prior Level of Function:  Lives With: Other (comment) (ECF)  Type of Home: 
 [facility]  INPATIENT PHYSICAL THERAPY  DAILY NOTE  [facility] ORTHOPEDICS 7K - 7K-19/019-A      Time In: 0912  Time Out: 09  Timed Code Treatment Minutes: 9 Minutes  Minutes: 9          Date: 2024  Patient Name: Kalpana Braga,  Gender:  female        MRN: [id number]  : 1955  (68 y.o.)     Referring Practitioner: Dr. MARISOL Church  Diagnosis: SOB  Additional Pertinent Hx: Per MD note:68 y.o. female who  has a past medical history of COPD (chronic obstructive pulmonary disease) (HCC), CVA (cerebral vascular accident) (HCC), Hyperlipidemia, Hypertension, Osteoarthritis, Smoker, and Urticaria. They present to the hospital with Shortness of Breath and restless and are admitted for Acute hypoxic respiratory failure (HCC).  Patient presented 3/21/24 with shortness of breath over the past couple of weeks.  She was initially saturating 70% on room air in the ED, requiring 6 L nasal cannula.  CTA chest showed no evidence of PE, however there was hypodense 3.9 x 5.5 cm right suprahilar mass extending to the right mediastinum and subcarinal region suspicious for neoplasm.  EBUS was completed 2024 with evidence of non-small cell carcinoma, which is a new diagnosis for patient.  Oncology and radiation oncology were consulted.  She received initial radiation treatment due to mass obstructing right mainstem bronchus on 2024 and tolerated this well.  She will continue to have further outpatient staging workup with oncology after discharge.  After admission, patient developed expressive aphasia on 3/24/2024, and was subsequently found to have acute left MCA infarct. She was then discharge to SNF.  PET scan on 2024 showed suprahilar mass with right hilar and mediastinal lymph nodes concerning for known malignancy and metastatic disease.  She represented to [facility] with a chief complaints of shortness of breath     Prior Level of Function:  Lives With: Other (comment) (ECF)  Type of Home: 
1500 pt arrived to PACU, opens eyes but not following commands. OPA and NPT in place. Both removed on arrival and pt placed on bipap. VSS at this time  1510 CXR completed  1520 pt resting, resp easy. VSS  1530 meets criteria for discharge from PACU, placed transport to 33  1555 transported to  in stable condition  
[facility]  INPATIENT PHYSICAL THERAPY  EVALUATION  [facility] CCU-STEPDOWN 3B - 3B-33/033-A    Time In: 07  Time Out: 07  Timed Code Treatment Minutes: 9 Minutes  Minutes: 17          Date: 2024  Patient Name: Kalpana Braga,  Gender:  female        MRN: [id number]  : 1955  (68 y.o.)      Referring Practitioner: Dr. MARISOL Church  Diagnosis: SOB  Additional Pertinent Hx: Per MD note:68 y.o. female who  has a past medical history of COPD (chronic obstructive pulmonary disease) (HCC), CVA (cerebral vascular accident) (HCC), Hyperlipidemia, Hypertension, Osteoarthritis, Smoker, and Urticaria. They present to the hospital with Shortness of Breath and restless and are admitted for Acute hypoxic respiratory failure (HCC).  Patient presented 3/21/24 with shortness of breath over the past couple of weeks.  She was initially saturating 70% on room air in the ED, requiring 6 L nasal cannula.  CTA chest showed no evidence of PE, however there was hypodense 3.9 x 5.5 cm right suprahilar mass extending to the right mediastinum and subcarinal region suspicious for neoplasm.  EBUS was completed 2024 with evidence of non-small cell carcinoma, which is a new diagnosis for patient.  Oncology and radiation oncology were consulted.  She received initial radiation treatment due to mass obstructing right mainstem bronchus on 2024 and tolerated this well.  She will continue to have further outpatient staging workup with oncology after discharge.  After admission, patient developed expressive aphasia on 3/24/2024, and was subsequently found to have acute left MCA infarct. She was then discharge to SNF.  PET scan on 2024 showed suprahilar mass with right hilar and mediastinal lymph nodes concerning for known malignancy and metastatic disease.  She represented to [facility] with a chief complaints of shortness of breath     Restrictions/Precautions:  Restrictions/Precautions: Fall Risk    Subjective:  Chart 
Call placed to cherelle Guevara AMANDAKAUR to see if questions from hospice referral completed 0428.2024. No answer, message left. Will attempt to call later in day if  no return call received. Call to 3 B  to see if family at bedside spoke to Page FOUNTAIN, no one present at this time. Updated of attempt to reach family.     Patient meeting for hospice either at home or facility if family would opt for comfort measures as no aggressive treatments wished for cancer dx. Patient however does not meet for hospice in the hospital..   
Call to Margarita,  to see about discharge plan.  She states that she has been unable to speak to family members regarding return to [facility].  The plan was to be discharged with hospice at facility, but family unable to pay room and board.  It is unknown about medicaid application, if it has been started or if family is agreeable.  Asked Margarita to contact hospice when there is a plan for discharge so we can arrange admission at facility.    
Call to hospital  Margarita to see what status is for discharge and if patient is still planning on signing on with hospice at discharge. Voicemail left to return call to let me know discharge plan so we can get patient on hospice admission schedule if needed.  
[facility]  INPATIENT OCCUPATIONAL THERAPY  [facility] CCU-STEPDOWN 3B  EVALUATION    Time:    Time In: 845  Time Out: 09  Timed Code Treatment Minutes: 10 Minutes  Minutes: 20          Date: 2024  Patient Name: Kalpana Braga,   Gender: female      MRN: [id number]  : 1955  (68 y.o.)  Referring Practitioner: Richie Church DO  Diagnosis: SOB  Additional Pertinent Hx: Per MD note:68 y.o. female who  has a past medical history of COPD (chronic obstructive pulmonary disease) (HCC), CVA (cerebral vascular accident) (HCC), Hyperlipidemia, Hypertension, Osteoarthritis, Smoker, and Urticaria. They present to the hospital with Shortness of Breath and restless and are admitted for Acute hypoxic respiratory failure (HCC).  Patient presented 3/21/24 with shortness of breath over the past couple of weeks.  She was initially saturating 70% on room air in the ED, requiring 6 L nasal cannula.  CTA chest showed no evidence of PE, however there was hypodense 3.9 x 5.5 cm right suprahilar mass extending to the right mediastinum and subcarinal region suspicious for neoplasm.  EBUS was completed 2024 with evidence of non-small cell carcinoma, which is a new diagnosis for patient.  Oncology and radiation oncology were consulted.  She received initial radiation treatment due to mass obstructing right mainstem bronchus on 2024 and tolerated this well.  She will continue to have further outpatient staging workup with oncology after discharge.  After admission, patient developed expressive aphasia on 3/24/2024, and was subsequently found to have acute left MCA infarct. She was then discharge to SNF.  PET scan on 2024 showed suprahilar mass with right hilar and mediastinal lymph nodes concerning for known malignancy and metastatic disease.  She represented to [facility] with a chief complaints of shortness of breath.    Restrictions/Precautions:  Restrictions/Precautions: Fall Risk    Subjective  Chart 
Comprehensive Nutrition Assessment    Type and Reason for Visit: Reassess    Nutrition Recommendations/Plan:   Continue diet as ordered.   Continue Ensure Plus TID and continue ONS at discharge.   Initiate Magic Cups BID.   Continue MVI and continue at discharge.      Malnutrition Assessment:  Malnutrition Status: Severe malnutrition  Context: Chronic Illness       Findings of the 6 clinical characteristics of malnutrition:  Energy Intake:  75% or less estimated energy requirements for 1 month or longer (Poor PO ongoing; recent admit 3/22/24-4/13/24 with poor PO during stay)  Weight Loss:   (17.2% in 9 months per 7/21/23 weight in EMR)     Body Fat Loss:  Mild body fat loss Orbital   Muscle Mass Loss:  Mild muscle mass loss Temples (temporalis), Clavicles (pectoralis & deltoids)  Fluid Accumulation:  No significant fluid accumulation     Strength:  Not Performed    Nutrition Assessment:    Pt improving from a nutritional standpoint AEB eating meals and ONS well, hospice following.  At risk for further nutritional compromise r/t acute on chronic respiratory failure - heated high flow, s/p thoracentesis with 1 L fluid removed 4/23, non-small cell lung carcinoma - does not want chemo tx, recent CVA, transaminitis - CT negative for hepatic mets, [facility]) and underlying medical condition (PMHx: COPD, HLD, HTN, OA, current smoker).       Nutrition Related Findings:    Patient/ Family Comments: Per patient her appetite is ok, she feels like she is eating more than she was. She is liking the Ensure and prefers chocolate. Patient reports that she likes Magic Cups and would like to receive them.   Edema:  non-pitting ,per flowsheet  GI Function: LBM  unknown (possibly 4/24)  per flowsheet  Wound: Pressure Injury, Stage II (R buttocks)       Labs:   Lab Results   Component Value Date    LABA1C 5.8 03/24/2024    LABA1C 5.7 07/21/2023    LABA1C 5.8 08/05/2022     Recent Labs     04/26/24  0455   NA 
Confirmed wish for hospice when discharged with POA/brother Kristopher Guevara. Updated by Dr. Church in office, had not had return call on nurse phone this date.   He is aware of cost out of pocket, shared with him that floor  will work with him for Harley to contact him in regards to finanical piece. Plan for hospice to admit when patient getting to facility  Discussed code stattus with explanation of Full code, Limited x 4, and DNR-CC. He voiced that she should be DNR-CC as she does not want anything else done, no heart monitoring or testing. \"She just wants to be left alone\". Let him know that nuse is going to contact provider for DNR-CC order.  Dr. Church updated  on discussion which aligns with what the patient has voiced that she wants.   Dr. Maradiaga signed DNR-CC for discharge.   Will obtain Comfort scripts for discharge when plan finalized.     
Hospice office updated of patient discharging at 1430 with transport at this time. Reviewed plan of admission to hospice service with POA/brother Kristopher Guevara when patient getting to facility. He voiced understanding. Verbal permission for admission with hospice nurse to contact him with time to set up time for paperwork to be signed as well as ask a few questions.   Patient voiced understanding of plan to add additional care team to ensure her comfort. Primary nurse Junie will fax AVS to hospice office at [phone] and call office at [phone] when patent leaving the hospital.   
Hospice presentation completed with Pt's brothers in unit family room.  Reviewed hospice concepts and philosophy.  Discussion was had related to qualification, benefit periods, services/items covered and non covered.  Reviewed hospice levels of care: GIP, private home or long term care or assisted living facilities.  Discussion was had to qualify for GIP interventions to maintain comfort would not be able to be met in a lower level of care. Private home: hospice would make schedule visits for teaching, assessment and support but family would provide ATC care.  Brother indicate private home is not an option; Pt's home is not in a safe condition.  Family expresses that they would like to return to [facility]; pt was receiving skilled rehab prior to acute care transfer.  Brothers voice concern about payor source & will need assist to navigate, communicate with facility.  Discussion was had as to what hospice care looks like in a long term care facility.  Brothers acknowledge understanding.  Family voices that Ativan has been in use to manage behaviors since CVA at facility; want to ensure the Ativan remains available.  Nurse Jacqui updated on completed presentation; family goal to discharge to [facility] with hospice care when appropriate.  Completed [employer] update to provider  with family goal for plan of care.  Currently not a hospice pt; hospice to follow as indicated.  
[facility]  [facility] ORTHOPEDICS 7K  Occupational Therapy  Daily Note  Time:   Time In: 1158  Time Out: 1221  Timed Code Treatment Minutes: 23 Minutes  Minutes: 23          Date: 2024  Patient Name: Kalpana Braga,   Gender: female      Room: [facility]19/019-A  MRN: [id number]  : 1955  (68 y.o.)  Referring Practitioner: Richie Church DO  Diagnosis: SOB  Additional Pertinent Hx: Per MD note:68 y.o. female who  has a past medical history of COPD (chronic obstructive pulmonary disease) (HCC), CVA (cerebral vascular accident) (HCC), Hyperlipidemia, Hypertension, Osteoarthritis, Smoker, and Urticaria. They present to the hospital with Shortness of Breath and restless and are admitted for Acute hypoxic respiratory failure (HCC).  Patient presented 3/21/24 with shortness of breath over the past couple of weeks.  She was initially saturating 70% on room air in the ED, requiring 6 L nasal cannula.  CTA chest showed no evidence of PE, however there was hypodense 3.9 x 5.5 cm right suprahilar mass extending to the right mediastinum and subcarinal region suspicious for neoplasm.  EBUS was completed 2024 with evidence of non-small cell carcinoma, which is a new diagnosis for patient.  Oncology and radiation oncology were consulted.  She received initial radiation treatment due to mass obstructing right mainstem bronchus on 2024 and tolerated this well.  She will continue to have further outpatient staging workup with oncology after discharge.  After admission, patient developed expressive aphasia on 3/24/2024, and was subsequently found to have acute left MCA infarct. She was then discharge to SNF.  PET scan on 2024 showed suprahilar mass with right hilar and mediastinal lymph nodes concerning for known malignancy and metastatic disease.  She represented to [facility] with a chief complaints of shortness of breath.    Restrictions/Precautions:  Restrictions/Precautions: Fall Risk 
[facility]   Pharmacy Pharmacokinetic Monitoring Service - Vancomycin     Kalpana Braga is a 68 y.o. female starting on vancomycin therapy for UTI. Pharmacy consulted by Dr. Church for monitoring and adjustment.    Target Concentration: Goal AUC/DEMI 400-600 mg*hr/L    Additional Antimicrobials: none    Pertinent Laboratory Values:   Wt Readings from Last 1 Encounters:   04/29/24 86.7 kg (191 lb 2.2 oz)     Temp Readings from Last 1 Encounters:   04/30/24 99 °F (37.2 °C) (Oral)     Estimated Creatinine Clearance: 113 mL/min (based on SCr of 0.5 mg/dL).  Recent Labs     04/29/24  0401 04/30/24  0638   CREATININE 0.5 0.5   BUN 12 11   WBC 16.3* 14.4*     Pertinent Cultures:  Date Source Results   4/29/24 Urine cx  >100K    MRSA Nasal Swab: N/A. Non-respiratory infection.    Plan:  Dosing recommendations based on Bayesian software  Start vancomycin 2000mg (23.1mg/kg) load x1 followed by 1250mg q12h  Anticipated AUC of 473 and trough concentration of 12.3 at steady state  Renal labs as indicated   Vancomycin concentration ordered for 5/2/24 @ 0600   Pharmacy will continue to monitor patient and adjust therapy as indicated    Thank you for the consult,  Maryjane Jones, PharmD   Pharmacy Resident  4/30/2024 6:52 PM  
[facility]   Pharmacy Pharmacokinetic Monitoring Service - Vancomycin    Indication: UTI  Target Concentration: Goal AUC/DEMI 400-600 mg*hr/L  Day of Therapy: 3  Additional Antimicrobials: none    Pertinent Laboratory Values:   Wt Readings from Last 1 Encounters:   05/02/24 87.1 kg (192 lb 0.4 oz)     Temp Readings from Last 1 Encounters:   05/02/24 99.2 °F (37.3 °C) (Oral)     Estimated Creatinine Clearance: 113 mL/min (based on SCr of 0.5 mg/dL).  Recent Labs     05/01/24  0605 05/02/24  0634   CREATININE 0.5 0.5   BUN 9 7   WBC 10.7 10.1     Pertinent Cultures:  Date Source Results   4/29/24 Urine cx  >100K CFU/mL MRSA   MRSA Nasal Swab: N/A. Non-respiratory infection.    Recent vancomycin administrations                     vancomycin (VANCOCIN) 1250 mg in sodium chloride 0.9% 250 mL IVPB (mg) 1,250 mg New Bag 05/02/24 0932     1,250 mg New Bag 05/01/24 2054     1,250 mg New Bag  0911    vancomycin (VANCOCIN) 2,000 mg in sodium chloride 0.9 % 500 mL IVPB (mg) 2,000 mg New Bag 04/30/24 2043                  Assessment:  Date/Time Current Dose Concentration Timing of Concentration (h) AUC C trough,ss   05/02/24 1250 mg Q12H 10.9 ~10 hours post dose 493 13.1   Note: Serum concentrations collected for AUC dosing may appear elevated if collected in close proximity to the dose administered, this is not necessarily an indication of toxicity    Plan:  Current dosing regimen is therapeutic  Continue current dose of 1250 mg Q12H  Repeat vancomycin concentration not ordered at this time  Pharmacy will continue to monitor patient and adjust therapy as indicated    Thank you for the consult,  Brandy Marin, PharmD  PGY1 Resident     
Noted hospice referral was order on 04.25.2024, was not called to hospice office. Visit made to discuss with Kalpana, patient anxious in bed, could not find glasses. Found and assisted patient to get rearranged in bed, introduced self. Offered to complete referral with her or can do set up if she would like for others to be present for information. She shook head yes to wanting someone else present, said her brother Kristopher.   Call placed to brother Kristopher tonight or tomorrow does not work well for him. Asked about Sunday referral if would be okay. Told him that should be fine. Set time of noon on 04.28.2024 for hospice referral. Patient not discharging over weekend as everything not set up by facility and plan not confirmed.   Patient upset and saying she wants to go home TODAY.  Told her that nurse does not believe that physicians feel that she should go home, she said no \"I AM GOING HOME\". Let her know that this is between the providers and her but unsafe for her to be at home.   Updated primary nurse of planned time of referral as well as Dr. Church and primary nurse.      
Nurse called report to Nurse at [facility], updates and concerns were answered. LACP to transport at 2:30 pm.  
Nutrition Note:  Due to change in medical/code status, dietitian will sign off - pt is able to continue ONS if desired.  Note hospice has been consulted and pt will be discharging with hospice. If nutrition intervention is required, please submit a dietitian consult.     Quique Leigh RD, LD  Phone: [phone]   
Palliative Care note today states pt is not interested in chemotherapy treatment for her cancer.  Discussed with pt today who states she discussed with her brother last night.  She states she does not wish to proceed with chemotherapy treatment for her cancer.  She is looking to get a stent placed to help alleviate symptoms.  She understands this will only help her symptoms and will not treat her cancer.  She is looking to be discharged back to SNF.  Typically, pts who live in SNF cannot get chemotherapy treatment.  This has been discussed with the patient and her brother multiple times.  Initially, their plan was for pt to go to SNF for rehab for a few weeks to get stronger.  Her brother, Kristopher, has stated she is not working with therapy, not walking at SNF, not improving at all.  We discussed that her cancer will be allowed to progress/worsen if not treated.  She verbalizes understanding of this.  We have discussed that pt can change her mind at any time.  Oncology will sign off.  Please call with questions, concerns, or if pt changes mind.    Electronically signed by PAO Chinchilla CNP on 4/24/2024 at 1:13 PM    
Patient appears more short of breath with any exertion this evening. Patient states it comes and goes. Discussed with Dr. Church. See order for cxr. Report to Zaynab FOUNTAIN.   
Patient being transferred to [facility]. Report called to Jessica. Attempted to call brother Kristopher BIRD, to notify of transfer but was sent to voicemail. Patient belongings sent with.  
Patient came from [facility]. Patient is wearing a cardiac event monitor. The phone is missing for the monitor. I talked to her brother Kristopher. He said [facility] gave them her belongs and the [employer] phone or box was not included. It is lost at this time.   
Patient does not have IV access. MD aware.   
Patient educated on how to use incentive spirometer. Patient verbalized understanding and demonstrated proper use. Emphasized importance and usage of device, with coughing and deep breathing daily.    
Patient educated on how to use incentive spirometer. Patient verbalized understanding and demonstrated proper use. Emphasized importance and usage of device, with coughing and deep breathing daily.       
Patient educated on how to use incentive spirometer. Patient verbalized understanding and demonstrated proper use. Emphasized importance and usage of device, with coughing and deep breathing every daily hours while awake.        
Patient in endo for bronchoscopy. Pictures taken. BAL of right lower lobe completed. Specimens labeled and sent to lab. Tumor debulking completed. No biopsies taken for pathology. APC used on pulmonary settings. 10-12mm dilation balloon used. Covered transbronchial stent 12mm x 30mm implanted in bronchus intermedius. Fluro used. Procedure completed. Patient tolerated well.    Scope  and  used.     
Patient states feeling anxious at this time. See ativan given per order. Monitor.   
Patient transported to [facility] via bed. No complaints. No IV access.  Jessica Acharya LPN  
Pt admitted to  7K19 via bed.     Complaints: transfer from .      Vital signs obtained. Assessment completed.    Two nurse skin assessment performed by July FOUNTAIN and Jessica MOLINA. Oriented to room.     Explained patients right to have family, representative or physician notified of their admission.  Patient has N/A for physician to be notified.  Patient has N/A for family/representative to be notified.    The patient is interested in [facility] meds to beds program?:  No    Policies and procedures for  explained. All questions answered with no further questions at this time. Fall prevention and safety brochure discussed with patient.  Bed alarm on. Call light in reach.       
Pt transported to [facility] by [facility] with all of her personal belongings. Hospice notified. No concerns at this time.   
Pt was alone and passive but was blessed.    04/25/24 1417   Encounter Summary   Encounter Overview/Reason Initial Encounter   Service Provided For Patient   Referral/Consult From Rounding   Support System Family members   Last Encounter  04/25/24   Complexity of Encounter Low   Begin Time 1250   End Time  1255   Total Time Calculated 5 min   Spiritual/Emotional needs   Type Spiritual Support   Assessment/Intervention/Outcome   Assessment Passive       
This RN called [facility], was advised that they gave patients bed away due to brothers not holding the bed. Spoke with brother Kristopher, he is aware. Mary FOUNTAIN at [facility] advised that social work and admissions coordinator will visit this issue in the morning. Dr. Church updated on plan. Patients brothers do want patient admitted to hospice. Will pass on to night shift to reach out to social work and hospice in the AM.   
admitted to Endo department and admitted to Endo room 5  Plan of care reviewed with patient.   Call light within reach.   Bed in lowest position, locked, with one bed rail up.   Appropriate arm bands on patient.   Bathroom offered.   All questions and concerns of patient addressed    Name: Kristopher monroe called   Relationship to patient: brother  Phone number: [phone]  
Rate and Rhythm: Normal rate.   Pulmonary:      Effort: Pulmonary effort is normal. No tachypnea or respiratory distress.   Musculoskeletal:      Cervical back: Neck supple.   Neurological:      Mental Status: She is alert. Mental status is at baseline.   Psychiatric:         Mood and Affect: Affect normal. Affect is not flat.         Speech: Speech normal. She is communicative.         Behavior: Behavior is not agitated, aggressive or hyperactive. Behavior is cooperative.         Cognition and Memory: Cognition is not impaired.          Palliative Performance Scale   50%  Mainly sit/lie; Extensive disease; Can't do any work; Considerable assist; intake normal or reduced; LOC full/confusion     Assessment and Plan   Kalpana Braga is a 68 y.o. who is admitted to [facility] for Acute hypoxic respiratory failure (HCC). Palliative care is consulted for Goals of Care and Symptom Management.  Her symptoms are well-controlled at this time.  We will continue with her current medications.  She again reiterated that she did not want to receive any kind of systemic treatment for her cancer.  We had a discussion and I am brought up that hospice would be the best plan of care if she did not want to pursue further aggressive medical treatments.  She was agreeable to meeting with hospice with her family later today.  We will try to get hospice set up prior to her going back to the facility as she will not need to be pre-CERT for that.  Palliative care will continue to follow patient while she is hospitalized.         Principal Problem:    Acute hypoxic respiratory failure (HCC)  Active Problems:    COPD (chronic obstructive pulmonary disease) (HCC)    Malignant neoplasm of upper lobe of right lung (HCC)    Palliative care patient    Pleural effusion on right    Elevated liver enzymes    Restless    Anemia    Severe malnutrition (HCC)  Resolved Problems:    * No resolved hospital problems. *     Continue current 
0-2 04/22/2024 11:15 AM    BLOODU NEGATIVE 04/22/2024 11:15 AM    SPECGRAV 1.022 04/22/2024 11:15 AM    GLUCOSEU NEGATIVE 03/24/2024 06:00 AM       Radiology:  US THORACENTESIS Which side should the procedure be performed? Right    Result Date: 4/22/2024  PROCEDURE: US THORACENTESIS CLINICAL INFORMATION: effusion w/almost complete whiteout, concern for malignant effusion . COMPARISON: Chest x-ray 4/22/2024. PROCEDURE: Physician performing procedure: Dr Templeton Informed consent signed: yes Local Anesthetic: 2% lidocaine Specimen volume: 73 ml Catheter: 10 cm 5 Sinhala Aspirated pleural fluid volume: 1 liters Aspirated pleural fluid color: yellow Complications: none The benefits and the risk of the procedure were explained to the patient and her brother, her POA. Her brother was given an opportunity to ask questions. Signed consent was obtained. A preprocedure timeout was performed. Limited ultrasound exam of the right chest showed  large  amount of pleural fluid.  The right posterior chest was marked with the physician's initials after the patient identified this is the correct side.  Using ultrasound guidance, a site was marked on  the skin. The right side of the posterior chest was prepped and draped using the usual sterile technique and the skin was anesthetized with 2 percent lidocaine. A 5 Bulgarian one-step catheter was introduced to the right pleural space. 1 L of pale yellow fluid drained. The catheter was then removed. The patient tolerated the procedure well with no complications. Specimen sent for laboratory studies as requested. Postprocedure ultrasound images demonstrate no residual fluid.      Successful ultrasound-guided thoracentesis with  1 L of fluid drained. **This report has been created using voice recognition software. It may contain minor errors which are inherent in voice recognition technology.** Final report electronically signed by Dr. Nora Templeton on 4/22/2024 5:10 PM    XR CHEST 
Meet at least 75% of estimated needs, by next RD assessment       Nutrition Monitoring and Evaluation:      Food/Nutrient Intake Outcomes: Food and Nutrient Intake, Supplement Intake  Physical Signs/Symptoms Outcomes: Biochemical Data, GI Status, Nutrition Focused Physical Findings, Skin, Weight    Discharge Planning:    Continue Oral Nutrition Supplement     Quique Leigh, ARI, LD  Contact: [phone]     
Patient  Patient Education: Plan of Care, Verbal Exercise Instruction, Health Promotion and Wellness Education, Activity Pacing, Energy Conservation    Goals:  Patient Goals : not stated  Short Term Goals  Time Frame for Short Term Goals: by discharge  Short Term Goal 1: bed mobility with CGA to get in/out of bed  Short Term Goal 2: transfer with CGA to get in/out of chairs  Short Term Goal 3: amb >20'x1 with walker and CGA to walk safely in room  Long Term Goals  Time Frame for Long Term Goals : no LTGs set secondary to short ELOS    Following session, patient left in safe position with all fall risk precautions in place.  
heparin (porcine)  5,000 Units SubCUTAneous 3 times per day    sodium chloride flush  5-40 mL IntraVENous 2 times per day     ipratropium 0.5 mg-albuterol 2.5 mg, LORazepam, LORazepam, albuterol, oxyCODONE, senna, hydrOXYzine HCl, oxyCODONE, sodium chloride flush, sodium chloride, potassium chloride **OR** potassium alternative oral replacement **OR** potassium chloride, magnesium sulfate, ondansetron **OR** ondansetron, polyethylene glycol, acetaminophen **OR** acetaminophen, hydrALAZINE  IV Drips/Infusions   sodium chloride       Home Medications  Medications Prior to Admission: LORazepam (ATIVAN) 0.5 MG tablet, Take 1 tablet by mouth every 4 hours as needed for Anxiety for up to 30 days. Max Daily Amount: 3 mg  aspirin 81 MG chewable tablet, Take 1 tablet by mouth daily  [] hydrOXYzine HCl (ATARAX) 10 MG tablet, Take 1 tablet by mouth 3 times daily as needed for Anxiety  tiotropium-olodaterol (STIOLTO) 2.5-2.5 MCG/ACT AERS, Inhale 2 puffs into the lungs daily  traZODone (DESYREL) 50 MG tablet, Take 1 tablet by mouth nightly for 15 days  hydrALAZINE (APRESOLINE) 25 MG tablet, Take 1 tablet by mouth 3 times daily Hold if SBP <100 mmHg.  metoprolol tartrate (LOPRESSOR) 25 MG tablet, Take 1 tablet by mouth 2 times daily  guaiFENesin (MUCINEX) 600 MG extended release tablet, Take 2 tablets by mouth 2 times daily  ferrous sulfate (IRON 325) 325 (65 Fe) MG tablet, Take 1 tablet by mouth every other day  clopidogrel (PLAVIX) 75 MG tablet, Take 1 tablet by mouth daily  oxyCODONE (ROXICODONE) 5 MG immediate release tablet, Take 0.5 tablets by mouth every 6 hours as needed for Pain for up to 14 days. Max Daily Amount: 10 mg  oxyCODONE (ROXICODONE) 5 MG immediate release tablet, Take 0.5 tablets by mouth every 6 hours as needed for Pain (Shortness of breath) for up to 30 days. Max Daily Amount: 10 mg  polyethylene glycol (GLYCOLAX) 17 g packet, Take 1 packet by mouth daily  atorvastatin (LIPITOR) 40 MG tablet, Take 1 
concerns  Palliative care prescriptions will be/have been sent to patient's outpatient pharmacy    CURRENT CODE STATUS:  Limited Limited Code details: Intubation/Re-intubation No; Defibrillation/Cardioversion No; Chest Compressions No; Resuscitative Medications No; Other No Comment         Parts of this note may have been dictated by use of voice recognition software and electronically transcribed. The note may contain errors not detected in proofreading    Electronically signed by Carmine Maradiaga MD on 4/24/2024 at 9:36 AM           Palliative Care Office: [phone]     
129*   BILITOT 0.3 0.2* 0.4   ALKPHOS 135* 130* 126     No results for input(s): \"SPECGRAV\", \"PHUR\", \"COLORU\", \"CLARITYU\", \"MUCUS\", \"PROTEINU\", \"BLOODU\", \"RBCUA\", \"WBCUA\", \"BACTERIA\", \"NITRU\", \"GLUCOSEU\", \"BILIRUBINUR\", \"UROBILINOGEN\", \"KETUA\", \"LABCAST\", \"LABCASTTY\", \"AMORPHOS\" in the last 72 hours.    Invalid input(s): \"CRYSTALS\"    PFTs   None on file    Sleep studies   None on file    Cultures    No current cultures    Thoracentesis   Date: 4/22/2024  By: Dr. Templeton   Amount removed: 1000 cc     Latest Reference Range & Units 04/22/24 16:10   Specimen  PLEURAL   Character, Body Fluid  CLEAR   Lymphocytes, Body Fluid % 62   Glucose, Fluid mg/dl 118   LD, Fluid U/L 83   Monocyte Count, Fluid % 8   pH, Fluid  7.38   Protein, Fluid gm/dl 1.7   SEGMENTED NEUTROPHILS, BODY FLUID % 9   Body Fluid RBC /cumm < 2000   Mesothelial Cells Body Fluid -   % 1+  21   Mononuclear Cells Body Fluid % 0.0   Polymorphonuclear Cells Body Fluid % 0.0   Total Nucleated cells Body Fluid 0 - 500 /cumm 209   Total Volume Received Body Fluid ml 70.0     Serum Total Protein: 6.8  Serum LDH: 1435      Total protein ratio i.e Pleural fluid total protein/ Serum Total protein: 1.7/6.8  LDH ratio i.e Pleural fluid LDH/ Serum LDH: 83/1435    Cytology-Negative for malignant cells     Echocardiogram   TTE 3/21/2024    Left Ventricle: Normal left ventricular systolic function with a visually estimated EF of 60 - 65%. Left ventricle size is normal. Normal wall thickness. Normal wall motion. Grade I diastolic dysfunction with normal LAP.    Left Atrium: Left atrium is mildly dilated.    Interatrial Septum: No interatrial shunt visualized with color Doppler and agitated saline. Grade 0 Absence of bubbles. Agitated saline study was negative without provocation.    Image quality is technically difficult. Procedure performed with the patient in a supine position.  Radiology    CXR  PROCEDURE: XR CHEST PORTABLE      CLINICAL INFORMATION: s/p right US 
Ascites.      Final report electronically signed by Dr. Tejas Velasquez on 4/22/2024 3:08 PM      XR CHEST PORTABLE   Final Result   1. Borderline heart size. Total opacification of the right hemithorax which has developed since prior study, consistent with a large effusion and apparent underlying atelectasis/pneumonia.   2. Findings of mild pulmonary vascular congestion left side. Question mild interstitial edema/pneumonitis left mid and upper lung fields. No effusion.            **This report has been created using voice recognition software.  It may contain minor errors which are inherent in voice recognition technology.**      Final report electronically signed by Dr. Constantin Fairbanks on 4/22/2024 11:50 AM          Diet: ADULT DIET; Regular  ADULT ORAL NUTRITION SUPPLEMENT; Breakfast, Lunch, Dinner; Standard High Calorie/High Protein Oral Supplement  ADULT ORAL NUTRITION SUPPLEMENT; Lunch, Dinner; Frozen Oral Supplement    DVT prophylaxis: [] Lovenox                                 [] SCDs                                 [x] SQ Heparin                                 [] Encourage ambulation           [] Already on Anticoagulation     Disposition:    [] Home       [] TCU       [] Rehab       [] Psych       [x] SNF       [] Long Term Care Facility       [] Other-    Code Status: Limited    PT/OT Eval Status: working      Electronically signed by Jerry Prince MD on 4/27/2024 at 11:49 AM       
THORACENTESIS Which side should the procedure be performed? Right   Final Result      Successful ultrasound-guided thoracentesis with  1 L of fluid drained.               **This report has been created using voice recognition software. It may contain minor errors which are inherent in voice recognition technology.**      Final report electronically signed by Dr. Nora Templeton on 4/22/2024 5:10 PM      US GALLBLADDER RUQ   Final Result   1. Normal gallbladder ultrasound without evidence of cholelithiasis.   2. Ascites.      Final report electronically signed by Dr. Tejas Velasquez on 4/22/2024 3:08 PM      XR CHEST PORTABLE   Final Result   1. Borderline heart size. Total opacification of the right hemithorax which has developed since prior study, consistent with a large effusion and apparent underlying atelectasis/pneumonia.   2. Findings of mild pulmonary vascular congestion left side. Question mild interstitial edema/pneumonitis left mid and upper lung fields. No effusion.            **This report has been created using voice recognition software.  It may contain minor errors which are inherent in voice recognition technology.**      Final report electronically signed by Dr. Constantin Fairbanks on 4/22/2024 11:50 AM          Diet: ADULT DIET; Regular  ADULT ORAL NUTRITION SUPPLEMENT; Breakfast, Lunch, Dinner; Standard High Calorie/High Protein Oral Supplement  ADULT ORAL NUTRITION SUPPLEMENT; Lunch, Dinner; Frozen Oral Supplement    DVT prophylaxis: [] Lovenox                                 [] SCDs                                 [x] SQ Heparin                                 [] Encourage ambulation           [] Already on Anticoagulation     Disposition:    [] Home       [] TCU       [] Rehab       [] Psych       [x] SNF       [] Long Term Care Facility       [] Other-    Code Status: Limited    PT/OT Eval Status: working      Electronically signed by Richie Church DO on 4/26/2024 at 3:00 PM       
signed by Dr Philomena Sims on 4/24/2024 9:18 AM      XR CHEST PORTABLE   Final Result   1. No pneumothorax.   2. Some aeration of the right perihilar location. There is still significant opacification throughout the entire right hemithorax with volume loss.         **This report has been created using voice recognition software. It may contain minor errors which are inherent in voice recognition technology.**      Final report electronically signed by Dr. Nora Templeton on 4/22/2024 5:08 PM      US THORACENTESIS Which side should the procedure be performed? Right   Final Result      Successful ultrasound-guided thoracentesis with  1 L of fluid drained.               **This report has been created using voice recognition software. It may contain minor errors which are inherent in voice recognition technology.**      Final report electronically signed by Dr. Nora Templeton on 4/22/2024 5:10 PM      US GALLBLADDER RUQ   Final Result   1. Normal gallbladder ultrasound without evidence of cholelithiasis.   2. Ascites.      Final report electronically signed by Dr. Tejas Velasquez on 4/22/2024 3:08 PM      XR CHEST PORTABLE   Final Result   1. Borderline heart size. Total opacification of the right hemithorax which has developed since prior study, consistent with a large effusion and apparent underlying atelectasis/pneumonia.   2. Findings of mild pulmonary vascular congestion left side. Question mild interstitial edema/pneumonitis left mid and upper lung fields. No effusion.            **This report has been created using voice recognition software.  It may contain minor errors which are inherent in voice recognition technology.**      Final report electronically signed by Dr. Constantin Fairbanks on 4/22/2024 11:50 AM          Diet: ADULT DIET; Regular  ADULT ORAL NUTRITION SUPPLEMENT; Breakfast, Lunch, Dinner; Standard High Calorie/High Protein Oral Supplement  ADULT ORAL NUTRITION SUPPLEMENT; Lunch, Dinner; Frozen Oral 
software.  It may contain minor errors which are inherent in voice recognition technology.**      Final report electronically signed by Dr. Constantin Fairbanks on 4/25/2024 3:35 PM      FLUORO FOR SURGICAL PROCEDURES   Final Result      CT CHEST W CONTRAST   Final Result   1. Near complete opacification of the right hemithorax with almost no aerated lung visualized is unchanged. The moderate right pleural effusion is stable. The right suprahilar mass and associated lymphadenopathy remains difficult to measure discretely    apart from the area of consolidation. The mass very approximately measures 4.7 x 6.0 cm (previously measured 3.6 x 6.0 cm on the comparison CTA chest in which it was better visualize).      2. The small left pleural effusion has increased in volume and the mild consolidation at the left lung base is more prominent. A 15 mm subpleural left upper lobe pulmonary nodule (series 301, image 19) remain stable.      3. Chronic findings are discussed.            **This report has been created using voice recognition software.  It may contain minor errors which are inherent in voice recognition technology.**      Final report electronically signed by Dr Philomena Sims on 4/24/2024 9:18 AM      XR CHEST PORTABLE   Final Result   1. No pneumothorax.   2. Some aeration of the right perihilar location. There is still significant opacification throughout the entire right hemithorax with volume loss.         **This report has been created using voice recognition software. It may contain minor errors which are inherent in voice recognition technology.**      Final report electronically signed by Dr. Nora Templeton on 4/22/2024 5:08 PM      US THORACENTESIS Which side should the procedure be performed? Right   Final Result      Successful ultrasound-guided thoracentesis with  1 L of fluid drained.               **This report has been created using voice recognition software. It may contain minor errors which are inherent 
voice recognition software. It may contain minor errors which are inherent in voice recognition technology.**      Final report electronically signed by Dr. Tejas Velasquez on 4/25/2024 3:19 PM      XR CHEST PORTABLE   Final Result   Status post bronchoscopy with stent insertion.            **This report has been created using voice recognition software.  It may contain minor errors which are inherent in voice recognition technology.**      Final report electronically signed by Dr. Constantin Fairbanks on 4/25/2024 3:35 PM      FLUORO FOR SURGICAL PROCEDURES   Final Result      CT CHEST W CONTRAST   Final Result   1. Near complete opacification of the right hemithorax with almost no aerated lung visualized is unchanged. The moderate right pleural effusion is stable. The right suprahilar mass and associated lymphadenopathy remains difficult to measure discretely    apart from the area of consolidation. The mass very approximately measures 4.7 x 6.0 cm (previously measured 3.6 x 6.0 cm on the comparison CTA chest in which it was better visualize).      2. The small left pleural effusion has increased in volume and the mild consolidation at the left lung base is more prominent. A 15 mm subpleural left upper lobe pulmonary nodule (series 301, image 19) remain stable.      3. Chronic findings are discussed.            **This report has been created using voice recognition software.  It may contain minor errors which are inherent in voice recognition technology.**      Final report electronically signed by Dr Philomena Sims on 4/24/2024 9:18 AM      XR CHEST PORTABLE   Final Result   1. No pneumothorax.   2. Some aeration of the right perihilar location. There is still significant opacification throughout the entire right hemithorax with volume loss.         **This report has been created using voice recognition software. It may contain minor errors which are inherent in voice recognition technology.**      Final report 
Findings of mild pulmonary vascular congestion left side. Question mild interstitial edema/pneumonitis left mid and upper lung fields. No effusion.            **This report has been created using voice recognition software.  It may contain minor errors which are inherent in voice recognition technology.**      Final report electronically signed by Dr. Constantin Fairbanks on 4/22/2024 11:50 AM      XR CHEST (2 VW)    (Results Pending)       Diet: ADULT DIET; Regular  ADULT ORAL NUTRITION SUPPLEMENT; Breakfast, Lunch, Dinner; Standard High Calorie/High Protein Oral Supplement  ADULT ORAL NUTRITION SUPPLEMENT; Lunch, Dinner; Frozen Oral Supplement    DVT prophylaxis: [] Lovenox                                 [] SCDs                                 [x] SQ Heparin                                 [] Encourage ambulation           [] Already on Anticoagulation     Disposition:    [] Home       [] TCU       [] Rehab       [] Psych       [x] SNF - on hospice        [] Long Term Care Facility       [] Other-    Code Status: DNR-CC      Electronically signed by Richie Church DO on 5/1/2024 at 10:36 PM       
Jareth Informed consent signed: yes Local Anesthetic: 2% lidocaine Specimen volume: 73 ml Catheter: 10 cm 5 Uzbek Aspirated pleural fluid volume: 1 liters Aspirated pleural fluid color: yellow Complications: none The benefits and the risk of the procedure were explained to the patient and her brother, her POA. Her brother was given an opportunity to ask questions. Signed consent was obtained. A preprocedure timeout was performed. Limited ultrasound exam of the right chest showed  large  amount of pleural fluid.  The right posterior chest was marked with the physician's initials after the patient identified this is the correct side.  Using ultrasound guidance, a site was marked on  the skin. The right side of the posterior chest was prepped and draped using the usual sterile technique and the skin was anesthetized with 2 percent lidocaine. A 5 Divehi one-step catheter was introduced to the right pleural space. 1 L of pale yellow fluid drained. The catheter was then removed. The patient tolerated the procedure well with no complications. Specimen sent for laboratory studies as requested. Postprocedure ultrasound images demonstrate no residual fluid.      Successful ultrasound-guided thoracentesis with  1 L of fluid drained. **This report has been created using voice recognition software. It may contain minor errors which are inherent in voice recognition technology.** Final report electronically signed by Dr. Nora Templeton on 4/22/2024 5:10 PM    XR CHEST PORTABLE    Result Date: 4/22/2024  PROCEDURE: XR CHEST PORTABLE CLINICAL INFORMATION: s/p right US guided thoracentesis, r/o ptx. Approximately 1 L of fluid removed from the right chest. COMPARISON: Chest x-ray 4/22/2024. TECHNIQUE: AP upright view of the chest. FINDINGS: There is persistent near complete opacification of the right hemithorax. There are some aerated areas in the right perihilar location. The amount of aeration is minimal. There is no pneumothorax.

## 2024-05-07 NOTE — RT PROTOCOL NOTE
RT Inhaler-Nebulizer Bronchodilator Protocol Note    There is a bronchodilator order in the chart from a provider indicating to follow the RT Bronchodilator Protocol and there is an “Initiate RT Inhaler-Nebulizer Bronchodilator Protocol” order as well (see protocol at bottom of note).    CXR Findings:  No results found.    The findings from the last RT Protocol Assessment were as follows:   History Pulmonary Disease: Chronic pulmonary disease  Respiratory Pattern: Dyspnea on exertion or RR 21-25 bpm  Breath Sounds: Slightly diminished and/or crackles  Cough: Strong, spontaneous, non-productive  Indication for Bronchodilator Therapy: Decreased or absent breath sounds  Bronchodilator Assessment Score: 6    Aerosolized bronchodilator medication orders have been revised according to the RT Inhaler-Nebulizer Bronchodilator Protocol below.    Respiratory Therapist to perform RT Therapy Protocol Assessment initially then follow the protocol.  Repeat RT Therapy Protocol Assessment PRN for score 0-3 or on second treatment, BID, and PRN for scores above 3.    No Indications - adjust the frequency to every 6 hours PRN wheezing or bronchospasm, if no treatments needed after 48 hours then discontinue using Per Protocol order mode.     If indication present, adjust the RT bronchodilator orders based on the Bronchodilator Assessment Score as indicated below.  Use Inhaler orders unless patient has one or more of the following: on home nebulizer, not able to hold breath for 10 seconds, is not alert and oriented, cannot activate and use MDI correctly, or respiratory rate 25 breaths per minute or more, then use the equivalent nebulizer order(s) with same Frequency and PRN reasons based on the score.  If a patient is on this medication at home then do not decrease Frequency below that used at home.    0-3 - enter or revise RT bronchodilator order(s) to equivalent RT Bronchodilator order with Frequency of every 4 hours PRN for wheezing 
RT Inhaler-Nebulizer Bronchodilator Protocol Note    There is a bronchodilator order in the chart from a provider indicating to follow the RT Bronchodilator Protocol and there is an “Initiate RT Inhaler-Nebulizer Bronchodilator Protocol” order as well (see protocol at bottom of note).    CXR Findings:  No results found.    The findings from the last RT Protocol Assessment were as follows:   History Pulmonary Disease: Chronic pulmonary disease  Respiratory Pattern: Dyspnea on exertion or RR 21-25 bpm  Breath Sounds: Slightly diminished and/or crackles  Cough: Strong, spontaneous, non-productive  Indication for Bronchodilator Therapy: Decreased or absent breath sounds  Bronchodilator Assessment Score: 6    Aerosolized bronchodilator medication orders have been revised according to the RT Inhaler-Nebulizer Bronchodilator Protocol below.    Respiratory Therapist to perform RT Therapy Protocol Assessment initially then follow the protocol.  Repeat RT Therapy Protocol Assessment PRN for score 0-3 or on second treatment, BID, and PRN for scores above 3.    No Indications - adjust the frequency to every 6 hours PRN wheezing or bronchospasm, if no treatments needed after 48 hours then discontinue using Per Protocol order mode.     If indication present, adjust the RT bronchodilator orders based on the Bronchodilator Assessment Score as indicated below.  Use Inhaler orders unless patient has one or more of the following: on home nebulizer, not able to hold breath for 10 seconds, is not alert and oriented, cannot activate and use MDI correctly, or respiratory rate 25 breaths per minute or more, then use the equivalent nebulizer order(s) with same Frequency and PRN reasons based on the score.  If a patient is on this medication at home then do not decrease Frequency below that used at home.    0-3 - enter or revise RT bronchodilator order(s) to equivalent RT Bronchodilator order with Frequency of every 4 hours PRN for wheezing 
RT Inhaler-Nebulizer Bronchodilator Protocol Note    There is a bronchodilator order in the chart from a provider indicating to follow the RT Bronchodilator Protocol and there is an “Initiate RT Inhaler-Nebulizer Bronchodilator Protocol” order as well (see protocol at bottom of note).    CXR Findings:  No results found.    The findings from the last RT Protocol Assessment were as follows:   History Pulmonary Disease: Chronic pulmonary disease  Respiratory Pattern: Mild dyspnea at rest, irregular pattern, or RR 21-25 bpm  Breath Sounds: Slightly diminished and/or crackles  Cough: Strong, spontaneous, non-productive  Indication for Bronchodilator Therapy: Decreased or absent breath sounds, On home bronchodilators  Bronchodilator Assessment Score: 8    Aerosolized bronchodilator medication orders have been revised according to the RT Inhaler-Nebulizer Bronchodilator Protocol below.    Respiratory Therapist to perform RT Therapy Protocol Assessment initially then follow the protocol.  Repeat RT Therapy Protocol Assessment PRN for score 0-3 or on second treatment, BID, and PRN for scores above 3.    No Indications - adjust the frequency to every 6 hours PRN wheezing or bronchospasm, if no treatments needed after 48 hours then discontinue using Per Protocol order mode.     If indication present, adjust the RT bronchodilator orders based on the Bronchodilator Assessment Score as indicated below.  Use Inhaler orders unless patient has one or more of the following: on home nebulizer, not able to hold breath for 10 seconds, is not alert and oriented, cannot activate and use MDI correctly, or respiratory rate 25 breaths per minute or more, then use the equivalent nebulizer order(s) with same Frequency and PRN reasons based on the score.  If a patient is on this medication at home then do not decrease Frequency below that used at home.    0-3 - enter or revise RT bronchodilator order(s) to equivalent RT Bronchodilator order 
RT Inhaler-Nebulizer Bronchodilator Protocol Note    There is a bronchodilator order in the chart from a provider indicating to follow the RT Bronchodilator Protocol and there is an “Initiate RT Inhaler-Nebulizer Bronchodilator Protocol” order as well (see protocol at bottom of note).    CXR Findings:  No results found.    The findings from the last RT Protocol Assessment were as follows:   History Pulmonary Disease: Chronic pulmonary disease  Respiratory Pattern: Regular pattern and RR 12-20 bpm  Breath Sounds: Clear breath sounds  Cough: Strong, spontaneous, non-productive  Indication for Bronchodilator Therapy: Decreased or absent breath sounds  Bronchodilator Assessment Score: 2    Aerosolized bronchodilator medication orders have been revised according to the RT Inhaler-Nebulizer Bronchodilator Protocol below.    Respiratory Therapist to perform RT Therapy Protocol Assessment initially then follow the protocol.  Repeat RT Therapy Protocol Assessment PRN for score 0-3 or on second treatment, BID, and PRN for scores above 3.    No Indications - adjust the frequency to every 6 hours PRN wheezing or bronchospasm, if no treatments needed after 48 hours then discontinue using Per Protocol order mode.     If indication present, adjust the RT bronchodilator orders based on the Bronchodilator Assessment Score as indicated below.  Use Inhaler orders unless patient has one or more of the following: on home nebulizer, not able to hold breath for 10 seconds, is not alert and oriented, cannot activate and use MDI correctly, or respiratory rate 25 breaths per minute or more, then use the equivalent nebulizer order(s) with same Frequency and PRN reasons based on the score.  If a patient is on this medication at home then do not decrease Frequency below that used at home.    0-3 - enter or revise RT bronchodilator order(s) to equivalent RT Bronchodilator order with Frequency of every 4 hours PRN for wheezing or increased work 
RT Inhaler-Nebulizer Bronchodilator Protocol Note    There is a bronchodilator order in the chart from a provider indicating to follow the RT Bronchodilator Protocol and there is an “Initiate RT Inhaler-Nebulizer Bronchodilator Protocol” order as well (see protocol at bottom of note).    CXR Findings:  No results found.    The findings from the last RT Protocol Assessment were as follows:   History Pulmonary Disease: Chronic pulmonary disease  Respiratory Pattern: Regular pattern and RR 12-20 bpm  Breath Sounds: Clear breath sounds  Cough: Strong, spontaneous, non-productive  Indication for Bronchodilator Therapy: Decreased or absent breath sounds  Bronchodilator Assessment Score: 2    Aerosolized bronchodilator medication orders have been revised according to the RT Inhaler-Nebulizer Bronchodilator Protocol below.    Respiratory Therapist to perform RT Therapy Protocol Assessment initially then follow the protocol.  Repeat RT Therapy Protocol Assessment PRN for score 0-3 or on second treatment, BID, and PRN for scores above 3.    No Indications - adjust the frequency to every 6 hours PRN wheezing or bronchospasm, if no treatments needed after 48 hours then discontinue using Per Protocol order mode.     If indication present, adjust the RT bronchodilator orders based on the Bronchodilator Assessment Score as indicated below.  Use Inhaler orders unless patient has one or more of the following: on home nebulizer, not able to hold breath for 10 seconds, is not alert and oriented, cannot activate and use MDI correctly, or respiratory rate 25 breaths per minute or more, then use the equivalent nebulizer order(s) with same Frequency and PRN reasons based on the score.  If a patient is on this medication at home then do not decrease Frequency below that used at home.    0-3 - enter or revise RT bronchodilator order(s) to equivalent RT Bronchodilator order with Frequency of every 4 hours PRN for wheezing or increased work 
RT Inhaler-Nebulizer Bronchodilator Protocol Note    There is a bronchodilator order in the chart from a provider indicating to follow the RT Bronchodilator Protocol and there is an “Initiate RT Inhaler-Nebulizer Bronchodilator Protocol” order as well (see protocol at bottom of note).    CXR Findings:  No results found.    The findings from the last RT Protocol Assessment were as follows:   History Pulmonary Disease: Chronic pulmonary disease  Respiratory Pattern: Regular pattern and RR 12-20 bpm  Breath Sounds: Slightly diminished and/or crackles  Cough: Strong, productive  Indication for Bronchodilator Therapy: Decreased or absent breath sounds, On home bronchodilators  Bronchodilator Assessment Score: 5    Aerosolized bronchodilator medication orders have been revised according to the RT Inhaler-Nebulizer Bronchodilator Protocol below.    Respiratory Therapist to perform RT Therapy Protocol Assessment initially then follow the protocol.  Repeat RT Therapy Protocol Assessment PRN for score 0-3 or on second treatment, BID, and PRN for scores above 3.    No Indications - adjust the frequency to every 6 hours PRN wheezing or bronchospasm, if no treatments needed after 48 hours then discontinue using Per Protocol order mode.     If indication present, adjust the RT bronchodilator orders based on the Bronchodilator Assessment Score as indicated below.  Use Inhaler orders unless patient has one or more of the following: on home nebulizer, not able to hold breath for 10 seconds, is not alert and oriented, cannot activate and use MDI correctly, or respiratory rate 25 breaths per minute or more, then use the equivalent nebulizer order(s) with same Frequency and PRN reasons based on the score.  If a patient is on this medication at home then do not decrease Frequency below that used at home.    0-3 - enter or revise RT bronchodilator order(s) to equivalent RT Bronchodilator order with Frequency of every 4 hours PRN for 
RT Inhaler-Nebulizer Bronchodilator Protocol Note    There is a bronchodilator order in the chart from a provider indicating to follow the RT Bronchodilator Protocol and there is an “Initiate RT Inhaler-Nebulizer Bronchodilator Protocol” order as well (see protocol at bottom of note).    CXR Findings:  No results found.    The findings from the last RT Protocol Assessment were as follows:   History Pulmonary Disease: Chronic pulmonary disease  Respiratory Pattern: Regular pattern and RR 12-20 bpm  Breath Sounds: Slightly diminished and/or crackles  Cough: Strong, spontaneous, non-productive  Indication for Bronchodilator Therapy: Decreased or absent breath sounds  Bronchodilator Assessment Score: 4    Aerosolized bronchodilator medication orders have been revised according to the RT Inhaler-Nebulizer Bronchodilator Protocol below.    Respiratory Therapist to perform RT Therapy Protocol Assessment initially then follow the protocol.  Repeat RT Therapy Protocol Assessment PRN for score 0-3 or on second treatment, BID, and PRN for scores above 3.    No Indications - adjust the frequency to every 6 hours PRN wheezing or bronchospasm, if no treatments needed after 48 hours then discontinue using Per Protocol order mode.     If indication present, adjust the RT bronchodilator orders based on the Bronchodilator Assessment Score as indicated below.  Use Inhaler orders unless patient has one or more of the following: on home nebulizer, not able to hold breath for 10 seconds, is not alert and oriented, cannot activate and use MDI correctly, or respiratory rate 25 breaths per minute or more, then use the equivalent nebulizer order(s) with same Frequency and PRN reasons based on the score.  If a patient is on this medication at home then do not decrease Frequency below that used at home.    0-3 - enter or revise RT bronchodilator order(s) to equivalent RT Bronchodilator order with Frequency of every 4 hours PRN for wheezing or 
RT Inhaler-Nebulizer Bronchodilator Protocol Note    There is a bronchodilator order in the chart from a provider indicating to follow the RT Bronchodilator Protocol and there is an “Initiate RT Inhaler-Nebulizer Bronchodilator Protocol” order as well (see protocol at bottom of note).    CXR Findings:  No results found.    The findings from the last RT Protocol Assessment were as follows:   History Pulmonary Disease: Chronic pulmonary disease  Respiratory Pattern: Regular pattern and RR 12-20 bpm  Breath Sounds: Slightly diminished and/or crackles  Cough: Strong, spontaneous, non-productive  Indication for Bronchodilator Therapy: Decreased or absent breath sounds  Bronchodilator Assessment Score: 4    Aerosolized bronchodilator medication orders have been revised according to the RT Inhaler-Nebulizer Bronchodilator Protocol below.    Respiratory Therapist to perform RT Therapy Protocol Assessment initially then follow the protocol.  Repeat RT Therapy Protocol Assessment PRN for score 0-3 or on second treatment, BID, and PRN for scores above 3.    No Indications - adjust the frequency to every 6 hours PRN wheezing or bronchospasm, if no treatments needed after 48 hours then discontinue using Per Protocol order mode.     If indication present, adjust the RT bronchodilator orders based on the Bronchodilator Assessment Score as indicated below.  Use Inhaler orders unless patient has one or more of the following: on home nebulizer, not able to hold breath for 10 seconds, is not alert and oriented, cannot activate and use MDI correctly, or respiratory rate 25 breaths per minute or more, then use the equivalent nebulizer order(s) with same Frequency and PRN reasons based on the score.  If a patient is on this medication at home then do not decrease Frequency below that used at home.    0-3 - enter or revise RT bronchodilator order(s) to equivalent RT Bronchodilator order with Frequency of every 4 hours PRN for wheezing or 
RT Inhaler-Nebulizer Bronchodilator Protocol Note    There is a bronchodilator order in the chart from a provider indicating to follow the RT Bronchodilator Protocol and there is an “Initiate RT Inhaler-Nebulizer Bronchodilator Protocol” order as well (see protocol at bottom of note).    CXR Findings:  XR CHEST PORTABLE    Result Date: 4/22/2024  1. No pneumothorax. 2. Some aeration of the right perihilar location. There is still significant opacification throughout the entire right hemithorax with volume loss. **This report has been created using voice recognition software. It may contain minor errors which are inherent in voice recognition technology.** Final report electronically signed by Dr. Nora Templeton on 4/22/2024 5:08 PM    XR CHEST PORTABLE    Result Date: 4/22/2024  1. Borderline heart size. Total opacification of the right hemithorax which has developed since prior study, consistent with a large effusion and apparent underlying atelectasis/pneumonia. 2. Findings of mild pulmonary vascular congestion left side. Question mild interstitial edema/pneumonitis left mid and upper lung fields. No effusion. **This report has been created using voice recognition software.  It may contain minor errors which are inherent in voice recognition technology.** Final report electronically signed by Dr. Constantin Fairbanks on 4/22/2024 11:50 AM      The findings from the last RT Protocol Assessment were as follows:   History Pulmonary Disease: Chronic pulmonary disease  Respiratory Pattern: Mild dyspnea at rest, irregular pattern, or RR 21-25 bpm  Breath Sounds: Slightly diminished and/or crackles  Cough: Strong, spontaneous, non-productive  Indication for Bronchodilator Therapy: Decreased or absent breath sounds, On home bronchodilators (pt states she takes arosols 3-4 times per day at home.)  Bronchodilator Assessment Score: 8    Aerosolized bronchodilator medication orders have been revised according to the RT 
RT Inhaler-Nebulizer Bronchodilator Protocol Note    There is a bronchodilator order in the chart from a provider indicating to follow the RT Bronchodilator Protocol and there is an “Initiate RT Inhaler-Nebulizer Bronchodilator Protocol” order as well (see protocol at bottom of note).    CXR Findings:  XR CHEST PORTABLE    Result Date: 4/22/2024  1. No pneumothorax. 2. Some aeration of the right perihilar location. There is still significant opacification throughout the entire right hemithorax with volume loss. **This report has been created using voice recognition software. It may contain minor errors which are inherent in voice recognition technology.** Final report electronically signed by Dr. Nora Templeton on 4/22/2024 5:08 PM    XR CHEST PORTABLE    Result Date: 4/22/2024  1. Borderline heart size. Total opacification of the right hemithorax which has developed since prior study, consistent with a large effusion and apparent underlying atelectasis/pneumonia. 2. Findings of mild pulmonary vascular congestion left side. Question mild interstitial edema/pneumonitis left mid and upper lung fields. No effusion. **This report has been created using voice recognition software.  It may contain minor errors which are inherent in voice recognition technology.** Final report electronically signed by Dr. Constantin Fairbanks on 4/22/2024 11:50 AM      The findings from the last RT Protocol Assessment were as follows:   History Pulmonary Disease: Chronic pulmonary disease  Respiratory Pattern: Mild dyspnea at rest, irregular pattern, or RR 21-25 bpm  Breath Sounds: Slightly diminished and/or crackles  Cough: Strong, spontaneous, non-productive  Indication for Bronchodilator Therapy: Decreased or absent breath sounds, On home bronchodilators (pt states she takes arosols 3-4 times per day at home.)  Bronchodilator Assessment Score: 8    Aerosolized bronchodilator medication orders have been revised according to the RT 
RT Nebulizer Bronchodilator Protocol Note    There is a bronchodilator order in the chart from a provider indicating to follow the RT Bronchodilator Protocol and there is an “Initiate RT Bronchodilator Protocol” order as well (see protocol at bottom of note).    CXR Findings:  XR CHEST PORTABLE    Result Date: 4/25/2024  Status post bronchoscopy with stent insertion. **This report has been created using voice recognition software.  It may contain minor errors which are inherent in voice recognition technology.** Final report electronically signed by Dr. Constantin Fairbanks on 4/25/2024 3:35 PM    XR CHEST PORTABLE    Result Date: 4/25/2024  Persistent complete opacification of the right thorax. **This report has been created using voice recognition software. It may contain minor errors which are inherent in voice recognition technology.** Final report electronically signed by Dr. Tejas Vleasquez on 4/25/2024 3:19 PM      The findings from the last RT Protocol Assessment were as follows:  Smoking: Chronic pulmonary disease  Respiratory Pattern: Regular pattern and RR 12-20 bpm  Breath Sounds: Clear breath sounds  Cough: Strong, spontaneous, non-productive  Indication for Bronchodilator Therapy: Decreased or absent breath sounds, On home bronchodilators  Bronchodilator Assessment Score: 2    Aerosolized bronchodilator medication orders have been revised according to the RT Nebulizer Bronchodilator Protocol below.    Respiratory Therapist to perform RT Therapy Protocol Assessment initially then follow the protocol.  Repeat RT Therapy Protocol Assessment PRN for score 0-3 or on second treatment, BID, and PRN for scores above 3.    No Indications - adjust the frequency to every 6 hours PRN wheezing or bronchospasm, if no treatments needed after 48 hours then discontinue using Per Protocol order mode.     If indication present, adjust the RT bronchodilator orders based on the Bronchodilator Assessment Score as indicated below.  
of breathing using Per Protocol order mode.        4-6 - enter or revise RT Bronchodilator order(s) to two equivalent RT bronchodilator orders with one order with BID Frequency and one order with Frequency of every 4 hours PRN wheezing or increased work of breathing using Per Protocol order mode.        7-10 - enter or revise RT Bronchodilator order(s) to two equivalent RT bronchodilator orders with one order with TID Frequency and one order with Frequency of every 4 hours PRN wheezing or increased work of breathing using Per Protocol order mode.       11-13 - enter or revise RT Bronchodilator order(s) to one equivalent RT bronchodilator order with QID Frequency and an Albuterol order with Frequency of every 4 hours PRN wheezing or increased work of breathing using Per Protocol order mode.      Greater than 13 - enter or revise RT Bronchodilator order(s) to one equivalent RT bronchodilator order with every 4 hours Frequency and an Albuterol order with Frequency of every 2 hours PRN wheezing or increased work of breathing using Per Protocol order mode.     RT to enter RT Home Evaluation for COPD & MDI Assessment order using Per Protocol order mode.    Electronically signed by Farzana Menendez RCP on 4/28/2024 at 11:02 AM

## 2024-05-07 NOTE — CARE COORDINATION
5/7/24, 8:52 AM EDT    DISCHARGE PLANNING EVALUATION    [facility] can accept today, has completed financial arrangements with family.  Confirmed plan with brotherDario, message left for brotherKristopher.        Transport scheduled for 2:30 pm, confirmed with [facility].  [facility], confirmed with family that they prefer [facility].      5/7/24, 8:56 AM EDT    Patient goals/plan/ treatment preferences discussed by  and .  Patient goals/plan/ treatment preferences reviewed with patient/ family.  Patient/ family verbalize understanding of discharge plan and are in agreement with goal/plan/treatment preferences.  Understanding was demonstrated using the teach back method.  AVS provided by RN at time of discharge, which includes all necessary medical information pertaining to the patients current course of illness, treatment, post-discharge goals of care, and treatment preferences.     Services At/After Discharge: Skilled Nursing Facility (SNF), Hospice, and In ambulance

## 2024-05-09 ENCOUNTER — TELEPHONE (OUTPATIENT)
Dept: FAMILY MEDICINE CLINIC | Age: 69
End: 2024-05-09

## 2024-05-09 NOTE — TELEPHONE ENCOUNTER
[facility] Transitions Initial Follow Up Call    Outreach made within 2 business days of discharge: Yes    Patient: Kalpana Braga Patient : 1955   MRN: [id number]  Reason for Admission: There are no discharge diagnoses documented for the most recent discharge.  Discharge Date: 24       Spoke with: Voicemail Full.  Could not leave message.

## 2024-05-09 NOTE — PROGRESS NOTES
Hospice Certification of Terminal Illness       69 yo with terminal dx of lung cancer.  She presented to the hospital 3/21 and was found to have mass causing bronchial compression and RU lung collapse on CT scan. 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  The mass extends into the mediastinum, there are no known distant metastasis. She has a large pleural effusion on th side of the cancer.  She refused systemic therapy for the cancer.  PPS is  30% with performance status very impaired from weeks ago at 50%. She is no longer ambulatory.  Change in wt with current 191# declined from 232# July 2023.  Comorbid Conditions include recent MCA CVA , anemia, abnormal LFTs, COPD    DNRCC    I certify this pt has a life expectancy of 6 months or less if the disease follows it's normal expected course.    Yasemin Hampton MD  Board Certified Hospice and Palliative Medicine  Hospice Medical Director Certified

## 2024-05-13 LAB — ECHO BSA: 1.97 M2

## 2024-05-27 LAB
FUNGUS SPEC CULT: NORMAL
FUNGUS SPEC FUNGUS STN: NORMAL

## 2025-03-09 NOTE — PROGRESS NOTES
[address] [place] [address] [address], [address], [place]  Phone:  [phone]  Fax:  [phone]      Marycruz Cevallos       Name: Fani Cook  : 1955          Chief Complaint:     Chief Complaint   Patient presents with    Wellness Program     see work form       History of Present Illness:      Fani Cook is a 71 y.o.  female who presents today for a health maintenance examination. Health Maintenance  Smoker?:  Yes; not ready to quit and doesn't want low-dose lung CT because she doesn't want to know if she has lung cancer  Healthy diet?:  No  Routine physical activity?:  No  Routine eye exams?:  Yes  Routine dental exams?:  No, has dentures  Last mammogram?:  3/2019  Last colon CA screening?:  2012  Last PAP?:  3 years ago--was WNL; h/o MILES exposure  Living will?:  Yes      Past Medical History:     Past Medical History:   Diagnosis Date    COPD (chronic obstructive pulmonary disease) ([facility] [address]     Hyperlipidemia     Hypertension     Osteoarthritis     Smoker     Urticaria         Past Surgical History:     Past Surgical History:   Procedure Laterality Date    DILATION AND CURETTAGE OF UTERUS      KNEE ARTHROSCOPY          Medications:       Outpatient Medications Prior to Visit   Medication Sig Dispense Refill    Glucosamine-Chondroit-Vit C-Mn (GLUCOSAMINE CHONDR 1500 COMPLX PO) Take by mouth      Cholecalciferol (VITAMIN D3) 3000 units TABS Take by mouth      calcium carbonate (OSCAL) 500 MG TABS tablet Take 500 mg by mouth daily      Multiple Vitamins-Minerals (CENTRUM SILVER) TABS Take 1 tablet by mouth daily.       metoprolol tartrate (LOPRESSOR) 50 MG tablet Take 25 mg by mouth 2 times daily       lovastatin (MEVACOR) 20 MG tablet Take 1 tablet by mouth nightly 90 tablet 1    losartan (COZAAR) 100 MG tablet Take 1 tablet by mouth daily 90 tablet 1    FLUoxetine (PROZAC) 20 MG capsule Take 1 capsule by mouth daily 90 capsule 1    albuterol (PROVENTIL) (2.5 MG/3ML) 0.083% nebulizer solution Take 3 mLs by nebulization every 6 hours as needed for Wheezing 2 Package 1    calcium citrate-vitamin D (CITRICAL + D) 315-250 MG-UNIT TABS per tablet Take 1 tablet by mouth daily      albuterol sulfate HFA (PROVENTIL HFA) 108 (90 BASE) MCG/ACT inhaler Inhale 2 puffs into the lungs every 4 hours as needed for Wheezing or Shortness of Breath 1 Inhaler 0     No facility-administered medications prior to visit. Allergies       Apresoline [hydralazine]; Enalapril; Hctz [hydrochlorothiazide]; Sulfa antibiotics; and Tenex [guanfacine hcl]      Social History:     Social:          Social History     Socioeconomic History    Marital status:      Spouse name: Not on file    Number of children: Not on file    Years of education: Not on file    Highest education level: Not on file   Occupational History    Not on file   Social Needs    Financial resource strain: Not on file    Food insecurity:     Worry: Not on file     Inability: Not on file    Transportation needs:     Medical: Not on file     Non-medical: Not on file   Tobacco Use    Smoking status: Current Every Day Smoker     Packs/day: 1.00     Types: Cigarettes    Smokeless tobacco: Never Used   Substance and Sexual Activity    Alcohol use:  Yes     Alcohol/week: 2.0 - 3.0 standard drinks     Types: 2 - 3 Shots of liquor per week     Comment: 2-3  16 oz glasses balck velvet daily    Drug use: No    Sexual activity: Not Currently   Lifestyle    Physical activity:     Days per week: Not on file     Minutes per session: Not on file    Stress: Not on file   Relationships    Social connections:     Talks on phone: Not on file     Gets together: Not on file     Attends Judaism service: Not on file     Active member of club or organization: Not on file     Attends meetings of clubs or organizations: Not on file     Relationship status: Not on file    Intimate partner violence:     Fear of current or ex partner: Not on file     Emotionally abused: Not on file     Physically abused: Not on file     Forced sexual activity: Not on file   Other Topics Concern    Not on file   Social History Narrative    Not on file       Family History:     Family History   Problem Relation Age of Onset    Heart Disease Father        Review of Systems:     Review of Systems   Constitutional: Negative for chills and fever. HENT: Negative for congestion and rhinorrhea. Eyes: Negative for discharge and redness. Respiratory: Positive for cough. Negative for shortness of breath and wheezing. Cardiovascular: Negative for chest pain and palpitations. Gastrointestinal: Negative for blood in stool, constipation, diarrhea and vomiting. Genitourinary: Negative for dysuria and frequency. Musculoskeletal: Negative for arthralgias and myalgias. Skin: Negative for color change and rash. Allergic/Immunologic: Negative for environmental allergies and food allergies. Neurological: Negative for weakness and headaches. Psychiatric/Behavioral: Negative for decreased concentration and dysphoric mood. Physical Exam:     Vitals:  Blood pressure 116/74, pulse 70, height 5' 4\" (1.626 m), weight 225 lb (102.1 kg). General appearance:  Alert, well appearing, and in no acute distress. Mental status:  Oriented to person, place, and time with normal affect. Head:  Normocephalic and atraumatic. Eyes:   Extraocular eye movements intact, conjunctiva clear. Ears:  Hearing appears to be intact. Nose:  No drainage noted. Mouth:  Mucous membranes moist.  Neck:  Supple, no carotid bruits, thyroid not palpable. Heart:  Normal rate, regular rhythm, no murmurs. Lungs:  Clear to auscultation bilaterally. Respirations unlabored. Abdomen:  Soft, nondistended, bowel sounds present all four quadrants. No masses, hepatomegaly, or splenomegaly. Neurological:  Normal speech.   Extremities:  Peripheral pulses palpable, no pedal parents

## (undated) DEVICE — NEEDLE ASPIR 21GA L700MM US GUID TREAT DST END FOR EFFICIENT

## (undated) DEVICE — PULMONARY BALLOON DILATATION CATHETER: Brand: CRE PULMONARY

## (undated) DEVICE — FIAPC® PROBE W/ FILTER 2200 C OD 2.3MM/6.9FR; L 2.2M/7.2FT: Brand: ERBE